# Patient Record
Sex: MALE | Race: WHITE | NOT HISPANIC OR LATINO | Employment: OTHER | URBAN - METROPOLITAN AREA
[De-identification: names, ages, dates, MRNs, and addresses within clinical notes are randomized per-mention and may not be internally consistent; named-entity substitution may affect disease eponyms.]

---

## 2017-04-10 ENCOUNTER — TELEPHONE (OUTPATIENT)
Dept: INTERNAL MEDICINE | Facility: CLINIC | Age: 82
End: 2017-04-10

## 2017-04-10 ENCOUNTER — CLINICAL SUPPORT (OUTPATIENT)
Dept: INTERNAL MEDICINE | Facility: CLINIC | Age: 82
End: 2017-04-10
Payer: COMMERCIAL

## 2017-04-10 DIAGNOSIS — E78.5 HYPERLIPIDEMIA, UNSPECIFIED HYPERLIPIDEMIA TYPE: ICD-10-CM

## 2017-04-10 DIAGNOSIS — I25.5 ISCHEMIC CARDIOMYOPATHY: ICD-10-CM

## 2017-04-10 DIAGNOSIS — I25.5 ISCHEMIC CARDIOMYOPATHY: Primary | ICD-10-CM

## 2017-04-10 LAB
ALBUMIN SERPL BCP-MCNC: 3.9 G/DL
ALP SERPL-CCNC: 90 U/L
ALT SERPL W/O P-5'-P-CCNC: 17 U/L
ANION GAP SERPL CALC-SCNC: 11 MMOL/L
AST SERPL-CCNC: 21 U/L
BASOPHILS # BLD AUTO: 0.04 K/UL
BASOPHILS NFR BLD: 0.4 %
BILIRUB SERPL-MCNC: 0.4 MG/DL
BUN SERPL-MCNC: 19 MG/DL
CALCIUM SERPL-MCNC: 9.5 MG/DL
CHLORIDE SERPL-SCNC: 103 MMOL/L
CHOLEST/HDLC SERPL: 3.7 {RATIO}
CO2 SERPL-SCNC: 27 MMOL/L
CREAT SERPL-MCNC: 1.2 MG/DL
DIFFERENTIAL METHOD: NORMAL
EOSINOPHIL # BLD AUTO: 0.2 K/UL
EOSINOPHIL NFR BLD: 2.6 %
ERYTHROCYTE [DISTWIDTH] IN BLOOD BY AUTOMATED COUNT: 14 %
EST. GFR  (AFRICAN AMERICAN): >60 ML/MIN/1.73 M^2
EST. GFR  (NON AFRICAN AMERICAN): 54.4 ML/MIN/1.73 M^2
GLUCOSE SERPL-MCNC: 109 MG/DL
HCT VFR BLD AUTO: 45 %
HDL/CHOLESTEROL RATIO: 27.1 %
HDLC SERPL-MCNC: 140 MG/DL
HDLC SERPL-MCNC: 38 MG/DL
HGB BLD-MCNC: 15.1 G/DL
LDLC SERPL CALC-MCNC: 79.8 MG/DL
LYMPHOCYTES # BLD AUTO: 2.2 K/UL
LYMPHOCYTES NFR BLD: 24 %
MCH RBC QN AUTO: 30.6 PG
MCHC RBC AUTO-ENTMCNC: 33.6 %
MCV RBC AUTO: 91 FL
MONOCYTES # BLD AUTO: 0.7 K/UL
MONOCYTES NFR BLD: 7.1 %
NEUTROPHILS # BLD AUTO: 6.1 K/UL
NEUTROPHILS NFR BLD: 65.7 %
NONHDLC SERPL-MCNC: 102 MG/DL
PLATELET # BLD AUTO: 240 K/UL
PMV BLD AUTO: 10.7 FL
POTASSIUM SERPL-SCNC: 5 MMOL/L
PROT SERPL-MCNC: 7.5 G/DL
RBC # BLD AUTO: 4.93 M/UL
SODIUM SERPL-SCNC: 141 MMOL/L
T4 FREE SERPL-MCNC: 0.74 NG/DL
TRIGL SERPL-MCNC: 111 MG/DL
TSH SERPL DL<=0.005 MIU/L-ACNC: 10.44 UIU/ML
WBC # BLD AUTO: 9.24 K/UL

## 2017-04-10 PROCEDURE — 84443 ASSAY THYROID STIM HORMONE: CPT

## 2017-04-10 PROCEDURE — 85025 COMPLETE CBC W/AUTO DIFF WBC: CPT

## 2017-04-10 PROCEDURE — 99999 PR PBB SHADOW E&M-EST. PATIENT-LVL I: CPT | Mod: PBBFAC,,,

## 2017-04-10 PROCEDURE — 80061 LIPID PANEL: CPT

## 2017-04-10 PROCEDURE — 84439 ASSAY OF FREE THYROXINE: CPT

## 2017-04-10 PROCEDURE — 80053 COMPREHEN METABOLIC PANEL: CPT

## 2017-04-10 NOTE — TELEPHONE ENCOUNTER
Patient came in with outside orders, please sign   [facility]   Phone number [phone]  Fax number [phone]

## 2017-04-11 ENCOUNTER — TELEPHONE (OUTPATIENT)
Dept: INTERNAL MEDICINE | Facility: CLINIC | Age: 82
End: 2017-04-11

## 2017-04-11 DIAGNOSIS — R79.89 ABNORMAL TSH: Primary | ICD-10-CM

## 2017-04-11 NOTE — TELEPHONE ENCOUNTER
See his lab results.  Please fax to his cardiologist Dr Kirby Figueroa and inform pt of thyroid results and schedule for lab repeat in a week.

## 2017-04-13 ENCOUNTER — TELEPHONE (OUTPATIENT)
Dept: INTERNAL MEDICINE | Facility: CLINIC | Age: 82
End: 2017-04-13

## 2017-04-13 NOTE — TELEPHONE ENCOUNTER
----- Message from Catherine Kauffman sent at 4/13/2017  4:22 PM CDT -----  Contact: Tiara/granddaughter  Tiara returned call to nurse. She can be contacted at [phone].    Thanks,  Catherine

## 2017-04-13 NOTE — TELEPHONE ENCOUNTER
----- Message from Catherine Kauffman sent at 4/13/2017  4:22 PM CDT -----  Contact: Tiara/granddaughter  Tiara returned call to nurse. She can be contacted at [phone].    Thanks,  Catherine

## 2017-04-17 ENCOUNTER — CLINICAL SUPPORT (OUTPATIENT)
Dept: INTERNAL MEDICINE | Facility: CLINIC | Age: 82
End: 2017-04-17
Payer: COMMERCIAL

## 2017-04-17 DIAGNOSIS — R79.89 ABNORMAL TSH: ICD-10-CM

## 2017-04-17 PROCEDURE — 84439 ASSAY OF FREE THYROXINE: CPT

## 2017-04-17 PROCEDURE — 99999 PR PBB SHADOW E&M-EST. PATIENT-LVL I: CPT | Mod: PBBFAC,,,

## 2017-04-17 PROCEDURE — 84481 FREE ASSAY (FT-3): CPT

## 2017-04-17 PROCEDURE — 84443 ASSAY THYROID STIM HORMONE: CPT

## 2017-04-18 LAB
T3FREE SERPL-MCNC: 2.8 PG/ML
T4 FREE SERPL-MCNC: 0.8 NG/DL
TSH SERPL DL<=0.005 MIU/L-ACNC: 11.99 UIU/ML

## 2017-04-28 ENCOUNTER — OFFICE VISIT (OUTPATIENT)
Dept: INTERNAL MEDICINE | Facility: CLINIC | Age: 82
End: 2017-04-28
Payer: COMMERCIAL

## 2017-04-28 VITALS
DIASTOLIC BLOOD PRESSURE: 70 MMHG | WEIGHT: 183.06 LBS | OXYGEN SATURATION: 95 % | TEMPERATURE: 98 F | HEART RATE: 61 BPM | SYSTOLIC BLOOD PRESSURE: 116 MMHG | HEIGHT: 70 IN | BODY MASS INDEX: 26.21 KG/M2

## 2017-04-28 DIAGNOSIS — N39.44 ENURESIS, NOCTURNAL ONLY: ICD-10-CM

## 2017-04-28 DIAGNOSIS — R35.1 NOCTURIA: ICD-10-CM

## 2017-04-28 DIAGNOSIS — E03.9 HYPOTHYROIDISM (ACQUIRED): Primary | ICD-10-CM

## 2017-04-28 DIAGNOSIS — R13.10 DYSPHAGIA, UNSPECIFIED TYPE: ICD-10-CM

## 2017-04-28 DIAGNOSIS — L60.3 DYSTROPHIC NAIL: ICD-10-CM

## 2017-04-28 LAB — THYROPEROXIDASE IGG SERPL-ACNC: 437.1 IU/ML

## 2017-04-28 PROCEDURE — 1126F AMNT PAIN NOTED NONE PRSNT: CPT | Mod: S$GLB,,, | Performed by: FAMILY MEDICINE

## 2017-04-28 PROCEDURE — 86376 MICROSOMAL ANTIBODY EACH: CPT

## 2017-04-28 PROCEDURE — 1157F ADVNC CARE PLAN IN RCRD: CPT | Mod: 8P,S$GLB,, | Performed by: FAMILY MEDICINE

## 2017-04-28 PROCEDURE — 1160F RVW MEDS BY RX/DR IN RCRD: CPT | Mod: S$GLB,,, | Performed by: FAMILY MEDICINE

## 2017-04-28 PROCEDURE — 99999 PR PBB SHADOW E&M-EST. PATIENT-LVL IV: CPT | Mod: PBBFAC,,, | Performed by: FAMILY MEDICINE

## 2017-04-28 PROCEDURE — 99214 OFFICE O/P EST MOD 30 MIN: CPT | Mod: S$GLB,,, | Performed by: FAMILY MEDICINE

## 2017-04-28 PROCEDURE — 1159F MED LIST DOCD IN RCRD: CPT | Mod: S$GLB,,, | Performed by: FAMILY MEDICINE

## 2017-04-28 RX ORDER — LEVOTHYROXINE SODIUM 25 UG/1
25 TABLET ORAL DAILY
Qty: 90 TABLET | Refills: 0 | Status: SHIPPED | OUTPATIENT
Start: 2017-04-28 | End: 2017-07-07 | Stop reason: DRUGHIGH

## 2017-04-28 RX ORDER — LOTEPREDNOL ETABONATE 5 MG/ML
SUSPENSION/ DROPS OPHTHALMIC
Refills: 5 | COMMUNITY
Start: 2017-04-17 | End: 2018-04-17 | Stop reason: ALTCHOICE

## 2017-04-28 NOTE — PROGRESS NOTES
Subjective:       Patient ID: Brant Lees is a 86 y.o. male.    Chief Complaint: discuss labs    HPI Comments: Here with his granddaughter who takes care of his meds, etc. He had labs per cardiologist which miles drawn here and included a TSH - it was elevated.  A repeat was performed at my request. He is here to review those results. - TSH still elevated 10.44 and then 11.99. FT4 and FT3 both WNL though lower nl.  Other labs all OK except eGFR sl decrease at 54.    He is also c/o frequency only at night with occasional leakage. Neither problem noted during the day.  C/O long nail on right he can't cut - been going to nail salons but does not trust them.      Review of Systems   Constitutional: Positive for fatigue (working in garden) and unexpected weight change (increased 13 lbs in last year).   HENT: Positive for trouble swallowing (off and on - cornbread and meat).    Respiratory: Negative for shortness of breath.    Cardiovascular: Negative for chest pain, palpitations and leg swelling.   Gastrointestinal: Negative for constipation.   Genitourinary: Positive for enuresis and frequency (only at night). Negative for difficulty urinating and urgency.   Skin: Negative for color change and rash.   Psychiatric/Behavioral: Negative for sleep disturbance.       Objective:      Physical Exam   Constitutional: He is oriented to person, place, and time. He appears well-developed.   HENT:   Head: Normocephalic and atraumatic.   Neck: Normal range of motion. Tracheal deviation present.   Cardiovascular: Normal rate and regular rhythm.    Murmur heard.  Pulmonary/Chest: Effort normal and breath sounds normal.   Neurological: He is alert and oriented to person, place, and time.   Skin: Skin is warm and dry.   R second and third toenails thickened, long, curving second nail   Psychiatric: He has a normal mood and affect. His behavior is normal.         Assessment/Plan:     1. Hypothyroidism (acquired)  Thyroid peroxidase  antibody    levothyroxine (SYNTHROID) 25 MCG tablet    TSH   2. Dysphagia, unspecified type  FL Esophagram Complete   3. Nocturia  Ambulatory referral to Urology   4. Enuresis, nocturnal only  Ambulatory referral to Urology   5. Dystrophic nail  Ambulatory referral to Podiatry   he declines GI eval for swallowing - will obtain GI swallow.  Podiatry for the private pay nail cutting alone.  Urology for complete eval prostate status and nocturia.  Will start on very low dose thyroid in this pt with CAD. He really is not symptomatic. Recheck 3 months on 25 mcg dose.  Check TPO to determine cause hypothyroid state.

## 2017-04-28 NOTE — MR AVS SNAPSHOT
[facility]  [address] [place] [address]  Phone: [phone]  Fax: [phone]                  Brant DIPAK Nabeel   2017 10:20 AM   Office Visit    Description:  Male : 1931   Provider:  Karlee Gibson MD   Department:  [facility]           Reason for Visit     discuss labs           Diagnoses this Visit        Comments    Hypothyroidism (acquired)    -  Primary     Dysphagia, unspecified type         Nocturia         Enuresis, nocturnal only         Dystrophic nail                To Do List           Future Appointments        Provider Department Dept Phone    2017 9:20 AM MD [facility] - Urology [phone]    2017 1:00 PM ONLH XR1-DR [facility] [phone]    2017 1:20 PM MD JAILYN [facility] - Pulmonary Services [phone]      Goals (5 Years of Data)     None      Follow-Up and Disposition     Return in about 2 months (around 2017) for lab recheck.       These Medications        Disp Refills Start End    levothyroxine (SYNTHROID) 25 MCG tablet 90 tablet 0 2017    Take 1 tablet (25 mcg total) by mouth once daily. - Oral    Pharmacy: [facility] - [address] #: [phone]         [facility]     [facility] Nurse Care [facility] -  Assistance  Unless otherwise directed by your provider, please contact [facility], our nurse care line that is available for  assistance.     Registered nurses in the [facility] Center provide: appointment scheduling, clinical advisement, health education, and other advisory services.  Call: [phone] (toll free)               Medications           Message regarding Medications     Verify the changes and/or additions to your medication regime listed below are the same as discussed with your clinician today.  If any of these changes or additions are incorrect, please  "notify your healthcare provider.        START taking these NEW medications        Refills    levothyroxine (SYNTHROID) 25 MCG tablet 0    Sig: Take 1 tablet (25 mcg total) by mouth once daily.    Class: Normal    Route: Oral      STOP taking these medications     guaifenesin-codeine 100-10 mg/5 ml (TUSSI-ORGANIDIN NR)  mg/5 mL syrup     prednisoLONE acetate (PRED FORTE) 1 % DrpS Place 1 drop into both eyes.           Verify that the below list of medications is an accurate representation of the medications you are currently taking.  If none reported, the list may be blank. If incorrect, please contact your healthcare provider. Carry this list with you in case of emergency.           Current Medications     amlodipine (NORVASC) 10 MG tablet Take 1 tablet by mouth. 1/2 two times daily    ASPIRIN (ASPIR-81 ORAL) Take by mouth.    clopidogrel (PLAVIX) 75 mg tablet Take 75 mg by mouth once daily.    levothyroxine (SYNTHROID) 25 MCG tablet Take 1 tablet (25 mcg total) by mouth once daily.    lisinopril (PRINIVIL,ZESTRIL) 20 MG tablet Take 1 tablet by mouth.    LOTEMAX 0.5 % ophthalmic suspension INSTILL 1 DROP IN EACH EYE TWICE DAILY THANK YOU    simvastatin (ZOCOR) 20 MG tablet Take 20 mg by mouth every evening.           Clinical Reference Information           Your Vitals Were     BP Pulse Temp Height Weight SpO2    116/70 (BP Location: Right arm, Patient Position: Sitting, BP Method: Automatic) 61 97.7 °F (36.5 °C) (Oral) 5' 10" (1.778 m) 83 kg (183 lb 1.5 oz) 95%    BMI                26.27 kg/m2          Blood Pressure          Most Recent Value    BP  116/70      Allergies as of 4/28/2017     No Known Allergies      Immunizations Administered on Date of Encounter - 4/28/2017     None      Orders Placed During Today's Visit      Normal Orders This Visit    Ambulatory referral to Podiatry     Ambulatory referral to Urology     Thyroid peroxidase antibody     Future Labs/Procedures Expected by Expires    FL " Esophagram Complete  4/28/2017 4/28/2018    TSH  6/29/2017 (Approximate) 4/29/2018      [facility] Sign-Up     Activating your [facility] account is as easy as 1-2-3!     1) Visit [email or web address], select Sign Up Now, enter this activation code and your date of birth, then select Next.  Activation code not generated  Current Patient Portal Status: Account disabled      2) Create a username and password to use when you visit [facility] in the future and select a security question in case you lose your password and select Next.    3) Enter your e-mail address and click Sign Up!    Additional Information  If you have questions, please e-mail [email or web address] or call [phone] to talk to our [email or web address] staff. Remember, [facility] is NOT to be used for urgent needs. For medical emergencies, dial 911.         Language Assistance Services     ATTENTION: Language assistance services are available, free of charge. Please call [phone].      ATENCIÓN: Si habla español, tiene a north disposición servicios gratuitos de asistencia lingüística. Llame al [phone].     HELGA Ý: N?u b?n nói Ti?ng Vi?t, có các d?ch v? h? tr? ngôn ng? mi?n phí dành cho b?n. G?i s? [phone].         [facility] complies with applicable Federal civil rights laws and does not discriminate on the basis of race, color, national origin, age, disability, or sex.

## 2017-05-02 ENCOUNTER — TELEPHONE (OUTPATIENT)
Dept: INTERNAL MEDICINE | Facility: CLINIC | Age: 82
End: 2017-05-02

## 2017-05-02 NOTE — TELEPHONE ENCOUNTER
----- Message from Patt Oviedo sent at 5/2/2017  2:33 PM CDT -----  Contact: Tiara hui  Returning your call.  please call Tiara @ [phone]. Thanks, facundo

## 2017-05-02 NOTE — TELEPHONE ENCOUNTER
Called pt grand daughter, gave her, her grand fathers test results. She did verbalize understanding of the results.

## 2017-06-16 ENCOUNTER — OFFICE VISIT (OUTPATIENT)
Dept: UROLOGY | Facility: CLINIC | Age: 82
End: 2017-06-16
Payer: COMMERCIAL

## 2017-06-16 VITALS
BODY MASS INDEX: 26.26 KG/M2 | HEART RATE: 57 BPM | SYSTOLIC BLOOD PRESSURE: 114 MMHG | WEIGHT: 183 LBS | DIASTOLIC BLOOD PRESSURE: 50 MMHG

## 2017-06-16 DIAGNOSIS — N32.81 OAB (OVERACTIVE BLADDER): ICD-10-CM

## 2017-06-16 DIAGNOSIS — R31.9 HEMATURIA: Primary | ICD-10-CM

## 2017-06-16 LAB
BACTERIA #/AREA URNS AUTO: ABNORMAL /HPF
BILIRUB SERPL-MCNC: NORMAL MG/DL
BLOOD URINE, POC: 50
COLOR, POC UA: YELLOW
GLUCOSE UR QL STRIP: NORMAL
KETONES UR QL STRIP: NORMAL
LEUKOCYTE ESTERASE URINE, POC: NORMAL
MICROSCOPIC COMMENT: ABNORMAL
NITRITE, POC UA: NORMAL
PH, POC UA: 5
POC RESIDUAL URINE VOLUME: 4 ML (ref 0–100)
PROTEIN, POC: NORMAL
RBC #/AREA URNS AUTO: 81 /HPF (ref 0–4)
SPECIFIC GRAVITY, POC UA: 1.01
UROBILINOGEN, POC UA: NORMAL
WBC #/AREA URNS AUTO: 29 /HPF (ref 0–5)

## 2017-06-16 PROCEDURE — 81001 URINALYSIS AUTO W/SCOPE: CPT

## 2017-06-16 PROCEDURE — 99999 PR PBB SHADOW E&M-EST. PATIENT-LVL II: CPT | Mod: PBBFAC,,, | Performed by: UROLOGY

## 2017-06-16 PROCEDURE — 99244 OFF/OP CNSLTJ NEW/EST MOD 40: CPT | Mod: 25,S$GLB,, | Performed by: UROLOGY

## 2017-06-16 PROCEDURE — 81002 URINALYSIS NONAUTO W/O SCOPE: CPT | Mod: S$GLB,,, | Performed by: UROLOGY

## 2017-06-16 PROCEDURE — 51798 US URINE CAPACITY MEASURE: CPT | Mod: S$GLB,,, | Performed by: UROLOGY

## 2017-06-16 RX ORDER — NITROGLYCERIN 0.4 MG/1
0.4 TABLET SUBLINGUAL EVERY 5 MIN PRN
COMMUNITY

## 2017-06-16 NOTE — LETTER
June 16, 2017      Karlee Gibson MD  [address] [place] [address] Dr Daphne BRANDT [id number]           [facility]  [address] [place] [address]  [place] [address]  Phone: [phone]  Fax: [phone]          Patient: Brant Lees   MR Number: [id number]   YOB: 1931   Date of Visit: 6/16/2017       Dear Dr. Karlee Gibson:    Thank you for referring Brant Lees to me for evaluation. Attached you will find relevant portions of my assessment and plan of care.    If you have questions, please do not hesitate to call me. I look forward to following Brant Lees along with you.    Sincerely,    Jorge Alcocer IV, MD    Enclosure  CC:  No Recipients    If you would like to receive this communication electronically, please contact [email or web address] or [phone] to request more information on mojio Link access.    For providers and/or their staff who would like to refer a patient to [facility], please contact us through our one-stop-shop provider referral line, [facility], at [phone].    If you feel you have received this communication in error or would no longer like to receive these types of communications, please e-mail [email or web address]

## 2017-06-16 NOTE — PROGRESS NOTES
Chief Complaint: Nocturia/BPH    HPI:   6/16/17: 85 yo man referred by Dr. iGbson for evaluation of nocturia and LUTS.  Has incontinence at night, sudden urgency has trouble making it to the toilet.  Nocturia like this 2-3/night.  No daytime problems.  Six months so far.   No abd/pelvic pain and no exac/rel factors.  No gross hematuria.  No urolithiasis.  No other urinary bother.  No  history.  PSA was always normal.  2 cups coffee in AM, tea at lunch and dinner (green tea).  Good stream.     Allergies:  Review of patient's allergies indicates no known allergies.    Medications:  has a current medication list which includes the following prescription(s): amlodipine, aspirin, clopidogrel, levothyroxine, lisinopril, lotemax, nitroglycerin, and simvastatin.    Review of Systems:  General: No fever, chills, fatigability, or weight loss.  Skin: No rashes, itching, or changes in color or texture of skin.  Chest: Denies DELUCA, cyanosis, wheezing, cough, and sputum production.  Abdomen: Appetite fine. No weight loss. Denies diarrhea, abdominal pain, hematemesis, or blood in stool.  Musculoskeletal: No joint stiffness or swelling. Denies back pain.  : As above.  All other review of systems negative.    PMH:   has a past medical history of Cardiomyopathy (9/07); HBP (high blood pressure) (1997); Hyperlipidemia; LBP (low back pain); MI (myocardial infarction) (12/06); and Status post primary angioplasty with coronary stent (12/06).    PSH:   has a past surgical history that includes Hernia repair and Coronary stent placement.    FamHx: family history is not on file.    SocHx:  reports that he has never smoked. He has never used smokeless tobacco. He reports that he does not drink alcohol or use drugs.      Physical Exam:  Vitals:    06/16/17 0924   BP: (!) 114/50   Pulse: (!) 57     General: A&Ox3, no apparent distress, no deformities  Neck: No masses, normal thyroid  Lungs: normal inspiration, no use of accessory  muscles  Heart: normal pulse, no arrhythmias  Abdomen: Soft, NT, ND, no masses, no hernias, no hepatosplenomegaly  Lymphatic: Neck and groin nodes negative  Skin: The skin is warm and dry. No jaundice.  Ext: No c/c/e.  : Test desc remington, no abnormalities of epididymus. Penis normal, with normal penile and scrotal skin. Meatus normal. Normal rectal tone, no hemorrhoids. Prost 40 gm no nodules or masses appreciated. SV not palpable. Perineum and anus normal.    Labs/Studies:   Urinalysis performed in clinic, summary: UA normal exc tr prot and 50 blood  Bladder Scan performed in office: PVR 4 ml.    Impression/Plan:   1.  Strictly no caffeine after noon.  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  Reviewed reccs to improve.  Oxybutinin later if pt asks for a little more help.  2.  Will send micro UA to see if hematuria workup is indicated.  3. RTC 1 year

## 2017-06-19 ENCOUNTER — TELEPHONE (OUTPATIENT)
Dept: UROLOGY | Facility: CLINIC | Age: 82
End: 2017-06-19

## 2017-06-19 DIAGNOSIS — R31.9 HEMATURIA: Primary | ICD-10-CM

## 2017-06-19 NOTE — TELEPHONE ENCOUNTER
Patient was contacted and informed of the requested hematuria workup Dr. Alcocer wanted him to have.  Pt asked me to contact his granddaughter, Tiara to inform her.  I called her; no answer. Msg left asking pt to call for appts.  Appts scheduled.

## 2017-06-26 ENCOUNTER — TELEPHONE (OUTPATIENT)
Dept: UROLOGY | Facility: CLINIC | Age: 82
End: 2017-06-26

## 2017-06-26 NOTE — TELEPHONE ENCOUNTER
Attempted to contact pts granddaughter, Tiara to discuss pending appt for CT scan.  No answer.  Msg left to call back.

## 2017-06-26 NOTE — TELEPHONE ENCOUNTER
Received call from Vicki in Radiology at [facility]; stated she spoke to the patient regarding his CT scan and he said he could not afford the $700 co pay and wanted to know if there was an alternate type of test you could order.

## 2017-06-29 ENCOUNTER — TELEPHONE (OUTPATIENT)
Dept: UROLOGY | Facility: CLINIC | Age: 82
End: 2017-06-29

## 2017-06-29 NOTE — TELEPHONE ENCOUNTER
----- Message from Bettina Gomez sent at 6/29/2017  4:13 PM CDT -----  Contact: pt Daughter Vianca  Pt is calling nurse staff regarding pt ct scan to be rescheduled at the Hospital and any other pt test. Pt call back  [phone]

## 2017-06-30 ENCOUNTER — CLINICAL SUPPORT (OUTPATIENT)
Dept: INTERNAL MEDICINE | Facility: CLINIC | Age: 82
End: 2017-06-30
Payer: COMMERCIAL

## 2017-06-30 DIAGNOSIS — E03.9 HYPOTHYROIDISM (ACQUIRED): ICD-10-CM

## 2017-06-30 LAB — TSH SERPL DL<=0.005 MIU/L-ACNC: 5.97 UIU/ML

## 2017-06-30 PROCEDURE — 84443 ASSAY THYROID STIM HORMONE: CPT

## 2017-06-30 PROCEDURE — 84439 ASSAY OF FREE THYROXINE: CPT

## 2017-07-01 LAB — T4 FREE SERPL-MCNC: 0.85 NG/DL

## 2017-07-06 ENCOUNTER — HOSPITAL ENCOUNTER (OUTPATIENT)
Dept: RADIOLOGY | Facility: HOSPITAL | Age: 82
Discharge: HOME | End: 2017-07-06
Attending: UROLOGY
Payer: MEDICARE

## 2017-07-06 DIAGNOSIS — R31.9 HEMATURIA: ICD-10-CM

## 2017-07-06 PROCEDURE — 25500020 PHARM REV CODE 255: Performed by: UROLOGY

## 2017-07-06 PROCEDURE — 74178 CT ABD&PLV WO CNTR FLWD CNTR: CPT | Mod: TC

## 2017-07-06 RX ADMIN — IOHEXOL 75 ML: 350 INJECTION, SOLUTION INTRAVENOUS at 12:07

## 2017-07-07 ENCOUNTER — OFFICE VISIT (OUTPATIENT)
Dept: INTERNAL MEDICINE | Facility: CLINIC | Age: 82
End: 2017-07-07
Payer: MEDICARE

## 2017-07-07 ENCOUNTER — TELEPHONE (OUTPATIENT)
Dept: OTOLARYNGOLOGY | Facility: CLINIC | Age: 82
End: 2017-07-07

## 2017-07-07 VITALS
BODY MASS INDEX: 26.2 KG/M2 | HEIGHT: 70 IN | OXYGEN SATURATION: 96 % | TEMPERATURE: 97 F | DIASTOLIC BLOOD PRESSURE: 68 MMHG | HEART RATE: 53 BPM | SYSTOLIC BLOOD PRESSURE: 124 MMHG | WEIGHT: 183 LBS

## 2017-07-07 DIAGNOSIS — E03.9 HYPOTHYROIDISM (ACQUIRED): Primary | ICD-10-CM

## 2017-07-07 DIAGNOSIS — M54.6 LEFT-SIDED THORACIC BACK PAIN, UNSPECIFIED CHRONICITY: ICD-10-CM

## 2017-07-07 PROCEDURE — 1126F AMNT PAIN NOTED NONE PRSNT: CPT | Mod: S$GLB,,, | Performed by: FAMILY MEDICINE

## 2017-07-07 PROCEDURE — 99999 PR PBB SHADOW E&M-EST. PATIENT-LVL III: CPT | Mod: PBBFAC,,, | Performed by: FAMILY MEDICINE

## 2017-07-07 PROCEDURE — 99213 OFFICE O/P EST LOW 20 MIN: CPT | Mod: S$GLB,,, | Performed by: FAMILY MEDICINE

## 2017-07-07 PROCEDURE — 1159F MED LIST DOCD IN RCRD: CPT | Mod: S$GLB,,, | Performed by: FAMILY MEDICINE

## 2017-07-07 RX ORDER — ACETAMINOPHEN 325 MG/1
325 TABLET ORAL EVERY 6 HOURS PRN
COMMUNITY

## 2017-07-07 RX ORDER — LEVOTHYROXINE SODIUM 50 UG/1
50 TABLET ORAL DAILY
Qty: 90 TABLET | Refills: 0 | Status: SHIPPED | OUTPATIENT
Start: 2017-07-07 | End: 2017-09-17 | Stop reason: SDUPTHER

## 2017-07-07 NOTE — PATIENT INSTRUCTIONS
Obtain your last refill of the 25 mcg dose - double it up to take 2 daily until you get the [facility] rx for 50 mcg.    Try no tylenol and see how your left side does.

## 2017-07-07 NOTE — TELEPHONE ENCOUNTER
----- Message from Bettina Gomez sent at 7/7/2017  9:30 AM CDT -----  Contact: Tati Resendiz  Pt is calling nurse staff regarding patient appt. Pt call back Astrid [phone]

## 2017-07-07 NOTE — PROGRESS NOTES
Subjective:       Patient ID: Brant Lees is a 86 y.o. male.    Chief Complaint: discuss labs    He is here to review his thyroid lab.  At his last visit he was placed on very low dose levothyroxine 25 µg due to a TSH of 10.440, and 11.991 on repeat.  On recheck 6/30/17, his level was still slightly elevated at 5.971.  No active hypothyroid symptoms.  No palpitations, sweats, tremors noted since starting his medication.    He is here with his wife.  The benefits and risks of increasing his dose are discussed.      Review of Systems   HENT: Negative for sore throat and trouble swallowing.    Respiratory: Negative for chest tightness, shortness of breath and wheezing.    Cardiovascular: Negative for chest pain and palpitations.   Musculoskeletal: Positive for myalgias (left flank - - hurts with standing, not sitting or lying down).   Neurological: Negative for numbness.       Objective:      Physical Exam   Constitutional: He is oriented to person, place, and time. He appears well-developed.   HENT:   Head: Normocephalic and atraumatic.   Cardiovascular: Normal rate and regular rhythm.    Murmur (2/6 LSB) heard.  Pulmonary/Chest: Effort normal and breath sounds normal.   No flank TTP B    Musculoskeletal:   Pain to left flank with lumbar flexion - but not with truncal twisting/side bending   Neurological: He is alert and oriented to person, place, and time.   Skin: Skin is warm and dry.   Psychiatric: He has a normal mood and affect. His behavior is normal.         Assessment/Plan:     1. Hypothyroidism (acquired)  levothyroxine (SYNTHROID) 50 MCG tablet    TSH   2. Left-sided thoracic back pain, unspecified chronicity      we will increase his dose to 50 µg levothyroxine.  And recheck in 90 days.  Obtain your last refill of the 25 mcg dose - double it up to take 2 daily until you get the [facility] rx for 50 mcg.  Try no tylenol and see how your left side does.

## 2017-07-07 NOTE — TELEPHONE ENCOUNTER
Patient's granddaughter states she would like a later time for patient's appointment on Monday. Informed her that there is no later time on that day and offered next available appt on 7/26/17. Granddaughter declined and will keep appointment as scheduled.

## 2017-07-10 ENCOUNTER — OFFICE VISIT (OUTPATIENT)
Dept: UROLOGY | Facility: CLINIC | Age: 82
End: 2017-07-10
Payer: COMMERCIAL

## 2017-07-10 VITALS — BODY MASS INDEX: 26.49 KG/M2 | WEIGHT: 182 LBS

## 2017-07-10 DIAGNOSIS — N32.9 LESION OF BLADDER: ICD-10-CM

## 2017-07-10 DIAGNOSIS — Z98.890 H/O TRANSURETHRAL DESTRUCTION OF BLADDER LESION: Primary | ICD-10-CM

## 2017-07-10 PROCEDURE — 52000 CYSTOURETHROSCOPY: CPT | Mod: S$GLB,,, | Performed by: UROLOGY

## 2017-07-10 PROCEDURE — 99999 PR PBB SHADOW E&M-EST. PATIENT-LVL III: CPT | Mod: PBBFAC,,, | Performed by: UROLOGY

## 2017-07-10 PROCEDURE — 99499 UNLISTED E&M SERVICE: CPT | Mod: S$GLB,,, | Performed by: UROLOGY

## 2017-07-10 NOTE — PROGRESS NOTES
Chief Complaint: Bladder Lesion    HPI:   7/10/17: Hematuria confirmed, CT Urogram reassuring.  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  6/16/17: 85 yo man referred by Dr. Gibson for evaluation of nocturia and LUTS.  Has incontinence at night, sudden urgency has trouble making it to the toilet.  Nocturia like this 2-3/night.  No daytime problems.  Six months so far.   No abd/pelvic pain and no exac/rel factors.  No gross hematuria.  No urolithiasis.  No other urinary bother.  No  history.  PSA was always normal.  2 cups coffee in AM, tea at lunch and dinner (green tea).  Good stream.     Allergies:  Review of patient's allergies indicates no known allergies.    Medications:  has a current medication list which includes the following prescription(s): acetaminophen, amlodipine, aspirin, clopidogrel, levothyroxine, lisinopril, lotemax, nitroglycerin, and simvastatin.    Review of Systems:  General: No fever, chills, fatigability, or weight loss.  Skin: No rashes, itching, or changes in color or texture of skin.  Chest: Denies DELUCA, cyanosis, wheezing, cough, and sputum production.  Abdomen: Appetite fine. No weight loss. Denies diarrhea, abdominal pain, hematemesis, or blood in stool.  Musculoskeletal: No joint stiffness or swelling. Denies back pain.  : As above.  All other review of systems negative.    PMH:   has a past medical history of Cardiomyopathy (9/07); HBP (high blood pressure) (1997); Hyperlipidemia; LBP (low back pain); MI (myocardial infarction) (12/06); and Status post primary angioplasty with coronary stent (12/06).    PSH:   has a past surgical history that includes Hernia repair and Coronary stent placement.    FamHx: family history is not on file.    SocHx:  reports that he has never smoked. He has never used smokeless tobacco. He reports that  he does not drink alcohol or use drugs.      Physical Exam:  There were no vitals filed for this visit.  General: A&Ox3, no apparent distress, no deformities  Neck: No masses, normal thyroid  Lungs: normal inspiration, no use of accessory muscles  Heart: normal pulse, no arrhythmias  Abdomen: Soft, NT, ND  Skin: The skin is warm and dry. No jaundice.  Ext: No c/c/e.  :   6/17: Test desc remington, no abnormalities of epididymus. Penis normal, with normal penile and scrotal skin. Meatus normal. Normal rectal tone, no hemorrhoids. Prost 40 gm no nodules or masses appreciated. SV not palpable. Perineum and anus normal.    Labs/Studies:   Urinalysis performed in clinic, summary:    6/17: UA normal exc tr prot and 50 blood  Bladder Scan performed in office:     6/17: PVR 4 ml.    Procedure: Diagnostic Cystoscopy    Procedure in Detail: After proper consents were obtained, the patient was prepped and draped in normal sterile fashion for diagnostic cystoscopy. 5 ml of lidocaine jelly was instilled in the urethra. The flexible cystoscope was then introduced into the urethra, and advanced into the bladder under direct vision. The urethral mucosa appeared normal, and no strictures were noted. The sphincter appeared to be normal, and the veru montanum was unremarkable. The prostatic mucosa and the lateral lobes of the prostate were normal. The bladder neck was normal. Inspection of the interior of the bladder was then carried out. The trigone was unremarkable, with no mucosal lesions. The ureteral orifices were normal in position and configuration. Systematic inspection of the mucosa of the bladder it was then carried out, rotating the cystoscope so that all areas of the left and right lateral walls, the dome of the bladder, and the posterior wall were all visualized. The cystoscope was then advanced further into the bladder, and maximum deflection of the scope was performed so that the bladder neck could be inspected. No mucosal  lesions were noted there. The cystoscope was then removed, and the procedure terminated.     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    Impression/Plan:   1. Strictly no caffeine after noon.  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  Reviewed reccs to improve.  Oxybutinin later if pt asks for a little more help.  2. Bladder worrisome for CIS/TCC.  Will arrange clearance for surgery and RTC to review/plan/consent.

## 2017-07-24 ENCOUNTER — PATIENT MESSAGE (OUTPATIENT)
Dept: UROLOGY | Facility: CLINIC | Age: 82
End: 2017-07-24

## 2017-07-31 ENCOUNTER — PATIENT MESSAGE (OUTPATIENT)
Dept: INTERNAL MEDICINE | Facility: CLINIC | Age: 82
End: 2017-07-31

## 2017-08-10 ENCOUNTER — OFFICE VISIT (OUTPATIENT)
Dept: UROLOGY | Facility: CLINIC | Age: 82
End: 2017-08-10
Payer: COMMERCIAL

## 2017-08-10 ENCOUNTER — HOSPITAL ENCOUNTER (OUTPATIENT)
Dept: RADIOLOGY | Facility: HOSPITAL | Age: 82
Discharge: HOME | End: 2017-08-10
Attending: UROLOGY
Payer: COMMERCIAL

## 2017-08-10 VITALS
DIASTOLIC BLOOD PRESSURE: 90 MMHG | BODY MASS INDEX: 26.91 KG/M2 | SYSTOLIC BLOOD PRESSURE: 163 MMHG | WEIGHT: 184.88 LBS | HEART RATE: 89 BPM

## 2017-08-10 DIAGNOSIS — N32.9 LESION OF BLADDER: ICD-10-CM

## 2017-08-10 DIAGNOSIS — N32.9 LESION OF BLADDER: Primary | ICD-10-CM

## 2017-08-10 LAB
BILIRUB SERPL-MCNC: NORMAL MG/DL
BLOOD URINE, POC: 50
COLOR, POC UA: NORMAL
GLUCOSE UR QL STRIP: NORMAL
KETONES UR QL STRIP: NORMAL
LEUKOCYTE ESTERASE URINE, POC: NORMAL
NITRITE, POC UA: NORMAL
PH, POC UA: 5
PROTEIN, POC: NORMAL
SPECIFIC GRAVITY, POC UA: 1.01
UROBILINOGEN, POC UA: NORMAL

## 2017-08-10 PROCEDURE — 81002 URINALYSIS NONAUTO W/O SCOPE: CPT | Mod: S$GLB,,, | Performed by: UROLOGY

## 2017-08-10 PROCEDURE — 71020 XR CHEST PA AND LATERAL: CPT | Mod: TC

## 2017-08-10 PROCEDURE — 99999 PR PBB SHADOW E&M-EST. PATIENT-LVL III: CPT | Mod: PBBFAC,,, | Performed by: UROLOGY

## 2017-08-10 PROCEDURE — 71020 XR CHEST PA AND LATERAL: CPT | Mod: 26,,, | Performed by: RADIOLOGY

## 2017-08-10 PROCEDURE — 99214 OFFICE O/P EST MOD 30 MIN: CPT | Mod: 25,S$GLB,, | Performed by: UROLOGY

## 2017-08-10 PROCEDURE — 1126F AMNT PAIN NOTED NONE PRSNT: CPT | Mod: S$GLB,,, | Performed by: UROLOGY

## 2017-08-10 PROCEDURE — 1159F MED LIST DOCD IN RCRD: CPT | Mod: S$GLB,,, | Performed by: UROLOGY

## 2017-08-10 PROCEDURE — 3008F BODY MASS INDEX DOCD: CPT | Mod: S$GLB,,, | Performed by: UROLOGY

## 2017-08-10 NOTE — PROGRESS NOTES
Chief Complaint: Bladder Lesion    HPI:   8/10/17: Cleared for surgery, reviewed findings, arranging TURBT.  7/10/17: Hematuria confirmed, CT Urogram reassuring.  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  6/16/17: 85 yo man referred by Dr. Gibson for evaluation of nocturia and LUTS.  Has incontinence at night, sudden urgency has trouble making it to the toilet.  Nocturia like this 2-3/night.  No daytime problems.  Six months so far.   No abd/pelvic pain and no exac/rel factors.  No gross hematuria.  No urolithiasis.  No other urinary bother.  No  history.  PSA was always normal.  2 cups coffee in AM, tea at lunch and dinner (green tea).  Good stream.     Allergies:  Review of patient's allergies indicates no known allergies.    Medications:  has a current medication list which includes the following prescription(s): acetaminophen, amlodipine, levothyroxine, lisinopril, lotemax, nitroglycerin, simvastatin, aspirin, and clopidogrel.    Review of Systems:  General: No fever, chills, fatigability, or weight loss.  Skin: No rashes, itching, or changes in color or texture of skin.  Chest: Denies DELUCA, cyanosis, wheezing, cough, and sputum production.  Abdomen: Appetite fine. No weight loss. Denies diarrhea, abdominal pain, hematemesis, or blood in stool.  Musculoskeletal: No joint stiffness or swelling. Denies back pain.  : As above.  All other review of systems negative.    PMH:   has a past medical history of Cardiomyopathy (9/07); HBP (high blood pressure) (1997); Hyperlipidemia; LBP (low back pain); MI (myocardial infarction) (12/06); and Status post primary angioplasty with coronary stent (12/06).    PSH:   has a past surgical history that includes Hernia repair and Coronary stent placement.    FamHx: family history is not on file.    SocHx:  reports that he has  never smoked. He has never used smokeless tobacco. He reports that he does not drink alcohol or use drugs.      Physical Exam:  Vitals:    08/10/17 1355   BP: (!) 163/90   Pulse: 89     General: A&Ox3, no apparent distress, no deformities  Neck: No masses, normal thyroid  Lungs: normal inspiration, no use of accessory muscles  Heart: normal pulse, no arrhythmias  Abdomen: Soft, NT, ND  Skin: The skin is warm and dry. No jaundice.  Ext: No c/c/e.  :   6/17: Test desc remington, no abnormalities of epididymus. Penis normal, with normal penile and scrotal skin. Meatus normal. Normal rectal tone, no hemorrhoids. Prost 40 gm no nodules or masses appreciated. SV not palpable. Perineum and anus normal.    Labs/Studies:   Urinalysis performed in clinic, summary:    6/17: UA normal exc tr prot and 50 blood  Bladder Scan performed in office:     6/17: PVR 4 ml.    Impression/Plan:   1. Risks/benefits of TURBT reviewed, consents on chart.  Will schedule.

## 2017-08-14 NOTE — PRE ADMISSION SCREENING
Pre op instructions reviewed with patient's granddaughter per phone:    To confirm, Your surgeon has instructed you:  Surgery is scheduled 08/22/17 at per MD      [facility] floor main lobby by per MD.      INSTRUCTIONS IMPORTANT!!!  ¨ Do not eat, drink, or smoke after 12 midnight-including water. OK to brush teeth, no gum, candy or mints!    ¨ Take only these medicines with a small swallow of water-morning of surgery.  Amlodipine, Synthroid, LIsinopril, use Lotemax opth gtts    Hold Plavix and ASA 7-10 days prior to surgery      ____  Do not wear makeup, including mascara.  ____  No powder, lotions or creams to surgical area.  ____  Please remove all jewelry, including piercings and leave at home.  ____  No money or valuables needed. Please leave at home.  ____  Please bring identification and insurance information to hospital.  ____  If going home the same day, arrange for a ride home. You will not be able to   drive if Anesthesia was used.  ____  Children, under 12 years old, must remain in the waiting room with an adult.  They are not allowed in patient areas.  ____  Wear loose fitting clothing. Allow for dressings, bandages.  ____  Stop Aspirin, Ibuprofen, Motrin and Aleve at least 5-7 days before surgery, unless otherwise instructed by your doctor, or the nurse.   You MAY use Tylenol/acetaminophen until day of surgery.  ____  If you take diabetic medication, do not take am of surgery unless instructed by   Doctor.  ____ Stop taking any Fish Oil supplement or any Vitamins that contain Vitamin E at least 5 days prior to surgery.          Bathing Instructions-- The night before surgery and the morning prior to coming to the hospital:   -Do not shave the surgical area.   -Shower and wash your hair and body as usual with anti-bacterial  soap and shampoo.   -Rinse your hair and body completely.   -Use one packet of hibiclens to wash the surgical site (using your hand)  gently for 5 minutes.  Do not scrub you skin too hard.   -Do not use hibiclens on your head, face, or genitals.   -Do not wash with anti-bacterial soap after you use the hibiclens.   -Rinse your body thoroughly.   -Dry with clean, soft towel.  Do not use lotion, cream, deodorant, or powders on   the surgical site.    Use antibacterial soap in place of hibiclens if your surgery is on the head, face or genitals.         Surgical Site Infection    Prevention of surgical site infections:     -Keep incisions clean and dry.   -Do not soak/submerge incisions in water until completely healed.   -Do not apply lotions, powders, creams, or deodorants to site.   -Always make sure hands are cleaned with antibacterial soap/ alcohol-based   prior to touching the surgical site.  (This includes doctors, nurses, staff, and yourself.)    Signs and symptoms:   -Redness and pain around the area where you had surgery   -Drainage of cloudy fluid from your surgical wound   -Fever over 100.4  I have read or had read and explained to me, and understand the above information.

## 2017-08-21 ENCOUNTER — ANESTHESIA EVENT (OUTPATIENT)
Dept: SURGERY | Facility: HOSPITAL | Age: 82
End: 2017-08-21
Payer: COMMERCIAL

## 2017-08-22 ENCOUNTER — TELEPHONE (OUTPATIENT)
Dept: UROLOGY | Facility: CLINIC | Age: 82
End: 2017-08-22

## 2017-08-22 ENCOUNTER — ANESTHESIA (OUTPATIENT)
Dept: SURGERY | Facility: HOSPITAL | Age: 82
End: 2017-08-22
Payer: COMMERCIAL

## 2017-08-22 ENCOUNTER — SURGERY (OUTPATIENT)
Age: 82
End: 2017-08-22

## 2017-08-22 ENCOUNTER — HOSPITAL ENCOUNTER (OUTPATIENT)
Facility: HOSPITAL | Age: 82
Discharge: HOME | End: 2017-08-22
Attending: UROLOGY | Admitting: UROLOGY
Payer: COMMERCIAL

## 2017-08-22 DIAGNOSIS — N32.9 LESION OF BLADDER: ICD-10-CM

## 2017-08-22 PROCEDURE — 25000003 PHARM REV CODE 250: Performed by: NURSE ANESTHETIST, CERTIFIED REGISTERED

## 2017-08-22 PROCEDURE — 71000033 HC RECOVERY, INTIAL HOUR: Performed by: UROLOGY

## 2017-08-22 PROCEDURE — 88305 TISSUE EXAM BY PATHOLOGIST: CPT | Mod: 26,,, | Performed by: PATHOLOGY

## 2017-08-22 PROCEDURE — 63600175 PHARM REV CODE 636 W HCPCS: Performed by: NURSE ANESTHETIST, CERTIFIED REGISTERED

## 2017-08-22 PROCEDURE — 88307 TISSUE EXAM BY PATHOLOGIST: CPT | Performed by: PATHOLOGY

## 2017-08-22 PROCEDURE — 37000009 HC ANESTHESIA EA ADD 15 MINS: Performed by: UROLOGY

## 2017-08-22 PROCEDURE — 36000706: Performed by: UROLOGY

## 2017-08-22 PROCEDURE — 88307 TISSUE EXAM BY PATHOLOGIST: CPT | Mod: 26,,, | Performed by: PATHOLOGY

## 2017-08-22 PROCEDURE — 52235 CYSTOSCOPY AND TREATMENT: CPT | Mod: ,,, | Performed by: UROLOGY

## 2017-08-22 PROCEDURE — 27201423 OPTIME MED/SURG SUP & DEVICES STERILE SUPPLY: Performed by: UROLOGY

## 2017-08-22 PROCEDURE — 88305 TISSUE EXAM BY PATHOLOGIST: CPT | Performed by: PATHOLOGY

## 2017-08-22 PROCEDURE — 36000707: Performed by: UROLOGY

## 2017-08-22 PROCEDURE — 63600175 PHARM REV CODE 636 W HCPCS: Performed by: UROLOGY

## 2017-08-22 PROCEDURE — 37000008 HC ANESTHESIA 1ST 15 MINUTES: Performed by: UROLOGY

## 2017-08-22 PROCEDURE — 71000015 HC POSTOP RECOV 1ST HR: Performed by: UROLOGY

## 2017-08-22 RX ORDER — PROPOFOL 10 MG/ML
VIAL (ML) INTRAVENOUS
Status: DISCONTINUED | OUTPATIENT
Start: 2017-08-22 | End: 2017-08-22

## 2017-08-22 RX ORDER — DOCUSATE SODIUM 100 MG/1
100 CAPSULE, LIQUID FILLED ORAL 2 TIMES DAILY
Qty: 60 CAPSULE | Refills: 0 | Status: SHIPPED | OUTPATIENT
Start: 2017-08-22 | End: 2017-09-28

## 2017-08-22 RX ORDER — ONDANSETRON 8 MG/1
8 TABLET, ORALLY DISINTEGRATING ORAL EVERY 8 HOURS PRN
Status: DISCONTINUED | OUTPATIENT
Start: 2017-08-22 | End: 2017-08-22 | Stop reason: HOSPADM

## 2017-08-22 RX ORDER — FUROSEMIDE 10 MG/ML
INJECTION INTRAMUSCULAR; INTRAVENOUS
Status: DISCONTINUED | OUTPATIENT
Start: 2017-08-22 | End: 2017-08-22

## 2017-08-22 RX ORDER — MORPHINE SULFATE 10 MG/ML
2 INJECTION INTRAMUSCULAR; INTRAVENOUS; SUBCUTANEOUS EVERY 5 MIN PRN
Status: DISCONTINUED | OUTPATIENT
Start: 2017-08-22 | End: 2017-08-22 | Stop reason: HOSPADM

## 2017-08-22 RX ORDER — CIPROFLOXACIN 500 MG/1
500 TABLET ORAL EVERY 12 HOURS
Qty: 6 TABLET | Refills: 0 | Status: SHIPPED | OUTPATIENT
Start: 2017-08-22 | End: 2017-08-25

## 2017-08-22 RX ORDER — FENTANYL CITRATE 50 UG/ML
INJECTION, SOLUTION INTRAMUSCULAR; INTRAVENOUS
Status: DISCONTINUED | OUTPATIENT
Start: 2017-08-22 | End: 2017-08-22

## 2017-08-22 RX ORDER — OXYCODONE HYDROCHLORIDE 5 MG/1
5 TABLET ORAL
Status: DISCONTINUED | OUTPATIENT
Start: 2017-08-22 | End: 2017-08-22 | Stop reason: HOSPADM

## 2017-08-22 RX ORDER — SODIUM CHLORIDE 0.9 % (FLUSH) 0.9 %
3 SYRINGE (ML) INJECTION
Status: DISCONTINUED | OUTPATIENT
Start: 2017-08-22 | End: 2017-08-22 | Stop reason: HOSPADM

## 2017-08-22 RX ORDER — MEPERIDINE HYDROCHLORIDE 50 MG/ML
12.5 INJECTION INTRAMUSCULAR; INTRAVENOUS; SUBCUTANEOUS ONCE AS NEEDED
Status: DISCONTINUED | OUTPATIENT
Start: 2017-08-22 | End: 2017-08-22 | Stop reason: HOSPADM

## 2017-08-22 RX ORDER — HYDROCODONE BITARTRATE AND ACETAMINOPHEN 5; 325 MG/1; MG/1
1 TABLET ORAL EVERY 4 HOURS PRN
Status: DISCONTINUED | OUTPATIENT
Start: 2017-08-22 | End: 2017-08-22 | Stop reason: HOSPADM

## 2017-08-22 RX ORDER — LIDOCAINE HYDROCHLORIDE 10 MG/ML
1 INJECTION, SOLUTION EPIDURAL; INFILTRATION; INTRACAUDAL; PERINEURAL ONCE
Status: DISCONTINUED | OUTPATIENT
Start: 2017-08-22 | End: 2017-08-22 | Stop reason: HOSPADM

## 2017-08-22 RX ORDER — HYDROCODONE BITARTRATE AND ACETAMINOPHEN 10; 325 MG/1; MG/1
1 TABLET ORAL EVERY 4 HOURS PRN
Status: DISCONTINUED | OUTPATIENT
Start: 2017-08-22 | End: 2017-08-22 | Stop reason: HOSPADM

## 2017-08-22 RX ORDER — SODIUM CHLORIDE, SODIUM LACTATE, POTASSIUM CHLORIDE, CALCIUM CHLORIDE 600; 310; 30; 20 MG/100ML; MG/100ML; MG/100ML; MG/100ML
INJECTION, SOLUTION INTRAVENOUS CONTINUOUS PRN
Status: DISCONTINUED | OUTPATIENT
Start: 2017-08-22 | End: 2017-08-22

## 2017-08-22 RX ORDER — CEFAZOLIN SODIUM 2 G/50ML
2 SOLUTION INTRAVENOUS
Status: DISCONTINUED | OUTPATIENT
Start: 2017-08-22 | End: 2017-08-22 | Stop reason: HOSPADM

## 2017-08-22 RX ORDER — SODIUM CHLORIDE 0.9 % (FLUSH) 0.9 %
3 SYRINGE (ML) INJECTION EVERY 8 HOURS
Status: DISCONTINUED | OUTPATIENT
Start: 2017-08-22 | End: 2017-08-22 | Stop reason: HOSPADM

## 2017-08-22 RX ORDER — ONDANSETRON 2 MG/ML
INJECTION INTRAMUSCULAR; INTRAVENOUS
Status: DISCONTINUED | OUTPATIENT
Start: 2017-08-22 | End: 2017-08-22

## 2017-08-22 RX ORDER — METOCLOPRAMIDE HYDROCHLORIDE 5 MG/ML
10 INJECTION INTRAMUSCULAR; INTRAVENOUS EVERY 10 MIN PRN
Status: DISCONTINUED | OUTPATIENT
Start: 2017-08-22 | End: 2017-08-22 | Stop reason: HOSPADM

## 2017-08-22 RX ORDER — ROCURONIUM BROMIDE 10 MG/ML
INJECTION, SOLUTION INTRAVENOUS
Status: DISCONTINUED | OUTPATIENT
Start: 2017-08-22 | End: 2017-08-22

## 2017-08-22 RX ORDER — LIDOCAINE HYDROCHLORIDE 10 MG/ML
INJECTION INFILTRATION; PERINEURAL
Status: DISCONTINUED | OUTPATIENT
Start: 2017-08-22 | End: 2017-08-22

## 2017-08-22 RX ORDER — OXYCODONE AND ACETAMINOPHEN 5; 325 MG/1; MG/1
1-2 TABLET ORAL EVERY 4 HOURS PRN
Qty: 30 TABLET | Refills: 0 | Status: SHIPPED | OUTPATIENT
Start: 2017-08-22 | End: 2017-09-28

## 2017-08-22 RX ADMIN — CEFAZOLIN SODIUM 2 G: 2 SOLUTION INTRAVENOUS at 03:08

## 2017-08-22 RX ADMIN — ROCURONIUM BROMIDE 5 MG: 10 INJECTION, SOLUTION INTRAVENOUS at 03:08

## 2017-08-22 RX ADMIN — PROPOFOL 100 MG: 10 INJECTION, EMULSION INTRAVENOUS at 03:08

## 2017-08-22 RX ADMIN — FUROSEMIDE 10 MG: 10 INJECTION, SOLUTION INTRAMUSCULAR; INTRAVENOUS at 03:08

## 2017-08-22 RX ADMIN — LIDOCAINE HYDROCHLORIDE 80 MG: 10 INJECTION, SOLUTION INFILTRATION; PERINEURAL at 03:08

## 2017-08-22 RX ADMIN — ROCURONIUM BROMIDE 10 MG: 10 INJECTION, SOLUTION INTRAVENOUS at 03:08

## 2017-08-22 RX ADMIN — SODIUM CHLORIDE, SODIUM LACTATE, POTASSIUM CHLORIDE, AND CALCIUM CHLORIDE: 600; 310; 30; 20 INJECTION, SOLUTION INTRAVENOUS at 03:08

## 2017-08-22 RX ADMIN — ONDANSETRON 4 MG: 2 INJECTION, SOLUTION INTRAMUSCULAR; INTRAVENOUS at 03:08

## 2017-08-22 RX ADMIN — ROCURONIUM BROMIDE 25 MG: 10 INJECTION, SOLUTION INTRAVENOUS at 03:08

## 2017-08-22 RX ADMIN — PROPOFOL 20 MG: 10 INJECTION, EMULSION INTRAVENOUS at 03:08

## 2017-08-22 RX ADMIN — FENTANYL CITRATE 100 MCG: 50 INJECTION, SOLUTION INTRAMUSCULAR; INTRAVENOUS at 03:08

## 2017-08-22 NOTE — PLAN OF CARE
Pt and pt daughter given discharge instructions. Pt given demonstration of large bag removed and leg bag attached for ride home. darefugiother and pt stated understanding of catheter care. Pt to be brought to main entrance via wheelchair with RN.

## 2017-08-22 NOTE — TELEPHONE ENCOUNTER
----- Message from Jorge Alcocer IV, MD sent at 8/22/2017  4:04 PM CDT -----  Nurse visit treva logan Monday then 2 wks with me

## 2017-08-22 NOTE — ANESTHESIA PREPROCEDURE EVALUATION
08/22/2017  Brant Lees is a 86 y.o., male.    Anesthesia Evaluation    I have reviewed the Patient Summary Reports.    I have reviewed the Nursing Notes.   I have reviewed the Medications.     Review of Systems  Anesthesia Hx:  No problems with previous Anesthesia  Denies Family Hx of Anesthesia complications.   Denies Personal Hx of Anesthesia complications.   Social:  Non-Smoker    Cardiovascular:   Hypertension Past MI CAD   Cardiomyopathy    Last stent in 2007.  Dr. Figueroa is cardiologist and cleared pt.  Doing well and is very active.  No CP or SOB   Pulmonary:  Pulmonary Normal    Neurological:  Neurology Normal        Physical Exam  General:  Well nourished    Airway/Jaw/Neck:  Airway Findings: Mouth Opening: Normal Tongue: Normal  Mallampati: I      Dental:  Dental Findings: Edentulous   Chest/Lungs:  Chest/Lungs Findings: Normal Respiratory Rate, Clear to auscultation     Heart/Vascular:  Heart Findings: Normal            Anesthesia Plan  Type of Anesthesia, risks & benefits discussed:  Anesthesia Type:  general  Patient's Preference:   Intra-op Monitoring Plan:   Intra-op Monitoring Plan Comments:   Post Op Pain Control Plan:   Post Op Pain Control Plan Comments:   Induction:   IV  Beta Blocker:  Patient is not currently on a Beta-Blocker (No further documentation required).       Informed Consent: Patient understands risks and agrees with Anesthesia plan.  Questions answered. Anesthesia consent signed with patient.  ASA Score: 3     Day of Surgery Review of History & Physical: I have interviewed and examined the patient. I have reviewed the patient's H&P dated:  There are no significant changes.          Ready For Surgery From Anesthesia Perspective.

## 2017-08-22 NOTE — PLAN OF CARE
Pt resting on stretcher s/p TURBT. Denies pain at present just have sensation to void. Encouraged pt that he has a logan in bladder and educated on that matter.

## 2017-08-22 NOTE — DISCHARGE INSTRUCTIONS
General Information:    1.  Do not drink alcoholic beverages including beer for 24 hours or as long as you are on pain medication..  2.  Do not drive a motor vehicle, operate machinery or power tools, or signs legal papers for 24 hours or as long as you are on pain medication.   3.  You may experience light-headedness, dizziness, and sleepiness following surgery. Please do not stay alone. A responsible adult should be with you for this 24 hour period.  4.  Go home and rest.    5. Progress slowly to a normal diet unless instructed.  Otherwise, begin with liquids such as soft drinks, then soup and crackers working up to solid foods. Drink plenty of nonalcoholic fluids.  6.  Certain anesthetics and pain medications produce nausea and vomiting in certain       individuals. If nausea becomes a problem at home, call you doctor.    7. Several times every hour while you are awake, take 2-3 deep breaths and cough. If you had stomach surgery hold a pillow or rolled towel firmly against your stomach before you cough. This will help with any pain the cough might cause.    8. Several times every hour while you are awake, pump and flex your feet 5-6 times and do foot circles. This will help prevent blood clots.    9.Call your doctor for severe pain, bleeding, fever, or signs or symptoms of infection (pain, swelling, redness, foul odor, drainage).    10.You can contact your doctor anytime by [phone] for the [facility] (at [facility]) or [phone] for the [facility] on [facility].   [email or web address] is another way to contact your doctor if you are an active participant online with [employer] [facility].      Discharge Instructions: Caring for Your Indwelling Urinary Catheter  You have been discharged with an indwelling urinary catheter (also called a Mendez catheter). A catheter is a thin, flexible tube. An indwelling urinary catheter has two parts. The first part is a tube that drains urine from your bladder.  The second part is a bag or other device that collects the urine.  The most important thing to remember is that you want to prevent infection. Always wash your hands before handling your catheter bag or tubing.  Draining the bedside bag  · Wash your hands with soap and clean, running water or an alcohol-based hand  that contains at least 60% alcohol.  · Hold the drainage tube over a toilet or measuring container.  · Unclamp the tube and let the bag drain.  · Dont touch the tip of the drainage tube or let it touch the toilet or container.  Cleaning the drainage tube  · When the bag is empty, clean the tip of the drainage tube with an alcohol wipe.  · Clamp the tube.  · Reinsert the tube into the pocket on the drainage bag.  Cleaning your skin and tubing  · Clean the skin near the catheter with soap and water.  · Wash your genital area from front to back.  · Wash the catheter tubing. Always wash the catheter in the direction away from your body.  · You will be told when and how to change your bag and tubing.  · Dont try to remove the catheter by yourself.  · You may shower with the catheter in place.  Emptying a leg bag  · Wash your hands.  · Remove the stopper on the bag.  · Drain the bag into the toilet or a measuring container. Dont let the tip of the drainage tube touch anything, including your fingers.  · Clean the tip of the drainage tube with alcohol.  · Replace the stopper.  Follow-up  Make a follow-up appointment as directed by your healthcare provider  When to call your healthcare provider  Call your healthcare provider right away if you have any of the following:  · Chills or fever above 100.4°F (38°C)  · Leakage around the catheter insertion site  · Increased spasms (uncontrollable twitching) in your legs, abdomen, or bladder. Occasional mild spasms are normal.  · Burning in the urinary tract, penis, or genital area  · Nausea and vomiting  · Aching in the lower back  · Cloudy or bloody (pink or  red) urine, sediment or mucus in the urine, or foul-smelling urine   Date Last Reviewed: 9/24/2014 © 2000-2016 [employer]. [address] [place] [address], [place] [address]. All rights reserved. This information is not intended as a substitute for professional medical care. Always follow your healthcare professional's instructions.      Discharge Instructions: Caring for Your Leg Bag  You are going home with a urinary catheter and collection device (drainage bag) in place. One type of collection device is called a leg bag. This is a smaller drainage bag that you can wear on your leg to collect urine during the day. The bag can fit under your clothing. You can move around with greater ease when using a leg bag instead of a larger collection bag.  You were shown how to care for your catheter in the hospital. This sheet will help you remember those steps when you are at home.  Home care  · Wash your hands thoroughly before and after you care for your catheter or collection device.  · Gather your supplies:  ¨ Alcohol wipes  ¨ Soap and water  ¨ Towel and washcloth  ¨ Leg strap and leg bag  · Use soap and water to wash the area where your catheter enters your body. Rinse well.  · Secure the bag gonzalez to your leg:  ¨ Position the leg band high on your thigh with the product label pointing away from your leg.  ¨ Stretch the leg band in place and fasten.  ¨ Place the catheter tubing over the bag and secure it. You may secure it with a Velcro tab or other method, depending on the product you use. Be sure to leave enough loop in the catheter above the leg band to avoid pulling on the tube.  ¨ Every 4 to 6 hours, reposition the band to prevent pressure from the elastic on your leg. You can do this by changing the bag to the other leg or by raising or lowering the leg band.  ¨ Wash the band as frequently as needed. You can hand wash and dry the leg band.  · Place the bag in the bag gonzalez.  · Clean the urine bag end of  the catheter and your catheter port with an alcohol wipe.  · Place a towel under the bag and port to keep urine from dripping onto your leg.  · Before connecting the outlet valve at the bottom of the bag to the catheter, make sure that it is firmly closed. Flip the valve upward toward the bag; it needs to snap firmly in place. Be sure not to tug on the tubing. Be gentle.  · Attach the urine bag to the end of the catheter; insert the connector snugly into the catheter port. You can avoid dribbling urine by bending the catheter tubing just below the tip and holding it while you disconnect it from the catheter. Be careful to keep the tip clean while connecting the leg bag tubing to the catheter--this keeps germs from getting into the system.  · Drain the bag when it is full. To drain the bag, flip the clamp downward. Direct the flexible outlet tube to control the flow of urine. You dont have to disconnect the leg bag from the catheter to empty it. Raise your leg up to the edge of the toilet to reach the leg bag. Then you can empty the bag directly into the toilet. This way, you wont need to bend over, which may be uncomfortable.  · Keep the leg bag clean. Rinse daily with equal parts water and vinegar to reduce odor and keep the bag free of germs.  · Remember to keep the drainage bag below the level of your bladder for proper drainage.  Follow-up  Make a follow-up appointment as directed by your healthcare provider.  When to call your healthcare provider  Call your healthcare provider immediately if you have any of the following:  · Redness, swelling, or warmth around the catheter entry site  · Pus draining from your catheter entry site or into the catheter tubing and bag  · Blood, clots, or floating debris in the urine  · Nausea and vomiting  · Shaking chills  · Fever above 100.4°F (38°C)  · Pain that is not relieved by medication  · Catheter that falls out or is dislodged   Date Last Reviewed: 9/26/2014  ©  1587-9640 [employer]. [address] [place] [address], [place] [address]. All rights reserved. This information is not intended as a substitute for professional medical care. Always follow your healthcare professional's instructions.      Ciprofloxacin tablets  What is this medicine?  CIPROFLOXACIN (sip latha FLOX a sin) is a quinolone antibiotic. It is used to treat certain kinds of bacterial infections. It will not work for colds, flu, or other viral infections.  How should I use this medicine?  Take this medicine by mouth with a glass of water. Follow the directions on the prescription label. Take your medicine at regular intervals. Do not take your medicine more often than directed. Take all of your medicine as directed even if you think your are better. Do not skip doses or stop your medicine early.  You can take this medicine with food or on an empty stomach. It can be taken with a meal that contains dairy or calcium, but do not take it alone with a dairy product, like milk or yogurt or calcium-fortified juice.  A special MedGuide will be given to you by the pharmacist with each prescription and refill. Be sure to read this information carefully each time.  Talk to your pediatrician regarding the use of this medicine in children. Special care may be needed.  What side effects may I notice from receiving this medicine?  Side effects that you should report to your doctor or health care professional as soon as possible:  · allergic reactions like skin rash or hives, swelling of the face, lips, or tongue  · anxious  · confusion  · depressed mood  · diarrhea  · fast, irregular heartbeat  · hallucination, loss of contact with reality  · joint, muscle, or tendon pain or swelling  · pain, tingling, numbness in the hands or feet  · suicidal thoughts or other mood changes  · sunburn  · unusually weak or tired  Side effects that usually do not require medical attention (report to your doctor or health care  professional if they continue or are bothersome):  · dry mouth  · headache  · nausea  · trouble sleeping  What may interact with this medicine?  Do not take this medicine with any of the following medications:  · cisapride  · droperidol  · terfenadine  · tizanidine  This medicine may also interact with the following medications:  · antacids  · birth control pills  · caffeine  · cyclosporin  · didanosine (ddI) buffered tablets or powder  · medicines for diabetes  · medicines for inflammation like ibuprofen, naproxen  · methotrexate  · multivitamins  · omeprazole  · phenytoin  · probenecid  · sucralfate  · theophylline  · warfarin  What if I miss a dose?  If you miss a dose, take it as soon as you can. If it is almost time for your next dose, take only that dose. Do not take double or extra doses.  Where should I keep my medicine?  Keep out of the reach of children.  Store at room temperature below 30 degrees C (86 degrees F). Keep container tightly closed. Throw away any unused medicine after the expiration date.  What should I tell my health care provider before I take this medicine?  They need to know if you have any of these conditions:  · bone problems  · cerebral disease  · history of low levels of potassium in the blood  · joint problems  · irregular heartbeat  · kidney disease  · myasthenia gravis  · seizures  · tendon problems  · tingling of the fingers or toes, or other nerve disorder  · an unusual or allergic reaction to ciprofloxacin, other antibiotics or medicines, foods, dyes, or preservatives  · pregnant or trying to get pregnant  · breast-feeding  What should I watch for while using this medicine?  Tell your doctor or health care professional if your symptoms do not improve.  Do not treat diarrhea with over the counter products. Contact your doctor if you have diarrhea that lasts more than 2 days or if it is severe and watery.  You may get drowsy or dizzy. Do not drive, use machinery, or do anything  that needs mental alertness until you know how this medicine affects you. Do not stand or sit up quickly, especially if you are an older patient. This reduces the risk of dizzy or fainting spells.  This medicine can make you more sensitive to the sun. Keep out of the sun. If you cannot avoid being in the sun, wear protective clothing and use sunscreen. Do not use sun lamps or tanning beds/booths.  Avoid antacids, aluminum, calcium, iron, magnesium, and zinc products for 6 hours before and 2 hours after taking a dose of this medicine.  Date Last Reviewed:   NOTE:This sheet is a summary. It may not cover all possible information. If you have questions about this medicine, talk to your doctor, pharmacist, or health care provider. Copyright© 2016 [employer]            Docusate capsules  What is this medicine?  DOCUSATE (doc CUE sayt) is stool softener. It helps prevent constipation and straining or discomfort associated with hard or dry stools.  How should I use this medicine?  Take this medicine by mouth with a glass of water. Follow the directions on the label. Take your doses at regular intervals. Do not take your medicine more often than directed.  Talk to your pediatrician regarding the use of this medicine in children. While this medicine may be prescribed for children as young as 2 years for selected conditions, precautions do apply.  What side effects may I notice from receiving this medicine?  Side effects that you should report to your doctor or health care professional as soon as possible:  · allergic reactions like skin rash, itching or hives, swelling of the face, lips, or tongue  Side effects that usually do not require medical attention (report to your doctor or health care professional if they continue or are bothersome):  · diarrhea  · stomach cramps  · throat irritation  What may interact with this medicine?  · mineral oil  What if I miss a dose?  If you miss a dose, take it as soon as you can. If  it is almost time for your next dose, take only that dose. Do not take double or extra doses.  Where should I keep my medicine?  Keep out of the reach of children.  Store at room temperature between 15 and 30 degrees C (59 and 86 degrees F). Throw away any unused medicine after the expiration date.  What should I tell my health care provider before I take this medicine?  They need to know if you have any of these conditions:  · nausea or vomiting  · severe constipation  · stomach pain  · sudden change in bowel habit lasting more than 2 weeks  · an unusual or allergic reaction to docusate, other medicines, foods, dyes, or preservatives  · pregnant or trying to get pregnant  · breast-feeding  What should I watch for while using this medicine?  Do not use for more than one week without advice from your doctor or health care professional. If your constipation returns, check with your doctor or health care professional.  Drink plenty of water while taking this medicine. Drinking water helps decrease constipation.  Stop using this medicine and contact your doctor or health care professional if you experience any rectal bleeding or do not have a bowel movement after use. These could be signs of a more serious condition.  Date Last Reviewed:   NOTE:This sheet is a summary. It may not cover all possible information. If you have questions about this medicine, talk to your doctor, pharmacist, or health care provider. Copyright© 2016 [employer]        Acetaminophen; Oxycodone tablets  What is this medicine?  ACETAMINOPHEN; OXYCODONE (a set a BRITTANY lashawn fen; ox i KOE done) is a pain reliever. It is used to treat moderate to severe pain.  How should I use this medicine?  Take this medicine by mouth with a full glass of water. Follow the directions on the prescription label. You can take it with or without food. If it upsets your stomach, take it with food. Take your medicine at regular intervals. Do not take it more often than  directed.  A special MedGuide will be given to you by the pharmacist with each prescription and refill. Be sure to read this information carefully each time.  Talk to your pediatrician regarding the use of this medicine in children. Special care may be needed.  What side effects may I notice from receiving this medicine?  Side effects that you should report to your doctor or health care professional as soon as possible:  · allergic reactions like skin rash, itching or hives, swelling of the face, lips, or tongue  · breathing problems  · confusion  · redness, blistering, peeling or loosening of the skin, including inside the mouth  · signs and symptoms of liver injury like dark yellow or brown urine; general ill feeling or flu-like symptoms; light-colored stools; loss of appetite; nausea; right upper belly pain; unusually weak or tired; yellowing of the eyes or skin  · signs and symptoms of low blood pressure like dizziness; feeling faint or lightheaded, falls; unusually weak or tired  · trouble passing urine or change in the amount of urine  Side effects that usually do not require medical attention (report to your doctor or health care professional if they continue or are bothersome):  · constipation  · dry mouth  · nausea, vomiting  · tiredness  What may interact with this medicine?  This medicine may interact with the following medications:  · alcohol  · antihistamines for allergy, cough and cold  · antiviral medicines for HIV or AIDS  · atropine  · certain antibiotics like clarithromycin, erythromycin, linezolid, rifampin  · certain medicines for anxiety or sleep  · certain medicines for bladder problems like oxybutynin, tolterodine  · certain medicines for depression like amitriptyline, fluoxetine, sertraline  · certain medicines for fungal infections like ketoconazole, itraconazole, voriconazole  · certain medicines for migraine headache like almotriptan, eletriptan, frovatriptan, naratriptan, rizatriptan,  sumatriptan, zolmitriptan  · certain medicines for nausea or vomiting like dolasetron, ondansetron, palonosetron  · certain medicines for Parkinson's disease like benztropine, trihexyphenidyl  · certain medicines for seizures like phenobarbital, phenytoin, primidone  · certain medicines for stomach problems like dicyclomine, hyoscyamine  · certain medicines for travel sickness like scopolamine  · diuretics  · general anesthetics like halothane, isoflurane, methoxyflurane, propofol  · ipratropium  · local anesthetics like lidocaine, pramoxine, tetracaine  · MAOIs like Carbex, Eldepryl, Marplan, Nardil, and Parnate  · medicines that relax muscles for surgery  · methylene blue  · nilotinib  · other medicines with acetaminophen  · other narcotic medicines for pain or cough  · phenothiazines like chlorpromazine, mesoridazine, prochlorperazine, thioridazine  What if I miss a dose?  If you miss a dose, take it as soon as you can. If it is almost time for your next dose, take only that dose. Do not take double or extra doses.  Where should I keep my medicine?  Keep out of the reach of children. This medicine can be abused. Keep your medicine in a safe place to protect it from theft. Do not share this medicine with anyone. Selling or giving away this medicine is dangerous and against the law.  This medicine may cause accidental overdose and death if it taken by other adults, children, or pets. Mix any unused medicine with a substance like cat litter or coffee grounds. Then throw the medicine away in a sealed container like a sealed bag or a coffee can with a lid. Do not use the medicine after the expiration date.  Store at room temperature between 20 and 25 degrees C (68 and 77 degrees F).  What should I tell my health care provider before I take this medicine?  They need to know if you have any of these conditions:  · brain tumor  · Crohn's disease, inflammatory bowel disease, or ulcerative colitis  · drug abuse or  addiction  · head injury  · heart or circulation problems  · if you often drink alcohol  · kidney disease or problems going to the bathroom  · liver disease  · lung disease, asthma, or breathing problems  · an unusual or allergic reaction to acetaminophen, oxycodone, other opioid analgesics, other medicines, foods, dyes, or preservatives  · pregnant or trying to get pregnant  · breast-feeding  What should I watch for while using this medicine?  Tell your doctor or health care professional if your pain does not go away, if it gets worse, or if you have new or a different type of pain. You may develop tolerance to the medicine. Tolerance means that you will need a higher dose of the medication for pain relief. Tolerance is normal and is expected if you take this medicine for a long time.  Do not suddenly stop taking your medicine because you may develop a severe reaction. Your body becomes used to the medicine. This does NOT mean you are addicted. Addiction is a behavior related to getting and using a drug for a non-medical reason. If you have pain, you have a medical reason to take pain medicine. Your doctor will tell you how much medicine to take. If your doctor wants you to stop the medicine, the dose will be slowly lowered over time to avoid any side effects.  There are different types of narcotic medicines (opiates). If you take more than one type at the same time or if you are taking another medicine that also causes drowsiness, you may have more side effects. Give your health care provider a list of all medicines you use. Your doctor will tell you how much medicine to take. Do not take more medicine than directed. Call emergency for help if you have problems breathing or unusual sleepiness.  Do not take other medicines that contain acetaminophen with this medicine. Always read labels carefully. If you have questions, ask your doctor or pharmacist.  If you take too much acetaminophen get medical help right away.  Too much acetaminophen can be very dangerous and cause liver damage. Even if you do not have symptoms, it is important to get help right away.  You may get drowsy or dizzy. Do not drive, use machinery, or do anything that needs mental alertness until you know how this medicine affects you. Do not stand or sit up quickly, especially if you are an older patient. This reduces the risk of dizzy or fainting spells. Alcohol may interfere with the effect of this medicine. Avoid alcoholic drinks.  The medicine will cause constipation. Try to have a bowel movement at least every 2 to 3 days. If you do not have a bowel movement for 3 days, call your doctor or health care professional.  Your mouth may get dry. Chewing sugarless gum or sucking hard candy, and drinking plenty or water may help. Contact your doctor if the problem does not go away or is severe.  Date Last Reviewed:   NOTE:This sheet is a summary. It may not cover all possible information. If you have questions about this medicine, talk to your doctor, pharmacist, or health care provider. Copyright© 2016 [employer]

## 2017-08-22 NOTE — DISCHARGE SUMMARY
Date of Discharge: 08/22/2017     Principle Diagnosis: Bladder lesion    Secondary Diagnosis:  has a past medical history of Anticoagulant long-term use; Cardiomyopathy (9/07); HBP (high blood pressure) (1997); Hyperlipidemia; LBP (low back pain); MI (myocardial infarction) (12/06); Status post primary angioplasty with coronary stent (12/06); and Thyroid disease.    History of Present Illness: Pt was worked up in clinic and today's procedure was scheduled.  Please see H&P for full details.    Hospital Course: Pt presented on the day of surgery and after proper consents were obtained he was brought to the OR where his procedure was performed without difficulty.    Discharge Disposition: Home    Followup Plan:  1. Pt is provided with medications for pain and others as indicated.  2. RTC in 2 weeks

## 2017-08-22 NOTE — H&P (VIEW-ONLY)
Chief Complaint: Bladder Lesion    HPI:   8/10/17: Cleared for surgery, reviewed findings, arranging TURBT.  7/10/17: Hematuria confirmed, CT Urogram reassuring.  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  6/16/17: 87 yo man referred by Dr. Gibson for evaluation of nocturia and LUTS.  Has incontinence at night, sudden urgency has trouble making it to the toilet.  Nocturia like this 2-3/night.  No daytime problems.  Six months so far.   No abd/pelvic pain and no exac/rel factors.  No gross hematuria.  No urolithiasis.  No other urinary bother.  No  history.  PSA was always normal.  2 cups coffee in AM, tea at lunch and dinner (green tea).  Good stream.     Allergies:  Review of patient's allergies indicates no known allergies.    Medications:  has a current medication list which includes the following prescription(s): acetaminophen, amlodipine, levothyroxine, lisinopril, lotemax, nitroglycerin, simvastatin, aspirin, and clopidogrel.    Review of Systems:  General: No fever, chills, fatigability, or weight loss.  Skin: No rashes, itching, or changes in color or texture of skin.  Chest: Denies DELUCA, cyanosis, wheezing, cough, and sputum production.  Abdomen: Appetite fine. No weight loss. Denies diarrhea, abdominal pain, hematemesis, or blood in stool.  Musculoskeletal: No joint stiffness or swelling. Denies back pain.  : As above.  All other review of systems negative.    PMH:   has a past medical history of Cardiomyopathy (9/07); HBP (high blood pressure) (1997); Hyperlipidemia; LBP (low back pain); MI (myocardial infarction) (12/06); and Status post primary angioplasty with coronary stent (12/06).    PSH:   has a past surgical history that includes Hernia repair and Coronary stent placement.    FamHx: family history is not on file.    SocHx:  reports that he has  never smoked. He has never used smokeless tobacco. He reports that he does not drink alcohol or use drugs.      Physical Exam:  Vitals:    08/10/17 1355   BP: (!) 163/90   Pulse: 89     General: A&Ox3, no apparent distress, no deformities  Neck: No masses, normal thyroid  Lungs: normal inspiration, no use of accessory muscles  Heart: normal pulse, no arrhythmias  Abdomen: Soft, NT, ND  Skin: The skin is warm and dry. No jaundice.  Ext: No c/c/e.  :   6/17: Test desc remington, no abnormalities of epididymus. Penis normal, with normal penile and scrotal skin. Meatus normal. Normal rectal tone, no hemorrhoids. Prost 40 gm no nodules or masses appreciated. SV not palpable. Perineum and anus normal.    Labs/Studies:   Urinalysis performed in clinic, summary:    6/17: UA normal exc tr prot and 50 blood  Bladder Scan performed in office:     6/17: PVR 4 ml.    Impression/Plan:   1. Risks/benefits of TURBT reviewed, consents on chart.  Will schedule.

## 2017-08-22 NOTE — ANESTHESIA POSTPROCEDURE EVALUATION
"Anesthesia Post Evaluation    Patient: Brant Lees    Procedure(s) Performed: Procedure(s) (LRB):  EXCISION-BLADDER TUMOR-TRANSURETHRAL (TURBT) (N/A)    Final Anesthesia Type: general  Patient location during evaluation: PACU  Patient participation: Yes- Able to Participate  Level of consciousness: awake and alert  Post-procedure vital signs: reviewed and stable  Pain management: adequate  Airway patency: patent  PONV status at discharge: No PONV  Anesthetic complications: no      Cardiovascular status: blood pressure returned to baseline  Respiratory status: unassisted  Hydration status: euvolemic  Follow-up not needed.        Visit Vitals  BP (!) 168/81   Pulse 75   Temp 36.8 °C (98.2 °F) (Temporal)   Resp (!) 26   Ht 5' 10" (1.778 m)   Wt 83 kg (182 lb 15.7 oz)   SpO2 (!) 91%   BMI 26.26 kg/m²       Pain/Kavita Score: Pain Assessment Performed: Yes (8/22/2017  4:45 PM)  Presence of Pain: denies (8/22/2017  4:45 PM)  Kavita Score: 9 (8/22/2017  4:45 PM)      "

## 2017-08-22 NOTE — INTERVAL H&P NOTE
The patient has been examined and the H&P has been reviewed:    I concur with the findings and no changes have occurred since H&P was written.    Anesthesia/Surgery risks, benefits and alternative options discussed and understood by patient/family.          Active Hospital Problems    Diagnosis  POA    Lesion of bladder [N32.9]  Yes      Resolved Hospital Problems    Diagnosis Date Resolved POA   No resolved problems to display.

## 2017-08-22 NOTE — TRANSFER OF CARE
"Anesthesia Transfer of Care Note    Patient: Brant Lees    Procedure(s) Performed: Procedure(s) (LRB):  EXCISION-BLADDER TUMOR-TRANSURETHRAL (TURBT) (N/A)    Patient location: PACU    Anesthesia Type: general    Transport from OR: Transported from OR on room air with adequate spontaneous ventilation    Post pain: adequate analgesia    Post assessment: no apparent anesthetic complications    Post vital signs: stable    Level of consciousness: awake, alert and oriented    Nausea/Vomiting: no nausea/vomiting    Complications: none    Transfer of care protocol was followed      Last vitals:   Visit Vitals  BP (!) 144/65 (Patient Position: Sitting)   Pulse (!) 51   Temp 37.1 °C (98.8 °F) (Skin)   Resp 18   Ht 5' 10" (1.778 m)   Wt 83 kg (182 lb 15.7 oz)   SpO2 (!) 94%   BMI 26.26 kg/m²     "

## 2017-08-22 NOTE — OP NOTE
Date of Procedure: 08/22/2017    PREOP DIAGNOSIS:  Bladder cancer.    POSTOP DIAGNOSIS:  Bladder cancer.    PROCEDURES:      1. TURBT -- medium.    SURGEON:  Clif Alcocer IV, M.D.    Assistants: None    Specimen: None    Prosthetics, Devices, Grafts: None    ANESTHESIA:  General endotracheal.    FINDINGS:  The patient had abnormal mucosa at the floor, dome and right side of bladder.  Two representative areas were biopsied, a low polypoid mass at the right dome was resected, and the remaining area fulgurated.  An area 4-5 cm was resected/fulgurated.    PROCEDURE IN DETAIL:  After proper consents were obtained, the patient was brought to the Operating Room where he was prepped and draped in normal sterile fashion and provided with general endotracheal anesthesia in the dorsal lithotomy position.  The 22-English cystoscope was assembled and introduced per urethra and the bladder and urethra were inspected and found to be normal except for the above finding consistent with what was seen in clinic.     The rigid biopsy forceps was then passed into the lumen of the scope and under vision, 2 biopsy specimens were taken.      The Erbe resectoscope was then assembled and introduced under vision with the visual obturator and then the Brown mechanism was passed for loop cautery. The lesion was resected in its entirety and then cauterized in a normal fashion.  The bladder was then rinsed and drained after excellent hemostasis was observed and the resectoscope was set aside.      A 20-English Logan catheter was placed in the bladder with 10 mL sterile water in its balloon and sent to gravity.  The patient tolerated all of this well and there were no complications.  he was awakened, transferred to [facility] and sent to Recovery in stable condition.    BLOOD LOSS:  None.    BLOOD GIVEN:  None.    URINE OUTPUT:  None.    DRAINS:  A 20-English Logan plug.    DISCHARGE DISPOSITION:  Home.    FOLLOWUP PLAN:  D/C logan Monday, then  return to clinic in approximately 2 weeks.  he was given prescriptions for his pain medicines, stool softeners and instructions for care at home.

## 2017-08-24 ENCOUNTER — PATIENT MESSAGE (OUTPATIENT)
Dept: UROLOGY | Facility: CLINIC | Age: 82
End: 2017-08-24

## 2017-08-28 ENCOUNTER — CLINICAL SUPPORT (OUTPATIENT)
Dept: UROLOGY | Facility: CLINIC | Age: 82
End: 2017-08-28
Payer: MEDICARE

## 2017-08-28 VITALS
WEIGHT: 183 LBS | BODY MASS INDEX: 26.26 KG/M2 | HEART RATE: 68 BPM | DIASTOLIC BLOOD PRESSURE: 68 MMHG | SYSTOLIC BLOOD PRESSURE: 138 MMHG

## 2017-08-28 VITALS
HEIGHT: 70 IN | RESPIRATION RATE: 26 BRPM | OXYGEN SATURATION: 91 % | SYSTOLIC BLOOD PRESSURE: 168 MMHG | TEMPERATURE: 98 F | BODY MASS INDEX: 26.2 KG/M2 | HEART RATE: 75 BPM | DIASTOLIC BLOOD PRESSURE: 81 MMHG | WEIGHT: 183 LBS

## 2017-08-28 DIAGNOSIS — N32.9 LESION OF BLADDER: Primary | ICD-10-CM

## 2017-08-28 PROCEDURE — 99999 PR PBB SHADOW E&M-EST. PATIENT-LVL III: CPT | Mod: PBBFAC,,,

## 2017-08-28 NOTE — PROGRESS NOTES
.Pt in today to have logan discontinued.  10cc balloon deflated and #20Fr logan successfully removed.  200 cc bloody colored urine noted to leg bag.  Pt tolerated procedure well and left clinic ambulatory per self without difficulty.

## 2017-09-05 ENCOUNTER — OFFICE VISIT (OUTPATIENT)
Dept: UROLOGY | Facility: CLINIC | Age: 82
End: 2017-09-05
Payer: MEDICARE

## 2017-09-05 VITALS — SYSTOLIC BLOOD PRESSURE: 144 MMHG | DIASTOLIC BLOOD PRESSURE: 64 MMHG | WEIGHT: 183 LBS | BODY MASS INDEX: 26.26 KG/M2

## 2017-09-05 DIAGNOSIS — C67.9 MALIGNANT NEOPLASM OF URINARY BLADDER, UNSPECIFIED SITE: Primary | ICD-10-CM

## 2017-09-05 LAB
BILIRUB SERPL-MCNC: NORMAL MG/DL
BLOOD URINE, POC: 250
COLOR, POC UA: NORMAL
GLUCOSE UR QL STRIP: NORMAL
KETONES UR QL STRIP: NORMAL
LEUKOCYTE ESTERASE URINE, POC: NORMAL
NITRITE, POC UA: NORMAL
PH, POC UA: 6
PROTEIN, POC: 30
SPECIFIC GRAVITY, POC UA: 1.02
UROBILINOGEN, POC UA: NORMAL

## 2017-09-05 PROCEDURE — 99024 POSTOP FOLLOW-UP VISIT: CPT | Mod: S$GLB,,, | Performed by: UROLOGY

## 2017-09-05 PROCEDURE — 81002 URINALYSIS NONAUTO W/O SCOPE: CPT | Mod: S$GLB,,, | Performed by: UROLOGY

## 2017-09-05 PROCEDURE — 99999 PR PBB SHADOW E&M-EST. PATIENT-LVL II: CPT | Mod: PBBFAC,,, | Performed by: UROLOGY

## 2017-09-05 NOTE — PROGRESS NOTES
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  OAB now settling down.  8/10/17: Cleared for surgery, reviewed findings, arranging TURBT.  7/10/17: Hematuria confirmed, CT Urogram reassuring.  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  6/16/17: 87 yo man referred by Dr. Gibson for evaluation of nocturia and LUTS.  Has incontinence at night, sudden urgency has trouble making it to the toilet.  Nocturia like this 2-3/night.  No daytime problems.  Six months so far.   No abd/pelvic pain and no exac/rel factors.  No gross hematuria.  No urolithiasis.  No other urinary bother.  No  history.  PSA was always normal.  2 cups coffee in AM, tea at lunch and dinner (green tea).  Good stream.     Allergies:  Review of patient's allergies indicates no known allergies.    Medications:  has a current medication list which includes the following prescription(s): amlodipine, aspirin, clopidogrel, levothyroxine, lisinopril, lotemax, simvastatin, acetaminophen, docusate sodium, nitroglycerin, and oxycodone-acetaminophen.    Review of Systems:  General: No fever, chills, fatigability, or weight loss.  Skin: No rashes, itching, or changes in color or texture of skin.  Chest: Denies DELUCA, cyanosis, wheezing, cough, and sputum production.  Abdomen: Appetite fine. No weight loss. Denies diarrhea, abdominal pain, hematemesis, or blood in stool.  Musculoskeletal: No joint stiffness or swelling. Denies back pain.  : As above.  All other review of systems negative.    PMH:   has a past medical history of Anticoagulant long-term use; Cardiomyopathy (9/07); HBP (high blood pressure) (1997); Hyperlipidemia; LBP (low back pain); MI (myocardial infarction) (12/06); Status post primary angioplasty with coronary stent (12/06); and Thyroid  disease.    PSH:   has a past surgical history that includes Hernia repair and Coronary stent placement.    FamHx: family history is not on file.    SocHx:  reports that he has never smoked. He has never used smokeless tobacco. He reports that he does not drink alcohol or use drugs.      Physical Exam:  Vitals:    09/05/17 1512   BP: (!) 144/64     General: A&Ox3, no apparent distress, no deformities  Neck: No masses, normal thyroid  Lungs: normal inspiration, no use of accessory muscles  Heart: normal pulse, no arrhythmias  Abdomen: Soft, NT, ND  Skin: The skin is warm and dry. No jaundice.  Ext: No c/c/e.  :   6/17: Test desc remington, no abnormalities of epididymus. Penis normal, with normal penile and scrotal skin. Meatus normal. Normal rectal tone, no hemorrhoids. Prost 40 gm no nodules or masses appreciated. SV not palpable. Perineum and anus normal.    Labs/Studies:   Urinalysis performed in clinic, summary:    6/17: UA normal exc tr prot and 50 blood  Bladder Scan performed in office:     6/17: PVR 4 ml.    Impression/Plan:   1. Reviewed findings and indications for BCG.  Will arrange 2 wks revisit for this.  2. 3 mo cysto 11/20/17.

## 2017-09-17 DIAGNOSIS — E03.9 HYPOTHYROIDISM (ACQUIRED): ICD-10-CM

## 2017-09-18 RX ORDER — LEVOTHYROXINE SODIUM 50 UG/1
TABLET ORAL
Qty: 90 TABLET | Refills: 0 | Status: SHIPPED | OUTPATIENT
Start: 2017-09-18 | End: 2017-12-17 | Stop reason: SDUPTHER

## 2017-09-22 ENCOUNTER — OFFICE VISIT (OUTPATIENT)
Dept: UROLOGY | Facility: CLINIC | Age: 82
End: 2017-09-22
Payer: COMMERCIAL

## 2017-09-22 VITALS — WEIGHT: 183 LBS | BODY MASS INDEX: 26.2 KG/M2 | HEIGHT: 70 IN

## 2017-09-22 DIAGNOSIS — C67.9 MALIGNANT NEOPLASM OF URINARY BLADDER, UNSPECIFIED SITE: Primary | ICD-10-CM

## 2017-09-22 DIAGNOSIS — R31.29 MICROSCOPIC HEMATURIA: ICD-10-CM

## 2017-09-22 LAB
BACTERIA #/AREA URNS AUTO: ABNORMAL /HPF
BILIRUB SERPL-MCNC: NORMAL MG/DL
BLOOD URINE, POC: 250
COLOR, POC UA: NORMAL
GLUCOSE UR QL STRIP: NORMAL
KETONES UR QL STRIP: NORMAL
LEUKOCYTE ESTERASE URINE, POC: NORMAL
MICROSCOPIC COMMENT: ABNORMAL
NITRITE, POC UA: NORMAL
PH, POC UA: 5
PROTEIN, POC: NORMAL
RBC #/AREA URNS AUTO: 50 /HPF (ref 0–4)
SPECIFIC GRAVITY, POC UA: 1.01
UROBILINOGEN, POC UA: NORMAL
WBC #/AREA URNS AUTO: 75 /HPF (ref 0–5)

## 2017-09-22 PROCEDURE — 81002 URINALYSIS NONAUTO W/O SCOPE: CPT | Mod: S$GLB,,, | Performed by: UROLOGY

## 2017-09-22 PROCEDURE — 99999 PR PBB SHADOW E&M-EST. PATIENT-LVL II: CPT | Mod: PBBFAC,,, | Performed by: UROLOGY

## 2017-09-22 PROCEDURE — 87086 URINE CULTURE/COLONY COUNT: CPT

## 2017-09-22 PROCEDURE — 81001 URINALYSIS AUTO W/SCOPE: CPT

## 2017-09-22 PROCEDURE — 99024 POSTOP FOLLOW-UP VISIT: CPT | Mod: S$GLB,,, | Performed by: UROLOGY

## 2017-09-22 NOTE — PROGRESS NOTES
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  9/5/17: High grade Ta on path report.  OAB now settling down.  8/10/17: Cleared for surgery, reviewed findings, arranging TURBT.  7/10/17: Hematuria confirmed, CT Urogram reassuring.  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  6/16/17: 85 yo man referred by Dr. Gibson for evaluation of nocturia and LUTS.  Has incontinence at night, sudden urgency has trouble making it to the toilet.  Nocturia like this 2-3/night.  No daytime problems.  Six months so far.   No abd/pelvic pain and no exac/rel factors.  No gross hematuria.  No urolithiasis.  No other urinary bother.  No  history.  PSA was always normal.  2 cups coffee in AM, tea at lunch and dinner (green tea).  Good stream.     Allergies:  Review of patient's allergies indicates no known allergies.    Medications:  has a current medication list which includes the following prescription(s): acetaminophen, amlodipine, aspirin, clopidogrel, docusate sodium, levothyroxine, lisinopril, lotemax, nitroglycerin, oxycodone-acetaminophen, and simvastatin.    Review of Systems:  General: No fever, chills, fatigability, or weight loss.  Skin: No rashes, itching, or changes in color or texture of skin.  Chest: Denies DELUCA, cyanosis, wheezing, cough, and sputum production.  Abdomen: Appetite fine. No weight loss. Denies diarrhea, abdominal pain, hematemesis, or blood in stool.  Musculoskeletal: No joint stiffness or swelling. Denies back pain.  : As above.  All other review of systems negative.    PMH:   has a past medical history of Anticoagulant long-term use; Cardiomyopathy (9/07); HBP (high blood pressure) (1997); Hyperlipidemia; LBP (low back pain); MI (myocardial infarction) (12/06); Status post  primary angioplasty with coronary stent (12/06); and Thyroid disease.    PSH:   has a past surgical history that includes Hernia repair and Coronary stent placement.    FamHx: family history is not on file.    SocHx:  reports that he has never smoked. He has never used smokeless tobacco. He reports that he does not drink alcohol or use drugs.      Physical Exam:  There were no vitals filed for this visit.  General: A&Ox3, no apparent distress, no deformities  Neck: No masses, normal thyroid  Lungs: normal inspiration, no use of accessory muscles  Heart: normal pulse, no arrhythmias  Abdomen: Soft, NT, ND  Skin: The skin is warm and dry. No jaundice.  Ext: No c/c/e.  :   6/17: Test desc remington, no abnormalities of epididymus. Penis normal, with normal penile and scrotal skin. Meatus normal. Normal rectal tone, no hemorrhoids. Prost 40 gm no nodules or masses appreciated. SV not palpable. Perineum and anus normal.    Labs/Studies:   Urinalysis performed in clinic, summary:    6/17: UA normal exc tr prot and 50 blood  Bladder Scan performed in office:     6/17: PVR 4 ml.    Impression/Plan:   1. Reviewed findings and indications for BCG.   UA/UCx out of abundance of caution and RTC 1 wk to retry BCG induction.  2. 3 mo cysto 11/20/17.

## 2017-09-24 LAB — BACTERIA UR CULT: NO GROWTH

## 2017-09-28 ENCOUNTER — OFFICE VISIT (OUTPATIENT)
Dept: UROLOGY | Facility: CLINIC | Age: 82
End: 2017-09-28
Payer: COMMERCIAL

## 2017-09-28 VITALS — WEIGHT: 183 LBS | BODY MASS INDEX: 26.26 KG/M2

## 2017-09-28 DIAGNOSIS — C67.9 MALIGNANT NEOPLASM OF URINARY BLADDER, UNSPECIFIED SITE: Primary | ICD-10-CM

## 2017-09-28 LAB
BILIRUB SERPL-MCNC: NORMAL MG/DL
BLOOD URINE, POC: 250
COLOR, POC UA: NORMAL
GLUCOSE UR QL STRIP: NORMAL
KETONES UR QL STRIP: NORMAL
LEUKOCYTE ESTERASE URINE, POC: NORMAL
NITRITE, POC UA: NORMAL
PH, POC UA: 5
PROTEIN, POC: 30
SPECIFIC GRAVITY, POC UA: 1.02
UROBILINOGEN, POC UA: NORMAL

## 2017-09-28 PROCEDURE — 81002 URINALYSIS NONAUTO W/O SCOPE: CPT | Mod: S$GLB,,, | Performed by: UROLOGY

## 2017-09-28 PROCEDURE — 51720 TREATMENT OF BLADDER LESION: CPT | Mod: S$GLB,,, | Performed by: UROLOGY

## 2017-09-28 PROCEDURE — 99499 UNLISTED E&M SERVICE: CPT | Mod: S$GLB,,, | Performed by: UROLOGY

## 2017-09-28 PROCEDURE — 99999 PR PBB SHADOW E&M-EST. PATIENT-LVL I: CPT | Mod: PBBFAC,,, | Performed by: UROLOGY

## 2017-09-28 NOTE — PROGRESS NOTES
Chief Complaint: High Grade Ta Bladder Cancer    HPI:   9/28/17: BCG 1/6 today.  9/22/17: Came for BCG today, but still too much microhematuria and leuk.  9/5/17: High grade Ta on path report.  OAB now settling down.  8/10/17: Cleared for surgery, reviewed findings, arranging TURBT.  7/10/17: Hematuria confirmed, CT Urogram reassuring.  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  6/16/17: 87 yo man referred by Dr. Gibson for evaluation of nocturia and LUTS.  Has incontinence at night, sudden urgency has trouble making it to the toilet.  Nocturia like this 2-3/night.  No daytime problems.  Six months so far.   No abd/pelvic pain and no exac/rel factors.  No gross hematuria.  No urolithiasis.  No other urinary bother.  No  history.  PSA was always normal.  2 cups coffee in AM, tea at lunch and dinner (green tea).  Good stream.     Allergies:  Review of patient's allergies indicates no known allergies.    Medications:  has a current medication list which includes the following prescription(s): acetaminophen, amlodipine, aspirin, clopidogrel, docusate sodium, levothyroxine, lisinopril, lotemax, nitroglycerin, oxycodone-acetaminophen, and simvastatin.    Review of Systems:  General: No fever, chills, fatigability, or weight loss.  Skin: No rashes, itching, or changes in color or texture of skin.  Chest: Denies DELUCA, cyanosis, wheezing, cough, and sputum production.  Abdomen: Appetite fine. No weight loss. Denies diarrhea, abdominal pain, hematemesis, or blood in stool.  Musculoskeletal: No joint stiffness or swelling. Denies back pain.  : As above.  All other review of systems negative.    PMH:   has a past medical history of Anticoagulant long-term use; Cardiomyopathy (9/07); HBP (high blood pressure) (1997); Hyperlipidemia; LBP (low back pain); MI (myocardial  infarction) (12/06); Status post primary angioplasty with coronary stent (12/06); and Thyroid disease.    PSH:   has a past surgical history that includes Hernia repair and Coronary stent placement.    FamHx: family history is not on file.    SocHx:  reports that he has never smoked. He has never used smokeless tobacco. He reports that he does not drink alcohol or use drugs.      Physical Exam:  There were no vitals filed for this visit.  General: A&Ox3, no apparent distress, no deformities  Neck: No masses, normal thyroid  Lungs: normal inspiration, no use of accessory muscles  Heart: normal pulse, no arrhythmias  Abdomen: Soft, NT, ND  Skin: The skin is warm and dry. No jaundice.  Ext: No c/c/e.  :   6/17: Test desc remington, no abnormalities of epididymus. Penis normal, with normal penile and scrotal skin. Meatus normal. Normal rectal tone, no hemorrhoids. Prost 40 gm no nodules or masses appreciated. SV not palpable. Perineum and anus normal.    Labs/Studies:   Urinalysis performed in clinic, summary:    6/17: UA normal exc tr prot and 50 blood  Bladder Scan performed in office:     6/17: PVR 4 ml.    Impression/Plan:   1. Reviewed findings and indications for BCG.   BCG 2/6 next week.  2. 3 mo cysto 11/20/17.

## 2017-09-28 NOTE — PROGRESS NOTES
....Patient in today for BCG treatment. One vial of BCG and 50ml perservative free sodium chloride 0.9% mixed as per instructions. Using aseptic technique, a sterile fenestrated drape was placed over penis. Pt was catheterized using a #14Fr red rubber catheter to allow bladder emptying. Private area cleansed with betadine. Using closed system, 50 ml BCG instilled via gravity directly into bladder. Pt tolerated well. Instructed pt to keep BCG solution in bladder for 2 hours. Pt was given instructions to follow for the next 6 hours, including sitting on his toilet at home and using 2 cups of undiluted chlorine bleach and closing this lid. Pt was told to allow bleach to stand for 15 minutes before flushing toilet. He was also told to drink plenty of water to flush any remaining BCG bacteria. Patient verbalized understanding.

## 2017-10-06 ENCOUNTER — OFFICE VISIT (OUTPATIENT)
Dept: UROLOGY | Facility: CLINIC | Age: 82
End: 2017-10-06
Payer: COMMERCIAL

## 2017-10-06 VITALS — BODY MASS INDEX: 26.26 KG/M2 | WEIGHT: 183 LBS

## 2017-10-06 DIAGNOSIS — C67.9 MALIGNANT NEOPLASM OF URINARY BLADDER, UNSPECIFIED SITE: Primary | ICD-10-CM

## 2017-10-06 LAB
BILIRUB SERPL-MCNC: NORMAL MG/DL
BLOOD URINE, POC: 250
COLOR, POC UA: YELLOW
GLUCOSE UR QL STRIP: NORMAL
KETONES UR QL STRIP: NORMAL
LEUKOCYTE ESTERASE URINE, POC: NORMAL
NITRITE, POC UA: NORMAL
PH, POC UA: 5
PROTEIN, POC: 30
SPECIFIC GRAVITY, POC UA: 1.02
UROBILINOGEN, POC UA: NORMAL

## 2017-10-06 PROCEDURE — 99999 PR PBB SHADOW E&M-EST. PATIENT-LVL I: CPT | Mod: PBBFAC,,, | Performed by: UROLOGY

## 2017-10-06 PROCEDURE — 81002 URINALYSIS NONAUTO W/O SCOPE: CPT | Mod: S$GLB,,, | Performed by: UROLOGY

## 2017-10-06 PROCEDURE — 99499 UNLISTED E&M SERVICE: CPT | Mod: S$GLB,,, | Performed by: UROLOGY

## 2017-10-06 PROCEDURE — 51720 TREATMENT OF BLADDER LESION: CPT | Mod: S$GLB,,, | Performed by: UROLOGY

## 2017-10-06 NOTE — PROGRESS NOTES
Chief Complaint: High Grade Ta Bladder Cancer    HPI:   10/6/17: BCG 2/6 today.  9/22/17: Came for BCG today, but still too much microhematuria and leuk.  9/5/17: High grade Ta on path report.  OAB now settling down.  8/10/17: Cleared for surgery, reviewed findings, arranging TURBT.  7/10/17: Hematuria confirmed, CT Urogram reassuring.  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  6/16/17: 85 yo man referred by Dr. Gibson for evaluation of nocturia and LUTS.  Has incontinence at night, sudden urgency has trouble making it to the toilet.  Nocturia like this 2-3/night.  No daytime problems.  Six months so far.   No abd/pelvic pain and no exac/rel factors.  No gross hematuria.  No urolithiasis.  No other urinary bother.  No  history.  PSA was always normal.  2 cups coffee in AM, tea at lunch and dinner (green tea).  Good stream.     Allergies:  Review of patient's allergies indicates no known allergies.    Medications:  has a current medication list which includes the following prescription(s): acetaminophen, amlodipine, aspirin, clopidogrel, levothyroxine, lisinopril, lotemax, nitroglycerin, and simvastatin.    Review of Systems:  General: No fever, chills, fatigability, or weight loss.  Skin: No rashes, itching, or changes in color or texture of skin.  Chest: Denies DELUCA, cyanosis, wheezing, cough, and sputum production.  Abdomen: Appetite fine. No weight loss. Denies diarrhea, abdominal pain, hematemesis, or blood in stool.  Musculoskeletal: No joint stiffness or swelling. Denies back pain.  : As above.  All other review of systems negative.    PMH:   has a past medical history of Anticoagulant long-term use; Cardiomyopathy (9/07); HBP (high blood pressure) (1997); Hyperlipidemia; LBP (low back pain); MI (myocardial infarction) (12/06); Status post primary  angioplasty with coronary stent (12/06); and Thyroid disease.    PSH:   has a past surgical history that includes Hernia repair and Coronary stent placement.    FamHx: family history is not on file.    SocHx:  reports that he has never smoked. He has never used smokeless tobacco. He reports that he does not drink alcohol or use drugs.      Physical Exam:  There were no vitals filed for this visit.  General: A&Ox3, no apparent distress, no deformities  Neck: No masses, normal thyroid  Lungs: normal inspiration, no use of accessory muscles  Heart: normal pulse, no arrhythmias  Abdomen: Soft, NT, ND  Skin: The skin is warm and dry. No jaundice.  Ext: No c/c/e.  :   6/17: Test desc remington, no abnormalities of epididymus. Penis normal, with normal penile and scrotal skin. Meatus normal. Normal rectal tone, no hemorrhoids. Prost 40 gm no nodules or masses appreciated. SV not palpable. Perineum and anus normal.    Labs/Studies:   Urinalysis performed in clinic, summary:    10/6/17: UA normal exc tr prot and 50 blood, less on micro exam  Bladder Scan performed in office:     6/17: PVR 4 ml.    Impression/Plan:   1. Reviewed findings and indications for BCG.   BCG 3/6 next week.  2. 3 mo cysto 11/20/17.

## 2017-10-10 ENCOUNTER — CLINICAL SUPPORT (OUTPATIENT)
Dept: INTERNAL MEDICINE | Facility: CLINIC | Age: 82
End: 2017-10-10
Payer: COMMERCIAL

## 2017-10-10 DIAGNOSIS — E03.9 HYPOTHYROIDISM (ACQUIRED): ICD-10-CM

## 2017-10-10 PROCEDURE — 99999 PR PBB SHADOW E&M-EST. PATIENT-LVL II: CPT | Mod: PBBFAC,,,

## 2017-10-10 PROCEDURE — 84443 ASSAY THYROID STIM HORMONE: CPT

## 2017-10-10 PROCEDURE — 90471 IMMUNIZATION ADMIN: CPT | Mod: S$GLB,,, | Performed by: FAMILY MEDICINE

## 2017-10-10 PROCEDURE — 90662 IIV NO PRSV INCREASED AG IM: CPT | Mod: S$GLB,,, | Performed by: FAMILY MEDICINE

## 2017-10-10 NOTE — PROGRESS NOTES
Lab draw today, informed will call with results, verbalized understanding.  Injection received today, informed to sit in clinic 15 minutes, verbalized understanding.

## 2017-10-11 DIAGNOSIS — E03.9 HYPOTHYROIDISM (ACQUIRED): Primary | ICD-10-CM

## 2017-10-11 LAB — TSH SERPL DL<=0.005 MIU/L-ACNC: 3.71 UIU/ML

## 2017-10-12 ENCOUNTER — CLINICAL SUPPORT (OUTPATIENT)
Dept: UROLOGY | Facility: CLINIC | Age: 82
End: 2017-10-12
Payer: COMMERCIAL

## 2017-10-12 ENCOUNTER — PATIENT MESSAGE (OUTPATIENT)
Dept: INTERNAL MEDICINE | Facility: CLINIC | Age: 82
End: 2017-10-12

## 2017-10-12 VITALS — BODY MASS INDEX: 26.26 KG/M2 | WEIGHT: 183 LBS

## 2017-10-12 DIAGNOSIS — R31.29 MICROSCOPIC HEMATURIA: Primary | ICD-10-CM

## 2017-10-12 PROCEDURE — 99999 PR PBB SHADOW E&M-EST. PATIENT-LVL II: CPT | Mod: PBBFAC,,,

## 2017-10-12 NOTE — PROGRESS NOTES
Patient here today for BCG; however, he didn't get it as he had too much blood in the urine.  Rescheduled for next week per Dr. Alcocer.

## 2017-10-13 ENCOUNTER — OFFICE VISIT (OUTPATIENT)
Dept: INTERNAL MEDICINE | Facility: CLINIC | Age: 82
End: 2017-10-13
Payer: COMMERCIAL

## 2017-10-13 VITALS
HEART RATE: 67 BPM | OXYGEN SATURATION: 96 % | SYSTOLIC BLOOD PRESSURE: 139 MMHG | WEIGHT: 181.13 LBS | BODY MASS INDEX: 25.99 KG/M2 | TEMPERATURE: 97 F | DIASTOLIC BLOOD PRESSURE: 61 MMHG

## 2017-10-13 DIAGNOSIS — E03.8 HYPOTHYROIDISM DUE TO HASHIMOTO'S THYROIDITIS: Primary | ICD-10-CM

## 2017-10-13 DIAGNOSIS — E06.3 HYPOTHYROIDISM DUE TO HASHIMOTO'S THYROIDITIS: Primary | ICD-10-CM

## 2017-10-13 DIAGNOSIS — L82.1 SEBORRHEIC KERATOSES: ICD-10-CM

## 2017-10-13 PROCEDURE — 99212 OFFICE O/P EST SF 10 MIN: CPT | Mod: S$GLB,,, | Performed by: FAMILY MEDICINE

## 2017-10-13 PROCEDURE — 99999 PR PBB SHADOW E&M-EST. PATIENT-LVL III: CPT | Mod: PBBFAC,,, | Performed by: FAMILY MEDICINE

## 2017-10-13 NOTE — PROGRESS NOTES
Subjective:       Patient ID: Brant Lees is a 86 y.o. male.    Chief Complaint: discuss lab work    Here for recheck on his hypothyroid state.  He had recent TSH which is now within normal limits.  He also states an acquaintance recommended he see the doctor about a mole on his face.      Review of Systems   Respiratory: Negative for shortness of breath.    Cardiovascular: Negative for chest pain and palpitations.   Skin: Positive for color change.       Objective:      Physical Exam   Constitutional: He is oriented to person, place, and time. He appears well-developed.   HENT:   Head: Normocephalic and atraumatic.   Cardiovascular: Normal rate, regular rhythm and normal heart sounds.    Pulmonary/Chest: Effort normal and breath sounds normal.   Neurological: He is alert and oriented to person, place, and time.   Skin: Skin is warm and dry.   Facial seborrheic keratoses are numerous including 1 to the left temple that covers a large area approx 2x3 cm. multiple seborrheic keratoses also to neck upper back.  There are hyperpigmentation changes to both forearms and evidence of chronic sun exposure.   Psychiatric: He has a normal mood and affect. His behavior is normal.         Assessment/Plan:     1. Hypothyroidism due to Hashimoto's thyroiditis     2. Seborrheic keratoses     Hypothyroidism controlled on current levothyroxine dose.  Will recheck 6 months for TSH while continuing the same dose.  No problem noted re samir K needing further eval - pt declines derm referral

## 2017-10-19 ENCOUNTER — OFFICE VISIT (OUTPATIENT)
Dept: UROLOGY | Facility: CLINIC | Age: 82
End: 2017-10-19
Payer: COMMERCIAL

## 2017-10-19 DIAGNOSIS — C67.9 MALIGNANT NEOPLASM OF URINARY BLADDER, UNSPECIFIED SITE: Primary | ICD-10-CM

## 2017-10-19 LAB
BILIRUB SERPL-MCNC: NORMAL MG/DL
BLOOD URINE, POC: NORMAL
COLOR, POC UA: YELLOW
GLUCOSE UR QL STRIP: NORMAL
KETONES UR QL STRIP: NORMAL
LEUKOCYTE ESTERASE URINE, POC: NORMAL
NITRITE, POC UA: NORMAL
PH, POC UA: 5
PROTEIN, POC: NORMAL
SPECIFIC GRAVITY, POC UA: 1.02
UROBILINOGEN, POC UA: NORMAL

## 2017-10-19 PROCEDURE — 81002 URINALYSIS NONAUTO W/O SCOPE: CPT | Mod: S$GLB,,, | Performed by: UROLOGY

## 2017-10-19 PROCEDURE — 99499 UNLISTED E&M SERVICE: CPT | Mod: S$GLB,,, | Performed by: UROLOGY

## 2017-10-19 PROCEDURE — 51720 TREATMENT OF BLADDER LESION: CPT | Mod: S$GLB,,, | Performed by: UROLOGY

## 2017-10-19 NOTE — PROGRESS NOTES
....Patient in today for BCG treatment. One vial of BCG and 50ml perservative free sodium chloride 0.9% mixed as per instructions. Using aseptic technique, pt was catheterized using a #14Fr red rubber catheter to allow bladder emptying. Private area cleansed with betadine. Using closed system, 50 ml BCG instilled via gravity directly into bladder. Pt tolerated well. Instructed pt to keep BCG solution in bladder for 2 hours. Pt was given instructions to follow for the next 6 hours, including sitting on the toilet at home and using 2 cups of undiluted chlorine bleach and closing the lid. Pt was told to allow bleach to stand for 15 minutes before flushing toilet. Pt was also told to drink plenty of water to flush any remaining BCG bacteria. Patient verbalized understanding.

## 2017-10-19 NOTE — PROGRESS NOTES
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  9/22/17: Came for BCG today, but still too much microhematuria and leuk.  9/5/17: High grade Ta on path report.  OAB now settling down.  8/10/17: Cleared for surgery, reviewed findings, arranging TURBT.  7/10/17: Hematuria confirmed, CT Urogram reassuring.  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  6/16/17: 85 yo man referred by Dr. Gibson for evaluation of nocturia and LUTS.  Has incontinence at night, sudden urgency has trouble making it to the toilet.  Nocturia like this 2-3/night.  No daytime problems.  Six months so far.   No abd/pelvic pain and no exac/rel factors.  No gross hematuria.  No urolithiasis.  No other urinary bother.  No  history.  PSA was always normal.  2 cups coffee in AM, tea at lunch and dinner (green tea).  Good stream.     Allergies:  Review of patient's allergies indicates no known allergies.    Medications:  has a current medication list which includes the following prescription(s): acetaminophen, amlodipine, aspirin, clopidogrel, levothyroxine, lisinopril, lotemax, nitroglycerin, and simvastatin.    Review of Systems:  General: No fever, chills, fatigability, or weight loss.  Skin: No rashes, itching, or changes in color or texture of skin.  Chest: Denies DELUCA, cyanosis, wheezing, cough, and sputum production.  Abdomen: Appetite fine. No weight loss. Denies diarrhea, abdominal pain, hematemesis, or blood in stool.  Musculoskeletal: No joint stiffness or swelling. Denies back pain.  : As above.  All other review of systems negative.    PMH:   has a past medical history of Anticoagulant long-term use; Cardiomyopathy (9/07); HBP (high blood pressure) (1997); Hyperlipidemia; LBP (low back pain); MI (myocardial infarction) (12/06); Status post primary  angioplasty with coronary stent (12/06); and Thyroid disease.    PSH:   has a past surgical history that includes Hernia repair and Coronary stent placement.    FamHx: family history is not on file.    SocHx:  reports that he has never smoked. He has never used smokeless tobacco. He reports that he does not drink alcohol or use drugs.      Physical Exam:  There were no vitals filed for this visit.  General: A&Ox3, no apparent distress, no deformities  Neck: No masses, normal thyroid  Lungs: normal inspiration, no use of accessory muscles  Heart: normal pulse, no arrhythmias  Abdomen: Soft, NT, ND  Skin: The skin is warm and dry. No jaundice.  Ext: No c/c/e.  :   6/17: Test desc remington, no abnormalities of epididymus. Penis normal, with normal penile and scrotal skin. Meatus normal. Normal rectal tone, no hemorrhoids. Prost 40 gm no nodules or masses appreciated. SV not palpable. Perineum and anus normal.    Labs/Studies:   Urinalysis performed in clinic, summary:    10/6/17: UA normal exc tr prot and 50 blood, less on micro exam  Bladder Scan performed in office:     6/17: PVR 4 ml.    Impression/Plan:   1. Reviewed findings and indications for BCG.   BCG 4/6 next week.  2. 3 mo cysto 11/20/17.

## 2017-10-26 ENCOUNTER — OFFICE VISIT (OUTPATIENT)
Dept: UROLOGY | Facility: CLINIC | Age: 82
End: 2017-10-26
Payer: COMMERCIAL

## 2017-10-26 VITALS
HEIGHT: 70 IN | SYSTOLIC BLOOD PRESSURE: 140 MMHG | DIASTOLIC BLOOD PRESSURE: 70 MMHG | WEIGHT: 182 LBS | BODY MASS INDEX: 26.05 KG/M2 | HEART RATE: 70 BPM

## 2017-10-26 DIAGNOSIS — C67.9 MALIGNANT NEOPLASM OF URINARY BLADDER, UNSPECIFIED SITE: Primary | ICD-10-CM

## 2017-10-26 PROCEDURE — 99999 PR PBB SHADOW E&M-EST. PATIENT-LVL III: CPT | Mod: PBBFAC,,, | Performed by: UROLOGY

## 2017-10-26 PROCEDURE — 99499 UNLISTED E&M SERVICE: CPT | Mod: S$GLB,,, | Performed by: UROLOGY

## 2017-10-26 NOTE — PROGRESS NOTES
Hematuria evident today and LUTS persist after last week.  Will postpone BCG and start with half-strength next week.

## 2017-11-03 ENCOUNTER — OFFICE VISIT (OUTPATIENT)
Dept: UROLOGY | Facility: CLINIC | Age: 82
End: 2017-11-03
Payer: COMMERCIAL

## 2017-11-03 ENCOUNTER — TELEPHONE (OUTPATIENT)
Dept: UROLOGY | Facility: CLINIC | Age: 82
End: 2017-11-03

## 2017-11-03 DIAGNOSIS — C67.9 MALIGNANT NEOPLASM OF URINARY BLADDER, UNSPECIFIED SITE: Primary | ICD-10-CM

## 2017-11-03 LAB
BILIRUB SERPL-MCNC: NORMAL MG/DL
BLOOD URINE, POC: 250
COLOR, POC UA: YELLOW
GLUCOSE UR QL STRIP: NORMAL
KETONES UR QL STRIP: NORMAL
LEUKOCYTE ESTERASE URINE, POC: NORMAL
NITRITE, POC UA: NORMAL
PH, POC UA: 5
PROTEIN, POC: 30
SPECIFIC GRAVITY, POC UA: 1.01
UROBILINOGEN, POC UA: NORMAL

## 2017-11-03 PROCEDURE — 99499 UNLISTED E&M SERVICE: CPT | Mod: S$GLB,,, | Performed by: UROLOGY

## 2017-11-03 PROCEDURE — 81002 URINALYSIS NONAUTO W/O SCOPE: CPT | Mod: S$GLB,,, | Performed by: UROLOGY

## 2017-11-03 PROCEDURE — 51720 TREATMENT OF BLADDER LESION: CPT | Mod: S$GLB,,, | Performed by: UROLOGY

## 2017-11-03 PROCEDURE — 99999 PR PBB SHADOW E&M-EST. PATIENT-LVL I: CPT | Mod: PBBFAC,,, | Performed by: UROLOGY

## 2017-11-03 NOTE — PROGRESS NOTES
....Patient in today for BCG treatment. One vial of BCG and 50ml perservative free sodium chloride 0.9% mixed as per instructions. Using aseptic technique, a sterile fenestrated drape was placed over penis. Pt was catheterized using a #14Fr red rubber catheter to allow bladder emptying. Private area cleansed with betadine. Using closed system, 50 ml BCG half strength instilled via gravity directly into bladder. Pt tolerated well. Instructed pt to keep BCG solution in bladder for 2 hours. Pt was given instructions to follow for the next 6 hours, including sitting on his toilet at home and using 2 cups of undiluted chlorine bleach and closing this lid. Pt was told to allow bleach to stand for 15 minutes before flushing toilet. He was also told to drink plenty of water to flush any remaining BCG bacteria. Patient verbalized understanding.

## 2017-11-03 NOTE — PROGRESS NOTES
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  BCG 1/2 strength 4/6 today.  10/19/17: BCG 3/6 today.  9/22/17: Came for BCG today, but still too much microhematuria and leuk.  9/5/17: High grade Ta on path report.  OAB now settling down.  8/10/17: Cleared for surgery, reviewed findings, arranging TURBT.  7/10/17: Hematuria confirmed, CT Urogram reassuring.  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  6/16/17: 87 yo man referred by Dr. Gibson for evaluation of nocturia and LUTS.  Has incontinence at night, sudden urgency has trouble making it to the toilet.  Nocturia like this 2-3/night.  No daytime problems.  Six months so far.   No abd/pelvic pain and no exac/rel factors.  No gross hematuria.  No urolithiasis.  No other urinary bother.  No  history.  PSA was always normal.  2 cups coffee in AM, tea at lunch and dinner (green tea).  Good stream.     Allergies:  Patient has no known allergies.    Medications:  has a current medication list which includes the following prescription(s): acetaminophen, amlodipine, aspirin, clopidogrel, levothyroxine, lisinopril, lotemax, nitroglycerin, and simvastatin.    Review of Systems:  General: No fever, chills, fatigability, or weight loss.  Skin: No rashes, itching, or changes in color or texture of skin.  Chest: Denies DELUCA, cyanosis, wheezing, cough, and sputum production.  Abdomen: Appetite fine. No weight loss. Denies diarrhea, abdominal pain, hematemesis, or blood in stool.  Musculoskeletal: No joint stiffness or swelling. Denies back pain.  : As above.  All other review of systems negative.    PMH:   has a past medical history of Anticoagulant long-term use; Cardiomyopathy (9/07); HBP (high blood pressure) (1997); Hyperlipidemia; LBP (low back  pain); MI (myocardial infarction) (12/06); Status post primary angioplasty with coronary stent (12/06); and Thyroid disease.    PSH:   has a past surgical history that includes Hernia repair and Coronary stent placement.    FamHx: family history is not on file.    SocHx:  reports that he has never smoked. He has never used smokeless tobacco. He reports that he does not drink alcohol or use drugs.      Physical Exam:  There were no vitals filed for this visit.  General: A&Ox3, no apparent distress, no deformities  Neck: No masses, normal thyroid  Lungs: normal inspiration, no use of accessory muscles  Heart: normal pulse, no arrhythmias  Abdomen: Soft, NT, ND  Skin: The skin is warm and dry. No jaundice.  Ext: No c/c/e.  :   6/17: Test desc remington, no abnormalities of epididymus. Penis normal, with normal penile and scrotal skin. Meatus normal. Normal rectal tone, no hemorrhoids. Prost 40 gm no nodules or masses appreciated. SV not palpable. Perineum and anus normal.    Labs/Studies:   Urinalysis performed in clinic, summary:    10/6/17: UA normal exc tr prot and 50 blood, less on micro exam  Bladder Scan performed in office:     6/17: PVR 4 ml.    Impression/Plan:   1. Reviewed findings and indications for BCG.   BCG 5/6 next week, half strength.  2. 3 mo cysto 11/20/17.

## 2017-11-03 NOTE — TELEPHONE ENCOUNTER
Patient complaining of dysuria and frequency following today's half strength BCG treatment. Advised patient to increase fluid intake and take AZO for bladder discomfort. If patient's symptoms worsen or do not improve call our office back on Monday. Also, advised patient go to ER if pain becomes intolerable. Patient states understanding.

## 2017-11-03 NOTE — TELEPHONE ENCOUNTER
----- Message from Paloma Alaniz sent at 11/3/2017  3:47 PM CDT -----  Contact: bella/wilma granddaughter   Call call regarding a question about pt appt.   ...[phone]

## 2017-11-06 NOTE — TELEPHONE ENCOUNTER
Spoke with patient's granddaughter, Tiara, to check on patient. Tiara states patient is taking AZO and has no further complaints at this time. Will discuss symptoms with Dr. Alcocer at upcoming visit this week.

## 2017-11-09 ENCOUNTER — OFFICE VISIT (OUTPATIENT)
Dept: UROLOGY | Facility: CLINIC | Age: 82
End: 2017-11-09
Payer: COMMERCIAL

## 2017-11-09 VITALS — BODY MASS INDEX: 25.91 KG/M2 | WEIGHT: 181 LBS | HEIGHT: 70 IN

## 2017-11-09 DIAGNOSIS — C67.9 MALIGNANT NEOPLASM OF URINARY BLADDER, UNSPECIFIED SITE: Primary | ICD-10-CM

## 2017-11-09 LAB
BILIRUB SERPL-MCNC: NORMAL MG/DL
BLOOD URINE, POC: 50
COLOR, POC UA: YELLOW
GLUCOSE UR QL STRIP: NORMAL
KETONES UR QL STRIP: NORMAL
LEUKOCYTE ESTERASE URINE, POC: NORMAL
NITRITE, POC UA: NORMAL
PH, POC UA: 5
PROTEIN, POC: 100
SPECIFIC GRAVITY, POC UA: 1.02
UROBILINOGEN, POC UA: NORMAL

## 2017-11-09 PROCEDURE — 99499 UNLISTED E&M SERVICE: CPT | Mod: S$GLB,,, | Performed by: UROLOGY

## 2017-11-09 PROCEDURE — 99999 PR PBB SHADOW E&M-EST. PATIENT-LVL II: CPT | Mod: PBBFAC,,, | Performed by: UROLOGY

## 2017-11-09 PROCEDURE — 81002 URINALYSIS NONAUTO W/O SCOPE: CPT | Mod: S$GLB,,, | Performed by: UROLOGY

## 2017-11-09 PROCEDURE — 51720 TREATMENT OF BLADDER LESION: CPT | Mod: S$GLB,,, | Performed by: UROLOGY

## 2017-11-09 NOTE — PROGRESS NOTES
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 BCG again today.  11/3/17: Had sig LUTS after last BCG and we skipped last week.  BCG 1/2 strength 4/6 today.  10/19/17: BCG 3/6 today.  9/22/17: Came for BCG today, but still too much microhematuria and leuk.  9/5/17: High grade Ta on path report.  OAB now settling down.  8/10/17: Cleared for surgery, reviewed findings, arranging TURBT.  7/10/17: Hematuria confirmed, CT Urogram reassuring.  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  6/16/17: 85 yo man referred by Dr. Gibson for evaluation of nocturia and LUTS.  Has incontinence at night, sudden urgency has trouble making it to the toilet.  Nocturia like this 2-3/night.  No daytime problems.  Six months so far.   No abd/pelvic pain and no exac/rel factors.  No gross hematuria.  No urolithiasis.  No other urinary bother.  No  history.  PSA was always normal.  2 cups coffee in AM, tea at lunch and dinner (green tea).  Good stream.     Allergies:  Patient has no known allergies.    Medications:  has a current medication list which includes the following prescription(s): acetaminophen, amlodipine, aspirin, clopidogrel, levothyroxine, lisinopril, lotemax, nitroglycerin, and simvastatin.    Review of Systems:  General: No fever, chills, fatigability, or weight loss.  Skin: No rashes, itching, or changes in color or texture of skin.  Chest: Denies DELUCA, cyanosis, wheezing, cough, and sputum production.  Abdomen: Appetite fine. No weight loss. Denies diarrhea, abdominal pain, hematemesis, or blood in stool.  Musculoskeletal: No joint stiffness or swelling. Denies back pain.  : As above.  All other review of systems negative.    PMH:   has a past medical history of Anticoagulant long-term  use; Cardiomyopathy (9/07); HBP (high blood pressure) (1997); Hyperlipidemia; LBP (low back pain); MI (myocardial infarction) (12/06); Status post primary angioplasty with coronary stent (12/06); and Thyroid disease.    PSH:   has a past surgical history that includes Hernia repair and Coronary stent placement.    FamHx: family history is not on file.    SocHx:  reports that he has never smoked. He has never used smokeless tobacco. He reports that he does not drink alcohol or use drugs.      Physical Exam:  There were no vitals filed for this visit.  General: A&Ox3, no apparent distress, no deformities  Neck: No masses, normal thyroid  Lungs: normal inspiration, no use of accessory muscles  Heart: normal pulse, no arrhythmias  Abdomen: Soft, NT, ND  Skin: The skin is warm and dry. No jaundice.  Ext: No c/c/e.  :   6/17: Test desc remington, no abnormalities of epididymus. Penis normal, with normal penile and scrotal skin. Meatus normal. Normal rectal tone, no hemorrhoids. Prost 40 gm no nodules or masses appreciated. SV not palpable. Perineum and anus normal.    Labs/Studies:   Urinalysis performed in clinic, summary:    10/6/17: UA normal exc tr prot and 50 blood, less on micro exam  Bladder Scan performed in office:     6/17: PVR 4 ml.    Impression/Plan:   1. Reviewed findings and indications for BCG.   BCG 6/6 next week, half strength.  2. 3 mo cysto 11/20/17.

## 2017-11-17 ENCOUNTER — OFFICE VISIT (OUTPATIENT)
Dept: UROLOGY | Facility: CLINIC | Age: 82
End: 2017-11-17
Payer: COMMERCIAL

## 2017-11-17 ENCOUNTER — TELEPHONE (OUTPATIENT)
Dept: UROLOGY | Facility: CLINIC | Age: 82
End: 2017-11-17

## 2017-11-17 DIAGNOSIS — C67.9 MALIGNANT NEOPLASM OF URINARY BLADDER, UNSPECIFIED SITE: Primary | ICD-10-CM

## 2017-11-17 PROCEDURE — 99214 OFFICE O/P EST MOD 30 MIN: CPT | Mod: 25,S$GLB,, | Performed by: UROLOGY

## 2017-11-17 PROCEDURE — 99999 PR PBB SHADOW E&M-EST. PATIENT-LVL I: CPT | Mod: PBBFAC,,, | Performed by: UROLOGY

## 2017-11-17 PROCEDURE — 81002 URINALYSIS NONAUTO W/O SCOPE: CPT | Mod: S$GLB,,, | Performed by: UROLOGY

## 2017-11-17 NOTE — PROGRESS NOTES
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  We reviewed the indications.  We will respect his wishes.  Agreed to cysto soon, then do 3 wks maint BCG; just not do it today.  11/9/17: LUTS for a half-day last time but not bad enough to skip this week. BCG again today.  11/3/17: Had sig LUTS after last BCG and we skipped last week.  BCG 1/2 strength 4/6 today.  10/19/17: BCG 3/6 today.  9/22/17: Came for BCG today, but still too much microhematuria and leuk.  9/5/17: High grade Ta on path report.  OAB now settling down.  8/10/17: Cleared for surgery, reviewed findings, arranging TURBT.  7/10/17: Hematuria confirmed, CT Urogram reassuring.  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  6/16/17: 85 yo man referred by Dr. Gibson for evaluation of nocturia and LUTS.  Has incontinence at night, sudden urgency has trouble making it to the toilet.  Nocturia like this 2-3/night.  No daytime problems.  Six months so far.   No abd/pelvic pain and no exac/rel factors.  No gross hematuria.  No urolithiasis.  No other urinary bother.  No  history.  PSA was always normal.  2 cups coffee in AM, tea at lunch and dinner (green tea).  Good stream.     Allergies:  Patient has no known allergies.    Medications:  has a current medication list which includes the following prescription(s): acetaminophen, amlodipine, aspirin, clopidogrel, levothyroxine, lisinopril, lotemax, nitroglycerin, and simvastatin.    Review of Systems:  General: No fever, chills, fatigability, or weight loss.  Skin: No rashes, itching, or changes in color or texture of skin.  Chest: Denies DELUCA, cyanosis, wheezing, cough, and sputum production.  Abdomen: Appetite fine. No weight loss. Denies diarrhea, abdominal pain, hematemesis, or blood in  stool.  Musculoskeletal: No joint stiffness or swelling. Denies back pain.  : As above.  All other review of systems negative.    PMH:   has a past medical history of Anticoagulant long-term use; Cardiomyopathy (9/07); HBP (high blood pressure) (1997); Hyperlipidemia; LBP (low back pain); MI (myocardial infarction) (12/06); Status post primary angioplasty with coronary stent (12/06); and Thyroid disease.    PSH:   has a past surgical history that includes Hernia repair and Coronary stent placement.    FamHx: family history is not on file.    SocHx:  reports that he has never smoked. He has never used smokeless tobacco. He reports that he does not drink alcohol or use drugs.      Physical Exam:  There were no vitals filed for this visit.  General: A&Ox3, no apparent distress, no deformities  Neck: No masses, normal thyroid  Lungs: normal inspiration, no use of accessory muscles  Heart: normal pulse, no arrhythmias  Abdomen: Soft, NT, ND  Skin: The skin is warm and dry. No jaundice.  Ext: No c/c/e.  :   6/17: Test desc remington, no abnormalities of epididymus. Penis normal, with normal penile and scrotal skin. Meatus normal. Normal rectal tone, no hemorrhoids. Prost 40 gm no nodules or masses appreciated. SV not palpable. Perineum and anus normal.    Labs/Studies:   Urinalysis performed in clinic, summary:    10/6/17: UA normal exc tr prot and 50 blood, less on micro exam  Bladder Scan performed in office:     6/17: PVR 4 ml.    Impression/Plan:   1. 3 mo cysto in a week or two.  Maint BCG after that.

## 2017-11-17 NOTE — TELEPHONE ENCOUNTER
----- Message from Bettina Gomez sent at 11/17/2017  2:17 PM CST -----  Contact: pt granddaughter Tiara    Pt is calling nurse staff requesting the pt procedure to be reschedule. Pt call back to be advised [phone] thanks

## 2017-11-28 ENCOUNTER — HOSPITAL ENCOUNTER (OUTPATIENT)
Dept: RADIOLOGY | Facility: HOSPITAL | Age: 82
Discharge: HOME | End: 2017-11-28
Attending: INTERNAL MEDICINE
Payer: COMMERCIAL

## 2017-11-28 ENCOUNTER — OFFICE VISIT (OUTPATIENT)
Dept: PULMONOLOGY | Facility: CLINIC | Age: 82
End: 2017-11-28
Payer: COMMERCIAL

## 2017-11-28 VITALS
RESPIRATION RATE: 17 BRPM | HEIGHT: 70 IN | BODY MASS INDEX: 26.05 KG/M2 | WEIGHT: 182 LBS | HEART RATE: 63 BPM | SYSTOLIC BLOOD PRESSURE: 120 MMHG | DIASTOLIC BLOOD PRESSURE: 64 MMHG | OXYGEN SATURATION: 95 %

## 2017-11-28 DIAGNOSIS — R91.8 MULTIPLE LUNG NODULES: Primary | Chronic | ICD-10-CM

## 2017-11-28 DIAGNOSIS — R91.8 MULTIPLE LUNG NODULES: Chronic | ICD-10-CM

## 2017-11-28 PROCEDURE — 71020 XR CHEST PA AND LATERAL: CPT | Mod: 26,,, | Performed by: RADIOLOGY

## 2017-11-28 PROCEDURE — 99214 OFFICE O/P EST MOD 30 MIN: CPT | Mod: S$GLB,,, | Performed by: INTERNAL MEDICINE

## 2017-11-28 PROCEDURE — 99999 PR PBB SHADOW E&M-EST. PATIENT-LVL III: CPT | Mod: PBBFAC,,, | Performed by: INTERNAL MEDICINE

## 2017-11-28 PROCEDURE — 71020 XR CHEST PA AND LATERAL: CPT | Mod: TC

## 2017-11-28 NOTE — PATIENT INSTRUCTIONS
Lung Anatomy  Your lungs take air in to give your body oxygen, which the body needs to work. Your lungs, like all the tissues in your body, are made up of billions of tiny specialized cells. Old lung cells die and are replaced by new, identical lung cells. This natural process helps ensure healthy lungs.    Date Last Reviewed: 11/1/2016  © 9168-9304 [employer] [address] [place] [address], [place] [address]. All rights reserved. This information is not intended as a substitute for professional medical care. Always follow your healthcare professional's instructions.

## 2017-11-28 NOTE — PROGRESS NOTES
Subjective:      Patient ID: Brant Lees is a 86 y.o. male.    Patient Active Problem List   Diagnosis    Hyperlipidemia    LBP (low back pain)    Cardiomyopathy    HBP (high blood pressure)    MI (myocardial infarction)    Status post primary angioplasty with coronary stent    Multiple lung nodules    Lesion of bladder    Hypothyroidism due to Hashimoto's thyroiditis     Problem list has been reviewed.    Chief Complaint: multiple lung nodules    HPI    Here for follow up for up  and review of CXR. He states that he is fine and has no specific pulmonary complaints. He denies  hemoptysis,  pain with breathing, wheezing, asthma.   A full  review of systems, past , family  and social histories was performed except as mentioned in the note above, these are non contributory to the main issues discussed today. Immunization status reviewed and updated.      Immunization status reviewed and up to date:    Previous Report Reviewed: office notes     The following portions of the patient's history were reviewed and updated as appropriate: He  has a past medical history of Anticoagulant long-term use; Cardiomyopathy (9/07); HBP (high blood pressure) (1997); Hyperlipidemia; LBP (low back pain); MI (myocardial infarction) (12/06); Status post primary angioplasty with coronary stent (12/06); and Thyroid disease.  He  has a past surgical history that includes Hernia repair and Coronary stent placement.  His family history is not on file.  He  reports that he has never smoked. He has never used smokeless tobacco. He reports that he does not drink alcohol or use drugs.  He has a current medication list which includes the following prescription(s): acetaminophen, amlodipine, aspirin, clopidogrel, levothyroxine, lisinopril, lotemax, nitroglycerin, and simvastatin.  He has No Known Allergies..    Review of Systems   Constitutional: Negative for fever, chills, fatigue and night sweats.   HENT: Negative for nosebleeds,  "rhinorrhea, sore throat, trouble swallowing, congestion and hearing loss.    Eyes: Negative for itching.   Respiratory: Positive for cough. Negative for snoring, sputum production, chest tightness, wheezing and dyspnea on extertion.    Cardiovascular: Negative for chest pain and leg swelling.   Genitourinary: Negative for difficulty urinating.   Endocrine: Negative for cold intolerance and heat intolerance.    Musculoskeletal: Positive for arthralgias.   Skin: Negative for rash.   Gastrointestinal: Negative for nausea, vomiting and acid reflux.   Neurological: Negative for dizziness, syncope, light-headedness and headaches.   Hematological: Does not bruise/bleed easily.   Psychiatric/Behavioral: The patient is not nervous/anxious.    All other systems reviewed and are negative.     Objective:     /64   Pulse 63   Resp 17   Ht 5' 10" (1.778 m)   Wt 82.6 kg (181 lb 15.8 oz)   SpO2 95%   BMI 26.11 kg/m²   Body mass index is 26.11 kg/m².    Physical Exam   Constitutional: He is oriented to person, place, and time. He appears well-developed and well-nourished.   HENT:   Head: Normocephalic and atraumatic.   Right Ear: Tympanic membrane, external ear and ear canal normal.   Left Ear: Tympanic membrane, external ear and ear canal normal.   Nose: Nose normal.   Mouth/Throat: Oropharynx is clear and moist. No oropharyngeal exudate.   Eyes: Conjunctivae and EOM are normal.   Neck: Neck supple. No thyromegaly present.   Cardiovascular: Normal rate, regular rhythm and normal heart sounds.    Pulmonary/Chest: Effort normal. He has no wheezes.   Lymphadenopathy:     He has no cervical adenopathy.   Neurological: He is alert and oriented to person, place, and time.   Skin: Skin is warm and dry.   Psychiatric: He has a normal mood and affect. His behavior is normal.       Personal Diagnostic Review  CXR: Cardiac silhouette remains borderline enlarged.   Diffuse coarsening of the bronchovascular markings with peripheral " scarring again noted bilaterally.  Slightly increased density noted in the left midlung which is equivocal for possible developing infectious/inflammatory infiltrate, correlate clinically.  No pleural effusions visualized. Multilevel degenerative changes noted throughout the visualized spine    Assessment:     1. Multiple lung nodules Stable     Outpatient Encounter Prescriptions as of 11/28/2017   Medication Sig Dispense Refill    acetaminophen (TYLENOL) 325 MG tablet Take 325 mg by mouth every 6 (six) hours as needed for Pain.      amlodipine (NORVASC) 10 MG tablet Take 2.5 tablets by mouth 2 (two) times daily. 1/2 two times daily       ASPIRIN (ASPIR-81 ORAL) Take by mouth.      clopidogrel (PLAVIX) 75 mg tablet Take 75 mg by mouth once daily.      levothyroxine (SYNTHROID) 50 MCG tablet TAKE 1 TABLET DAILY 90 tablet 0    lisinopril (PRINIVIL,ZESTRIL) 20 MG tablet Take 1 tablet by mouth once daily.       LOTEMAX 0.5 % ophthalmic suspension INSTILL 1 DROP IN EACH EYE TWICE DAILY THANK YOU  5    nitroGLYCERIN (NITROSTAT) 0.4 MG SL tablet Place 0.4 mg under the tongue every 5 (five) minutes as needed for Chest pain.      simvastatin (ZOCOR) 20 MG tablet Take 20 mg by mouth every evening.       No facility-administered encounter medications on file as of 11/28/2017.      Orders Placed This Encounter   Procedures    X-Ray Chest PA And Lateral     Standing Status:   Future     Standing Expiration Date:   11/28/2018     Order Specific Question:   May the Radiologist modify the order per protocol to meet the clinical needs of the patient?     Answer:   Yes     Plan:     Discussed diagnosis, its evaluation, treatment and usual course. All questions answered.    Multiple lung nodules  Radiologically stable. Repeat CXR in 1 year.      TIME SPENT WITH PATIENT: Time spent: 25 minutes in face to face  discussion concerning diagnosis, prognosis, review of lab and test results, benefits of treatment as well as  management of disease, counseling of patient and coordination of care between various health  care providers . Greater than half the time spent was used for coordination of care and counseling of patient.       Return in about 1 year (around 11/28/2018) for Multiple lung nodules.

## 2017-12-07 ENCOUNTER — OFFICE VISIT (OUTPATIENT)
Dept: UROLOGY | Facility: CLINIC | Age: 82
End: 2017-12-07
Payer: COMMERCIAL

## 2017-12-07 VITALS
SYSTOLIC BLOOD PRESSURE: 122 MMHG | BODY MASS INDEX: 25.91 KG/M2 | WEIGHT: 181 LBS | HEIGHT: 70 IN | HEART RATE: 68 BPM | DIASTOLIC BLOOD PRESSURE: 80 MMHG

## 2017-12-07 DIAGNOSIS — C67.9 MALIGNANT NEOPLASM OF URINARY BLADDER, UNSPECIFIED SITE: Primary | ICD-10-CM

## 2017-12-07 PROCEDURE — 52000 CYSTOURETHROSCOPY: CPT | Mod: S$GLB,,, | Performed by: UROLOGY

## 2017-12-07 PROCEDURE — 99499 UNLISTED E&M SERVICE: CPT | Mod: S$GLB,,, | Performed by: UROLOGY

## 2017-12-07 PROCEDURE — 81002 URINALYSIS NONAUTO W/O SCOPE: CPT | Mod: S$GLB,,, | Performed by: UROLOGY

## 2017-12-07 PROCEDURE — 99999 PR PBB SHADOW E&M-EST. PATIENT-LVL II: CPT | Mod: PBBFAC,,, | Performed by: UROLOGY

## 2017-12-07 NOTE — PROGRESS NOTES
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  11/17/17: Pt would like to not do the BCG today.  We reviewed the indications.  We will respect his wishes.  Agreed to cysto soon, then do 3 wks maint BCG; just not do it today.  11/9/17: LUTS for a half-day last time but not bad enough to skip this week. BCG again today.  11/3/17: Had sig LUTS after last BCG and we skipped last week.  BCG 1/2 strength 4/6 today.  10/19/17: BCG 3/6 today.  9/22/17: Came for BCG today, but still too much microhematuria and leuk.  9/5/17: High grade Ta on path report.  OAB now settling down.  8/10/17: Cleared for surgery, reviewed findings, arranging TURBT.  7/10/17: Hematuria confirmed, CT Urogram reassuring.  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  6/16/17: 85 yo man referred by Dr. Gibson for evaluation of nocturia and LUTS.  Has incontinence at night, sudden urgency has trouble making it to the toilet.  Nocturia like this 2-3/night.  No daytime problems.  Six months so far.   No abd/pelvic pain and no exac/rel factors.  No gross hematuria.  No urolithiasis.  No other urinary bother.  No  history.  PSA was always normal.  2 cups coffee in AM, tea at lunch and dinner (green tea).  Good stream.     Allergies:  Patient has no known allergies.    Medications:  has a current medication list which includes the following prescription(s): acetaminophen, amlodipine, aspirin, clopidogrel, levothyroxine, lisinopril, lotemax, nitroglycerin, and simvastatin.    Review of Systems:  General: No fever, chills, fatigability, or weight loss.  Skin: No rashes, itching, or changes in color or texture of skin.  Chest: Denies DELUCA, cyanosis, wheezing, cough, and sputum production.  Abdomen: Appetite fine. No weight loss. Denies diarrhea, abdominal pain, hematemesis,  or blood in stool.  Musculoskeletal: No joint stiffness or swelling. Denies back pain.  : As above.  All other review of systems negative.    PMH:   has a past medical history of Anticoagulant long-term use; Cardiomyopathy (9/07); HBP (high blood pressure) (1997); Hyperlipidemia; LBP (low back pain); MI (myocardial infarction) (12/06); Status post primary angioplasty with coronary stent (12/06); and Thyroid disease.    PSH:   has a past surgical history that includes Hernia repair and Coronary stent placement.    FamHx: family history is not on file.    SocHx:  reports that he has never smoked. He has never used smokeless tobacco. He reports that he does not drink alcohol or use drugs.      Physical Exam:  Vitals:    12/07/17 1434   BP: 122/80   Pulse: 68     General: A&Ox3, no apparent distress, no deformities  Neck: No masses, normal thyroid  Lungs: normal inspiration, no use of accessory muscles  Heart: normal pulse, no arrhythmias  Abdomen: Soft, NT, ND  Skin: The skin is warm and dry. No jaundice.  Ext: No c/c/e.  :   6/17: Test desc remington, no abnormalities of epididymus. Penis normal, with normal penile and scrotal skin. Meatus normal. Normal rectal tone, no hemorrhoids. Prost 40 gm no nodules or masses appreciated. SV not palpable. Perineum and anus normal.    Labs/Studies:   Urinalysis performed in clinic, summary:    10/6/17: UA normal exc tr prot and 50 blood, less on micro exam  Bladder Scan performed in office:     6/17: PVR 4 ml.    Procedure: Diagnostic Cystoscopy    Procedure in Detail: After proper consents were obtained, the patient was prepped and draped in normal sterile fashion for diagnostic cystoscopy. 5 ml of lidocaine jelly was instilled in the urethra. The flexible cystoscope was then introduced into the urethra, and advanced into the bladder under direct vision. The urethral mucosa appeared normal, and no strictures were noted. The sphincter appeared to be normal, and the veru montanum  was unremarkable. The prostatic mucosa and the lateral lobes of the prostate were normal. The bladder neck was normal. Inspection of the interior of the bladder was then carried out. The trigone was unremarkable, with no mucosal lesions. The ureteral orifices were normal in position and configuration. Systematic inspection of the mucosa of the bladder it was then carried out, rotating the cystoscope so that all areas of the left and right lateral walls, the dome of the bladder, and the posterior wall were all visualized. The cystoscope was then advanced further into the bladder, and maximum deflection of the scope was performed so that the bladder neck could be inspected. No mucosal lesions were noted there. The cystoscope was then removed, and the procedure terminated.     Findings: no recurrence    Impression/Plan:   1. 6 mo cysto 3/7/18.  Maint BCG 1/3 Half-strength next week.

## 2017-12-11 ENCOUNTER — TELEPHONE (OUTPATIENT)
Dept: UROLOGY | Facility: CLINIC | Age: 82
End: 2017-12-11

## 2017-12-11 NOTE — TELEPHONE ENCOUNTER
----- Message from Moris Dominguez sent at 12/11/2017  9:22 AM CST -----  Contact: Kathie Menjivar- [phone]  Returning call, please call back at 11am at [phone].  Md Cindy

## 2017-12-11 NOTE — TELEPHONE ENCOUNTER
Spoke with patient's granddaughter, Tiara, and rescheduled patient's BCG treatments and follow up cystoscopy.

## 2017-12-11 NOTE — TELEPHONE ENCOUNTER
----- Message from Genny Martinez sent at 12/8/2017 11:45 AM CST -----  Contact: Tiara (family member)  Request a call on this pt, no additional info given, can be reached at [phone]///[email or web address]

## 2017-12-13 ENCOUNTER — OFFICE VISIT (OUTPATIENT)
Dept: UROLOGY | Facility: CLINIC | Age: 82
End: 2017-12-13
Payer: COMMERCIAL

## 2017-12-13 VITALS — WEIGHT: 181 LBS | BODY MASS INDEX: 25.97 KG/M2

## 2017-12-13 DIAGNOSIS — C67.9 MALIGNANT NEOPLASM OF URINARY BLADDER, UNSPECIFIED SITE: Primary | ICD-10-CM

## 2017-12-13 LAB
BILIRUB SERPL-MCNC: NORMAL MG/DL
BLOOD URINE, POC: 50
COLOR, POC UA: NORMAL
GLUCOSE UR QL STRIP: NORMAL
KETONES UR QL STRIP: NORMAL
LEUKOCYTE ESTERASE URINE, POC: NORMAL
NITRITE, POC UA: NORMAL
PH, POC UA: 5
PROTEIN, POC: 30
SPECIFIC GRAVITY, POC UA: 1.01
UROBILINOGEN, POC UA: NORMAL

## 2017-12-13 PROCEDURE — 81002 URINALYSIS NONAUTO W/O SCOPE: CPT | Mod: S$GLB,,, | Performed by: UROLOGY

## 2017-12-13 PROCEDURE — 99499 UNLISTED E&M SERVICE: CPT | Mod: S$GLB,,, | Performed by: UROLOGY

## 2017-12-13 PROCEDURE — 51720 TREATMENT OF BLADDER LESION: CPT | Mod: S$GLB,,, | Performed by: UROLOGY

## 2017-12-13 PROCEDURE — 99999 PR PBB SHADOW E&M-EST. PATIENT-LVL I: CPT | Mod: PBBFAC,,, | Performed by: UROLOGY

## 2017-12-13 NOTE — PROGRESS NOTES
Chief Complaint: High Grade Ta Bladder Cancer    HPI:   12/13/17: BCG 1/3 1/2 strength today.  12/7/17: 3 mo cysto normal.  11/17/17: Pt would like to not do the BCG today.  We reviewed the indications.  We will respect his wishes.  Agreed to cysto soon, then do 3 wks maint BCG; just not do it today.  11/9/17: LUTS for a half-day last time but not bad enough to skip this week. BCG again today.  11/3/17: Had sig LUTS after last BCG and we skipped last week.  BCG 1/2 strength 4/6 today.  10/19/17: BCG 3/6 today.  9/22/17: Came for BCG today, but still too much microhematuria and leuk.  9/5/17: High grade Ta on path report.  OAB now settling down.  8/10/17: Cleared for surgery, reviewed findings, arranging TURBT.  7/10/17: Hematuria confirmed, CT Urogram reassuring.  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  6/16/17: 87 yo man referred by Dr. Gibson for evaluation of nocturia and LUTS.  Has incontinence at night, sudden urgency has trouble making it to the toilet.  Nocturia like this 2-3/night.  No daytime problems.  Six months so far.   No abd/pelvic pain and no exac/rel factors.  No gross hematuria.  No urolithiasis.  No other urinary bother.  No  history.  PSA was always normal.  2 cups coffee in AM, tea at lunch and dinner (green tea).  Good stream.     Allergies:  Patient has no known allergies.    Medications:  has a current medication list which includes the following prescription(s): acetaminophen, amlodipine, aspirin, clopidogrel, levothyroxine, lisinopril, lotemax, nitroglycerin, and simvastatin.    Review of Systems:  General: No fever, chills, fatigability, or weight loss.  Skin: No rashes, itching, or changes in color or texture of skin.  Chest: Denies DELUCA, cyanosis, wheezing, cough, and sputum production.  Abdomen: Appetite fine. No weight loss. Denies  diarrhea, abdominal pain, hematemesis, or blood in stool.  Musculoskeletal: No joint stiffness or swelling. Denies back pain.  : As above.  All other review of systems negative.    PMH:   has a past medical history of Anticoagulant long-term use; Cardiomyopathy (9/07); HBP (high blood pressure) (1997); Hyperlipidemia; LBP (low back pain); MI (myocardial infarction) (12/06); Status post primary angioplasty with coronary stent (12/06); and Thyroid disease.    PSH:   has a past surgical history that includes Hernia repair and Coronary stent placement.    FamHx: family history is not on file.    SocHx:  reports that he has never smoked. He has never used smokeless tobacco. He reports that he does not drink alcohol or use drugs.      Physical Exam:  There were no vitals filed for this visit.  General: A&Ox3, no apparent distress, no deformities  Neck: No masses, normal thyroid  Lungs: normal inspiration, no use of accessory muscles  Heart: normal pulse, no arrhythmias  Abdomen: Soft, NT, ND  Skin: The skin is warm and dry. No jaundice.  Ext: No c/c/e.  :   6/17: Test desc remington, no abnormalities of epididymus. Penis normal, with normal penile and scrotal skin. Meatus normal. Normal rectal tone, no hemorrhoids. Prost 40 gm no nodules or masses appreciated. SV not palpable. Perineum and anus normal.    Labs/Studies:   Urinalysis performed in clinic, summary:    10/6/17: UA normal exc tr prot and 50 blood, less on micro exam  Bladder Scan performed in office:     6/17: PVR 4 ml.    Impression/Plan:   1. 6 mo cysto 3/7/18.  Maint BCG 2/3 Half-strength next week.

## 2017-12-13 NOTE — PROGRESS NOTES
....Patient in today for BCG treatment. One vial of BCG and 50ml perservative free sodium chloride 0.9% mixed as per instructions. Using aseptic technique, a sterile fenestrated drape was placed over penis. Pt was catheterized using a #14Fr red rubber catheter to allow bladder emptying. Private area cleansed with betadine. Using closed system, 50 ml half strength BCG instilled via gravity directly into bladder. Pt tolerated well. Instructed pt to keep BCG solution in bladder for 2 hours. Pt was given instructions to follow for the next 6 hours, including sitting on his toilet at home and using 2 cups of undiluted chlorine bleach and closing this lid. Pt was told to allow bleach to stand for 15 minutes before flushing toilet. He was also told to drink plenty of water to flush any remaining BCG bacteria. Patient verbalized understanding.

## 2017-12-17 DIAGNOSIS — E03.9 HYPOTHYROIDISM (ACQUIRED): ICD-10-CM

## 2017-12-18 RX ORDER — LEVOTHYROXINE SODIUM 50 UG/1
TABLET ORAL
Qty: 90 TABLET | Refills: 0 | Status: SHIPPED | OUTPATIENT
Start: 2017-12-18 | End: 2018-03-16 | Stop reason: SDUPTHER

## 2017-12-20 ENCOUNTER — OFFICE VISIT (OUTPATIENT)
Dept: UROLOGY | Facility: CLINIC | Age: 82
End: 2017-12-20
Payer: COMMERCIAL

## 2017-12-20 DIAGNOSIS — C67.9 MALIGNANT NEOPLASM OF URINARY BLADDER, UNSPECIFIED SITE: Primary | ICD-10-CM

## 2017-12-20 PROCEDURE — 99499 UNLISTED E&M SERVICE: CPT | Mod: S$GLB,,, | Performed by: UROLOGY

## 2017-12-20 NOTE — PROGRESS NOTES
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  Got better in a couple days.  12/13/17: BCG 1/3 1/2 strength today.  12/7/17: 3 mo cysto normal.  11/17/17: Pt would like to not do the BCG today.  We reviewed the indications.  We will respect his wishes.  Agreed to cysto soon, then do 3 wks maint BCG; just not do it today.  11/9/17: LUTS for a half-day last time but not bad enough to skip this week. BCG again today.  11/3/17: Had sig LUTS after last BCG and we skipped last week.  BCG 1/2 strength 4/6 today.  10/19/17: BCG 3/6 today.  9/22/17: Came for BCG today, but still too much microhematuria and leuk.  9/5/17: High grade Ta on path report.  OAB now settling down.  8/10/17: Cleared for surgery, reviewed findings, arranging TURBT.  7/10/17: Hematuria confirmed, CT Urogram reassuring.  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  6/16/17: 87 yo man referred by Dr. Gibson for evaluation of nocturia and LUTS.  Has incontinence at night, sudden urgency has trouble making it to the toilet.  Nocturia like this 2-3/night.  No daytime problems.  Six months so far.   No abd/pelvic pain and no exac/rel factors.  No gross hematuria.  No urolithiasis.  No other urinary bother.  No  history.  PSA was always normal.  2 cups coffee in AM, tea at lunch and dinner (green tea).  Good stream.     Allergies:  Patient has no known allergies.    Medications:  has a current medication list which includes the following prescription(s): acetaminophen, amlodipine, aspirin, clopidogrel, levothyroxine, lisinopril, lotemax, nitroglycerin, and simvastatin.    Review of Systems:  General: No fever, chills, fatigability, or weight loss.  Skin: No rashes, itching, or changes in color or texture of skin.  Chest: Denies DELUCA,  cyanosis, wheezing, cough, and sputum production.  Abdomen: Appetite fine. No weight loss. Denies diarrhea, abdominal pain, hematemesis, or blood in stool.  Musculoskeletal: No joint stiffness or swelling. Denies back pain.  : As above.  All other review of systems negative.    PMH:   has a past medical history of Anticoagulant long-term use; Cardiomyopathy (9/07); HBP (high blood pressure) (1997); Hyperlipidemia; LBP (low back pain); MI (myocardial infarction) (12/06); Status post primary angioplasty with coronary stent (12/06); and Thyroid disease.    PSH:   has a past surgical history that includes Hernia repair and Coronary stent placement.    FamHx: family history is not on file.    SocHx:  reports that he has never smoked. He has never used smokeless tobacco. He reports that he does not drink alcohol or use drugs.      Physical Exam:  There were no vitals filed for this visit.  General: A&Ox3, no apparent distress, no deformities  Neck: No masses, normal thyroid  Lungs: normal inspiration, no use of accessory muscles  Heart: normal pulse, no arrhythmias  Abdomen: Soft, NT, ND  Skin: The skin is warm and dry. No jaundice.  Ext: No c/c/e.  :   6/17: Test desc remington, no abnormalities of epididymus. Penis normal, with normal penile and scrotal skin. Meatus normal. Normal rectal tone, no hemorrhoids. Prost 40 gm no nodules or masses appreciated. SV not palpable. Perineum and anus normal.    Labs/Studies:   Urinalysis performed in clinic, summary:    10/6/17: UA normal exc tr prot and 50 blood, less on micro exam  Bladder Scan performed in office:     6/17: PVR 4 ml.    Impression/Plan:   1. 6 mo cysto 3/7/18.  Maint BCG 2/3 Half-strength in a couple weeks.  If has a reaction to that will cease BCG.  No E&M for today.

## 2018-01-03 ENCOUNTER — OFFICE VISIT (OUTPATIENT)
Dept: UROLOGY | Facility: CLINIC | Age: 83
End: 2018-01-03
Payer: COMMERCIAL

## 2018-01-03 DIAGNOSIS — C67.9 MALIGNANT NEOPLASM OF URINARY BLADDER, UNSPECIFIED SITE: Primary | ICD-10-CM

## 2018-01-03 PROCEDURE — 51720 TREATMENT OF BLADDER LESION: CPT | Mod: S$GLB,,, | Performed by: UROLOGY

## 2018-01-03 PROCEDURE — 81002 URINALYSIS NONAUTO W/O SCOPE: CPT | Mod: S$GLB,,, | Performed by: UROLOGY

## 2018-01-03 PROCEDURE — 99499 UNLISTED E&M SERVICE: CPT | Mod: S$GLB,,, | Performed by: UROLOGY

## 2018-01-03 NOTE — PROGRESS NOTES
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  12/20/17: Had flu-like symptoms very severe after last dose.  Got better in a couple days.  12/13/17: BCG 1/3 1/2 strength today.  12/7/17: 3 mo cysto normal.  11/17/17: Pt would like to not do the BCG today.  We reviewed the indications.  We will respect his wishes.  Agreed to cysto soon, then do 3 wks maint BCG; just not do it today.  11/9/17: LUTS for a half-day last time but not bad enough to skip this week. BCG again today.  11/3/17: Had sig LUTS after last BCG and we skipped last week.  BCG 1/2 strength 4/6 today.  10/19/17: BCG 3/6 today.  9/22/17: Came for BCG today, but still too much microhematuria and leuk.  9/5/17: High grade Ta on path report.  OAB now settling down.  8/10/17: Cleared for surgery, reviewed findings, arranging TURBT.  7/10/17: Hematuria confirmed, CT Urogram reassuring.  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  6/16/17: 87 yo man referred by Dr. Gibson for evaluation of nocturia and LUTS.  Has incontinence at night, sudden urgency has trouble making it to the toilet.  Nocturia like this 2-3/night.  No daytime problems.  Six months so far.   No abd/pelvic pain and no exac/rel factors.  No gross hematuria.  No urolithiasis.  No other urinary bother.  No  history.  PSA was always normal.  2 cups coffee in AM, tea at lunch and dinner (green tea).  Good stream.     Allergies:  Patient has no known allergies.    Medications:  has a current medication list which includes the following prescription(s): acetaminophen, amlodipine, aspirin, clopidogrel, levothyroxine, lisinopril, lotemax, nitroglycerin, and simvastatin.    Review of Systems:  General: No fever, chills, fatigability, or weight loss.  Skin: No rashes, itching, or changes in  color or texture of skin.  Chest: Denies DELUCA, cyanosis, wheezing, cough, and sputum production.  Abdomen: Appetite fine. No weight loss. Denies diarrhea, abdominal pain, hematemesis, or blood in stool.  Musculoskeletal: No joint stiffness or swelling. Denies back pain.  : As above.  All other review of systems negative.    PMH:   has a past medical history of Anticoagulant long-term use; Cardiomyopathy (9/07); HBP (high blood pressure) (1997); Hyperlipidemia; LBP (low back pain); MI (myocardial infarction) (12/06); Status post primary angioplasty with coronary stent (12/06); and Thyroid disease.    PSH:   has a past surgical history that includes Hernia repair and Coronary stent placement.    FamHx: family history is not on file.    SocHx:  reports that he has never smoked. He has never used smokeless tobacco. He reports that he does not drink alcohol or use drugs.      Physical Exam:  There were no vitals filed for this visit.  General: A&Ox3, no apparent distress, no deformities  Neck: No masses, normal thyroid  Lungs: normal inspiration, no use of accessory muscles  Heart: normal pulse, no arrhythmias  Abdomen: Soft, NT, ND  Skin: The skin is warm and dry. No jaundice.  Ext: No c/c/e.  :   6/17: Test desc remington, no abnormalities of epididymus. Penis normal, with normal penile and scrotal skin. Meatus normal. Normal rectal tone, no hemorrhoids. Prost 40 gm no nodules or masses appreciated. SV not palpable. Perineum and anus normal.    Labs/Studies:   Urinalysis performed in clinic, summary:    10/6/17: UA normal exc tr prot and 50 blood, less on micro exam  Bladder Scan performed in office:     6/17: PVR 4 ml.    Impression/Plan:   1. 6 mo cysto 3/7/18.  Maint BCG 2/3 Half-strength today.  If has a reaction to that will cease BCG.

## 2018-01-10 ENCOUNTER — OFFICE VISIT (OUTPATIENT)
Dept: UROLOGY | Facility: CLINIC | Age: 83
End: 2018-01-10
Payer: COMMERCIAL

## 2018-01-10 DIAGNOSIS — C67.9 MALIGNANT NEOPLASM OF URINARY BLADDER, UNSPECIFIED SITE: Primary | ICD-10-CM

## 2018-01-10 LAB
BILIRUB SERPL-MCNC: NORMAL MG/DL
BLOOD URINE, POC: 50
COLOR, POC UA: YELLOW
GLUCOSE UR QL STRIP: NORMAL
KETONES UR QL STRIP: NORMAL
LEUKOCYTE ESTERASE URINE, POC: NORMAL
NITRITE, POC UA: NORMAL
PH, POC UA: 5
PROTEIN, POC: 30
SPECIFIC GRAVITY, POC UA: 1.02
UROBILINOGEN, POC UA: NORMAL

## 2018-01-10 PROCEDURE — 99213 OFFICE O/P EST LOW 20 MIN: CPT | Mod: 25,S$GLB,, | Performed by: UROLOGY

## 2018-01-10 PROCEDURE — 81002 URINALYSIS NONAUTO W/O SCOPE: CPT | Mod: S$GLB,,, | Performed by: UROLOGY

## 2018-01-10 PROCEDURE — 99999 PR PBB SHADOW E&M-EST. PATIENT-LVL I: CPT | Mod: PBBFAC,,, | Performed by: UROLOGY

## 2018-01-10 NOTE — PROGRESS NOTES
Chief Complaint: High Grade Ta Bladder Cancer    HPI:   1/10/18: Had a lot of problems with BCG re-try and we agreed today to not do it again.  Voiding fine otherwise, no other complaints.   1/3/18: No problems in interim will try BCG again.  12/20/17: Had flu-like symptoms very severe after last dose.  Got better in a couple days.  12/13/17: BCG 1/3 1/2 strength today.  12/7/17: 3 mo cysto normal.  11/17/17: Pt would like to not do the BCG today.  We reviewed the indications.  We will respect his wishes.  Agreed to cysto soon, then do 3 wks maint BCG; just not do it today.  11/9/17: LUTS for a half-day last time but not bad enough to skip this week. BCG again today.  11/3/17: Had sig LUTS after last BCG and we skipped last week.  BCG 1/2 strength 4/6 today.  10/19/17: BCG 3/6 today.  9/22/17: Came for BCG today, but still too much microhematuria and leuk.  9/5/17: High grade Ta on path report.  OAB now settling down.  8/10/17: Cleared for surgery, reviewed findings, arranging TURBT.  7/10/17: Hematuria confirmed, CT Urogram reassuring.  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  6/16/17: 85 yo man referred by Dr. Gibson for evaluation of nocturia and LUTS.  Has incontinence at night, sudden urgency has trouble making it to the toilet.  Nocturia like this 2-3/night.  No daytime problems.  Six months so far.   No abd/pelvic pain and no exac/rel factors.  No gross hematuria.  No urolithiasis.  No other urinary bother.  No  history.  PSA was always normal.  2 cups coffee in AM, tea at lunch and dinner (green tea).  Good stream.     Allergies:  Patient has no known allergies.    Medications:  has a current medication list which includes the following prescription(s): acetaminophen, amlodipine, aspirin, clopidogrel, levothyroxine, lisinopril, lotemax, nitroglycerin,  and simvastatin.    Review of Systems:  General: No fever, chills, fatigability, or weight loss.  Skin: No rashes, itching, or changes in color or texture of skin.  Chest: Denies DELUCA, cyanosis, wheezing, cough, and sputum production.  Abdomen: Appetite fine. No weight loss. Denies diarrhea, abdominal pain, hematemesis, or blood in stool.  Musculoskeletal: No joint stiffness or swelling. Denies back pain.  : As above.  All other review of systems negative.    PMH:   has a past medical history of Anticoagulant long-term use; Cardiomyopathy (9/07); HBP (high blood pressure) (1997); Hyperlipidemia; LBP (low back pain); MI (myocardial infarction) (12/06); Status post primary angioplasty with coronary stent (12/06); and Thyroid disease.    PSH:   has a past surgical history that includes Hernia repair and Coronary stent placement.    FamHx: family history is not on file.    SocHx:  reports that he has never smoked. He has never used smokeless tobacco. He reports that he does not drink alcohol or use drugs.      Physical Exam:  There were no vitals filed for this visit.  General: A&Ox3, no apparent distress, no deformities  Neck: No masses, normal thyroid  Lungs: normal inspiration, no use of accessory muscles  Heart: normal pulse, no arrhythmias  Abdomen: Soft, NT, ND  Skin: The skin is warm and dry. No jaundice.  Ext: No c/c/e.  :   6/17: Test desc remington, no abnormalities of epididymus. Penis normal, with normal penile and scrotal skin. Meatus normal. Normal rectal tone, no hemorrhoids. Prost 40 gm no nodules or masses appreciated. SV not palpable. Perineum and anus normal.    Labs/Studies:   Urinalysis performed in clinic, summary:    10/6/17: UA normal exc tr prot and 50 blood, less on micro exam  Bladder Scan performed in office:     6/17: PVR 4 ml.    Impression/Plan:   1. 6 mo cysto 3/7/18. No more BCG

## 2018-03-14 ENCOUNTER — OFFICE VISIT (OUTPATIENT)
Dept: UROLOGY | Facility: CLINIC | Age: 83
End: 2018-03-14
Payer: COMMERCIAL

## 2018-03-14 VITALS — BODY MASS INDEX: 25.97 KG/M2 | WEIGHT: 181 LBS

## 2018-03-14 DIAGNOSIS — C67.9 MALIGNANT NEOPLASM OF URINARY BLADDER, UNSPECIFIED SITE: Primary | ICD-10-CM

## 2018-03-14 PROCEDURE — 52000 CYSTOURETHROSCOPY: CPT | Mod: S$GLB,,, | Performed by: UROLOGY

## 2018-03-14 PROCEDURE — 99499 UNLISTED E&M SERVICE: CPT | Mod: S$GLB,,, | Performed by: UROLOGY

## 2018-03-14 NOTE — PROGRESS NOTES
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  No BCG from now on.  1/10/18: Had a lot of problems with BCG re-try and we agreed today to not do it again.  Voiding fine otherwise, no other complaints.   1/3/18: No problems in interim will try BCG again.  12/20/17: Had flu-like symptoms very severe after last dose.  Got better in a couple days.  12/13/17: BCG 1/3 1/2 strength today.  12/7/17: 3 mo cysto normal.  11/17/17: Pt would like to not do the BCG today.  We reviewed the indications.  We will respect his wishes.  Agreed to cysto soon, then do 3 wks maint BCG; just not do it today.  11/9/17: LUTS for a half-day last time but not bad enough to skip this week. BCG again today.  11/3/17: Had sig LUTS after last BCG and we skipped last week.  BCG 1/2 strength 4/6 today.  10/19/17: BCG 3/6 today.  9/22/17: Came for BCG today, but still too much microhematuria and leuk.  9/5/17: High grade Ta on path report.  OAB now settling down.  8/10/17: Cleared for surgery, reviewed findings, arranging TURBT.  7/10/17: Hematuria confirmed, CT Urogram reassuring.  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  6/16/17: 85 yo man referred by Dr. Gibson for evaluation of nocturia and LUTS.  Has incontinence at night, sudden urgency has trouble making it to the toilet.  Nocturia like this 2-3/night.  No daytime problems.  Six months so far.   No abd/pelvic pain and no exac/rel factors.  No gross hematuria.  No urolithiasis.  No other urinary bother.  No  history.  PSA was always normal.  2 cups coffee in AM, tea at lunch and dinner (green tea).  Good stream.     Allergies:  Patient has no known allergies.    Medications:  has a current medication list which includes the following prescription(s): acetaminophen, amlodipine, aspirin, clopidogrel,  levothyroxine, lisinopril, lotemax, nitroglycerin, and simvastatin.    Review of Systems:  General: No fever, chills, fatigability, or weight loss.  Skin: No rashes, itching, or changes in color or texture of skin.  Chest: Denies DELUCA, cyanosis, wheezing, cough, and sputum production.  Abdomen: Appetite fine. No weight loss. Denies diarrhea, abdominal pain, hematemesis, or blood in stool.  Musculoskeletal: No joint stiffness or swelling. Denies back pain.  : As above.  All other review of systems negative.    PMH:   has a past medical history of Anticoagulant long-term use; Cardiomyopathy (9/07); HBP (high blood pressure) (1997); Hyperlipidemia; LBP (low back pain); MI (myocardial infarction) (12/06); Status post primary angioplasty with coronary stent (12/06); and Thyroid disease.    PSH:   has a past surgical history that includes Hernia repair and Coronary stent placement.    FamHx: family history is not on file.    SocHx:  reports that he has never smoked. He has never used smokeless tobacco. He reports that he does not drink alcohol or use drugs.      Physical Exam:  There were no vitals filed for this visit.  General: A&Ox3, no apparent distress, no deformities  Neck: No masses, normal thyroid  Lungs: normal inspiration, no use of accessory muscles  Heart: normal pulse, no arrhythmias  Abdomen: Soft, NT, ND  Skin: The skin is warm and dry. No jaundice.  Ext: No c/c/e.  :   6/17: Test desc remingtno, no abnormalities of epididymus. Penis normal, with normal penile and scrotal skin. Meatus normal. Normal rectal tone, no hemorrhoids. Prost 40 gm no nodules or masses appreciated. SV not palpable. Perineum and anus normal.    Labs/Studies:   Urinalysis performed in clinic, summary:    10/6/17: UA normal exc tr prot and 50 blood, less on micro exam  Bladder Scan performed in office:     6/17: PVR 4 ml.    Procedure: Diagnostic Cystoscopy    Procedure in Detail: After proper consents were obtained, the patient was  prepped and draped in normal sterile fashion for diagnostic cystoscopy. 5 ml of lidocaine jelly was instilled in the urethra. The flexible cystoscope was then introduced into the urethra, and advanced into the bladder under direct vision. The urethral mucosa appeared normal, and no strictures were noted. The sphincter appeared to be normal, and the veru montanum was unremarkable. The prostatic mucosa and the lateral lobes of the prostate were normal. The bladder neck was normal. Inspection of the interior of the bladder was then carried out. The trigone was unremarkable, with no mucosal lesions. The ureteral orifices were normal in position and configuration. Systematic inspection of the mucosa of the bladder it was then carried out, rotating the cystoscope so that all areas of the left and right lateral walls, the dome of the bladder, and the posterior wall were all visualized. The cystoscope was then advanced further into the bladder, and maximum deflection of the scope was performed so that the bladder neck could be inspected. No mucosal lesions were noted there. The cystoscope was then removed, and the procedure terminated.     Findings: normal cysto    Impression/Plan:   1. 9 mo cysto 6/7/18. No more BCG

## 2018-03-16 DIAGNOSIS — E03.9 HYPOTHYROIDISM (ACQUIRED): ICD-10-CM

## 2018-03-19 RX ORDER — LEVOTHYROXINE SODIUM 50 UG/1
TABLET ORAL
Qty: 90 TABLET | Refills: 0 | Status: SHIPPED | OUTPATIENT
Start: 2018-03-19 | End: 2018-06-15 | Stop reason: SDUPTHER

## 2018-04-13 ENCOUNTER — CLINICAL SUPPORT (OUTPATIENT)
Dept: INTERNAL MEDICINE | Facility: CLINIC | Age: 83
End: 2018-04-13
Payer: COMMERCIAL

## 2018-04-13 DIAGNOSIS — E03.9 HYPOTHYROIDISM (ACQUIRED): ICD-10-CM

## 2018-04-13 LAB — TSH SERPL DL<=0.005 MIU/L-ACNC: 2.52 UIU/ML

## 2018-04-13 PROCEDURE — 84443 ASSAY THYROID STIM HORMONE: CPT

## 2018-04-13 PROCEDURE — 99999 PR PBB SHADOW E&M-EST. PATIENT-LVL I: CPT | Mod: PBBFAC,,,

## 2018-04-17 ENCOUNTER — OFFICE VISIT (OUTPATIENT)
Dept: INTERNAL MEDICINE | Facility: CLINIC | Age: 83
End: 2018-04-17
Payer: COMMERCIAL

## 2018-04-17 VITALS
TEMPERATURE: 97 F | WEIGHT: 183.63 LBS | BODY MASS INDEX: 26.29 KG/M2 | SYSTOLIC BLOOD PRESSURE: 136 MMHG | HEIGHT: 70 IN | HEART RATE: 63 BPM | DIASTOLIC BLOOD PRESSURE: 67 MMHG | OXYGEN SATURATION: 95 %

## 2018-04-17 DIAGNOSIS — E03.9 HYPOTHYROIDISM (ACQUIRED): Primary | ICD-10-CM

## 2018-04-17 PROCEDURE — 99999 PR PBB SHADOW E&M-EST. PATIENT-LVL III: CPT | Mod: PBBFAC,,, | Performed by: FAMILY MEDICINE

## 2018-04-17 PROCEDURE — 99212 OFFICE O/P EST SF 10 MIN: CPT | Mod: S$GLB,,, | Performed by: FAMILY MEDICINE

## 2018-04-17 RX ORDER — FLUOROMETHOLONE 1 MG/ML
SUSPENSION/ DROPS OPHTHALMIC
COMMUNITY
Start: 2018-03-19 | End: 2019-07-26 | Stop reason: ALTCHOICE

## 2018-04-17 NOTE — PROGRESS NOTES
Subjective:       Patient ID: Brant Lees is a 87 y.o. male.    Chief Complaint: Follow-up (6 Month follow-up hypothyroidism / Hashimoto)    Here for 6 month recheck on hypothyroidism.  Lab reviewed.Lab Results       Component                Value               Date                       WBC                      10.25               08/10/2017                 HGB                      15.0                08/10/2017                 HCT                      44.8                08/10/2017                 PLT                      243                 08/10/2017                 CHOL                     140                 04/10/2017                 TRIG                     111                 04/10/2017                 HDL                      38 (L)              04/10/2017                 LDLCALC                  79.8                04/10/2017                 ALT                      17                  04/10/2017                 AST                      21                  04/10/2017                 NA                       139                 08/10/2017                 K                        4.7                 08/10/2017                 CL                       103                 08/10/2017                 CALCIUM                  10.0                08/10/2017                 CREATININE               1.3                 08/10/2017                 BUN                      21                  08/10/2017                 CO2                      26                  08/10/2017                 TSH                      2.520               04/13/2018                 GLU                      98                  08/10/2017                 ESTGFRAFRICA             57.1 (A)            08/10/2017                 EGFRNONAA                49.4 (A)            08/10/2017            TSH in normal range now.  Blood pressure controlled and lipids controlled as well on simvastatin 20 from outside cardiologist.        Review of Systems    Constitutional: Positive for fatigue. Negative for unexpected weight change.   Respiratory: Negative for chest tightness.    Cardiovascular: Negative for chest pain, palpitations and leg swelling.   Gastrointestinal: Negative for constipation and diarrhea.   Musculoskeletal: Positive for neck pain (right sided throat pain).   Psychiatric/Behavioral: Negative for sleep disturbance.       Objective:      Physical Exam   Constitutional: He is oriented to person, place, and time. He appears well-developed.   HENT:   Head: Normocephalic and atraumatic.   Eyes:   Eyesight poor   Cardiovascular: Normal rate, regular rhythm and normal heart sounds.    Pulmonary/Chest: Effort normal and breath sounds normal.   Musculoskeletal: He exhibits no edema.   Neurological: He is alert and oriented to person, place, and time.   Skin: Skin is warm and dry.   Psychiatric: He has a normal mood and affect. His behavior is normal.         Assessment/Plan:     1. Hypothyroidism (acquired)  TSH     Will refill for a year when due repeat TSH in a year.

## 2018-06-14 ENCOUNTER — OFFICE VISIT (OUTPATIENT)
Dept: UROLOGY | Facility: CLINIC | Age: 83
End: 2018-06-14
Payer: COMMERCIAL

## 2018-06-14 VITALS
SYSTOLIC BLOOD PRESSURE: 124 MMHG | HEIGHT: 70 IN | DIASTOLIC BLOOD PRESSURE: 60 MMHG | HEART RATE: 64 BPM | BODY MASS INDEX: 26.29 KG/M2 | WEIGHT: 183.63 LBS

## 2018-06-14 DIAGNOSIS — C67.9 MALIGNANT NEOPLASM OF URINARY BLADDER, UNSPECIFIED SITE: Primary | ICD-10-CM

## 2018-06-14 LAB
BILIRUB SERPL-MCNC: NORMAL MG/DL
BLOOD URINE, POC: NORMAL
COLOR, POC UA: YELLOW
GLUCOSE UR QL STRIP: NORMAL
KETONES UR QL STRIP: NORMAL
LEUKOCYTE ESTERASE URINE, POC: NORMAL
NITRITE, POC UA: NORMAL
PH, POC UA: 5
PROTEIN, POC: NORMAL
SPECIFIC GRAVITY, POC UA: 1.01
UROBILINOGEN, POC UA: NORMAL

## 2018-06-14 PROCEDURE — 99999 PR PBB SHADOW E&M-EST. PATIENT-LVL II: CPT | Mod: PBBFAC,,, | Performed by: UROLOGY

## 2018-06-14 PROCEDURE — 99499 UNLISTED E&M SERVICE: CPT | Mod: S$GLB,,, | Performed by: UROLOGY

## 2018-06-14 PROCEDURE — 52000 CYSTOURETHROSCOPY: CPT | Mod: 59,S$GLB,, | Performed by: UROLOGY

## 2018-06-14 PROCEDURE — 81002 URINALYSIS NONAUTO W/O SCOPE: CPT | Mod: S$GLB,,, | Performed by: UROLOGY

## 2018-06-14 NOTE — PROGRESS NOTES
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  3/14/18: 6 mo cysto normal.  No BCG from now on.  1/10/18: Had a lot of problems with BCG re-try and we agreed today to not do it again.  Voiding fine otherwise, no other complaints.   1/3/18: No problems in interim will try BCG again.  12/20/17: Had flu-like symptoms very severe after last dose.  Got better in a couple days.  12/13/17: BCG 1/3 1/2 strength today.  12/7/17: 3 mo cysto normal.  11/17/17: Pt would like to not do the BCG today.  We reviewed the indications.  We will respect his wishes.  Agreed to cysto soon, then do 3 wks maint BCG; just not do it today.  11/9/17: LUTS for a half-day last time but not bad enough to skip this week. BCG again today.  11/3/17: Had sig LUTS after last BCG and we skipped last week.  BCG 1/2 strength 4/6 today.  10/19/17: BCG 3/6 today.  9/22/17: Came for BCG today, but still too much microhematuria and leuk.  9/5/17: High grade Ta on path report.  OAB now settling down.  8/10/17: Cleared for surgery, reviewed findings, arranging TURBT.  7/10/17: Hematuria confirmed, CT Urogram reassuring.  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  6/16/17: 85 yo man referred by Dr. Gibson for evaluation of nocturia and LUTS.  Has incontinence at night, sudden urgency has trouble making it to the toilet.  Nocturia like this 2-3/night.  No daytime problems.  Six months so far.   No abd/pelvic pain and no exac/rel factors.  No gross hematuria.  No urolithiasis.  No other urinary bother.  No  history.  PSA was always normal.  2 cups coffee in AM, tea at lunch and dinner (green tea).  Good stream.     Allergies:  Patient has no known allergies.    Medications:  has a current medication list which includes the following prescription(s): acetaminophen,  amlodipine, aspirin, clopidogrel, fluorometholone 0.1%, levothyroxine, lisinopril, nitroglycerin, and simvastatin.    Review of Systems:  General: No fever, chills, fatigability, or weight loss.  Skin: No rashes, itching, or changes in color or texture of skin.  Chest: Denies DELUCA, cyanosis, wheezing, cough, and sputum production.  Abdomen: Appetite fine. No weight loss. Denies diarrhea, abdominal pain, hematemesis, or blood in stool.  Musculoskeletal: No joint stiffness or swelling. Denies back pain.  : As above.  All other review of systems negative.    PMH:   has a past medical history of Anticoagulant long-term use; Cardiomyopathy (9/07); HBP (high blood pressure) (1997); Hyperlipidemia; LBP (low back pain); MI (myocardial infarction) (12/06); Status post primary angioplasty with coronary stent (12/06); and Thyroid disease.    PSH:   has a past surgical history that includes Hernia repair and Coronary stent placement.    FamHx: family history includes Cancer in his mother; Heart disease in his mother.    SocHx:  reports that he has never smoked. He has never used smokeless tobacco. He reports that he does not drink alcohol or use drugs.      Physical Exam:  Vitals:    06/14/18 1349   BP: 124/60   Pulse: 64     General: A&Ox3, no apparent distress, no deformities  Neck: No masses, normal thyroid  Lungs: normal inspiration, no use of accessory muscles  Heart: normal pulse, no arrhythmias  Abdomen: Soft, NT, ND  Skin: The skin is warm and dry. No jaundice.  Ext: No c/c/e.  :   6/17: Test desc remington, no abnormalities of epididymus. Penis normal, with normal penile and scrotal skin. Meatus normal. Normal rectal tone, no hemorrhoids. Prost 40 gm no nodules or masses appreciated. SV not palpable. Perineum and anus normal.    Labs/Studies:   Urinalysis performed in clinic, summary:    10/6/17: UA normal exc tr prot and 50 blood, less on micro exam  Bladder Scan performed in office:     6/17: PVR 4 ml.    Procedure:  Diagnostic Cystoscopy    Procedure in Detail: After proper consents were obtained, the patient was prepped and draped in normal sterile fashion for diagnostic cystoscopy. 5 ml of lidocaine jelly was instilled in the urethra. The flexible cystoscope was then introduced into the urethra, and advanced into the bladder under direct vision. The urethral mucosa appeared normal, and no strictures were noted. The sphincter appeared to be normal, and the veru montanum was unremarkable. The prostatic mucosa and the lateral lobes of the prostate were normal. The bladder neck was normal. Inspection of the interior of the bladder was then carried out. The trigone was unremarkable, with no mucosal lesions. The ureteral orifices were normal in position and configuration. Systematic inspection of the mucosa of the bladder it was then carried out, rotating the cystoscope so that all areas of the left and right lateral walls, the dome of the bladder, and the posterior wall were all visualized. The cystoscope was then advanced further into the bladder, and maximum deflection of the scope was performed so that the bladder neck could be inspected. No mucosal lesions were noted there. The cystoscope was then removed, and the procedure terminated.     Findings: normal cysto    Impression/Plan:   1. 12 mo cysto 9/7/18. No more BCG

## 2018-06-15 DIAGNOSIS — E03.9 HYPOTHYROIDISM (ACQUIRED): ICD-10-CM

## 2018-06-17 RX ORDER — LEVOTHYROXINE SODIUM 50 UG/1
TABLET ORAL
Qty: 90 TABLET | Refills: 2 | Status: SHIPPED | OUTPATIENT
Start: 2018-06-17 | End: 2019-03-14 | Stop reason: SDUPTHER

## 2018-09-14 ENCOUNTER — TELEPHONE (OUTPATIENT)
Dept: UROLOGY | Facility: CLINIC | Age: 83
End: 2018-09-14

## 2018-09-14 NOTE — TELEPHONE ENCOUNTER
----- Message from Maribell Valdivia sent at 9/14/2018  4:01 PM CDT -----  Contact: wilma Menjivar   Calling in regards to his upcoming appointment and please advise [phone]

## 2018-09-14 NOTE — TELEPHONE ENCOUNTER
----- Message from Sukumar Granger sent at 9/14/2018 12:09 PM CDT -----  Contact: Tiara [phone]  Granddaughter would like to change appt from Monday 17 til Tuesday 25 am. Please contact her at [phone]. The only times are in November.

## 2018-09-14 NOTE — TELEPHONE ENCOUNTER
Patient requesting to reschedule appt with Dr. Alcocer. After offering several different appt dates patient decided to keep as scheduled on 10/8/18.

## 2018-09-14 NOTE — TELEPHONE ENCOUNTER
Spoke with patient's granddaughter, Tiara, and rescheduled follow up with Dr. Alcocer per patient request.

## 2018-10-17 ENCOUNTER — OFFICE VISIT (OUTPATIENT)
Dept: UROLOGY | Facility: CLINIC | Age: 83
End: 2018-10-17
Payer: COMMERCIAL

## 2018-10-17 VITALS — WEIGHT: 183.63 LBS | BODY MASS INDEX: 26.29 KG/M2 | HEIGHT: 70 IN

## 2018-10-17 DIAGNOSIS — C67.9 MALIGNANT NEOPLASM OF URINARY BLADDER, UNSPECIFIED SITE: Primary | ICD-10-CM

## 2018-10-17 LAB
BILIRUB SERPL-MCNC: NORMAL MG/DL
BLOOD URINE, POC: NORMAL
COLOR, POC UA: YELLOW
GLUCOSE UR QL STRIP: NORMAL
KETONES UR QL STRIP: NORMAL
LEUKOCYTE ESTERASE URINE, POC: NORMAL
NITRITE, POC UA: NORMAL
PH, POC UA: 6
PROTEIN, POC: NORMAL
SPECIFIC GRAVITY, POC UA: 1.01
UROBILINOGEN, POC UA: NORMAL

## 2018-10-17 PROCEDURE — 99499 UNLISTED E&M SERVICE: CPT | Mod: S$GLB,,, | Performed by: UROLOGY

## 2018-10-17 PROCEDURE — 52000 CYSTOURETHROSCOPY: CPT | Mod: S$GLB,,, | Performed by: UROLOGY

## 2018-10-17 PROCEDURE — 99999 PR PBB SHADOW E&M-EST. PATIENT-LVL II: CPT | Mod: PBBFAC,,, | Performed by: UROLOGY

## 2018-10-17 PROCEDURE — 81002 URINALYSIS NONAUTO W/O SCOPE: CPT | Mod: S$GLB,,, | Performed by: UROLOGY

## 2018-10-17 NOTE — PROGRESS NOTES
Chief Complaint: High Grade Ta Bladder Cancer    HPI:   10/17/18: 12 mo cysto normal.  6/14/18: 9 mo cysto normal.  3/14/18: 6 mo cysto normal.  No BCG from now on.  1/10/18: Had a lot of problems with BCG re-try and we agreed today to not do it again.  Voiding fine otherwise, no other complaints.   1/3/18: No problems in interim will try BCG again.  12/20/17: Had flu-like symptoms very severe after last dose.  Got better in a couple days.  12/13/17: BCG 1/3 1/2 strength today.  12/7/17: 3 mo cysto normal.  11/17/17: Pt would like to not do the BCG today.  We reviewed the indications.  We will respect his wishes.  Agreed to cysto soon, then do 3 wks maint BCG; just not do it today.  11/9/17: LUTS for a half-day last time but not bad enough to skip this week. BCG again today.  11/3/17: Had sig LUTS after last BCG and we skipped last week.  BCG 1/2 strength 4/6 today.  10/19/17: BCG 3/6 today.  9/22/17: Came for BCG today, but still too much microhematuria and leuk.  9/5/17: High grade Ta on path report.  OAB now settling down.  8/10/17: Cleared for surgery, reviewed findings, arranging TURBT.  7/10/17: Hematuria confirmed, CT Urogram reassuring.  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  6/16/17: 87 yo man referred by Dr. Gibson for evaluation of nocturia and LUTS.  Has incontinence at night, sudden urgency has trouble making it to the toilet.  Nocturia like this 2-3/night.  No daytime problems.  Six months so far.   No abd/pelvic pain and no exac/rel factors.  No gross hematuria.  No urolithiasis.  No other urinary bother.  No  history.  PSA was always normal.  2 cups coffee in AM, tea at lunch and dinner (green tea).  Good stream.     Allergies:  Patient has no known allergies.    Medications:  has a current medication list which includes the following  prescription(s): acetaminophen, amlodipine, aspirin, clopidogrel, fluorometholone 0.1%, levothyroxine, lisinopril, nitroglycerin, and simvastatin.    Review of Systems:  General: No fever, chills, fatigability, or weight loss.  Skin: No rashes, itching, or changes in color or texture of skin.  Chest: Denies DELUCA, cyanosis, wheezing, cough, and sputum production.  Abdomen: Appetite fine. No weight loss. Denies diarrhea, abdominal pain, hematemesis, or blood in stool.  Musculoskeletal: No joint stiffness or swelling. Denies back pain.  : As above.  All other review of systems negative.    PMH:   has a past medical history of Anticoagulant long-term use, Cardiomyopathy (9/07), HBP (high blood pressure) (1997), Hyperlipidemia, LBP (low back pain), MI (myocardial infarction) (12/06), Status post primary angioplasty with coronary stent (12/06), and Thyroid disease.    PSH:   has a past surgical history that includes Hernia repair; Coronary stent placement; and EXCISION-BLADDER TUMOR-TRANSURETHRAL (TURBT) (N/A, 8/22/2017).    FamHx: family history includes Cancer in his mother; Heart disease in his mother.    SocHx:  reports that  has never smoked. he has never used smokeless tobacco. He reports that he does not drink alcohol or use drugs.      Physical Exam:  There were no vitals filed for this visit.  General: A&Ox3, no apparent distress, no deformities  Neck: No masses, normal thyroid  Lungs: normal inspiration, no use of accessory muscles  Heart: normal pulse, no arrhythmias  Abdomen: Soft, NT, ND  Skin: The skin is warm and dry. No jaundice.  Ext: No c/c/e.  :   6/17: Test desc remington, no abnormalities of epididymus. Penis normal, with normal penile and scrotal skin. Meatus normal. Normal rectal tone, no hemorrhoids. Prost 40 gm no nodules or masses appreciated. SV not palpable. Perineum and anus normal.    Labs/Studies:   Urinalysis performed in clinic, summary:    10/6/17: UA normal exc tr prot and 50 blood, less on  micro exam  Bladder Scan performed in office:     6/17: PVR 4 ml.    Procedure: Diagnostic Cystoscopy    Procedure in Detail: After proper consents were obtained, the patient was prepped and draped in normal sterile fashion for diagnostic cystoscopy. 5 ml of lidocaine jelly was instilled in the urethra. The flexible cystoscope was then introduced into the urethra, and advanced into the bladder under direct vision. The urethral mucosa appeared normal, and no strictures were noted. The sphincter appeared to be normal, and the veru montanum was unremarkable. The prostatic mucosa and the lateral lobes of the prostate were normal. The bladder neck was normal. Inspection of the interior of the bladder was then carried out. The trigone was unremarkable, with no mucosal lesions. The ureteral orifices were normal in position and configuration. Systematic inspection of the mucosa of the bladder it was then carried out, rotating the cystoscope so that all areas of the left and right lateral walls, the dome of the bladder, and the posterior wall were all visualized. The cystoscope was then advanced further into the bladder, and maximum deflection of the scope was performed so that the bladder neck could be inspected. No mucosal lesions were noted there. The cystoscope was then removed, and the procedure terminated.     Findings: normal cysto    Impression/Plan:   1. 15 mo cysto 12/7/18. No more BCG

## 2018-10-24 ENCOUNTER — PATIENT MESSAGE (OUTPATIENT)
Dept: PULMONOLOGY | Facility: CLINIC | Age: 83
End: 2018-10-24

## 2018-10-24 ENCOUNTER — PATIENT MESSAGE (OUTPATIENT)
Dept: UROLOGY | Facility: CLINIC | Age: 83
End: 2018-10-24

## 2018-10-24 ENCOUNTER — PATIENT MESSAGE (OUTPATIENT)
Dept: INTERNAL MEDICINE | Facility: CLINIC | Age: 83
End: 2018-10-24

## 2018-10-25 ENCOUNTER — PATIENT MESSAGE (OUTPATIENT)
Dept: UROLOGY | Facility: CLINIC | Age: 83
End: 2018-10-25

## 2018-10-30 ENCOUNTER — IMMUNIZATION (OUTPATIENT)
Dept: INTERNAL MEDICINE | Facility: CLINIC | Age: 83
End: 2018-10-30
Payer: COMMERCIAL

## 2018-10-30 PROCEDURE — 90471 IMMUNIZATION ADMIN: CPT | Mod: S$GLB,,, | Performed by: FAMILY MEDICINE

## 2018-10-30 PROCEDURE — 90662 IIV NO PRSV INCREASED AG IM: CPT | Mod: S$GLB,,, | Performed by: FAMILY MEDICINE

## 2018-11-28 ENCOUNTER — HOSPITAL ENCOUNTER (OUTPATIENT)
Dept: RADIOLOGY | Facility: HOSPITAL | Age: 83
Discharge: HOME | End: 2018-11-28
Attending: INTERNAL MEDICINE
Payer: COMMERCIAL

## 2018-11-28 ENCOUNTER — OFFICE VISIT (OUTPATIENT)
Dept: PULMONOLOGY | Facility: CLINIC | Age: 83
End: 2018-11-28
Payer: COMMERCIAL

## 2018-11-28 VITALS
HEIGHT: 70 IN | BODY MASS INDEX: 25.87 KG/M2 | WEIGHT: 180.69 LBS | HEART RATE: 82 BPM | RESPIRATION RATE: 18 BRPM | SYSTOLIC BLOOD PRESSURE: 100 MMHG | DIASTOLIC BLOOD PRESSURE: 60 MMHG | OXYGEN SATURATION: 94 %

## 2018-11-28 DIAGNOSIS — R91.8 MULTIPLE LUNG NODULES: Chronic | ICD-10-CM

## 2018-11-28 DIAGNOSIS — R91.8 MULTIPLE LUNG NODULES: Primary | Chronic | ICD-10-CM

## 2018-11-28 PROCEDURE — 90732 PPSV23 VACC 2 YRS+ SUBQ/IM: CPT | Mod: S$GLB,,, | Performed by: INTERNAL MEDICINE

## 2018-11-28 PROCEDURE — 1100F PTFALLS ASSESS-DOCD GE2>/YR: CPT | Mod: CPTII,S$GLB,, | Performed by: INTERNAL MEDICINE

## 2018-11-28 PROCEDURE — 71046 X-RAY EXAM CHEST 2 VIEWS: CPT | Mod: TC

## 2018-11-28 PROCEDURE — 90471 IMMUNIZATION ADMIN: CPT | Mod: S$GLB,,, | Performed by: INTERNAL MEDICINE

## 2018-11-28 PROCEDURE — 3288F FALL RISK ASSESSMENT DOCD: CPT | Mod: CPTII,S$GLB,, | Performed by: INTERNAL MEDICINE

## 2018-11-28 PROCEDURE — 99214 OFFICE O/P EST MOD 30 MIN: CPT | Mod: 25,S$GLB,, | Performed by: INTERNAL MEDICINE

## 2018-11-28 PROCEDURE — 99999 PR PBB SHADOW E&M-EST. PATIENT-LVL III: CPT | Mod: PBBFAC,,, | Performed by: INTERNAL MEDICINE

## 2018-11-28 PROCEDURE — 71046 X-RAY EXAM CHEST 2 VIEWS: CPT | Mod: 26,,, | Performed by: RADIOLOGY

## 2018-11-28 NOTE — ASSESSMENT & PLAN NOTE
Left midlung nodular opacity increased in density compared to previous imaging    Clinical correlation with CT chest with contrast recommended.    CT chest in 3 months.     Fleischner Society Guidelines:    High risk patient:   Smoking history, history of malignancy or risk factors for malignancy.( non-solid ground glass opacities and partially solid nodules may require longer follow up to exclude indolent adenocarcinoma)      Nodule size Low risk patient High risk patient   4 mm  No follow up Follow up CT in 12 months. If unchanged, no further follow up.   4 - 6 mm Follow up CT in 12 months; if unchanged, no further follow up.  Initial follow up CT in 6 - 12 months, then at 18 - 24 months if no change.      6 - 8 mm  Initial follow up CT at 6 - 12 months and at 18 months if no change.  Initial follow up CT at 3 - 6 months. Then at 9-12 months and 24 months if no change.      > 8 mm Initial follow up CT at 3, 6, 9 and 24 months, dynamic contrast enhanced CT, PET-CT and/or biopsy. Initial follow up CT at 3, 6, 9 and 24 months, dynamic contrast enhanced CT, PET-CT and/or biopsy.     Immunization Update:     [x]        Polysaccharide  Vaccine (23 Valent) (IM)  []        Pneumococcal Conjugate Vaccine (13 Valent) (IM)  methacholine.   []        Influenza - High Dose (65+) (PF) (IM)

## 2018-11-28 NOTE — PATIENT INSTRUCTIONS
Lung Anatomy  Your lungs take air in to give your body oxygen, which the body needs to work. Your lungs, like all the tissues in your body, are made up of billions of tiny specialized cells. Old lung cells die and are replaced by new, identical lung cells. This natural process helps ensure healthy lungs.    Date Last Reviewed: 11/1/2016  © 3094-4747 [employer]. [address] [place] [address], [place] [address]. All rights reserved. This information is not intended as a substitute for professional medical care. Always follow your healthcare professional's instructions.

## 2018-11-28 NOTE — PROGRESS NOTES
Subjective:      Patient ID: Brant Lees is a 87 y.o. male.    Patient Active Problem List   Diagnosis    Hyperlipidemia    LBP (low back pain)    Cardiomyopathy    HBP (high blood pressure)    MI (myocardial infarction)    Status post primary angioplasty with coronary stent    Multiple lung nodules    Lesion of bladder    Hypothyroidism due to Hashimoto's thyroiditis     Problem list has been reviewed.    Chief Complaint: multiple lung nodules    HPI    Here for follow up for up  and review of CXR. Left midlung nodular opacity increased in density compared to previous imaging. Clinical correlation with CT chest is recommended.     He states that he is fine and has no specific pulmonary complaints. He denies  hemoptysis,  pain with breathing, wheezing, asthma.  Immunization status reviewed and updated.      Immunization status reviewed and up to date:     A full  review of systems, past , family  and social histories was performed except as mentioned in the note above, these are non contributory to the main issues discussed today.       Previous Report Reviewed: office notes     The following portions of the patient's history were reviewed and updated as appropriate: He  has a past medical history of Anticoagulant long-term use, Cardiomyopathy (9/07), HBP (high blood pressure) (1997), Hyperlipidemia, LBP (low back pain), MI (myocardial infarction) (12/06), Status post primary angioplasty with coronary stent (12/06), and Thyroid disease.  He  has a past surgical history that includes Hernia repair; Coronary stent placement; and EXCISION-BLADDER TUMOR-TRANSURETHRAL (TURBT) (N/A, 8/22/2017).  His family history includes Cancer in his mother; Heart disease in his mother.  He  reports that  has never smoked. he has never used smokeless tobacco. He reports that he does not drink alcohol or use drugs.  He has a current medication list which includes the following prescription(s): acetaminophen, amlodipine, aspirin,  "fluorometholone 0.1%, levothyroxine, lisinopril, nitroglycerin, and simvastatin.  He has No Known Allergies..    Review of Systems   Constitutional: Negative for fever, chills, fatigue and night sweats.   HENT: Negative for nosebleeds, rhinorrhea, sore throat, trouble swallowing, congestion and hearing loss.    Eyes: Negative for itching.   Respiratory: Positive for cough. Negative for snoring, sputum production, chest tightness, wheezing and dyspnea on extertion.    Cardiovascular: Negative for chest pain and leg swelling.   Genitourinary: Negative for difficulty urinating.   Endocrine: Negative for cold intolerance and heat intolerance.    Musculoskeletal: Positive for arthralgias.   Skin: Negative for rash.   Gastrointestinal: Negative for nausea, vomiting and acid reflux.   Neurological: Negative for dizziness, syncope, light-headedness and headaches.   Hematological: Does not bruise/bleed easily.   Psychiatric/Behavioral: The patient is not nervous/anxious.    All other systems reviewed and are negative.     Objective:     /60   Pulse 82   Resp 18   Ht 5' 10" (1.778 m)   Wt 81.9 kg (180 lb 10.7 oz)   SpO2 (!) 94%   BMI 25.92 kg/m²   Body mass index is 25.92 kg/m².    Physical Exam   Constitutional: He is oriented to person, place, and time. He appears well-developed and well-nourished.   HENT:   Head: Normocephalic and atraumatic.   Right Ear: Tympanic membrane, external ear and ear canal normal.   Left Ear: Tympanic membrane, external ear and ear canal normal.   Nose: Nose normal.   Mouth/Throat: Oropharynx is clear and moist. No oropharyngeal exudate.   Eyes: Conjunctivae and EOM are normal.   Neck: Neck supple. No thyromegaly present.   Cardiovascular: Normal rate, regular rhythm and normal heart sounds.   Pulmonary/Chest: Effort normal. He has no wheezes.   Lymphadenopathy:     He has no cervical adenopathy.   Neurological: He is alert and oriented to person, place, and time.   Skin: Skin is " warm and dry.   Psychiatric: He has a normal mood and affect. His behavior is normal.       Personal Diagnostic Review  CXR: 11/28/18: Left midlung nodular opacity.  Correlate with CT chest with contrast    Assessment / plan :       Discussed diagnosis, its evaluation, treatment and usual course. All questions answered.    Problem List Items Addressed This Visit        Pulmonary    Multiple lung nodules - Primary (Chronic)    Current Assessment & Plan     Left midlung nodular opacity increased in density compared to previous imaging    Clinical correlation with CT chest with contrast recommended.    CT chest in 3 months.     Fleischner Society Guidelines:    High risk patient:   Smoking history, history of malignancy or risk factors for malignancy.( non-solid ground glass opacities and partially solid nodules may require longer follow up to exclude indolent adenocarcinoma)      Nodule size Low risk patient High risk patient   4 mm  No follow up Follow up CT in 12 months. If unchanged, no further follow up.   4 - 6 mm Follow up CT in 12 months; if unchanged, no further follow up.  Initial follow up CT in 6 - 12 months, then at 18 - 24 months if no change.      6 - 8 mm  Initial follow up CT at 6 - 12 months and at 18 months if no change.  Initial follow up CT at 3 - 6 months. Then at 9-12 months and 24 months if no change.      > 8 mm Initial follow up CT at 3, 6, 9 and 24 months, dynamic contrast enhanced CT, PET-CT and/or biopsy. Initial follow up CT at 3, 6, 9 and 24 months, dynamic contrast enhanced CT, PET-CT and/or biopsy.     Immunization Update:     [x]        Polysaccharide  Vaccine (23 Valent) (IM)  []        Pneumococcal Conjugate Vaccine (13 Valent) (IM)  methacholine.   []        Influenza - High Dose (65+) (PF) (IM)                     Relevant Orders    CT Chest With Contrast    Basic metabolic panel    Pneumococcal polysaccharide vaccine 23-valent greater than or equal to 1yo subcutaneous/IM  (Completed)         TIME SPENT WITH PATIENT: Time spent: 30 minutes in face to face  discussion concerning diagnosis, prognosis, review of lab and test results, benefits of treatment as well as management of disease, counseling of patient and coordination of care between various health  care providers . Greater than half the time spent was used for coordination of care and counseling of patient.       Follow-up in about 3 months (around 2/28/2019) for Lung Nodule.

## 2018-12-19 ENCOUNTER — OFFICE VISIT (OUTPATIENT)
Dept: UROLOGY | Facility: CLINIC | Age: 83
End: 2018-12-19
Payer: COMMERCIAL

## 2018-12-19 VITALS — HEIGHT: 70 IN | WEIGHT: 180.56 LBS | BODY MASS INDEX: 25.85 KG/M2

## 2018-12-19 DIAGNOSIS — C67.9 MALIGNANT NEOPLASM OF URINARY BLADDER, UNSPECIFIED SITE: Primary | ICD-10-CM

## 2018-12-19 PROCEDURE — 81002 URINALYSIS NONAUTO W/O SCOPE: CPT | Mod: S$GLB,,, | Performed by: UROLOGY

## 2018-12-19 PROCEDURE — 99499 UNLISTED E&M SERVICE: CPT | Mod: S$GLB,,, | Performed by: UROLOGY

## 2018-12-19 PROCEDURE — 99999 PR PBB SHADOW E&M-EST. PATIENT-LVL II: CPT | Mod: PBBFAC,,, | Performed by: UROLOGY

## 2018-12-19 PROCEDURE — 52000 CYSTOURETHROSCOPY: CPT | Mod: S$GLB,,, | Performed by: UROLOGY

## 2018-12-19 NOTE — PROGRESS NOTES
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  6/14/18: 9 mo cysto normal.  3/14/18: 6 mo cysto normal.  No BCG from now on.  1/10/18: Had a lot of problems with BCG re-try and we agreed today to not do it again.  Voiding fine otherwise, no other complaints.   1/3/18: No problems in interim will try BCG again.  12/20/17: Had flu-like symptoms very severe after last dose.  Got better in a couple days.  12/13/17: BCG 1/3 1/2 strength today.  12/7/17: 3 mo cysto normal.  11/17/17: Pt would like to not do the BCG today.  We reviewed the indications.  We will respect his wishes.  Agreed to cysto soon, then do 3 wks maint BCG; just not do it today.  11/9/17: LUTS for a half-day last time but not bad enough to skip this week. BCG again today.  11/3/17: Had sig LUTS after last BCG and we skipped last week.  BCG 1/2 strength 4/6 today.  10/19/17: BCG 3/6 today.  9/22/17: Came for BCG today, but still too much microhematuria and leuk.  9/5/17: High grade Ta on path report.  OAB now settling down.  8/10/17: Cleared for surgery, reviewed findings, arranging TURBT.  7/10/17: Hematuria confirmed, CT Urogram reassuring.  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  6/16/17: 87 yo man referred by Dr. Gibson for evaluation of nocturia and LUTS.  Has incontinence at night, sudden urgency has trouble making it to the toilet.  Nocturia like this 2-3/night.  No daytime problems.  Six months so far.   No abd/pelvic pain and no exac/rel factors.  No gross hematuria.  No urolithiasis.  No other urinary bother.  No  history.  PSA was always normal.  2 cups coffee in AM, tea at lunch and dinner (green tea).  Good stream.     Allergies:  Patient has no known allergies.    Medications:  has a current medication list which  includes the following prescription(s): acetaminophen, amlodipine, aspirin, fluorometholone 0.1%, levothyroxine, lisinopril, nitroglycerin, and simvastatin.    Review of Systems:  General: No fever, chills, fatigability, or weight loss.  Skin: No rashes, itching, or changes in color or texture of skin.  Chest: Denies DELUCA, cyanosis, wheezing, cough, and sputum production.  Abdomen: Appetite fine. No weight loss. Denies diarrhea, abdominal pain, hematemesis, or blood in stool.  Musculoskeletal: No joint stiffness or swelling. Denies back pain.  : As above.  All other review of systems negative.    PMH:   has a past medical history of Anticoagulant long-term use, Cardiomyopathy (9/07), HBP (high blood pressure) (1997), Hyperlipidemia, LBP (low back pain), MI (myocardial infarction) (12/06), Status post primary angioplasty with coronary stent (12/06), and Thyroid disease.    PSH:   has a past surgical history that includes Hernia repair; Coronary stent placement; and EXCISION-BLADDER TUMOR-TRANSURETHRAL (TURBT) (N/A, 8/22/2017).    FamHx: family history includes Cancer in his mother; Heart disease in his mother.    SocHx:  reports that  has never smoked. he has never used smokeless tobacco. He reports that he does not drink alcohol or use drugs.      Physical Exam:  There were no vitals filed for this visit.  General: A&Ox3, no apparent distress, no deformities  Neck: No masses, normal thyroid  Lungs: normal inspiration, no use of accessory muscles  Heart: normal pulse, no arrhythmias  Abdomen: Soft, NT, ND  Skin: The skin is warm and dry. No jaundice.  Ext: No c/c/e.  :   6/17: Test desc remington, no abnormalities of epididymus. Penis normal, with normal penile and scrotal skin. Meatus normal. Normal rectal tone, no hemorrhoids. Prost 40 gm no nodules or masses appreciated. SV not palpable. Perineum and anus normal.    Labs/Studies:   Urinalysis performed in clinic, summary:    10/6/17: UA normal exc tr prot and 50  blood, less on micro exam  Bladder Scan performed in office:     6/17: PVR 4 ml.    Procedure: Diagnostic Cystoscopy    Procedure in Detail: After proper consents were obtained, the patient was prepped and draped in normal sterile fashion for diagnostic cystoscopy. 5 ml of lidocaine jelly was instilled in the urethra. The flexible cystoscope was then introduced into the urethra, and advanced into the bladder under direct vision. The urethral mucosa appeared normal, and no strictures were noted. The sphincter appeared to be normal, and the veru montanum was unremarkable. The prostatic mucosa and the lateral lobes of the prostate were normal. The bladder neck was normal. Inspection of the interior of the bladder was then carried out. The trigone was unremarkable, with no mucosal lesions. The ureteral orifices were normal in position and configuration. Systematic inspection of the mucosa of the bladder it was then carried out, rotating the cystoscope so that all areas of the left and right lateral walls, the dome of the bladder, and the posterior wall were all visualized. The cystoscope was then advanced further into the bladder, and maximum deflection of the scope was performed so that the bladder neck could be inspected. No mucosal lesions were noted there. The cystoscope was then removed, and the procedure terminated.     Findings: normal cysto    Impression/Plan:   1. 18 mo cysto 3/7/19. No more BCG

## 2019-01-17 ENCOUNTER — OFFICE VISIT (OUTPATIENT)
Dept: INTERNAL MEDICINE | Facility: CLINIC | Age: 84
End: 2019-01-17
Payer: COMMERCIAL

## 2019-01-17 VITALS
RESPIRATION RATE: 18 BRPM | HEART RATE: 60 BPM | HEIGHT: 70 IN | SYSTOLIC BLOOD PRESSURE: 114 MMHG | DIASTOLIC BLOOD PRESSURE: 61 MMHG | TEMPERATURE: 98 F | WEIGHT: 179.44 LBS | OXYGEN SATURATION: 95 % | BODY MASS INDEX: 25.69 KG/M2

## 2019-01-17 DIAGNOSIS — Z01.812 ENCOUNTER FOR PREPROCEDURAL LABORATORY EXAMINATION: Primary | ICD-10-CM

## 2019-01-17 DIAGNOSIS — Z01.810 ENCOUNTER FOR PREPROCEDURAL CARDIOVASCULAR EXAMINATION: ICD-10-CM

## 2019-01-17 LAB
ALBUMIN SERPL BCP-MCNC: 3.9 G/DL
ALP SERPL-CCNC: 94 U/L
ALT SERPL W/O P-5'-P-CCNC: 23 U/L
ANION GAP SERPL CALC-SCNC: 8 MMOL/L
AST SERPL-CCNC: 22 U/L
BASOPHILS # BLD AUTO: 0.08 K/UL
BASOPHILS NFR BLD: 0.7 %
BILIRUB SERPL-MCNC: 0.3 MG/DL
BUN SERPL-MCNC: 20 MG/DL
CALCIUM SERPL-MCNC: 9.8 MG/DL
CHLORIDE SERPL-SCNC: 101 MMOL/L
CO2 SERPL-SCNC: 27 MMOL/L
CREAT SERPL-MCNC: 1.3 MG/DL
DIFFERENTIAL METHOD: ABNORMAL
EOSINOPHIL # BLD AUTO: 0.3 K/UL
EOSINOPHIL NFR BLD: 2.5 %
ERYTHROCYTE [DISTWIDTH] IN BLOOD BY AUTOMATED COUNT: 14.2 %
EST. GFR  (AFRICAN AMERICAN): 56.7 ML/MIN/1.73 M^2
EST. GFR  (NON AFRICAN AMERICAN): 49.1 ML/MIN/1.73 M^2
GLUCOSE SERPL-MCNC: 95 MG/DL
HCT VFR BLD AUTO: 47.2 %
HGB BLD-MCNC: 15.1 G/DL
IMM GRANULOCYTES # BLD AUTO: 0.06 K/UL
IMM GRANULOCYTES NFR BLD AUTO: 0.5 %
LYMPHOCYTES # BLD AUTO: 2.4 K/UL
LYMPHOCYTES NFR BLD: 20.9 %
MCH RBC QN AUTO: 29.6 PG
MCHC RBC AUTO-ENTMCNC: 32 G/DL
MCV RBC AUTO: 93 FL
MONOCYTES # BLD AUTO: 0.9 K/UL
MONOCYTES NFR BLD: 8 %
NEUTROPHILS # BLD AUTO: 7.7 K/UL
NEUTROPHILS NFR BLD: 67.4 %
NRBC BLD-RTO: 0 /100 WBC
PLATELET # BLD AUTO: 288 K/UL
PMV BLD AUTO: 11 FL
POTASSIUM SERPL-SCNC: 4.3 MMOL/L
PROT SERPL-MCNC: 8.1 G/DL
RBC # BLD AUTO: 5.1 M/UL
SODIUM SERPL-SCNC: 136 MMOL/L
WBC # BLD AUTO: 11.46 K/UL

## 2019-01-17 PROCEDURE — 80053 COMPREHEN METABOLIC PANEL: CPT

## 2019-01-17 PROCEDURE — 85025 COMPLETE CBC W/AUTO DIFF WBC: CPT

## 2019-01-17 PROCEDURE — 93000 EKG 12-LEAD: ICD-10-PCS | Mod: S$GLB,,, | Performed by: INTERNAL MEDICINE

## 2019-01-17 PROCEDURE — 99243 OFF/OP CNSLTJ NEW/EST LOW 30: CPT | Mod: S$GLB,,, | Performed by: FAMILY MEDICINE

## 2019-01-17 PROCEDURE — 99999 PR PBB SHADOW E&M-EST. PATIENT-LVL IV: ICD-10-PCS | Mod: PBBFAC,,, | Performed by: FAMILY MEDICINE

## 2019-01-17 PROCEDURE — 93000 ELECTROCARDIOGRAM COMPLETE: CPT | Mod: S$GLB,,, | Performed by: INTERNAL MEDICINE

## 2019-01-17 PROCEDURE — 93005 EKG 12-LEAD: ICD-10-PCS | Mod: S$GLB,,, | Performed by: FAMILY MEDICINE

## 2019-01-17 PROCEDURE — 99999 PR PBB SHADOW E&M-EST. PATIENT-LVL IV: CPT | Mod: PBBFAC,,, | Performed by: FAMILY MEDICINE

## 2019-01-17 PROCEDURE — 93005 ELECTROCARDIOGRAM TRACING: CPT | Mod: S$GLB,,, | Performed by: FAMILY MEDICINE

## 2019-01-17 PROCEDURE — 99243 PR OFFICE CONSULTATION,LEVEL III: ICD-10-PCS | Mod: S$GLB,,, | Performed by: FAMILY MEDICINE

## 2019-01-17 RX ORDER — LOTEPREDNOL ETABONATE 5 MG/G
GEL OPHTHALMIC
Refills: 5 | COMMUNITY
Start: 2018-12-12 | End: 2019-07-26 | Stop reason: ALTCHOICE

## 2019-01-17 RX ORDER — CLOPIDOGREL BISULFATE 75 MG/1
TABLET ORAL
COMMUNITY
Start: 2019-01-09 | End: 2019-03-21

## 2019-01-17 NOTE — PROGRESS NOTES
"Subjective:       Patient ID: Brant Lees is a 87 y.o. male.    Chief Complaint: Pre-op Exam    HPI  Here for preop eval  Needs cbc,cmp, and ekg    Having retina surgery under local anesthesia.   Onset of sx one year ago  Sx include visual disturbance, blood in eye, excessive eye discharge. Nothing helps. Being treated with eye drops    Provides paperwork  Denies any sx of cp, swelling, sob, fever, cough, congestion or urinary sx    Has h/o s/p coronary stent. No longer on plavix, recently discontinued in Aug 2018. Also taking asa  PMH HTN and HLD and hypothyroid. Reports compliance to all meds.    Denies recent use of NG tabs for cp  Review of Systems   Constitutional: Negative for activity change, appetite change, fever and unexpected weight change.   HENT: Negative for congestion and sore throat.    Eyes: Positive for discharge and visual disturbance.   Respiratory: Negative for cough and shortness of breath.    Cardiovascular: Negative for chest pain, palpitations and leg swelling.   Gastrointestinal: Negative for anal bleeding, blood in stool, constipation, diarrhea, nausea and vomiting.   Genitourinary: Negative for difficulty urinating, dysuria, frequency and hematuria.   Musculoskeletal:        Bunions causing pain when walk   Neurological: Negative for facial asymmetry, speech difficulty, weakness and numbness.   Psychiatric/Behavioral: Negative for dysphoric mood, hallucinations and suicidal ideas.        Objective:   /61 (BP Location: Left arm, Patient Position: Sitting, BP Method: Medium (Automatic))   Pulse 60   Temp 97.8 °F (36.6 °C) (Tympanic)   Resp 18   Ht 5' 10" (1.778 m)   Wt 81.4 kg (179 lb 7.3 oz)   SpO2 95%   BMI 25.75 kg/m²     Physical Exam   Constitutional: He is oriented to person, place, and time. He appears well-developed and well-nourished. No distress.   HENT:   Head: Normocephalic and atraumatic.   Mouth/Throat: Oropharynx is clear and moist.   Eyes: EOM are normal. No " scleral icterus.   Neck: Normal range of motion. Neck supple.   Cardiovascular: Regular rhythm. Bradycardia present.   Murmur heard.   Systolic murmur is present with a grade of 2/6.  Best ausculated right sternal border w/ radiating to carotids   Pulmonary/Chest: Effort normal and breath sounds normal. He has no wheezes.   Abdominal: Soft. Bowel sounds are normal. There is no tenderness.   Musculoskeletal: Normal range of motion. He exhibits no edema or deformity.   Neurological: He is alert and oriented to person, place, and time.   Skin: Skin is warm and dry.   Several seb keratosis on face and neck   Psychiatric: He has a normal mood and affect.   Vitals reviewed.    Assessment:     1. Encounter for preprocedural laboratory examination    2. Encounter for preprocedural cardiovascular examination      Plan:     Problem List Items Addressed This Visit     None      Visit Diagnoses     Encounter for preprocedural laboratory examination    -  Primary    Relevant Orders    CBC auto differential    Comprehensive metabolic panel    Encounter for preprocedural cardiovascular examination        Relevant Orders    EKG 12-lead      Samano index, Class I-II, low to moderate risk based on age and 2/6 murmur radiating to carotids     will need cardiac clearance. Has appt scheduled w/ Dr. Figueroa on 1/21/2019  Follow-up if symptoms worsen or fail to improve.

## 2019-01-18 NOTE — PROGRESS NOTES
Pls fax copy of lab results, ekg, and H&P (clinic note) to number provided on form to Lai Lewis MD (see form for instructions) fax number [phone]

## 2019-02-25 ENCOUNTER — LAB VISIT (OUTPATIENT)
Dept: LAB | Facility: HOSPITAL | Age: 84
End: 2019-02-25
Attending: INTERNAL MEDICINE
Payer: COMMERCIAL

## 2019-02-25 DIAGNOSIS — R91.8 MULTIPLE LUNG NODULES: Chronic | ICD-10-CM

## 2019-02-25 LAB
ANION GAP SERPL CALC-SCNC: 9 MMOL/L
BUN SERPL-MCNC: 18 MG/DL
CALCIUM SERPL-MCNC: 10.1 MG/DL
CHLORIDE SERPL-SCNC: 103 MMOL/L
CO2 SERPL-SCNC: 28 MMOL/L
CREAT SERPL-MCNC: 1.2 MG/DL
EST. GFR  (AFRICAN AMERICAN): >60 ML/MIN/1.73 M^2
EST. GFR  (NON AFRICAN AMERICAN): 54 ML/MIN/1.73 M^2
GLUCOSE SERPL-MCNC: 118 MG/DL
POTASSIUM SERPL-SCNC: 4.5 MMOL/L
SODIUM SERPL-SCNC: 140 MMOL/L

## 2019-02-25 PROCEDURE — 80048 BASIC METABOLIC PNL TOTAL CA: CPT

## 2019-02-25 PROCEDURE — 36415 COLL VENOUS BLD VENIPUNCTURE: CPT

## 2019-02-28 ENCOUNTER — OFFICE VISIT (OUTPATIENT)
Dept: PULMONOLOGY | Facility: CLINIC | Age: 84
End: 2019-02-28
Payer: COMMERCIAL

## 2019-02-28 ENCOUNTER — HOSPITAL ENCOUNTER (OUTPATIENT)
Dept: RADIOLOGY | Facility: HOSPITAL | Age: 84
Discharge: HOME | End: 2019-02-28
Attending: INTERNAL MEDICINE
Payer: COMMERCIAL

## 2019-02-28 VITALS
HEIGHT: 70 IN | WEIGHT: 178.44 LBS | BODY MASS INDEX: 25.55 KG/M2 | RESPIRATION RATE: 20 BRPM | DIASTOLIC BLOOD PRESSURE: 80 MMHG | SYSTOLIC BLOOD PRESSURE: 107 MMHG | HEART RATE: 62 BPM | OXYGEN SATURATION: 90 %

## 2019-02-28 DIAGNOSIS — R91.8 MULTIPLE LUNG NODULES: Primary | Chronic | ICD-10-CM

## 2019-02-28 DIAGNOSIS — R91.8 MULTIPLE LUNG NODULES: Chronic | ICD-10-CM

## 2019-02-28 PROCEDURE — 71250 CT THORAX DX C-: CPT | Mod: TC

## 2019-02-28 PROCEDURE — 1101F PT FALLS ASSESS-DOCD LE1/YR: CPT | Mod: CPTII,S$GLB,, | Performed by: INTERNAL MEDICINE

## 2019-02-28 PROCEDURE — 99215 OFFICE O/P EST HI 40 MIN: CPT | Mod: S$GLB,,, | Performed by: INTERNAL MEDICINE

## 2019-02-28 PROCEDURE — 99999 PR PBB SHADOW E&M-EST. PATIENT-LVL III: ICD-10-PCS | Mod: PBBFAC,,, | Performed by: INTERNAL MEDICINE

## 2019-02-28 PROCEDURE — 99215 PR OFFICE/OUTPT VISIT, EST, LEVL V, 40-54 MIN: ICD-10-PCS | Mod: S$GLB,,, | Performed by: INTERNAL MEDICINE

## 2019-02-28 PROCEDURE — 1101F PR PT FALLS ASSESS DOC 0-1 FALLS W/OUT INJ PAST YR: ICD-10-PCS | Mod: CPTII,S$GLB,, | Performed by: INTERNAL MEDICINE

## 2019-02-28 PROCEDURE — 99999 PR PBB SHADOW E&M-EST. PATIENT-LVL III: CPT | Mod: PBBFAC,,, | Performed by: INTERNAL MEDICINE

## 2019-02-28 NOTE — ASSESSMENT & PLAN NOTE
Multiple lung nodules. Neoplasm suspected.     NM PET scan.     CT lung biopsy after PET scan.    Fleischner Society Guidelines:    High risk patient:   Smoking history, history of malignancy or risk factors for malignancy.( non-solid ground glass opacities and partially solid nodules may require longer follow up to exclude indolent adenocarcinoma)      Nodule size Low risk patient High risk patient   4 mm  No follow up Follow up CT in 12 months. If unchanged, no further follow up.   4 - 6 mm Follow up CT in 12 months; if unchanged, no further follow up.  Initial follow up CT in 6 - 12 months, then at 18 - 24 months if no change.      6 - 8 mm  Initial follow up CT at 6 - 12 months and at 18 months if no change.  Initial follow up CT at 3 - 6 months. Then at 9-12 months and 24 months if no change.      > 8 mm Initial follow up CT at 3, 6, 9 and 24 months, dynamic contrast enhanced CT, PET-CT and/or biopsy. Initial follow up CT at 3, 6, 9 and 24 months, dynamic contrast enhanced CT, PET-CT and/or biopsy.

## 2019-02-28 NOTE — PATIENT INSTRUCTIONS
Lung Anatomy  Your lungs take air in to give your body oxygen, which the body needs to work. Your lungs, like all the tissues in your body, are made up of billions of tiny specialized cells. Old lung cells die and are replaced by new, identical lung cells. This natural process helps ensure healthy lungs.    Date Last Reviewed: 11/1/2016  © 0955-4632 [employer]. [address] [place] [address], [place] [address]. All rights reserved. This information is not intended as a substitute for professional medical care. Always follow your healthcare professional's instructions.

## 2019-02-28 NOTE — H&P (VIEW-ONLY)
Subjective:      Patient ID: Brant Lees is a 88 y.o. male.    Patient Active Problem List   Diagnosis    Hyperlipidemia    LBP (low back pain)    Cardiomyopathy    HBP (high blood pressure)    MI (myocardial infarction)    Status post primary angioplasty with coronary stent    Multiple lung nodules    Lesion of bladder    Hypothyroidism due to Hashimoto's thyroiditis     Problem list has been reviewed.    Chief Complaint: Multiple lung nodules    HPI    Here for follow up for up  and review of CT chest. Bibasilar opacities and  Enlarged AP window lymph node. Neoplasm suspected.     He states that he is fine and has no specific pulmonary complaints. He denies  hemoptysis,  pain with breathing, wheezing, asthma.  Immunization status reviewed and updated.      Immunization status reviewed and up to date:     A full  review of systems, past , family  and social histories was performed except as mentioned in the note above, these are non contributory to the main issues discussed today.       Previous Report Reviewed: office notes     The following portions of the patient's history were reviewed and updated as appropriate: He  has a past medical history of Anticoagulant long-term use, Cardiomyopathy (9/07), HBP (high blood pressure) (1997), Hyperlipidemia, LBP (low back pain), MI (myocardial infarction) (12/06), Status post primary angioplasty with coronary stent (12/06), and Thyroid disease.  He  has a past surgical history that includes Hernia repair and Coronary stent placement.  His family history includes Cancer in his mother; Heart disease in his mother.  He  reports that  has never smoked. he has never used smokeless tobacco. He reports that he does not drink alcohol or use drugs.  He has a current medication list which includes the following prescription(s): acetaminophen, amlodipine, aspirin, clopidogrel, fluorometholone 0.1%, levothyroxine, lisinopril, lotemax, nitroglycerin, and simvastatin.  He has No  "Known Allergies..    Review of Systems   Constitutional: Negative for fever, chills, fatigue and night sweats.   HENT: Negative for nosebleeds, rhinorrhea, sore throat, trouble swallowing, congestion and hearing loss.    Eyes: Negative for itching.   Respiratory: Positive for cough. Negative for snoring, sputum production, chest tightness, wheezing and dyspnea on extertion.    Cardiovascular: Negative for chest pain and leg swelling.   Genitourinary: Negative for difficulty urinating.   Endocrine: Negative for cold intolerance and heat intolerance.    Musculoskeletal: Positive for arthralgias.   Skin: Negative for rash.   Gastrointestinal: Negative for nausea, vomiting and acid reflux.   Neurological: Negative for dizziness, syncope, light-headedness and headaches.   Hematological: Does not bruise/bleed easily.   Psychiatric/Behavioral: The patient is not nervous/anxious.    All other systems reviewed and are negative.     Objective:     /80   Pulse 62   Resp 20   Ht 5' 10" (1.778 m)   Wt 81 kg (178 lb 7.4 oz)   SpO2 (!) 90%   BMI 25.61 kg/m²   Body mass index is 25.61 kg/m².    Physical Exam   Constitutional: He is oriented to person, place, and time. He appears well-developed and well-nourished.   HENT:   Head: Normocephalic and atraumatic.   Right Ear: Tympanic membrane, external ear and ear canal normal.   Left Ear: Tympanic membrane, external ear and ear canal normal.   Nose: Nose normal.   Mouth/Throat: Oropharynx is clear and moist. No oropharyngeal exudate.   Eyes: Conjunctivae and EOM are normal.   Neck: Neck supple. No thyromegaly present.   Cardiovascular: Normal rate, regular rhythm and normal heart sounds.   Pulmonary/Chest: Effort normal. He has no wheezes.   Lymphadenopathy:     He has no cervical adenopathy.   Neurological: He is alert and oriented to person, place, and time.   Skin: Skin is warm and dry.   Psychiatric: He has a normal mood and affect. His behavior is normal. "       Personal Diagnostic Review  CT chest with contrast: 02/28/2019:    1. There has been interval development of ill-defined opacities in the posterior aspect of both lower lobes. One of the larger ones is in the posterior aspect of the left lower lobe and measures 5.2 cm in craniocaudal dimension by 3.4 cm in AP dimension by 5.3 cm in medial-lateral dimension. There is an enlarged lymph node on the left side of the mediastinum in the AP window. It has a short axis measurement of 12 mm.  This is consistent with the patient's history.  Hence, I recommend consideration of a percutaneous biopsy.  2. There are moderate emphysematous changes in both upper lobes.  3. There is a 14 mm exophytic hypodense mass off of the anteromedial aspect of the superior pole of the left kidney. It has a Hounsfield measurement of -2.  This is characteristic of a cyst.  4. There is a small hiatal hernia.  5. There are mild degenerative changes in the spine.        Assessment / plan :       Discussed diagnosis, its evaluation, treatment and usual course. All questions answered.    Problem List Items Addressed This Visit        Pulmonary    Multiple lung nodules - Primary (Chronic)    Current Assessment & Plan     Multiple lung nodules. Neoplasm suspected.     NM PET scan.     CT lung biopsy after PET scan.    Fleischner Society Guidelines:    High risk patient:   Smoking history, history of malignancy or risk factors for malignancy.( non-solid ground glass opacities and partially solid nodules may require longer follow up to exclude indolent adenocarcinoma)      Nodule size Low risk patient High risk patient   4 mm  No follow up Follow up CT in 12 months. If unchanged, no further follow up.   4 - 6 mm Follow up CT in 12 months; if unchanged, no further follow up.  Initial follow up CT in 6 - 12 months, then at 18 - 24 months if no change.      6 - 8 mm  Initial follow up CT at 6 - 12 months and at 18 months if no change.  Initial follow  up CT at 3 - 6 months. Then at 9-12 months and 24 months if no change.      > 8 mm Initial follow up CT at 3, 6, 9 and 24 months, dynamic contrast enhanced CT, PET-CT and/or biopsy. Initial follow up CT at 3, 6, 9 and 24 months, dynamic contrast enhanced CT, PET-CT and/or biopsy.                     TIME SPENT WITH PATIENT: Time spent: 30 minutes in face to face  discussion concerning diagnosis, prognosis, review of lab and test results, benefits of treatment as well as management of disease, counseling of patient and coordination of care between various health  care providers . Greater than half the time spent was used for coordination of care and counseling of patient.       Follow-up in about 1 month (around 3/28/2019) for Lung Nodule.

## 2019-03-04 ENCOUNTER — TELEPHONE (OUTPATIENT)
Dept: RADIOLOGY | Facility: HOSPITAL | Age: 84
End: 2019-03-04

## 2019-03-05 ENCOUNTER — TELEPHONE (OUTPATIENT)
Dept: RADIOLOGY | Facility: HOSPITAL | Age: 84
End: 2019-03-05

## 2019-03-06 ENCOUNTER — HOSPITAL ENCOUNTER (OUTPATIENT)
Dept: RADIOLOGY | Facility: HOSPITAL | Age: 84
Discharge: HOME | End: 2019-03-06
Attending: INTERNAL MEDICINE
Payer: COMMERCIAL

## 2019-03-06 DIAGNOSIS — R91.8 MULTIPLE LUNG NODULES: Chronic | ICD-10-CM

## 2019-03-06 PROCEDURE — 78815 PET IMAGE W/CT SKULL-THIGH: CPT | Mod: 26,PS,, | Performed by: RADIOLOGY

## 2019-03-06 PROCEDURE — 78815 PET IMAGE W/CT SKULL-THIGH: CPT | Mod: TC

## 2019-03-06 PROCEDURE — A9552 F18 FDG: HCPCS

## 2019-03-06 PROCEDURE — 78815 NM PET CT ROUTINE: ICD-10-PCS | Mod: 26,PS,, | Performed by: RADIOLOGY

## 2019-03-08 ENCOUNTER — TELEPHONE (OUTPATIENT)
Dept: PULMONOLOGY | Facility: CLINIC | Age: 84
End: 2019-03-08

## 2019-03-08 DIAGNOSIS — R91.8 MULTIPLE LUNG NODULES: Primary | Chronic | ICD-10-CM

## 2019-03-08 NOTE — TELEPHONE ENCOUNTER
----- Message from Lai Blunt MD sent at 3/7/2019  7:02 PM CST -----  Regarding: PET scan Results  PET scan reviewed:    Increased FDG uptake noted in the masslike consolidative density along the posterior pleural margins of the left lower lobe described on recent chest CT.  SUV max measures 15.5.  There is an additional FDG avid nodular opacity along the posterolateral pleural margins of the right lower lobe measuring approximately 1.9 cm in diameter a grossly unchanged in appearance from recent CT.  SUV max measures 7.0.  There multiple FDG avid left hilar lymph nodes with SUV max of 5.3.  Low level tracer uptake noted within a right hilar lymph node with SUV max of 2.9.  There is low level FDG uptake within a prominent AP window mediastinal lymph node with SUV max of 2.2      PLAN:    CT guided lung biopsy.    Please inform patient and schedule

## 2019-03-13 ENCOUNTER — TELEPHONE (OUTPATIENT)
Dept: PULMONOLOGY | Facility: CLINIC | Age: 84
End: 2019-03-13

## 2019-03-14 ENCOUNTER — TELEPHONE (OUTPATIENT)
Dept: INTERNAL MEDICINE | Facility: CLINIC | Age: 84
End: 2019-03-14

## 2019-03-14 DIAGNOSIS — E03.8 HYPOTHYROIDISM DUE TO HASHIMOTO'S THYROIDITIS: Primary | ICD-10-CM

## 2019-03-14 DIAGNOSIS — E06.3 HYPOTHYROIDISM DUE TO HASHIMOTO'S THYROIDITIS: Primary | ICD-10-CM

## 2019-03-14 DIAGNOSIS — E03.9 HYPOTHYROIDISM (ACQUIRED): ICD-10-CM

## 2019-03-14 RX ORDER — LEVOTHYROXINE SODIUM 50 UG/1
TABLET ORAL
Qty: 90 TABLET | Refills: 0 | Status: SHIPPED | OUTPATIENT
Start: 2019-03-14 | End: 2019-06-12 | Stop reason: SDUPTHER

## 2019-03-14 NOTE — TELEPHONE ENCOUNTER
----- Message from Angela Jaime sent at 3/14/2019 12:18 PM CDT -----  Contact: Dolores [phone]  Type:  Patient Returning Call    Who Called:Dolores  Who Left Message for Patient:Sol  Does the patient know what this is regarding?:unk  Would the patient rather a call back or a response via [email or web address]? Call back   Best Call Back Number:[phone]  Additional Information:

## 2019-03-14 NOTE — TELEPHONE ENCOUNTER
Spoke to Tiara and advise of prescription that was sent to the pharmacy.  Also informed her of the patient needing his TSH lab completed.  Tiara verbally understood.  Scheduled patient for 03/21/2019 to have TSH done.

## 2019-03-14 NOTE — TELEPHONE ENCOUNTER
rf sent for thyroid med - he is due to get TSH checked.  pls schedule him for lab to obtain the TSH ordered last year for this month or next.

## 2019-03-20 ENCOUNTER — TELEPHONE (OUTPATIENT)
Dept: RADIOLOGY | Facility: HOSPITAL | Age: 84
End: 2019-03-20

## 2019-03-20 DIAGNOSIS — R91.8 MULTIPLE LUNG NODULES: Primary | Chronic | ICD-10-CM

## 2019-03-21 ENCOUNTER — HOSPITAL ENCOUNTER (OUTPATIENT)
Dept: RADIOLOGY | Facility: HOSPITAL | Age: 84
Discharge: HOME | End: 2019-03-21
Attending: PHYSICIAN ASSISTANT
Payer: COMMERCIAL

## 2019-03-21 ENCOUNTER — HOSPITAL ENCOUNTER (OUTPATIENT)
Dept: RADIOLOGY | Facility: HOSPITAL | Age: 84
Discharge: HOME | End: 2019-03-21
Attending: INTERNAL MEDICINE
Payer: COMMERCIAL

## 2019-03-21 VITALS
RESPIRATION RATE: 18 BRPM | TEMPERATURE: 98 F | OXYGEN SATURATION: 93 % | WEIGHT: 175 LBS | DIASTOLIC BLOOD PRESSURE: 65 MMHG | HEIGHT: 70 IN | BODY MASS INDEX: 25.05 KG/M2 | HEART RATE: 70 BPM | SYSTOLIC BLOOD PRESSURE: 141 MMHG

## 2019-03-21 DIAGNOSIS — R91.8 MULTIPLE LUNG NODULES: Chronic | ICD-10-CM

## 2019-03-21 PROCEDURE — 88342 IMHCHEM/IMCYTCHM 1ST ANTB: CPT | Mod: 26,,, | Performed by: PATHOLOGY

## 2019-03-21 PROCEDURE — 87116 MYCOBACTERIA CULTURE: CPT

## 2019-03-21 PROCEDURE — 88341 IMHCHEM/IMCYTCHM EA ADD ANTB: CPT | Performed by: PATHOLOGY

## 2019-03-21 PROCEDURE — 71045 X-RAY EXAM CHEST 1 VIEW: CPT | Mod: TC

## 2019-03-21 PROCEDURE — 88342 IMHCHEM/IMCYTCHM 1ST ANTB: CPT | Performed by: PATHOLOGY

## 2019-03-21 PROCEDURE — 88305 TISSUE EXAM BY PATHOLOGIST: CPT | Mod: 26,,, | Performed by: PATHOLOGY

## 2019-03-21 PROCEDURE — 87102 FUNGUS ISOLATION CULTURE: CPT

## 2019-03-21 PROCEDURE — 88341 TISSUE SPECIMEN TO PATHOLOGY, RADIOLOGY: ICD-10-PCS | Mod: 26,,, | Performed by: PATHOLOGY

## 2019-03-21 PROCEDURE — 88342 TISSUE SPECIMEN TO PATHOLOGY, RADIOLOGY: ICD-10-PCS | Mod: 26,,, | Performed by: PATHOLOGY

## 2019-03-21 PROCEDURE — 88341 IMHCHEM/IMCYTCHM EA ADD ANTB: CPT | Mod: 26,,, | Performed by: PATHOLOGY

## 2019-03-21 PROCEDURE — 87070 CULTURE OTHR SPECIMN AEROBIC: CPT

## 2019-03-21 PROCEDURE — 88305 TISSUE SPECIMEN TO PATHOLOGY, RADIOLOGY: ICD-10-PCS | Mod: 26,,, | Performed by: PATHOLOGY

## 2019-03-21 PROCEDURE — 88305 TISSUE EXAM BY PATHOLOGIST: CPT | Performed by: PATHOLOGY

## 2019-03-21 PROCEDURE — 63600175 PHARM REV CODE 636 W HCPCS: Performed by: RADIOLOGY

## 2019-03-21 PROCEDURE — 87205 SMEAR GRAM STAIN: CPT

## 2019-03-21 PROCEDURE — 77012 CT SCAN FOR NEEDLE BIOPSY: CPT | Mod: TC

## 2019-03-21 PROCEDURE — 87206 SMEAR FLUORESCENT/ACID STAI: CPT

## 2019-03-21 PROCEDURE — 87075 CULTR BACTERIA EXCEPT BLOOD: CPT

## 2019-03-21 RX ORDER — MIDAZOLAM HYDROCHLORIDE 1 MG/ML
INJECTION INTRAMUSCULAR; INTRAVENOUS CODE/TRAUMA/SEDATION MEDICATION
Status: COMPLETED | OUTPATIENT
Start: 2019-03-21 | End: 2019-03-21

## 2019-03-21 RX ORDER — FENTANYL CITRATE 50 UG/ML
INJECTION, SOLUTION INTRAMUSCULAR; INTRAVENOUS CODE/TRAUMA/SEDATION MEDICATION
Status: COMPLETED | OUTPATIENT
Start: 2019-03-21 | End: 2019-03-21

## 2019-03-21 RX ADMIN — MIDAZOLAM HYDROCHLORIDE 0.5 MG: 1 INJECTION, SOLUTION INTRAMUSCULAR; INTRAVENOUS at 10:03

## 2019-03-21 RX ADMIN — FENTANYL CITRATE 25 MCG: 50 INJECTION, SOLUTION INTRAMUSCULAR; INTRAVENOUS at 10:03

## 2019-03-21 NOTE — SEDATION DOCUMENTATION
Procedure complete, pt tolerated well.  Vss.  Band aid placed to left back puncture site, site WdL.  Pt awake and able to follow simple commands.  Pt denied sob or pain at this time.

## 2019-03-21 NOTE — DISCHARGE SUMMARY
Pre Op Diagnosis: left lung mass     Post Op Diagnosis: same     Procedure:  Left lung bx.     Procedure performed by: Luis Felipe MARSH, Ariana RECINOS     Written Informed Consent Obtained: Yes     Specimen Removed:  yes     Estimated Blood Loss:  minimal     Findings: Local anesthesia and moderate sedation were used.     The patient tolerated the procedure well and there were no complications.      Sterile technique was performed in the posterior left thorax, lidocaine was used as a local anesthetic.  Multiple samples taken from the left lung mass.  Pt tolerated the procedure well with a small pneumothorax.  Please see radiologist report for details. F/u with PCP and/or ordering physician.

## 2019-03-21 NOTE — DISCHARGE INSTRUCTIONS
CT-Guided Lung Biopsy  CT-guided lung biopsy is a procedure to collect small tissue samples from an abnormal area in your lung. During the procedure, an imaging method called CT (computed tomography) is used to show live pictures of your lung. Then a thin needle is used to remove the tissue samples. The samples are tested in a lab for cancer and other problems.     For the biopsy, a thin needle is used to remove samples of tissue from an abnormal area in the lung.   Getting ready for your procedure  Follow any instructions from your healthcare provider.  Tell your provider about any medicines you are taking. It is important for your provider to know if you are taking any blood-thinning medicines or have a bleeding disorder.  You may need to stop taking all or some medicine before the procedure. This includes:  · All prescription medicines  · Blood-thinning medicines (anticoagulants)  · Over-the-counter medicines such as aspirin or ibuprofen  · Street drugs  · Herbs, vitamins, and other supplements  Also tell your provider if you:  · Are pregnant or think you may be pregnant  · Are breastfeeding  · Are allergic to or have intolerances to any medicines  · Have a chronic cough, new cough, or other illness  · Use oxygen therapy at home  · Smoke or drink alcohol on a regular basis  Follow any directions youre given for not eating or drinking before the procedure.  The day of your procedure  The procedure takes about 60 minutes. The entire procedure (including time to prepare and recover) takes several hours. Youll likely go home the same day.  Before the procedure begins:  · An IV (intravenous) line may be put into a vein in your hand or arm. This line supplies fluids and medicines.  · To keep you free of pain during the procedure, you may be given anesthesia. Depending on the type of anesthesia used, you may be awake, drowsy, or in a deep sleep for the procedure.  During the procedure:  · Youll lie on a CT scan  table. You may be on your back, side, or stomach. Pictures of your lung are then taken using the CT scanner. This helps your provider find the best place to position the needle in your lungs.  · A april is made on your skin where the needle will be inserted (biopsy site). The site is injected with numbing medicine.  · Using the CT pictures as a guide, the needle is passed through the numbed skin between your ribs and into your lung. Samples of tissue are then removed from the abnormal area in your lung. The samples are sent to a lab to be checked for problems.  · When the procedure is complete, the needle is removed. Pressure is applied to the biopsy site to help stop any bleeding. The site is then bandaged.  After the procedure:  · Youll be taken to a room to rest until the anesthesia wears off.  · A chest X-ray may be done. This is to make sure there was no damage to your lungs or the area where the needle was placed.  · When its time for you to go home, have an adult family member or friend ready to drive you.  Recovering at home  You may cough up a small amount of blood shortly after the procedure. Later, you may also have some soreness around the biopsy site. Once at home, follow any instructions youre given. Be sure to:  · Take all medicines as directed.  · Care for the biopsy site as instructed.  · Check for signs of infection at the biopsy site (see below).  · Do not bathe or shower until your provider says it's OK. If you wish, you may wash with a sponge or washcloth.  · Avoid heavy lifting and other strenuous activities as directed.  · If you plan any air travel, ask your provider when you can do so. You may be told not to fly for a few weeks. This is because pressure changes may affect your lungs.  When to call your provider  Call 911 or your local emergency number if you have sudden, severe difficulty breathing. This could be a life-threatening emergency.  Call your healthcare provider for less severe  symptoms that are still concerning. If you are unable to speak with your provider, go to the emergency room if you have any of the following symptoms:  · Fever of 100.4°F (38°C) or higher, or as directed by your provider  · Sudden chest pain, shortness of breath, or fainting  · Coughing up increasing amounts of blood  · Signs of infection at the biopsy site, such as increased redness or swelling, warmth, more pain, bleeding, or bad-smelling drainage   Follow-up  The provider who ordered your test will discuss the biopsy results with you during a follow-up visit. Results are usually ready within 1 to 2 weeks. If more tests or treatments are needed, your provider will discuss these with you.  Risks and possible complications  All procedures have some risk. Possible risks of this procedure include:  · An air leak in your lung (pneumothorax). This may require a stay in the hospital and treatment to re-inflate the lung.  · Bleeding into or around the lung  · Infection in the skin or lung  · Injury to other structures in the chest  · Risks of anesthesia. This will be discussed with you before the procedure.   Date Last Reviewed: 6/11/2015  © 3406-1421 [employer]. [address] [place] [address]. All rights reserved. This information is not intended as a substitute for professional medical care. Always follow your healthcare professional's instructions.        Recovery After Procedural Sedation (Adult)  You have been given medicine by vein to make you sleep during your surgery. This may have included both a pain medicine and sleeping medicine. Most of the effects have worn off. But you may still have some drowsiness for the next 6 to 8 hours.  Home care  Follow these guidelines when you get home:  · For the next 8 hours, you should be watched by a responsible adult. This person should make sure your condition is not getting worse.  · Don't drink any alcohol for the next 24 hours.  · Don't drive,  operate dangerous machinery, or make important business or personal decisions during the next 24 hours.  Note: Your healthcare provider may tell you not to take any medicine by mouth for pain or sleep in the next 4 hours. These medicines may react with the medicines you were given in the hospital. This could cause a much stronger response than usual.  Follow-up care  Follow up with your healthcare provider if you are not alert and back to your usual level of activity within 12 hours.  When to seek medical advice  Call your healthcare provider right away if any of these occur:  · Drowsiness gets worse  · Weakness or dizziness gets worse  · Repeated vomiting  · You can't be awakened   Date Last Reviewed: 10/18/2016  © 7306-7469 [employer]. [address] [place] [address], [place] [address]. All rights reserved. This information is not intended as a substitute for professional medical care. Always follow your healthcare professional's instructions.

## 2019-03-25 ENCOUNTER — OFFICE VISIT (OUTPATIENT)
Dept: UROLOGY | Facility: CLINIC | Age: 84
End: 2019-03-25
Payer: COMMERCIAL

## 2019-03-25 VITALS
SYSTOLIC BLOOD PRESSURE: 124 MMHG | BODY MASS INDEX: 25.06 KG/M2 | WEIGHT: 175.06 LBS | HEIGHT: 70 IN | DIASTOLIC BLOOD PRESSURE: 56 MMHG | HEART RATE: 62 BPM

## 2019-03-25 DIAGNOSIS — C67.9 MALIGNANT NEOPLASM OF URINARY BLADDER, UNSPECIFIED SITE: Primary | ICD-10-CM

## 2019-03-25 PROCEDURE — 81002 POCT URINE DIPSTICK WITHOUT MICROSCOPE: ICD-10-PCS | Mod: S$GLB,,, | Performed by: UROLOGY

## 2019-03-25 PROCEDURE — 99999 PR PBB SHADOW E&M-EST. PATIENT-LVL III: ICD-10-PCS | Mod: PBBFAC,,, | Performed by: UROLOGY

## 2019-03-25 PROCEDURE — 52000 CYSTOURETHROSCOPY: CPT | Mod: S$GLB,,, | Performed by: UROLOGY

## 2019-03-25 PROCEDURE — 81002 URINALYSIS NONAUTO W/O SCOPE: CPT | Mod: S$GLB,,, | Performed by: UROLOGY

## 2019-03-25 PROCEDURE — 52000 PR CYSTOURETHROSCOPY: ICD-10-PCS | Mod: S$GLB,,, | Performed by: UROLOGY

## 2019-03-25 PROCEDURE — 99999 PR PBB SHADOW E&M-EST. PATIENT-LVL III: CPT | Mod: PBBFAC,,, | Performed by: UROLOGY

## 2019-03-25 PROCEDURE — 99024 POSTOP FOLLOW-UP VISIT: CPT | Mod: S$GLB,,, | Performed by: UROLOGY

## 2019-03-25 PROCEDURE — 99024 PR POST-OP FOLLOW-UP VISIT: ICD-10-PCS | Mod: S$GLB,,, | Performed by: UROLOGY

## 2019-03-25 NOTE — PROGRESS NOTES
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  12/19/18: 15 mo cysto normal  10/17/18: 12 mo cysto normal.  6/14/18: 9 mo cysto normal.  3/14/18: 6 mo cysto normal.  No BCG from now on.  1/10/18: Had a lot of problems with BCG re-try and we agreed today to not do it again.  Voiding fine otherwise, no other complaints.   1/3/18: No problems in interim will try BCG again.  12/20/17: Had flu-like symptoms very severe after last dose.  Got better in a couple days.  12/13/17: BCG 1/3 1/2 strength today.  12/7/17: 3 mo cysto normal.  11/17/17: Pt would like to not do the BCG today.  We reviewed the indications.  We will respect his wishes.  Agreed to cysto soon, then do 3 wks maint BCG; just not do it today.  11/9/17: LUTS for a half-day last time but not bad enough to skip this week. BCG again today.  11/3/17: Had sig LUTS after last BCG and we skipped last week.  BCG 1/2 strength 4/6 today.  10/19/17: BCG 3/6 today.  9/22/17: Came for BCG today, but still too much microhematuria and leuk.  9/5/17: High grade Ta on path report.  OAB now settling down.  8/10/17: Cleared for surgery, reviewed findings, arranging TURBT.  7/10/17: Hematuria confirmed, CT Urogram reassuring.  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  6/16/17: 85 yo man referred by Dr. Gibson for evaluation of nocturia and LUTS.  Has incontinence at night, sudden urgency has trouble making it to the toilet.  Nocturia like this 2-3/night.  No daytime problems.  Six months so far.   No abd/pelvic pain and no exac/rel factors.  No gross hematuria.  No urolithiasis.  No other urinary bother.  No  history.  PSA was always normal.  2 cups coffee in AM, tea at lunch and dinner (green tea).  Good stream.     Allergies:  Patient has no known allergies.    Medications:  has a  current medication list which includes the following prescription(s): acetaminophen, amlodipine, aspirin, fluorometholone 0.1%, levothyroxine, lisinopril, lotemax, nitroglycerin, and simvastatin.    Review of Systems:  General: No fever, chills, fatigability, or weight loss.  Skin: No rashes, itching, or changes in color or texture of skin.  Chest: Denies DELUCA, cyanosis, wheezing, cough, and sputum production.  Abdomen: Appetite fine. No weight loss. Denies diarrhea, abdominal pain, hematemesis, or blood in stool.  Musculoskeletal: No joint stiffness or swelling. Denies back pain.  : As above.  All other review of systems negative.    PMH:   has a past medical history of Anticoagulant long-term use, Cardiomyopathy (9/07), HBP (high blood pressure) (1997), Hyperlipidemia, LBP (low back pain), MI (myocardial infarction) (12/06), Status post primary angioplasty with coronary stent (12/06), and Thyroid disease.    PSH:   has a past surgical history that includes Hernia repair and Coronary stent placement.    FamHx: family history includes Cancer in his mother; Heart disease in his mother.    SocHx:  reports that he has never smoked. He has never used smokeless tobacco. He reports that he does not drink alcohol or use drugs.      Physical Exam:  Vitals:    03/25/19 0835   BP: (!) 124/56   Pulse: 62     General: A&Ox3, no apparent distress, no deformities  Neck: No masses, normal thyroid  Lungs: normal inspiration, no use of accessory muscles  Heart: normal pulse, no arrhythmias  Abdomen: Soft, NT, ND  Skin: The skin is warm and dry. No jaundice.  Ext: No c/c/e.  :   6/17: Test desc remington, no abnormalities of epididymus. Penis normal, with normal penile and scrotal skin. Meatus normal. Normal rectal tone, no hemorrhoids. Prost 40 gm no nodules or masses appreciated. SV not palpable. Perineum and anus normal.    Labs/Studies:   Urinalysis performed in clinic, summary:    10/6/17: UA normal exc tr prot and 50 blood, less  on micro exam  Bladder Scan performed in office:     6/17: PVR 4 ml.    Procedure: Diagnostic Cystoscopy    Procedure in Detail: After proper consents were obtained, the patient was prepped and draped in normal sterile fashion for diagnostic cystoscopy. 5 ml of lidocaine jelly was instilled in the urethra. The flexible cystoscope was then introduced into the urethra, and advanced into the bladder under direct vision. The urethral mucosa appeared normal, and no strictures were noted. The sphincter appeared to be normal, and the veru montanum was unremarkable. The prostatic mucosa and the lateral lobes of the prostate were normal. The bladder neck was normal. Inspection of the interior of the bladder was then carried out. The trigone was unremarkable, with no mucosal lesions. The ureteral orifices were normal in position and configuration. Systematic inspection of the mucosa of the bladder it was then carried out, rotating the cystoscope so that all areas of the left and right lateral walls, the dome of the bladder, and the posterior wall were all visualized. The cystoscope was then advanced further into the bladder, and maximum deflection of the scope was performed so that the bladder neck could be inspected. No mucosal lesions were noted there. The cystoscope was then removed, and the procedure terminated.     Findings: normal cysto    Impression/Plan:   1. 24 mo cysto 9/7/19. No more BCG

## 2019-03-26 LAB
BACTERIA THROAT CULT: NO GROWTH
GRAM STN SPEC: NORMAL
GRAM STN SPEC: NORMAL

## 2019-03-29 LAB — BACTERIA SPEC ANAEROBE CULT: NORMAL

## 2019-04-01 ENCOUNTER — PATIENT MESSAGE (OUTPATIENT)
Dept: PULMONOLOGY | Facility: CLINIC | Age: 84
End: 2019-04-01

## 2019-04-01 ENCOUNTER — TELEPHONE (OUTPATIENT)
Dept: PULMONOLOGY | Facility: CLINIC | Age: 84
End: 2019-04-01

## 2019-04-01 DIAGNOSIS — R91.8 MULTIPLE LUNG NODULES: Chronic | ICD-10-CM

## 2019-04-01 NOTE — TELEPHONE ENCOUNTER
----- Message from Lai Blunt MD sent at 3/28/2019 12:58 PM CDT -----  Regarding: BIOPSY results.    I have reviewed  Left lung nodule biopsy results:     Biopsy is positive and suggestive of non-small cell carcinoma    The specimen shows infiltrating nests of hyperchromatic tumor cells. No gland formation is readily identified. Immunohistochemical stains show positivity for cytokeratin 5/6 with focal positivity for p63 and cytokeratin 7 and negativity for synaptophysin, chromogranin and TTF 1.     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     Molecular studies for PD-L1 have been ordered and the results will  be issued in a supplemental report when available.       PLAN:    1. Ambulatory Referral to ONCOLOGY for further evaluation and management  2. MRI brain to rule out brain METS.      Please inform patient.

## 2019-04-15 ENCOUNTER — INITIAL CONSULT (OUTPATIENT)
Dept: HEMATOLOGY/ONCOLOGY | Facility: CLINIC | Age: 84
End: 2019-04-15
Payer: COMMERCIAL

## 2019-04-15 ENCOUNTER — HOSPITAL ENCOUNTER (OUTPATIENT)
Dept: RADIOLOGY | Facility: HOSPITAL | Age: 84
Discharge: HOME | End: 2019-04-15
Attending: INTERNAL MEDICINE
Payer: COMMERCIAL

## 2019-04-15 VITALS
DIASTOLIC BLOOD PRESSURE: 72 MMHG | SYSTOLIC BLOOD PRESSURE: 130 MMHG | TEMPERATURE: 96 F | HEIGHT: 70 IN | BODY MASS INDEX: 25.54 KG/M2 | OXYGEN SATURATION: 93 % | HEART RATE: 77 BPM | WEIGHT: 178.38 LBS | RESPIRATION RATE: 18 BRPM

## 2019-04-15 DIAGNOSIS — C34.90 METASTATIC NON-SMALL CELL LUNG CANCER: Primary | ICD-10-CM

## 2019-04-15 DIAGNOSIS — C34.80 MALIGNANT NEOPLASM OF OVERLAPPING SITES OF LUNG, UNSPECIFIED LATERALITY: ICD-10-CM

## 2019-04-15 DIAGNOSIS — Z85.51 HISTORY OF BLADDER CANCER: ICD-10-CM

## 2019-04-15 PROCEDURE — 99999 PR PBB SHADOW E&M-EST. PATIENT-LVL III: CPT | Mod: PBBFAC,,, | Performed by: INTERNAL MEDICINE

## 2019-04-15 PROCEDURE — 99999 PR PBB SHADOW E&M-EST. PATIENT-LVL III: ICD-10-PCS | Mod: PBBFAC,,, | Performed by: INTERNAL MEDICINE

## 2019-04-15 PROCEDURE — 25500020 PHARM REV CODE 255: Performed by: INTERNAL MEDICINE

## 2019-04-15 PROCEDURE — 70553 MRI BRAIN STEM W/O & W/DYE: CPT | Mod: TC

## 2019-04-15 PROCEDURE — 99245 OFF/OP CONSLTJ NEW/EST HI 55: CPT | Mod: S$GLB,,, | Performed by: INTERNAL MEDICINE

## 2019-04-15 PROCEDURE — A9585 GADOBUTROL INJECTION: HCPCS | Performed by: INTERNAL MEDICINE

## 2019-04-15 PROCEDURE — 99245 PR OFFICE CONSULTATION,LEVEL V: ICD-10-PCS | Mod: S$GLB,,, | Performed by: INTERNAL MEDICINE

## 2019-04-15 RX ORDER — GADOBUTROL 604.72 MG/ML
10 INJECTION INTRAVENOUS
Status: COMPLETED | OUTPATIENT
Start: 2019-04-15 | End: 2019-04-15

## 2019-04-15 RX ADMIN — GADOBUTROL 7 ML: 604.72 INJECTION INTRAVENOUS at 08:04

## 2019-04-15 NOTE — LETTER
April 15, 2019      Lai Blunt MD  [address]  [place] [address]           [place] [facility]  [address] [place] [address]  Phone: [phone]  Fax: [phone]          Patient: Brant Lees   MR Number: [id number]   YOB: 1931   Date of Visit: 4/15/2019       Dear Dr. Lai Blunt:    Thank you for referring Brant Lees to me for evaluation. Attached you will find relevant portions of my assessment and plan of care.    If you have questions, please do not hesitate to call me. I look forward to following Brant Lees along with you.    Sincerely,    Simone Goins MD    Enclosure  CC:  No Recipients    If you would like to receive this communication electronically, please contact [email or web address] or [phone] to request more information on [facility] access.    For providers and/or their staff who would like to refer a patient to [facility], please contact us through our one-stop-shop provider referral line, Mayra Gallardo, at [phone].    If you feel you have received this communication in error or would no longer like to receive these types of communications, please e-mail [email or web address]

## 2019-04-15 NOTE — PROGRESS NOTES
Subjective:       Patient ID: Brant Lees is a 88 y.o. male.    Chief Complaint: Metastatic non-small cell lung cancer [C34.90]  HPI: We have an opportunity to see Mr. Brant Lees in Hematology Oncology clinic at [facility] on 04/15/2019.  Mr. Brant Lees is a 88 y.o. gentleman who had screening Ct lung, was found to have multiple lung lesions. PET CT showed involvement of both right and left lungs as well as hilar nodes.  Biopsy showed NSCLC squamous type.  Reported no cancer symptoms, denies pain.     No history exists.     Past Medical History:   Diagnosis Date    Anticoagulant long-term use     plavix    Cardiomyopathy 9/07    Ischemic    HBP (high blood pressure) 1997    Hyperlipidemia     LBP (low back pain)     MI (myocardial infarction) 12/06    Status post primary angioplasty with coronary stent 12/06    Thyroid disease      Family History   Problem Relation Age of Onset    Heart disease Mother     Cancer Mother      Social History     Socioeconomic History    Marital status:      Spouse name: Not on file    Number of children: 1    Years of education: Not on file    Highest education level: Not on file   Occupational History    Not on file   Social Needs    Financial resource strain: Not on file    Food insecurity:     Worry: Not on file     Inability: Not on file    Transportation needs:     Medical: Not on file     Non-medical: Not on file   Tobacco Use    Smoking status: Never Smoker    Smokeless tobacco: Never Used   Substance and Sexual Activity    Alcohol use: No    Drug use: No    Sexual activity: Not Currently     Partners: Female   Lifestyle    Physical activity:     Days per week: Not on file     Minutes per session: Not on file    Stress: Not on file   Relationships    Social connections:     Talks on phone: Not on file     Gets together: Not on file     Attends Latter-day service: Not on file     Active member of club or organization: Not on file      Attends meetings of clubs or organizations: Not on file     Relationship status: Not on file   Other Topics Concern    Not on file   Social History Narrative    Not on file     Past Surgical History:   Procedure Laterality Date    CORONARY STENT PLACEMENT      EXCISION-BLADDER TUMOR-TRANSURETHRAL (TURBT) N/A 8/22/2017    Performed by Jorge Alcocer IV, MD at [facility] OR    HERNIA REPAIR       Current Outpatient Medications   Medication Sig Dispense Refill    acetaminophen (TYLENOL) 325 MG tablet Take 325 mg by mouth every 6 (six) hours as needed for Pain.      amlodipine (NORVASC) 10 MG tablet Take 2.5 tablets by mouth 2 (two) times daily. 1/2 two times daily       ASPIRIN (ASPIR-81 ORAL) Take by mouth.      fluorometholone 0.1% (FML) 0.1 % DrpS       levothyroxine (SYNTHROID) 50 MCG tablet TAKE 1 TABLET DAILY 90 tablet 0    lisinopril (PRINIVIL,ZESTRIL) 20 MG tablet Take 1 tablet by mouth once daily.       LOTEMAX 0.5 % DrpG INSTILL 1 DROP INTO BOTH EYES TWICE DAILY THANK YOU  5    nitroGLYCERIN (NITROSTAT) 0.4 MG SL tablet Place 0.4 mg under the tongue every 5 (five) minutes as needed for Chest pain.      simvastatin (ZOCOR) 20 MG tablet Take 20 mg by mouth every evening.       No current facility-administered medications for this visit.        Labs:  Lab Results   Component Value Date    WBC 11.21 03/21/2019    HGB 16.3 03/21/2019    HCT 47.9 03/21/2019    MCV 91 03/21/2019     03/21/2019     BMP  Lab Results   Component Value Date     02/25/2019    K 4.5 02/25/2019     02/25/2019    CO2 28 02/25/2019    BUN 18 02/25/2019    CREATININE 1.3 04/15/2019    CALCIUM 10.1 02/25/2019    ANIONGAP 9 02/25/2019    ESTGFRAFRICA 56 (A) 04/15/2019    EGFRNONAA 49 (A) 04/15/2019     Lab Results   Component Value Date    ALT 23 01/17/2019    AST 22 01/17/2019    ALKPHOS 94 01/17/2019    BILITOT 0.3 01/17/2019       No results found for: IRON, TIBC, FERRITIN, SATURATEDIRO  No results found for:  LQIEKYCG35  No results found for: FOLATE  Lab Results   Component Value Date    TSH 1.896 03/21/2019       I have reviewed the radiology reports and examined the scan/xray images.    Review of Systems   Constitutional: Negative.    HENT: Negative.    Eyes: Negative.    Respiratory: Negative.    Cardiovascular: Negative.    Gastrointestinal: Negative.    Endocrine: Negative.    Genitourinary: Negative.    Musculoskeletal: Negative.    Skin: Negative.    Allergic/Immunologic: Negative.    Neurological: Negative.    Hematological: Negative.    Psychiatric/Behavioral: Negative.      ECOG SCORE    0 - Fully active-able to carry on all pre-disease performance without restriction            Objective:     Vitals:    04/15/19 0910   BP: 130/72   Pulse: 77   Resp: 18   Temp: 96.1 °F (35.6 °C)   Body mass index is 25.59 kg/m².  Physical Exam   Constitutional: He is oriented to person, place, and time. He appears well-developed and well-nourished.   HENT:   Head: Normocephalic and atraumatic.   Eyes: Conjunctivae and EOM are normal.   Neck: Normal range of motion. Neck supple.   Cardiovascular: Normal rate and regular rhythm.   Pulmonary/Chest: Effort normal and breath sounds normal.   Abdominal: Soft. Bowel sounds are normal.   Musculoskeletal: Normal range of motion.   Neurological: He is alert and oriented to person, place, and time.   Skin: Skin is warm and dry.   Psychiatric: He has a normal mood and affect. His behavior is normal. Judgment and thought content normal.   Nursing note and vitals reviewed.        Assessment:      1. Metastatic non-small cell lung cancer    2. Malignant neoplasm of overlapping sites of lung, unspecified laterality    3. History of bladder cancer           Plan:     Malignant neoplasm of overlapping sites of lung, unspecified laterality  Metastatic non-small cell lung cancer    Mr. Lees is not interested in chemotherapy given potential side effects on QOL.    Will check pathology evaluation of  PDL1 expression, and mutations in EGFR, ROS1, ALK, BRAF.   Mr. Lees would like immunetherapy or targeted therapy as options.    MRI brain done, result pending    Bladder cancer  S/p TURBT and BCG.  Follow up with Dr. Alcocer

## 2019-04-23 LAB — FUNGUS SPEC CULT: NORMAL

## 2019-05-07 ENCOUNTER — OFFICE VISIT (OUTPATIENT)
Dept: HEMATOLOGY/ONCOLOGY | Facility: CLINIC | Age: 84
End: 2019-05-07
Payer: COMMERCIAL

## 2019-05-07 VITALS
HEIGHT: 70 IN | HEART RATE: 62 BPM | OXYGEN SATURATION: 95 % | DIASTOLIC BLOOD PRESSURE: 77 MMHG | TEMPERATURE: 97 F | WEIGHT: 177 LBS | SYSTOLIC BLOOD PRESSURE: 137 MMHG | BODY MASS INDEX: 25.34 KG/M2

## 2019-05-07 DIAGNOSIS — C34.90 METASTATIC NON-SMALL CELL LUNG CANCER: Primary | ICD-10-CM

## 2019-05-07 PROCEDURE — 1100F PTFALLS ASSESS-DOCD GE2>/YR: CPT | Mod: CPTII,S$GLB,, | Performed by: INTERNAL MEDICINE

## 2019-05-07 PROCEDURE — 99215 OFFICE O/P EST HI 40 MIN: CPT | Mod: S$GLB,,, | Performed by: INTERNAL MEDICINE

## 2019-05-07 PROCEDURE — 99999 PR PBB SHADOW E&M-EST. PATIENT-LVL IV: CPT | Mod: PBBFAC,,, | Performed by: INTERNAL MEDICINE

## 2019-05-07 PROCEDURE — 3288F PR FALLS RISK ASSESSMENT DOCUMENTED: ICD-10-PCS | Mod: CPTII,S$GLB,, | Performed by: INTERNAL MEDICINE

## 2019-05-07 PROCEDURE — 99999 PR PBB SHADOW E&M-EST. PATIENT-LVL IV: ICD-10-PCS | Mod: PBBFAC,,, | Performed by: INTERNAL MEDICINE

## 2019-05-07 PROCEDURE — 99215 PR OFFICE/OUTPT VISIT, EST, LEVL V, 40-54 MIN: ICD-10-PCS | Mod: S$GLB,,, | Performed by: INTERNAL MEDICINE

## 2019-05-07 PROCEDURE — 1100F PR PT FALLS ASSESS DOC 2+ FALLS/FALL W/INJURY/YR: ICD-10-PCS | Mod: CPTII,S$GLB,, | Performed by: INTERNAL MEDICINE

## 2019-05-07 PROCEDURE — 3288F FALL RISK ASSESSMENT DOCD: CPT | Mod: CPTII,S$GLB,, | Performed by: INTERNAL MEDICINE

## 2019-05-07 NOTE — PLAN OF CARE
START ON PATHWAY REGIMEN - Non-Small Cell Lung    [id number]        Pembrolizumab (Keytruda(R))           Additional Orders: Severe immune-mediated reactions can occur. See   prescribing information for more details and required immediate management with   steroids. Monitor thyroid, renal, liver function tests, glucose, and sodium at   baseline and before each dose of pembrolizumab. Ref: Keytruda(R) (pembrolizumab)   prescribing information, 2016.    **Always confirm dose/schedule in your pharmacy ordering system**        Patient Characteristics:  Stage IV Metastatic, Squamous, PS = 0, 1, First Line, PD-L1 Expression Positive    ? 50% (TPS)  AJCC T Category: Staged < 8th Ed.  Current Disease Status: Distant Metastases  AJCC N Category: Staged < 8th Ed.  AJCC M Category: M1a  AJCC 8 Stage Grouping: CHAPARRITA  Histology: Squamous Cell  Line of therapy: First Line  PD-L1 Expression Status: PD-L1 Positive ? 50% (TPS)  Performance Status: PS = 0, 1  Intent of Therapy:  Curative Intent, Discussed with Patient

## 2019-05-07 NOTE — PROGRESS NOTES
Subjective:       Patient ID: Brant Lees is a 88 y.o. male.    Chief Complaint: Metastatic non-small cell lung cancer [C34.90]  HPI: We have an opportunity to see Mr. Brant Lees in Hematology Oncology clinic at [facility] on 05/07/2019.  Mr. Brant Lees is a 88 y.o.  gentleman who had screening Ct lung, was found to have multiple lung lesions. PET CT showed involvement of both right and left lungs as well as hilar nodes.  Biopsy showed NSCLC squamous type.  Reported no cancer symptoms, denies pain.  Molecular analysis showed high PDL1 expression 50%.         Malignant neoplasm of overlapping sites of lung    4/15/2019 Initial Diagnosis     Malignant neoplasm of overlapping sites of lung         4/15/2019 Cancer Staged     Staging form: Lung, AJCC 8th Edition  - Clinical stage from 4/15/2019: Stage IV (cT3, cN3, cM1a) - Signed by Simone Goins MD on 4/15/2019           Metastatic non-small cell lung cancer    4/15/2019 Initial Diagnosis     Metastatic non-small cell lung cancer         5/7/2019 -  Chemotherapy     Treatment Summary   Plan Name: OP PEMBROLIZUMAB 200MG Q3W  Treatment Goal: Curative  Status: Active  Start Date: 5/7/2019 (Planned)  End Date: 8/20/2019 (Planned)  Provider: Simone Goins MD  Chemotherapy: PEMBROLIZUMAB INFUSION, 200 mg, Intravenous, Clinic/HOD 1 time, 0 of 6 cycles          Past Medical History:   Diagnosis Date    Anticoagulant long-term use     plavix    Cardiomyopathy 9/07    Ischemic    HBP (high blood pressure) 1997    Hyperlipidemia     LBP (low back pain)     MI (myocardial infarction) 12/06    Status post primary angioplasty with coronary stent 12/06    Thyroid disease      Family History   Problem Relation Age of Onset    Heart disease Mother     Cancer Mother      Social History     Socioeconomic History    Marital status:      Spouse name: Not on file    Number of children: 1    Years of education: Not on file    Highest education level: Not on  file   Occupational History    Not on file   Social Needs    Financial resource strain: Not on file    Food insecurity:     Worry: Not on file     Inability: Not on file    Transportation needs:     Medical: Not on file     Non-medical: Not on file   Tobacco Use    Smoking status: Never Smoker    Smokeless tobacco: Never Used   Substance and Sexual Activity    Alcohol use: No    Drug use: No    Sexual activity: Not Currently     Partners: Female   Lifestyle    Physical activity:     Days per week: Not on file     Minutes per session: Not on file    Stress: Not on file   Relationships    Social connections:     Talks on phone: Not on file     Gets together: Not on file     Attends Bahai service: Not on file     Active member of club or organization: Not on file     Attends meetings of clubs or organizations: Not on file     Relationship status: Not on file   Other Topics Concern    Not on file   Social History Narrative    Not on file     Past Surgical History:   Procedure Laterality Date    CORONARY STENT PLACEMENT      EXCISION-BLADDER TUMOR-TRANSURETHRAL (TURBT) N/A 8/22/2017    Performed by [facility] at [facility] OR    HERNIA REPAIR       Current Outpatient Medications   Medication Sig Dispense Refill    acetaminophen (TYLENOL) 325 MG tablet Take 325 mg by mouth every 6 (six) hours as needed for Pain.      amlodipine (NORVASC) 10 MG tablet Take 2.5 tablets by mouth 2 (two) times daily. 1/2 two times daily       ASPIRIN (ASPIR-81 ORAL) Take by mouth.      fluorometholone 0.1% (FML) 0.1 % DrpS       levothyroxine (SYNTHROID) 50 MCG tablet TAKE 1 TABLET DAILY 90 tablet 0    lisinopril (PRINIVIL,ZESTRIL) 20 MG tablet Take 1 tablet by mouth once daily.       LOTEMAX 0.5 % DrpG INSTILL 1 DROP INTO BOTH EYES TWICE DAILY THANK YOU  5    simvastatin (ZOCOR) 20 MG tablet Take 20 mg by mouth every evening.      nitroGLYCERIN (NITROSTAT) 0.4 MG SL tablet Place 0.4 mg under the tongue  every 5 (five) minutes as needed for Chest pain.       No current facility-administered medications for this visit.        Labs:  Lab Results   Component Value Date    WBC 11.21 03/21/2019    HGB 16.3 03/21/2019    HCT 47.9 03/21/2019    MCV 91 03/21/2019     03/21/2019     BMP  Lab Results   Component Value Date     02/25/2019    K 4.5 02/25/2019     02/25/2019    CO2 28 02/25/2019    BUN 18 02/25/2019    CREATININE 1.3 04/15/2019    CALCIUM 10.1 02/25/2019    ANIONGAP 9 02/25/2019    ESTGFRAFRICA 56 (A) 04/15/2019    EGFRNONAA 49 (A) 04/15/2019     Lab Results   Component Value Date    ALT 23 01/17/2019    AST 22 01/17/2019    ALKPHOS 94 01/17/2019    BILITOT 0.3 01/17/2019       No results found for: IRON, TIBC, FERRITIN, SATURATEDIRO  No results found for: [id number]  No results found for: FOLATE  Lab Results   Component Value Date    TSH 1.896 03/21/2019       I have reviewed the radiology reports and examined the scan/xray images.    Review of Systems   Constitutional: Negative.    HENT: Negative.    Eyes: Negative.    Respiratory: Negative.    Cardiovascular: Negative.    Gastrointestinal: Negative.    Endocrine: Negative.    Genitourinary: Negative.    Musculoskeletal: Negative.    Skin: Negative.    Allergic/Immunologic: Negative.    Neurological: Negative.    Hematological: Negative.    Psychiatric/Behavioral: Negative.      ECOG SCORE              Objective:     Vitals:    05/07/19 1114   BP: 137/77   Pulse: 62   Temp: 96.7 °F (35.9 °C)   Body mass index is 25.4 kg/m².  Physical Exam   Constitutional: He is oriented to person, place, and time. He appears well-developed and well-nourished.   HENT:   Head: Normocephalic and atraumatic.   Eyes: Conjunctivae and EOM are normal.   Neck: Normal range of motion. Neck supple.   Cardiovascular: Normal rate and regular rhythm.   Pulmonary/Chest: Effort normal and breath sounds normal.   Abdominal: Soft. Bowel sounds are normal.   Musculoskeletal:  Normal range of motion.   Neurological: He is alert and oriented to person, place, and time.   Skin: Skin is warm and dry.   Psychiatric: He has a normal mood and affect. His behavior is normal. Judgment and thought content normal.   Nursing note and vitals reviewed.        Assessment:      1. Metastatic non-small cell lung cancer           Plan:     Metastatic non-small cell lung cancer    We discuss treatment with Keytruda  We discussed the benefit and risk of treatment.  Goal of treatment is for curative intent with improved chance of cancer free survival and overall survival. Risk of treatment includes but is not limited to fatigue, immune mediated side effects colitis pneumonitis.  -     Ambulatory consult to General Surgery for port placement.

## 2019-05-08 ENCOUNTER — TELEPHONE (OUTPATIENT)
Dept: HEMATOLOGY/ONCOLOGY | Facility: CLINIC | Age: 84
End: 2019-05-08

## 2019-05-08 NOTE — TELEPHONE ENCOUNTER
Called and spoke with pts granddaughter regarding appt with Dr. Goins yesterday and the need to set up some appointments.  Explained my role as nurse navigator to pts granddaughter.  Tiara stated that she is the caregiver for him and she is the one to bring him to all his appts.  Notified that we need to set him up with gen surgery for mediport consult.  Tiara would prefer for pt to be seen at [facility] but next availability isnt until 5/16.  Tiara stated that she will be off work on 5/17 but doesn't want to wait that long for appt.  Explained that we have multiple available appts with surgery the next few days and early next week but at the  location.  Tiara stated that she is going to contact some family and see if they could bring him to that appt for her.  Tiara to call me back once she speaks with them to schedule.  Tiara stated that she has my business card and direct number.

## 2019-05-09 ENCOUNTER — OFFICE VISIT (OUTPATIENT)
Dept: SURGERY | Facility: CLINIC | Age: 84
End: 2019-05-09
Payer: COMMERCIAL

## 2019-05-09 ENCOUNTER — LAB VISIT (OUTPATIENT)
Dept: LAB | Facility: HOSPITAL | Age: 84
End: 2019-05-09
Attending: SURGERY
Payer: COMMERCIAL

## 2019-05-09 VITALS
WEIGHT: 176.56 LBS | HEART RATE: 59 BPM | DIASTOLIC BLOOD PRESSURE: 68 MMHG | SYSTOLIC BLOOD PRESSURE: 110 MMHG | TEMPERATURE: 99 F | BODY MASS INDEX: 25.34 KG/M2

## 2019-05-09 DIAGNOSIS — C34.90 METASTATIC NON-SMALL CELL LUNG CANCER: ICD-10-CM

## 2019-05-09 DIAGNOSIS — C34.90 METASTATIC NON-SMALL CELL LUNG CANCER: Primary | ICD-10-CM

## 2019-05-09 LAB
ALBUMIN SERPL BCP-MCNC: 3.6 G/DL (ref 3.5–5.2)
ALP SERPL-CCNC: 105 U/L (ref 55–135)
ALT SERPL W/O P-5'-P-CCNC: 16 U/L (ref 10–44)
ANION GAP SERPL CALC-SCNC: 9 MMOL/L (ref 8–16)
AST SERPL-CCNC: 19 U/L (ref 10–40)
BASOPHILS # BLD AUTO: 0.07 K/UL (ref 0–0.2)
BASOPHILS NFR BLD: 0.7 % (ref 0–1.9)
BILIRUB SERPL-MCNC: 0.3 MG/DL (ref 0.1–1)
BUN SERPL-MCNC: 18 MG/DL (ref 8–23)
CALCIUM SERPL-MCNC: 9.9 MG/DL (ref 8.7–10.5)
CHLORIDE SERPL-SCNC: 105 MMOL/L (ref 95–110)
CO2 SERPL-SCNC: 25 MMOL/L (ref 23–29)
CREAT SERPL-MCNC: 1.2 MG/DL (ref 0.5–1.4)
DIFFERENTIAL METHOD: ABNORMAL
EOSINOPHIL # BLD AUTO: 0.3 K/UL (ref 0–0.5)
EOSINOPHIL NFR BLD: 2.6 % (ref 0–8)
ERYTHROCYTE [DISTWIDTH] IN BLOOD BY AUTOMATED COUNT: 13.7 % (ref 11.5–14.5)
EST. GFR  (AFRICAN AMERICAN): >60 ML/MIN/1.73 M^2
EST. GFR  (NON AFRICAN AMERICAN): 53.7 ML/MIN/1.73 M^2
GLUCOSE SERPL-MCNC: 73 MG/DL (ref 70–110)
HCT VFR BLD AUTO: 47 % (ref 40–54)
HGB BLD-MCNC: 14.8 G/DL (ref 14–18)
IMM GRANULOCYTES # BLD AUTO: 0.03 K/UL (ref 0–0.04)
IMM GRANULOCYTES NFR BLD AUTO: 0.3 % (ref 0–0.5)
LYMPHOCYTES # BLD AUTO: 2.1 K/UL (ref 1–4.8)
LYMPHOCYTES NFR BLD: 20.6 % (ref 18–48)
MCH RBC QN AUTO: 29.1 PG (ref 27–31)
MCHC RBC AUTO-ENTMCNC: 31.5 G/DL (ref 32–36)
MCV RBC AUTO: 92 FL (ref 82–98)
MONOCYTES # BLD AUTO: 1 K/UL (ref 0.3–1)
MONOCYTES NFR BLD: 9.4 % (ref 4–15)
NEUTROPHILS # BLD AUTO: 6.9 K/UL (ref 1.8–7.7)
NEUTROPHILS NFR BLD: 66.4 % (ref 38–73)
NRBC BLD-RTO: 0 /100 WBC
PLATELET # BLD AUTO: 194 K/UL (ref 150–350)
PMV BLD AUTO: 12.1 FL (ref 9.2–12.9)
POTASSIUM SERPL-SCNC: 5.2 MMOL/L (ref 3.5–5.1)
PROT SERPL-MCNC: 7.9 G/DL (ref 6–8.4)
RBC # BLD AUTO: 5.09 M/UL (ref 4.6–6.2)
SODIUM SERPL-SCNC: 139 MMOL/L (ref 136–145)
WBC # BLD AUTO: 10.33 K/UL (ref 3.9–12.7)

## 2019-05-09 PROCEDURE — 99243 PR OFFICE CONSULTATION,LEVEL III: ICD-10-PCS | Mod: S$GLB,,, | Performed by: SURGERY

## 2019-05-09 PROCEDURE — 80053 COMPREHEN METABOLIC PANEL: CPT

## 2019-05-09 PROCEDURE — 99243 OFF/OP CNSLTJ NEW/EST LOW 30: CPT | Mod: S$GLB,,, | Performed by: SURGERY

## 2019-05-09 PROCEDURE — 85025 COMPLETE CBC W/AUTO DIFF WBC: CPT

## 2019-05-09 PROCEDURE — 99999 PR PBB SHADOW E&M-EST. PATIENT-LVL III: ICD-10-PCS | Mod: PBBFAC,,, | Performed by: SURGERY

## 2019-05-09 PROCEDURE — 99999 PR PBB SHADOW E&M-EST. PATIENT-LVL III: CPT | Mod: PBBFAC,,, | Performed by: SURGERY

## 2019-05-09 PROCEDURE — 36415 COLL VENOUS BLD VENIPUNCTURE: CPT

## 2019-05-09 RX ORDER — LIDOCAINE HYDROCHLORIDE 10 MG/ML
1 INJECTION, SOLUTION EPIDURAL; INFILTRATION; INTRACAUDAL; PERINEURAL ONCE
Status: DISCONTINUED | OUTPATIENT
Start: 2019-05-09 | End: 2019-05-17 | Stop reason: HOSPADM

## 2019-05-09 RX ORDER — ONDANSETRON 4 MG/1
8 TABLET, ORALLY DISINTEGRATING ORAL EVERY 8 HOURS PRN
Status: CANCELLED | OUTPATIENT
Start: 2019-05-09

## 2019-05-09 NOTE — PROGRESS NOTES
Patient ID: Brant Lees is a 88 y.o. male.    MediPort placement    Chief Complaint: Consult (mediport)      HPI:  Patient presents for MediPort placement.  This is to be placed for the treatment of metastatic non-small cell lung cancer.    The patient offers no complaints at this time.      Review of Systems   Constitutional: Negative.    HENT: Negative.    Eyes: Negative.    Respiratory: Negative.    Cardiovascular: Negative.    Gastrointestinal: Negative.    Endocrine: Negative.    Genitourinary: Negative.    Musculoskeletal: Negative.    Skin: Negative.    Allergic/Immunologic: Negative.    Neurological: Negative.    Hematological: Negative.    Psychiatric/Behavioral: Negative.        Current Outpatient Medications   Medication Sig Dispense Refill    acetaminophen (TYLENOL) 325 MG tablet Take 325 mg by mouth every 6 (six) hours as needed for Pain.      amlodipine (NORVASC) 10 MG tablet Take 2.5 tablets by mouth 2 (two) times daily. 1/2 two times daily       ASPIRIN (ASPIR-81 ORAL) Take by mouth.      fluorometholone 0.1% (FML) 0.1 % DrpS       levothyroxine (SYNTHROID) 50 MCG tablet TAKE 1 TABLET DAILY 90 tablet 0    lisinopril (PRINIVIL,ZESTRIL) 20 MG tablet Take 1 tablet by mouth once daily.       LOTEMAX 0.5 % DrpG INSTILL 1 DROP INTO BOTH EYES TWICE DAILY THANK YOU  5    nitroGLYCERIN (NITROSTAT) 0.4 MG SL tablet Place 0.4 mg under the tongue every 5 (five) minutes as needed for Chest pain.      simvastatin (ZOCOR) 20 MG tablet Take 20 mg by mouth every evening.       No current facility-administered medications for this visit.        Review of patient's allergies indicates:  No Known Allergies    Past Medical History:   Diagnosis Date    Anticoagulant long-term use     plavix    Cardiomyopathy 9/07    Ischemic    HBP (high blood pressure) 1997    Hyperlipidemia     LBP (low back pain)     MI (myocardial infarction) 12/06    Status post primary angioplasty with coronary stent 12/06    Thyroid  disease        Past Surgical History:   Procedure Laterality Date    CORONARY STENT PLACEMENT      EXCISION-BLADDER TUMOR-TRANSURETHRAL (TURBT) N/A 8/22/2017    Performed by [facility] at [facility] OR    HERNIA REPAIR         Family History   Problem Relation Age of Onset    Heart disease Mother     Cancer Mother        Social History     Socioeconomic History    Marital status:      Spouse name: Not on file    Number of children: 1    Years of education: Not on file    Highest education level: Not on file   Occupational History    Not on file   Social Needs    Financial resource strain: Not on file    Food insecurity:     Worry: Not on file     Inability: Not on file    Transportation needs:     Medical: Not on file     Non-medical: Not on file   Tobacco Use    Smoking status: Never Smoker    Smokeless tobacco: Never Used   Substance and Sexual Activity    Alcohol use: No    Drug use: No    Sexual activity: Not Currently     Partners: Female   Lifestyle    Physical activity:     Days per week: Not on file     Minutes per session: Not on file    Stress: Not on file   Relationships    Social connections:     Talks on phone: Not on file     Gets together: Not on file     Attends Druze service: Not on file     Active member of club or organization: Not on file     Attends meetings of clubs or organizations: Not on file     Relationship status: Not on file   Other Topics Concern    Not on file   Social History Narrative    Not on file       Vitals:    05/09/19 1033   BP: 110/68   Pulse: (!) 59   Temp: 98.6 °F (37 °C)       Physical Exam   Constitutional: He is oriented to person, place, and time. He appears well-developed and well-nourished. No distress.   Slightly frail   HENT:   Head: Normocephalic and atraumatic.   Eyes: Pupils are equal, round, and reactive to light. No scleral icterus.   Neck: Normal range of motion. Neck supple. No JVD present. No tracheal deviation  present. No thyromegaly present.   Cardiovascular: Normal rate, regular rhythm, normal heart sounds and intact distal pulses.   Pulmonary/Chest: Effort normal and breath sounds normal.   Abdominal: Soft. Bowel sounds are normal. He exhibits no distension and no mass. There is no tenderness. There is no rebound and no guarding.   Musculoskeletal: Normal range of motion.   Lymphadenopathy:     He has no cervical adenopathy.   Neurological: He is alert and oriented to person, place, and time.   Skin: Skin is warm and dry. Capillary refill takes less than 2 seconds.   Multiple benign skin lesion   Psychiatric: He has a normal mood and affect. His behavior is normal. Judgment and thought content normal.   Vitals reviewed.    PET scan was reviewed.  Masslike density on the right    Pathology was reviewed    Assessment & Plan:    metastatic non-small cell lung cancer  MediPort placement.  Will try the right side 1st.    Surgery will be scheduled for May 17th due to OR availability.    The need for MediPort placement was discussed. The risks,benefits, and potential complications were discussed.  This includes infection, bleeding, Pneumothorax, hemothorax, injury to the great vessels, loss of the Guidewire or catheter.  I also discussed embolism of air or fat, pericardial tamponade, narrowing or stenosis of the vessels and infection of the port site.    Complete list of complications were reviewed and are listed on the consent form.  Informed consent was obtained.  Surgery  was scheduled.  Preoperative instructions were given

## 2019-05-09 NOTE — PATIENT INSTRUCTIONS
" Surgery scheduled for May 17th at 11:00 a.m..  We would like to move the surgery up to 8:00 a.m. if operating room time becomes available.  We will call you and inform you of the arrival time and the start time of your surgery in the next few days.    Please stop taking her aspirin tomorrow      Please take all the remainder of your morning medications with a sip of water.    If any of your preop labs are abnormal we will let you  [facility]  Instructions for Patients and Families    You are invited to share your experience with me and my staff.  If you receive a survey in the mail, please return it at your convenience, or complete a brief survey on [employer].  We value your opinion!        Did you know that [facility] can be used to make appointments?  Just select "Schedule Appointment" under the "Visits" menu.    Notify you if any of your preop laboratories show abnormalities.  I    Before surgery:  The pre-op nurse from the hospital will call you before the day of your surgery to confirm your arrival time.  The time of your surgery may change due to emergencies or other unforeseen events.  Do not eat or drink anything after midnight the night before your procedure, except for clear liquids up to three hours before your surgery time, and sips of water with medication.  If you are not diabetic, it is recommended that you drink a glass of clear fruit juice (apple, grape, cranberry, not orange) three hours before your surgery time, but nothing after that.  If you are diabetic, you may have water or sugar-free clear liquids such as Crystal Light up to three hours before your surgery time.    Day of Surgery:  · You will go to a pre-op area where an IV will be started and you will speak to the anesthesiologist and surgeon.  · Your family will be updated throughout the operation.  After surgery, your family member may be taken to a private room for consultation with the surgeon.  This is " for the privacy of your medical information and does not necessarily mean there is anything wrong.    If your incisions have:  · Glue:  You may take a bath or shower immediately and wash your skin as you normally do.  The glue will eventually crumble or peel off. Do not let your incisions soak under water.  · Strips: Leave them on, but it is OK if they fall off on their own. It is OK to get them wet 48 hours after surgery.  · Bandage: You may remove it 2 days after your surgery, and then you may leave the incision open and take a shower or bath, unless otherwise instructed. If your bandage has clear plastic, you may shower with it on and then remove it 2 days after surgery.    Activities  · Walking is recommended after surgery; bed rest is not recommended unless specifically ordered.  · If you have had abdominal surgery, do not lift over 20 pounds for 4 weeks after surgery.  · If you have had hernia surgery, do not lift over 20 pounds for 6 weeks after surgery.  · You may drive when you are off your post-operative pain medication.  · Do not smoke after surgery, it decreases your ability to heal and increases the risk of infection and pneumonia.    Diet:  Drink lots of fluids after surgery.  You might not have much of an appetite at first, you may eat regular food when you feel ready, unless you are given special diet instructions.    Post-operative symptoms and medications  · It is safe to take over-the-counter medications for constipation, heartburn, sleep, or itching if needed.  Prescription pain medication may contain acetaminophen (Tylenol), so you should not take additional acetaminophen (Tylenol) at the same time as your pain medication.  · You may experience nausea, low fever/chills, and clear drainage from your incision, sometimes up to a month after surgery.  Notify our office if you have fever over 101 degrees, worsening redness around your incision, thick cloudy drainage, or inability to drink any  "liquids.  · You will experience some level of pain after surgery.  Your pain medication should help with the pain, but may not be able to eliminate it entirely.  Pain will decrease with time, and most pain will be gone by 4 to 6 weeks after surgery.  · We are not able to call in prescriptions for pain medication after hours or on weekends.  If your pain medication is ineffective or you will run out soon and need a refill, please call our office at [phone].  We are not able to replace pain medication that has been lost or stolen.    After surgery, you will either be discharged home or admitted to the hospital.  If you are admitted to the hospital, one of the surgeons or a physician assistant will see you once a day.  Due to scheduled surgery, we may see you in the afternoon or at night; however, your nurse is able to page us at any time.  If you feel there is a situation that is not being addressed properly, please dial [phone] from the phone in your room.    Follow-up appointment  · You will see your surgeon or a physician assistant in clinic for a follow-up appointment at either our [facility] (off [facility]) or [facility] (off [facility]) locations, usually between one and four weeks after your surgery.  · The hospital nurses can make your follow up appointment, or you can make it online at [email or web address] or call [phone].  · If you have a smartphone with the [employer] nba, please let us know if you would like to do a phone visit instead of a post-op office visit.    If you are signed up for [facility], install the "[employer]" nba to access your test results, send messages to your doctors, and schedule appointments from your smartphone!    "

## 2019-05-09 NOTE — LETTER
May 9, 2019      Simone Goins MD  [address]  [place] [address]           [facility]  [address] [facility] [address]  Phone: [phone]  Fax: [phone]          Patient: Brant Lees   MR Number: [id number]   YOB: 1931   Date of Visit: 5/9/2019       Dear Dr. Simone Goins:    Thank you for referring Brant Lees to me for evaluation. Attached you will find relevant portions of my assessment and plan of care.    If you have questions, please do not hesitate to call me. I look forward to following Brant Lees along with you.    Sincerely,    Lester Trejo MD    Enclosure  CC:  No Recipients    If you would like to receive this communication electronically, please contact [email or web address] or [phone] to request more information on [employer] access.    For providers and/or their staff who would like to refer a patient to [facility], please contact us through our one-stop-shop provider referral line, Mayra Gallardo, at [phone].    If you feel you have received this communication in error or would no longer like to receive these types of communications, please e-mail [email or web address]

## 2019-05-09 NOTE — H&P (VIEW-ONLY)
Patient ID: Brant Lees is a 88 y.o. male.    MediPort placement    Chief Complaint: Consult (mediport)      HPI:  Patient presents for MediPort placement.  This is to be placed for the treatment of metastatic non-small cell lung cancer.    The patient offers no complaints at this time.      Review of Systems   Constitutional: Negative.    HENT: Negative.    Eyes: Negative.    Respiratory: Negative.    Cardiovascular: Negative.    Gastrointestinal: Negative.    Endocrine: Negative.    Genitourinary: Negative.    Musculoskeletal: Negative.    Skin: Negative.    Allergic/Immunologic: Negative.    Neurological: Negative.    Hematological: Negative.    Psychiatric/Behavioral: Negative.        Current Outpatient Medications   Medication Sig Dispense Refill    acetaminophen (TYLENOL) 325 MG tablet Take 325 mg by mouth every 6 (six) hours as needed for Pain.      amlodipine (NORVASC) 10 MG tablet Take 2.5 tablets by mouth 2 (two) times daily. 1/2 two times daily       ASPIRIN (ASPIR-81 ORAL) Take by mouth.      fluorometholone 0.1% (FML) 0.1 % DrpS       levothyroxine (SYNTHROID) 50 MCG tablet TAKE 1 TABLET DAILY 90 tablet 0    lisinopril (PRINIVIL,ZESTRIL) 20 MG tablet Take 1 tablet by mouth once daily.       LOTEMAX 0.5 % DrpG INSTILL 1 DROP INTO BOTH EYES TWICE DAILY THANK YOU  5    nitroGLYCERIN (NITROSTAT) 0.4 MG SL tablet Place 0.4 mg under the tongue every 5 (five) minutes as needed for Chest pain.      simvastatin (ZOCOR) 20 MG tablet Take 20 mg by mouth every evening.       No current facility-administered medications for this visit.        Review of patient's allergies indicates:  No Known Allergies    Past Medical History:   Diagnosis Date    Anticoagulant long-term use     plavix    Cardiomyopathy 9/07    Ischemic    HBP (high blood pressure) 1997    Hyperlipidemia     LBP (low back pain)     MI (myocardial infarction) 12/06    Status post primary angioplasty with coronary stent 12/06    Thyroid  disease        Past Surgical History:   Procedure Laterality Date    CORONARY STENT PLACEMENT      EXCISION-BLADDER TUMOR-TRANSURETHRAL (TURBT) N/A 8/22/2017    Performed by Jorge [facility]coc[facility] at [facility] OR    HERNIA REPAIR         Family History   Problem Relation Age of Onset    Heart disease Mother     Cancer Mother        Social History     Socioeconomic History    Marital status:      Spouse name: Not on file    Number of children: 1    Years of education: Not on file    Highest education level: Not on file   Occupational History    Not on file   Social Needs    Financial resource strain: Not on file    Food insecurity:     Worry: Not on file     Inability: Not on file    Transportation needs:     Medical: Not on file     Non-medical: Not on file   Tobacco Use    Smoking status: Never Smoker    Smokeless tobacco: Never Used   Substance and Sexual Activity    Alcohol use: No    Drug use: No    Sexual activity: Not Currently     Partners: Female   Lifestyle    Physical activity:     Days per week: Not on file     Minutes per session: Not on file    Stress: Not on file   Relationships    Social connections:     Talks on phone: Not on file     Gets together: Not on file     Attends Scientologist service: Not on file     Active member of club or organization: Not on file     Attends meetings of clubs or organizations: Not on file     Relationship status: Not on file   Other Topics Concern    Not on file   Social History Narrative    Not on file       Vitals:    05/09/19 1033   BP: 110/68   Pulse: (!) 59   Temp: 98.6 °F (37 °C)       Physical Exam   Constitutional: He is oriented to person, place, and time. He appears well-developed and well-nourished. No distress.   Slightly frail   HENT:   Head: Normocephalic and atraumatic.   Eyes: Pupils are equal, round, and reactive to light. No scleral icterus.   Neck: Normal range of motion. Neck supple. No JVD present. No tracheal deviation  present. No thyromegaly present.   Cardiovascular: Normal rate, regular rhythm, normal heart sounds and intact distal pulses.   Pulmonary/Chest: Effort normal and breath sounds normal.   Abdominal: Soft. Bowel sounds are normal. He exhibits no distension and no mass. There is no tenderness. There is no rebound and no guarding.   Musculoskeletal: Normal range of motion.   Lymphadenopathy:     He has no cervical adenopathy.   Neurological: He is alert and oriented to person, place, and time.   Skin: Skin is warm and dry. Capillary refill takes less than 2 seconds.   Multiple benign skin lesion   Psychiatric: He has a normal mood and affect. His behavior is normal. Judgment and thought content normal.   Vitals reviewed.    PET scan was reviewed.  Masslike density on the right    Pathology was reviewed    Assessment & Plan:    metastatic non-small cell lung cancer  MediPort placement.  Will try the right side 1st.    Surgery will be scheduled for May 17th due to OR availability.    The need for MediPort placement was discussed. The risks,benefits, and potential complications were discussed.  This includes infection, bleeding, Pneumothorax, hemothorax, injury to the great vessels, loss of the Guidewire or catheter.  I also discussed embolism of air or fat, pericardial tamponade, narrowing or stenosis of the vessels and infection of the port site.    Complete list of complications were reviewed and are listed on the consent form.  Informed consent was obtained.  Surgery  was scheduled.  Preoperative instructions were given

## 2019-05-10 ENCOUNTER — TELEPHONE (OUTPATIENT)
Dept: HEMATOLOGY/ONCOLOGY | Facility: CLINIC | Age: 84
End: 2019-05-10

## 2019-05-13 DIAGNOSIS — C34.90 METASTATIC NON-SMALL CELL LUNG CANCER: Primary | ICD-10-CM

## 2019-05-15 NOTE — PRE-PROCEDURE INSTRUCTIONS
Pre op instructions reviewed with patient's granddaughter, Tiara,  per phone.    To confirm, Your surgeon has instructed you:  Surgery is scheduled 05/17/19 at 0800.  Pre admit office to call afternoon prior to surgery with final arrival time.  If surgery is on Monday, Pre admit office to call Friday afternoon with with final arrival time      Please report to [facility] floor main lobby by 0630.      INSTRUCTIONS IMPORTANT!!!  ¨ No smoking after 12 midnight, the night before surgery.  ¨ No solid food after 12 midnight, but you may have clear liquids up until 3 hours prior to surgery.  This includes: grape, cranberry, and apple juice (not orange, and no coffee.)   ¨ OK to brush teeth, but no gum, candy or mints!    ¨ Take only these medicines with a small swallow of water-morning of surgery.  Amlodipine, synthroid  ____  Do not wear makeup, including mascara.  ____  No powder, lotions or creams to surgical area.  ____  Please remove all jewelry, including piercings and leave at home.  ____  No money or valuables needed. Please leave at home.  ____  Please bring identification and insurance information to hospital.  ____  If going home the same day, arrange for a ride home. You will not be able to   drive if Anesthesia was used.  ____  Children, under 12 years old, must remain in the waiting room with an adult.  They are not allowed in patient areas.  ____  Wear loose fitting clothing. Allow for dressings, bandages.  ____  Stop Aspirin, Ibuprofen, Motrin and Aleve at least 5-7 days before surgery, unless otherwise instructed by your doctor, or the nurse.   You MAY use Tylenol/acetaminophen until day of surgery.  ____  If you take diabetic medication, do not take am of surgery unless instructed by   Doctor.  ____ Stop taking any Fish Oil supplement or any Vitamins that contain Vitamin E at least 5 days prior to surgery.          Bathing Instructions-- The night before surgery and the  morning prior to coming to the hospital:   -Do not shave the surgical area.   -Shower and wash your hair and body as usual with your regular soap and shampoo.   -Rinse your hair and body completely.   -Use one packet of hibiclens to wash the surgical site (using your hand) gently for 5 minutes.  Do not scrub you skin too hard.   -Do not use hibiclens on your head, face, or genitals.   -Do not wash with regular soap after you use the hibiclens.   -Rinse your body thoroughly.   -Dry with clean, soft towel.  Do not use lotion, cream, deodorant, or powders on   the surgical site.    Use antibacterial soap in place of hibiclens if your surgery is on the head, face or genitals.         Surgical Site Infection    Prevention of surgical site infections:     -Keep incisions clean and dry.   -Do not soak/submerge incisions in water until completely healed.   -Do not apply lotions, powders, creams, or deodorants to site.   -Always make sure hands are cleaned with antibacterial soap/ alcohol-based   prior to touching the surgical site.  (This includes doctors, nurses, staff, and yourself.)    Signs and symptoms:   -Redness and pain around the area where you had surgery   -Drainage of cloudy fluid from your surgical wound   -Fever over 100.4  I have read or had read and explained to me, and understand the above information.

## 2019-05-17 ENCOUNTER — ANESTHESIA (OUTPATIENT)
Dept: SURGERY | Facility: HOSPITAL | Age: 84
End: 2019-05-17
Payer: COMMERCIAL

## 2019-05-17 ENCOUNTER — HOSPITAL ENCOUNTER (OUTPATIENT)
Facility: HOSPITAL | Age: 84
Discharge: HOME | End: 2019-05-17
Attending: SURGERY | Admitting: SURGERY
Payer: COMMERCIAL

## 2019-05-17 ENCOUNTER — ANESTHESIA EVENT (OUTPATIENT)
Dept: SURGERY | Facility: HOSPITAL | Age: 84
End: 2019-05-17
Payer: COMMERCIAL

## 2019-05-17 DIAGNOSIS — C34.90 METASTATIC NON-SMALL CELL LUNG CANCER: ICD-10-CM

## 2019-05-17 PROBLEM — Z95.828 PORT-A-CATH IN PLACE: Status: ACTIVE | Noted: 2019-05-17

## 2019-05-17 PROCEDURE — 25000003 PHARM REV CODE 250: Performed by: NURSE ANESTHETIST, CERTIFIED REGISTERED

## 2019-05-17 PROCEDURE — 36000706: Performed by: SURGERY

## 2019-05-17 PROCEDURE — 36561 INSERT TUNNELED CV CATH: CPT | Mod: RT,,, | Performed by: SURGERY

## 2019-05-17 PROCEDURE — 63600175 PHARM REV CODE 636 W HCPCS: Performed by: SURGERY

## 2019-05-17 PROCEDURE — 36561 PR INSERT TUNNELED CV CATH WITH PORT: ICD-10-PCS | Mod: RT,,, | Performed by: SURGERY

## 2019-05-17 PROCEDURE — 77001 FLUOROGUIDE FOR VEIN DEVICE: CPT | Mod: 26,,, | Performed by: SURGERY

## 2019-05-17 PROCEDURE — 25000003 PHARM REV CODE 250: Performed by: SURGERY

## 2019-05-17 PROCEDURE — 25000003 PHARM REV CODE 250: Performed by: ANESTHESIOLOGY

## 2019-05-17 PROCEDURE — 71000033 HC RECOVERY, INTIAL HOUR: Performed by: SURGERY

## 2019-05-17 PROCEDURE — 71000015 HC POSTOP RECOV 1ST HR: Performed by: SURGERY

## 2019-05-17 PROCEDURE — 77001 CHG FLUOROGUIDE CNTRL VEN ACCESS,PLACE,REPLACE,REMOVE: ICD-10-PCS | Mod: 26,,, | Performed by: SURGERY

## 2019-05-17 PROCEDURE — C1788 PORT, INDWELLING, IMP: HCPCS | Performed by: SURGERY

## 2019-05-17 PROCEDURE — 63600175 PHARM REV CODE 636 W HCPCS: Performed by: NURSE ANESTHETIST, CERTIFIED REGISTERED

## 2019-05-17 PROCEDURE — 36000707: Performed by: SURGERY

## 2019-05-17 PROCEDURE — 37000009 HC ANESTHESIA EA ADD 15 MINS: Performed by: SURGERY

## 2019-05-17 PROCEDURE — 37000008 HC ANESTHESIA 1ST 15 MINUTES: Performed by: SURGERY

## 2019-05-17 DEVICE — PORT POWER CLEAR VIEW: Type: IMPLANTABLE DEVICE | Site: SUBCLAVIAN | Status: FUNCTIONAL

## 2019-05-17 RX ORDER — HYDROMORPHONE HYDROCHLORIDE 2 MG/ML
0.2 INJECTION, SOLUTION INTRAMUSCULAR; INTRAVENOUS; SUBCUTANEOUS EVERY 5 MIN PRN
Status: DISCONTINUED | OUTPATIENT
Start: 2019-05-17 | End: 2019-05-17 | Stop reason: HOSPADM

## 2019-05-17 RX ORDER — OXYCODONE HYDROCHLORIDE 5 MG/1
5 TABLET ORAL
Status: DISCONTINUED | OUTPATIENT
Start: 2019-05-17 | End: 2019-05-17 | Stop reason: HOSPADM

## 2019-05-17 RX ORDER — HEPARIN 100 UNIT/ML
SYRINGE INTRAVENOUS
Status: DISCONTINUED | OUTPATIENT
Start: 2019-05-17 | End: 2019-05-17 | Stop reason: HOSPADM

## 2019-05-17 RX ORDER — LIDOCAINE HYDROCHLORIDE 10 MG/ML
INJECTION, SOLUTION EPIDURAL; INFILTRATION; INTRACAUDAL; PERINEURAL
Status: DISCONTINUED | OUTPATIENT
Start: 2019-05-17 | End: 2019-05-17 | Stop reason: HOSPADM

## 2019-05-17 RX ORDER — HYDROCODONE BITARTRATE AND ACETAMINOPHEN 5; 325 MG/1; MG/1
1 TABLET ORAL EVERY 6 HOURS PRN
Qty: 12 TABLET | Refills: 0 | Status: SHIPPED | OUTPATIENT
Start: 2019-05-17 | End: 2019-06-14

## 2019-05-17 RX ORDER — ONDANSETRON 8 MG/1
8 TABLET, ORALLY DISINTEGRATING ORAL EVERY 8 HOURS PRN
Status: DISCONTINUED | OUTPATIENT
Start: 2019-05-17 | End: 2019-05-17 | Stop reason: HOSPADM

## 2019-05-17 RX ORDER — SODIUM CHLORIDE 0.9 % (FLUSH) 0.9 %
10 SYRINGE (ML) INJECTION
Status: DISCONTINUED | OUTPATIENT
Start: 2019-05-17 | End: 2019-05-17 | Stop reason: HOSPADM

## 2019-05-17 RX ORDER — FENTANYL CITRATE 50 UG/ML
INJECTION, SOLUTION INTRAMUSCULAR; INTRAVENOUS
Status: DISCONTINUED | OUTPATIENT
Start: 2019-05-17 | End: 2019-05-17

## 2019-05-17 RX ORDER — BUPIVACAINE HYDROCHLORIDE 2.5 MG/ML
INJECTION, SOLUTION EPIDURAL; INFILTRATION; INTRACAUDAL
Status: DISCONTINUED | OUTPATIENT
Start: 2019-05-17 | End: 2019-05-17 | Stop reason: HOSPADM

## 2019-05-17 RX ORDER — LIDOCAINE HYDROCHLORIDE 10 MG/ML
INJECTION INFILTRATION; PERINEURAL
Status: DISCONTINUED | OUTPATIENT
Start: 2019-05-17 | End: 2019-05-17

## 2019-05-17 RX ORDER — MORPHINE SULFATE 4 MG/ML
2 INJECTION, SOLUTION INTRAMUSCULAR; INTRAVENOUS EVERY 5 MIN PRN
Status: DISCONTINUED | OUTPATIENT
Start: 2019-05-17 | End: 2019-05-17 | Stop reason: HOSPADM

## 2019-05-17 RX ORDER — PROPOFOL 10 MG/ML
VIAL (ML) INTRAVENOUS
Status: DISCONTINUED | OUTPATIENT
Start: 2019-05-17 | End: 2019-05-17

## 2019-05-17 RX ORDER — LIDOCAINE HYDROCHLORIDE 10 MG/ML
1 INJECTION, SOLUTION EPIDURAL; INFILTRATION; INTRACAUDAL; PERINEURAL ONCE
Status: DISCONTINUED | OUTPATIENT
Start: 2019-05-17 | End: 2019-05-17 | Stop reason: HOSPADM

## 2019-05-17 RX ORDER — CEFAZOLIN SODIUM 2 G/50ML
2 SOLUTION INTRAVENOUS
Status: COMPLETED | OUTPATIENT
Start: 2019-05-17 | End: 2019-05-17

## 2019-05-17 RX ORDER — HYDROCODONE BITARTRATE AND ACETAMINOPHEN 5; 325 MG/1; MG/1
1 TABLET ORAL EVERY 4 HOURS PRN
Status: DISCONTINUED | OUTPATIENT
Start: 2019-05-17 | End: 2019-05-17 | Stop reason: HOSPADM

## 2019-05-17 RX ORDER — ONDANSETRON 2 MG/ML
4 INJECTION INTRAMUSCULAR; INTRAVENOUS DAILY PRN
Status: DISCONTINUED | OUTPATIENT
Start: 2019-05-17 | End: 2019-05-17 | Stop reason: HOSPADM

## 2019-05-17 RX ORDER — HYDROCODONE BITARTRATE AND ACETAMINOPHEN 7.5; 325 MG/1; MG/1
1 TABLET ORAL EVERY 6 HOURS PRN
Status: DISCONTINUED | OUTPATIENT
Start: 2019-05-17 | End: 2019-05-17 | Stop reason: HOSPADM

## 2019-05-17 RX ORDER — SODIUM CHLORIDE, SODIUM LACTATE, POTASSIUM CHLORIDE, CALCIUM CHLORIDE 600; 310; 30; 20 MG/100ML; MG/100ML; MG/100ML; MG/100ML
INJECTION, SOLUTION INTRAVENOUS CONTINUOUS
Status: DISCONTINUED | OUTPATIENT
Start: 2019-05-17 | End: 2019-05-17 | Stop reason: HOSPADM

## 2019-05-17 RX ORDER — HYDROMORPHONE HYDROCHLORIDE 2 MG/ML
0.5 INJECTION, SOLUTION INTRAMUSCULAR; INTRAVENOUS; SUBCUTANEOUS EVERY 4 HOURS PRN
Status: DISCONTINUED | OUTPATIENT
Start: 2019-05-17 | End: 2019-05-17 | Stop reason: HOSPADM

## 2019-05-17 RX ADMIN — CEFAZOLIN SODIUM 2 G: 2 SOLUTION INTRAVENOUS at 08:05

## 2019-05-17 RX ADMIN — PROPOFOL 50 MG: 10 INJECTION, EMULSION INTRAVENOUS at 08:05

## 2019-05-17 RX ADMIN — FENTANYL CITRATE 25 MCG: 50 INJECTION, SOLUTION INTRAMUSCULAR; INTRAVENOUS at 08:05

## 2019-05-17 RX ADMIN — PROPOFOL 25 MG: 10 INJECTION, EMULSION INTRAVENOUS at 08:05

## 2019-05-17 RX ADMIN — SODIUM CHLORIDE, SODIUM LACTATE, POTASSIUM CHLORIDE, AND CALCIUM CHLORIDE: 600; 310; 30; 20 INJECTION, SOLUTION INTRAVENOUS at 08:05

## 2019-05-17 RX ADMIN — LIDOCAINE HYDROCHLORIDE 50 MG: 10 INJECTION, SOLUTION INFILTRATION; PERINEURAL at 08:05

## 2019-05-17 NOTE — ANESTHESIA PREPROCEDURE EVALUATION
05/17/2019  Brant Lees is a 88 y.o., male.    Pre-op Assessment    I have reviewed the Patient Summary Reports.         Review of Systems  Anesthesia Hx:  History of prior surgery of interest to airway management or planning: Denies Family Hx of Anesthesia complications.    Cardiovascular:   Hypertension Past MI     Pulmonary:   Denies COPD.  Denies Shortness of breath.    Neurological:   Denies CVA. Denies Seizures.    Endocrine:   Hypothyroidism        Physical Exam  General:  Well nourished    Airway/Jaw/Neck:  Airway Findings: Mouth Opening: Normal General Airway Assessment: Adult       Chest/Lungs:  Chest/Lungs Findings: Clear to auscultation, Normal Respiratory Rate     Heart/Vascular:  Heart Findings: Rate: Normal  Rhythm: Regular Rhythm  Sounds: Normal             Anesthesia Plan  Type of Anesthesia, risks & benefits discussed:  Anesthesia Type:  general  Patient's Preference:   Intra-op Monitoring Plan:   Intra-op Monitoring Plan Comments:   Post Op Pain Control Plan:   Post Op Pain Control Plan Comments:   Induction:   IV  Beta Blocker:         Informed Consent:    ASA Score: 3     Day of Surgery Review of History & Physical:

## 2019-05-17 NOTE — DISCHARGE SUMMARY
[facility]  Discharge Note  Short Stay    Admit Date: 5/17/2019    Discharge Date and Time: No discharge date for patient encounter.     Attending Physician: Lester Trejo MD     Discharge Provider: Lester Trejo    Diagnoses:  Active Hospital Problems    Diagnosis  POA    *Metastatic non-small cell lung cancer [C34.90]  Yes      Resolved Hospital Problems   No resolved problems to display.       Discharged Condition: stable    Hospital Course: Patient was admitted for an outpatient procedure and tolerated the procedure well with no complications.    Right subclavian CT compatible an MRI safe power port.  Chest x-ray showed no complications    Final Diagnoses: Same as principal problem.    Disposition: Home or Self Care    Follow up/Patient Instructions:    Medications:  Reconciled Home Medications:      Medication List      START taking these medications    HYDROcodone-acetaminophen 5-325 mg per tablet  Commonly known as:  NORCO  Take 1 tablet by mouth every 6 (six) hours as needed for Pain.     nozaseptin nasal   Commonly known as:  NOZIN  1 each by Each Nare route 2 (two) times daily. for 7 days        CONTINUE taking these medications    acetaminophen 325 MG tablet  Commonly known as:  TYLENOL  Take 325 mg by mouth every 6 (six) hours as needed for Pain.     amLODIPine 10 MG tablet  Commonly known as:  NORVASC  Take 2.5 tablets by mouth 2 (two) times daily. 1/2 two times daily     ASPIR-81 ORAL  Take by mouth.     fluorometholone 0.1% 0.1 % Drps  Commonly known as:  FML     levothyroxine 50 MCG tablet  Commonly known as:  SYNTHROID  TAKE 1 TABLET DAILY     lisinopril 20 MG tablet  Commonly known as:  PRINIVIL,ZESTRIL  Take 1 tablet by mouth once daily.     LOTEMAX 0.5 % Drpg  Generic drug:  loteprednol etabonate  INSTILL 1 DROP INTO BOTH EYES TWICE DAILY THANK YOU     nitroGLYCERIN 0.4 MG SL tablet  Commonly known as:  NITROSTAT  Place 0.4 mg under the tongue every 5 (five) minutes as  needed for Chest pain.     simvastatin 20 MG tablet  Commonly known as:  ZOCOR  Take 20 mg by mouth every evening.          Discharge Procedure Orders   Diet general     Call MD for:  temperature >100.4     Call MD for:  persistent nausea and vomiting     Call MD for:  severe uncontrolled pain     Call MD for:  difficulty breathing, headache or visual disturbances     Call MD for:  redness, tenderness, or signs of infection (pain, swelling, redness, odor or green/yellow discharge around incision site)     Remove dressing in 72 hours   Order Comments: You may shower with the clear plastic dressing in place     Activity as tolerated     Follow-up Information     [facility] - Hematology Oncology.    Specialty:  Hematology and Oncology  Why:  As scheduled for chemotherapy  Contact information:  [address] [facility] [address] [phone]                 Discharge Procedure Orders (must include Diet, Follow-up, Activity):   Discharge Procedure Orders (must include Diet, Follow-up, Activity)   Diet general     Call MD for:  temperature >100.4     Call MD for:  persistent nausea and vomiting     Call MD for:  severe uncontrolled pain     Call MD for:  difficulty breathing, headache or visual disturbances     Call MD for:  redness, tenderness, or signs of infection (pain, swelling, redness, odor or green/yellow discharge around incision site)     Remove dressing in 72 hours   Order Comments: You may shower with the clear plastic dressing in place     Activity as tolerated

## 2019-05-17 NOTE — PLAN OF CARE
Pt resting on stretcher, denies pain at present. CXR now completed. VSS. Will cont to monitor. See flow sheet for detailed assessment.

## 2019-05-17 NOTE — ANESTHESIA POSTPROCEDURE EVALUATION
Anesthesia Post Evaluation    Patient: Brant Lees    Procedure(s) Performed: Procedure(s) (LRB):  INSERTION, VENOUS ACCESS PORT (Left)    Final Anesthesia Type: MAC  Patient location during evaluation: PACU  Patient participation: Yes- Able to Participate  Level of consciousness: awake and alert  Post-procedure vital signs: reviewed and stable  Pain management: adequate  Airway patency: patent  PONV status at discharge: No PONV  Anesthetic complications: no      Cardiovascular status: blood pressure returned to baseline  Respiratory status: unassisted  Hydration status: euvolemic  Follow-up not needed.          Vitals Value Taken Time   /78 5/17/2019 10:30 AM   Temp 36.2 °C (97.1 °F) 5/17/2019 10:30 AM   Pulse 82 5/17/2019 10:30 AM   Resp 20 5/17/2019 10:30 AM   SpO2 92 % 5/17/2019 10:30 AM         Event Time     Out of Recovery 09:46:04          Pain/Kavita Score: Kavita Score: 10 (5/17/2019 10:30 AM)

## 2019-05-17 NOTE — DISCHARGE INSTRUCTIONS
Please call for any fever, increase in pain, nausea or vomiting or redness or drainage from incision(s).        May shower with the clear plastic dressing in place and once it has been removed    Remove the dressing on Monday morning or leave it in place to see the oncology department for chemotherapy     Remove steri strips as they begin to fall off    If you become constipated from the pain medication you can use over the counter laxatives,  Miralax or Glycolax, or Magnesium Citrate for severe constipation.

## 2019-05-17 NOTE — TRANSFER OF CARE
"Anesthesia Transfer of Care Note    Patient: Brant Lees    Procedure(s) Performed: Procedure(s) (LRB):  INSERTION, VENOUS ACCESS PORT (Left)    Patient location: PACU    Anesthesia Type: MAC    Transport from OR: Transported from OR on room air with adequate spontaneous ventilation    Post pain: adequate analgesia    Post assessment: no apparent anesthetic complications    Post vital signs: stable    Level of consciousness: awake, alert and oriented    Nausea/Vomiting: no nausea/vomiting    Complications: none    Transfer of care protocol was followed      Last vitals:   Visit Vitals  BP (!) 146/65 (BP Location: Left arm, Patient Position: Sitting)   Pulse 84   Temp 36.8 °C (98.2 °F) (Temporal)   Resp 18   Ht 5' 10" (1.778 m)   Wt 78.9 kg (174 lb 0.9 oz)   SpO2 (!) 92%   BMI 24.97 kg/m²     "

## 2019-05-17 NOTE — OP NOTE
Operative Note       Surgery Date: 5/17/2019     Surgeon(s) and Role:     * Lester Trejo MD - Primary    Pre-op Diagnosis:  Metastatic non-small cell lung cancer [C34.90]    Post-op Diagnosis: Post-Op Diagnosis Codes:     * Metastatic non-small cell lung cancer [C34.90]    Procedure(s) (LRB):  INSERTION, VENOUS ACCESS PORT (Left)    Anesthesia: Monitor Anesthesia Care    Procedure in Detail/Findings:    Right subclavian power port    The patient was placed supine . SCDs were placed for DVT prophylaxis and antibiotics were given  before the incision. SCD on the lower extremities.  The upper chest and neck were prepped and draped with sterile technique.       A time out was completed.    The central catheter was flushed with heparin to ensure function. Landmarks were identified and a skin entry site was chosen 1-2 cm inferior to the distal third of the clavicle. The right chest  wall skin and subcutaneous tissue were anesthetized with lidocaine. Local anesthesia was carried down to the periosteum of the clavicle.     The patient's head was turned in the contralateral direction and, as the ipsilateral arm was retracted caudad.  The bed was placed in slight Trendelenburg position.  The right subclavian vein was then located with the needle and a 10-mL syringe. The needle was inserted at the chosen site with the bevel down and directed toward the sternal notch. With continuous negative pressure in the syringe, the needle was advanced in a stepwise fashion down the clavicle. The vein was located as the needle passed beneath the bone. As soon as the syringe began to fill with venous blood, the needle was rotated 90 degrees so the bevel was pointed at the foot of the bed. The needle position was secured and the syringe removed. The hub was occluded to prevent venous air embolus. The guide wire passed easily, and its position in the superior vena cava was confirmed by fluoroscopy. The needle was removed while the wire  was held in place.     A counter incision was made at the guidewire exit site. A 3-cm incision was made on the anterior chest wall, and electrocautery was used to create a subcutaneous pocket for the port. MediPort catheter was tunneled between the pocket and the guidewire exit    The dilator/introducer was placed over the guidewire and advanced into the superior Vena cava.  The dilator and guidewire were removed.  The MediPort catheter was placed through the introducer and advanced into the distal superior vena cava/atrial.  The catheter was aspirated and there was brisk return of blood.  The catheter was then withdrawn under fluoroscopic visualization such that the tip was at the superior vena cava/atrial junction.  The catheter was cut and connected to the reservoir.    The port was placed in the subcutaneous pocket.  The port was secured in place in the subcutaneous pocket with 0-Vicryl sutures. The port was aspirated to ensure adequate blood flow and then flushed with heparinized saline. Both skin incisions were then closed in two layers with interrupted 3-0 Vicryl sutures and running 4-0 Monocryl sutures.     The patient tolerated the procedure well and was taken to the postanesthesia care unit in stable condition.  An upright chest x-ray was ordered to document location of the catheter tip and check for pneumothorax.     Estimated Blood Loss: 20 mL  Lidocaine 1% 7 mL  Marcaine 0.25% 10 mL  IV fluids 500 mL           Specimens (From admission, onward)    None        Implants:   Implant Name Type Inv. Item Serial No.  Lot No. LRB No. Used   PORT POWER CLEAR VIEW - [id number]  PORT POWER CLEAR VIEW  [employer] [id number] 1              Disposition: PACU - hemodynamically stable.           Condition: Stable    Attestation:  I performed the procedure.           Discharge Note    Admit Date: 5/17/2019    Attending Physician: Lester Trejo MD     Discharge Physician: Lester Trejo MD    Final  Diagnosis: Post-Op Diagnosis Codes:     * Metastatic non-small cell lung cancer [C34.90]    Disposition: Home or Self Care    Patient Instructions:   Current Discharge Medication List      START taking these medications    Details   HYDROcodone-acetaminophen (NORCO) 5-325 mg per tablet Take 1 tablet by mouth every 6 (six) hours as needed for Pain.  Qty: 12 tablet, Refills: 0      nozaseptin (NOZIN) nasal  1 each by Each Nare route 2 (two) times daily. for 7 days  Qty: 1 applicator, Refills: 0         CONTINUE these medications which have NOT CHANGED    Details   acetaminophen (TYLENOL) 325 MG tablet Take 325 mg by mouth every 6 (six) hours as needed for Pain.      amlodipine (NORVASC) 10 MG tablet Take 2.5 tablets by mouth 2 (two) times daily. 1/2 two times daily       ASPIRIN (ASPIR-81 ORAL) Take by mouth.      levothyroxine (SYNTHROID) 50 MCG tablet TAKE 1 TABLET DAILY  Qty: 90 tablet, Refills: 0    Associated Diagnoses: Hypothyroidism (acquired)      lisinopril (PRINIVIL,ZESTRIL) 20 MG tablet Take 1 tablet by mouth once daily.       LOTEMAX 0.5 % DrpG INSTILL 1 DROP INTO BOTH EYES TWICE DAILY THANK YOU  Refills: 5      simvastatin (ZOCOR) 20 MG tablet Take 20 mg by mouth every evening.      fluorometholone 0.1% (FML) 0.1 % DrpS       nitroGLYCERIN (NITROSTAT) 0.4 MG SL tablet Place 0.4 mg under the tongue every 5 (five) minutes as needed for Chest pain.             Discharge Procedure Orders (must include Diet, Follow-up, Activity)   Discharge Procedure Orders (must include Diet, Follow-up, Activity)   Diet general     Call MD for:  temperature >100.4     Call MD for:  persistent nausea and vomiting     Call MD for:  severe uncontrolled pain     Call MD for:  difficulty breathing, headache or visual disturbances     Call MD for:  redness, tenderness, or signs of infection (pain, swelling, redness, odor or green/yellow discharge around incision site)     Remove dressing in 72 hours   Order Comments: You  may shower with the clear plastic dressing in place     Activity as tolerated        Discharge Date: No discharge date for patient encounter.

## 2019-05-17 NOTE — INTERVAL H&P NOTE
The patient has been examined and the H&P has been reviewed:    I concur with the findings and no changes have occurred since H&P was written.    Anesthesia/Surgery risks, benefits and alternative options discussed and understood by patient/family.          Active Hospital Problems    Diagnosis  POA    Metastatic non-small cell lung cancer [C34.90]  Yes      Resolved Hospital Problems   No resolved problems to display.

## 2019-05-17 NOTE — PLAN OF CARE
Dr. Salamanca called to bedside to assess pt's O2 sats. Pt has non-labored breathing with O2 sats of 92-95% on 2.5L O2 via NC. Dr. Salamanca gave approval to transfer pt to Phase II of Recovery and to wean off to room air in Phase II of Recovery.  Still awaiting CXR results.

## 2019-05-17 NOTE — PLAN OF CARE
Report given to nurse [place], informed of O2 sats on room air vs oxygen. Instructed of Dr. Salamanca's orders to wean off O2 to room air. Also made aware of need of CXR results prior to d/c to home, nurse [place] verbalized agreement.

## 2019-05-17 NOTE — PLAN OF CARE
Discharge instructions discussed with patient and granddaughter. Patient and granddaughter verbalized understanding.

## 2019-05-20 ENCOUNTER — OFFICE VISIT (OUTPATIENT)
Dept: HEMATOLOGY/ONCOLOGY | Facility: CLINIC | Age: 84
End: 2019-05-20
Payer: COMMERCIAL

## 2019-05-20 ENCOUNTER — SOCIAL WORK (OUTPATIENT)
Dept: HEMATOLOGY/ONCOLOGY | Facility: CLINIC | Age: 84
End: 2019-05-20

## 2019-05-20 DIAGNOSIS — Z71.9 ENCOUNTER FOR EDUCATION: ICD-10-CM

## 2019-05-20 DIAGNOSIS — C34.90 METASTATIC NON-SMALL CELL LUNG CANCER: Primary | ICD-10-CM

## 2019-05-20 PROCEDURE — 99999 PR PBB SHADOW E&M-EST. PATIENT-LVL I: CPT | Mod: PBBFAC,,, | Performed by: NURSE PRACTITIONER

## 2019-05-20 PROCEDURE — 3288F PR FALLS RISK ASSESSMENT DOCUMENTED: ICD-10-PCS | Mod: CPTII,S$GLB,, | Performed by: NURSE PRACTITIONER

## 2019-05-20 PROCEDURE — 1100F PR PT FALLS ASSESS DOC 2+ FALLS/FALL W/INJURY/YR: ICD-10-PCS | Mod: CPTII,S$GLB,, | Performed by: NURSE PRACTITIONER

## 2019-05-20 PROCEDURE — 99999 PR PBB SHADOW E&M-EST. PATIENT-LVL I: ICD-10-PCS | Mod: PBBFAC,,, | Performed by: NURSE PRACTITIONER

## 2019-05-20 PROCEDURE — 3288F FALL RISK ASSESSMENT DOCD: CPT | Mod: CPTII,S$GLB,, | Performed by: NURSE PRACTITIONER

## 2019-05-20 PROCEDURE — 1100F PTFALLS ASSESS-DOCD GE2>/YR: CPT | Mod: CPTII,S$GLB,, | Performed by: NURSE PRACTITIONER

## 2019-05-20 PROCEDURE — 99215 PR OFFICE/OUTPT VISIT, EST, LEVL V, 40-54 MIN: ICD-10-PCS | Mod: S$GLB,,, | Performed by: NURSE PRACTITIONER

## 2019-05-20 PROCEDURE — 99215 OFFICE O/P EST HI 40 MIN: CPT | Mod: S$GLB,,, | Performed by: NURSE PRACTITIONER

## 2019-05-20 NOTE — PROGRESS NOTES
"Met with pt to complete initial SW assessment of needs; addressed Distress Screening of 4/10. Normalized and validated feelings. Pt accompanied by his granddaughter who lives next door and is primary caregiver. She is also a nurse, but she balances a job and 4 children of her own. Main concern off the bat is financial assistance. Currently pt has commercial insurance. He does have a deductible, which is believed to have been met. Nevertheless, the pt will likely be eligible for co-pay assistance. Will attempt to help him get set up with this. Granddaughter mentioned that pt will convert to Medicare July 1st. This will change the assistance he can get with Keytruda, so in June will need to look into other options (foundation versus free drug). Pt does not want to get treated if it will result in a large bill. Let him know every attempt will be made to mitigate cost. Aside from this, they reviewed  services checklist. They will look at brochures for Cancer Services and [employer] and let SW know if they would like a referral at treatment on Friday. Also let them know about Next Steps class. Granddaughter questioned Home Health/in-home help for "later down the road." We talked about the difference between Home Health and sitter services, especially in terms of coverage by insurance and limitations for the latter. Referred them to [facility]. Also suggested a life alert since the pt lives alone. No additional needs expressed; will plan to f/u Friday regarding the above and to assess for any further needs.  "

## 2019-05-20 NOTE — PROGRESS NOTES
87 y/o male for immunotherapy teaching. Patient given the Navigation Notebook. Explained the contents of the chemotherapy binder. Discussed with patient the rationale for chemotherapy, how it works, the process of treatment, potential side effects and symptoms to report.   SW consulted to discuss medication cost issues.      Reviewed the specific medication and gave them a handout describing the potential side effects of Keytruda     Discussed potential side effects such as:  Colitis  Intestinal perforation/obstruction  Fatigue  Stomatitis  Diarrhea  Fever > 100.4  Rash - itchy/non itchy  Skin care  Constipation  Hepatic inflammation  Renal inflammation  Lung inflammation  Neurological complications  Endocrine Dysfunction  Eye changes  Port management  Community resources  Reproductive Concerns mentioned    Phone numbers to contact healthcare team provided to patient.  Patient verbalized understanding plan of care and how to contact healthcare team if needed.

## 2019-05-23 VITALS
DIASTOLIC BLOOD PRESSURE: 78 MMHG | HEIGHT: 70 IN | SYSTOLIC BLOOD PRESSURE: 140 MMHG | BODY MASS INDEX: 24.92 KG/M2 | RESPIRATION RATE: 20 BRPM | WEIGHT: 174.06 LBS | OXYGEN SATURATION: 92 % | TEMPERATURE: 97 F | HEART RATE: 82 BPM

## 2019-05-24 ENCOUNTER — LAB VISIT (OUTPATIENT)
Dept: LAB | Facility: HOSPITAL | Age: 84
End: 2019-05-24
Attending: INTERNAL MEDICINE
Payer: COMMERCIAL

## 2019-05-24 ENCOUNTER — OFFICE VISIT (OUTPATIENT)
Dept: HEMATOLOGY/ONCOLOGY | Facility: CLINIC | Age: 84
End: 2019-05-24
Payer: COMMERCIAL

## 2019-05-24 ENCOUNTER — INFUSION (OUTPATIENT)
Dept: INFUSION THERAPY | Facility: HOSPITAL | Age: 84
End: 2019-05-24
Attending: INTERNAL MEDICINE
Payer: COMMERCIAL

## 2019-05-24 VITALS — HEIGHT: 71 IN | WEIGHT: 172 LBS | BODY MASS INDEX: 24.08 KG/M2

## 2019-05-24 VITALS
DIASTOLIC BLOOD PRESSURE: 69 MMHG | OXYGEN SATURATION: 95 % | BODY MASS INDEX: 24.74 KG/M2 | WEIGHT: 172.38 LBS | SYSTOLIC BLOOD PRESSURE: 127 MMHG | TEMPERATURE: 98 F | HEART RATE: 72 BPM

## 2019-05-24 DIAGNOSIS — C34.90 METASTATIC NON-SMALL CELL LUNG CANCER: Primary | ICD-10-CM

## 2019-05-24 DIAGNOSIS — C34.80 MALIGNANT NEOPLASM OF OVERLAPPING SITES OF LUNG, UNSPECIFIED LATERALITY: ICD-10-CM

## 2019-05-24 DIAGNOSIS — E07.9 THYROID DYSFUNCTION: ICD-10-CM

## 2019-05-24 DIAGNOSIS — C34.90 METASTATIC NON-SMALL CELL LUNG CANCER: ICD-10-CM

## 2019-05-24 LAB
ACID FAST MOD KINY STN SPEC: NORMAL
ALBUMIN SERPL BCP-MCNC: 3.4 G/DL (ref 3.5–5.2)
ALP SERPL-CCNC: 101 U/L (ref 55–135)
ALT SERPL W/O P-5'-P-CCNC: 41 U/L (ref 10–44)
ANION GAP SERPL CALC-SCNC: 10 MMOL/L (ref 8–16)
AST SERPL-CCNC: 29 U/L (ref 10–40)
BASOPHILS # BLD AUTO: 0.02 K/UL (ref 0–0.2)
BASOPHILS NFR BLD: 0.2 % (ref 0–1.9)
BILIRUB SERPL-MCNC: 0.3 MG/DL (ref 0.1–1)
BUN SERPL-MCNC: 22 MG/DL (ref 8–23)
CALCIUM SERPL-MCNC: 9.2 MG/DL (ref 8.7–10.5)
CHLORIDE SERPL-SCNC: 105 MMOL/L (ref 95–110)
CO2 SERPL-SCNC: 23 MMOL/L (ref 23–29)
CREAT SERPL-MCNC: 1.3 MG/DL (ref 0.5–1.4)
DIFFERENTIAL METHOD: NORMAL
EOSINOPHIL # BLD AUTO: 0.3 K/UL (ref 0–0.5)
EOSINOPHIL NFR BLD: 3.6 % (ref 0–8)
ERYTHROCYTE [DISTWIDTH] IN BLOOD BY AUTOMATED COUNT: 14.3 % (ref 11.5–14.5)
EST. GFR  (AFRICAN AMERICAN): 56 ML/MIN/1.73 M^2
EST. GFR  (NON AFRICAN AMERICAN): 49 ML/MIN/1.73 M^2
GLUCOSE SERPL-MCNC: 107 MG/DL (ref 70–110)
HCT VFR BLD AUTO: 43.5 % (ref 40–54)
HGB BLD-MCNC: 14.2 G/DL (ref 14–18)
LYMPHOCYTES # BLD AUTO: 2 K/UL (ref 1–4.8)
LYMPHOCYTES NFR BLD: 21.4 % (ref 18–48)
MCH RBC QN AUTO: 29.2 PG (ref 27–31)
MCHC RBC AUTO-ENTMCNC: 32.6 G/DL (ref 32–36)
MCV RBC AUTO: 89 FL (ref 82–98)
MONOCYTES # BLD AUTO: 0.9 K/UL (ref 0.3–1)
MONOCYTES NFR BLD: 9.2 % (ref 4–15)
MYCOBACTERIUM SPEC QL CULT: NORMAL
NEUTROPHILS # BLD AUTO: 6.2 K/UL (ref 1.8–7.7)
NEUTROPHILS NFR BLD: 65.6 % (ref 38–73)
PLATELET # BLD AUTO: 317 K/UL (ref 150–350)
PMV BLD AUTO: 9.7 FL (ref 9.2–12.9)
POTASSIUM SERPL-SCNC: 4.8 MMOL/L (ref 3.5–5.1)
PROT SERPL-MCNC: 7.6 G/DL (ref 6–8.4)
RBC # BLD AUTO: 4.87 M/UL (ref 4.6–6.2)
SODIUM SERPL-SCNC: 138 MMOL/L (ref 136–145)
WBC # BLD AUTO: 9.41 K/UL (ref 3.9–12.7)

## 2019-05-24 PROCEDURE — 99215 PR OFFICE/OUTPT VISIT, EST, LEVL V, 40-54 MIN: ICD-10-PCS | Mod: S$GLB,,, | Performed by: INTERNAL MEDICINE

## 2019-05-24 PROCEDURE — 80053 COMPREHEN METABOLIC PANEL: CPT

## 2019-05-24 PROCEDURE — 99999 PR PBB SHADOW E&M-EST. PATIENT-LVL III: CPT | Mod: PBBFAC,,, | Performed by: INTERNAL MEDICINE

## 2019-05-24 PROCEDURE — 63600175 PHARM REV CODE 636 W HCPCS: Performed by: INTERNAL MEDICINE

## 2019-05-24 PROCEDURE — 1101F PR PT FALLS ASSESS DOC 0-1 FALLS W/OUT INJ PAST YR: ICD-10-PCS | Mod: CPTII,S$GLB,, | Performed by: INTERNAL MEDICINE

## 2019-05-24 PROCEDURE — 96413 CHEMO IV INFUSION 1 HR: CPT

## 2019-05-24 PROCEDURE — 99215 OFFICE O/P EST HI 40 MIN: CPT | Mod: S$GLB,,, | Performed by: INTERNAL MEDICINE

## 2019-05-24 PROCEDURE — 36415 COLL VENOUS BLD VENIPUNCTURE: CPT

## 2019-05-24 PROCEDURE — 1101F PT FALLS ASSESS-DOCD LE1/YR: CPT | Mod: CPTII,S$GLB,, | Performed by: INTERNAL MEDICINE

## 2019-05-24 PROCEDURE — 85025 COMPLETE CBC W/AUTO DIFF WBC: CPT

## 2019-05-24 PROCEDURE — 25000003 PHARM REV CODE 250: Performed by: INTERNAL MEDICINE

## 2019-05-24 PROCEDURE — 99999 PR PBB SHADOW E&M-EST. PATIENT-LVL III: ICD-10-PCS | Mod: PBBFAC,,, | Performed by: INTERNAL MEDICINE

## 2019-05-24 RX ORDER — HEPARIN 100 UNIT/ML
500 SYRINGE INTRAVENOUS
Status: DISCONTINUED | OUTPATIENT
Start: 2019-05-24 | End: 2019-05-24 | Stop reason: HOSPADM

## 2019-05-24 RX ORDER — HEPARIN 100 UNIT/ML
500 SYRINGE INTRAVENOUS
Status: CANCELLED | OUTPATIENT
Start: 2019-05-24

## 2019-05-24 RX ORDER — SODIUM CHLORIDE 0.9 % (FLUSH) 0.9 %
10 SYRINGE (ML) INJECTION
Status: CANCELLED | OUTPATIENT
Start: 2019-05-24

## 2019-05-24 RX ADMIN — HEPARIN 500 UNITS: 100 SYRINGE at 02:05

## 2019-05-24 RX ADMIN — SODIUM CHLORIDE 200 MG: 9 INJECTION, SOLUTION INTRAVENOUS at 01:05

## 2019-05-24 NOTE — PROGRESS NOTES
Hematology/Oncology Office Note    Reason for referral:  Lung cancer    CC:  Lung cancer    Referred by:  No ref. provider found    Diagnosis:  Stage IV metastatic lung cancer    Treatment:  Keytruda    History of present illness:  88-year-old gentleman followed by Dr. Simone Goins for stage IV non-small cell lung cancer who presents today to receive 1st dose of Keytruda.  I am seeing the patient today for the 1st time in Dr. Alvarado absence.  He has no complaints today and is eager to begin palliative immunotherapy.      Past Medical History:   Diagnosis Date    Anticoagulant long-term use     plavix    Cancer     Metastatic non-small cell lung cancer    Cardiomyopathy 9/07    Ischemic    HBP (high blood pressure) 1997    Hyperlipidemia     LBP (low back pain)     MI (myocardial infarction) 12/06    Status post primary angioplasty with coronary stent 12/06    Thyroid disease          Social History:      Family History: family history includes Cancer in his mother; Heart disease in his mother.          HPI  Review of Systems   Constitutional: Negative for activity change, appetite change, diaphoresis, fatigue, fever and unexpected weight change.   HENT: Negative for congestion, dental problem, drooling, ear discharge, ear pain, facial swelling, nosebleeds, rhinorrhea, sinus pressure and voice change.    Eyes: Negative.    Respiratory: Negative for apnea, cough, shortness of breath, wheezing and stridor.    Cardiovascular: Negative for chest pain, palpitations and leg swelling.   Gastrointestinal: Negative for abdominal distention, abdominal pain, constipation, diarrhea, nausea and vomiting.   Endocrine: Negative.  Negative for polyphagia.   Genitourinary: Negative for decreased urine volume, difficulty urinating, enuresis, flank pain, frequency, genital sores, hematuria, testicular pain and urgency.   Musculoskeletal: Negative for arthralgias, joint swelling, myalgias, neck pain and neck stiffness.   Skin:  Negative for color change, pallor, rash and wound.   Allergic/Immunologic: Negative.    Neurological: Negative for dizziness, tremors, syncope, facial asymmetry, speech difficulty, weakness and headaches.   Hematological: Negative for adenopathy. Does not bruise/bleed easily.   Psychiatric/Behavioral: Negative for agitation, behavioral problems, confusion, decreased concentration, dysphoric mood and hallucinations. The patient is not nervous/anxious and is not hyperactive.        Objective:       Vitals:    05/24/19 1256   BP: 127/69   Pulse: 72   Temp: 98 °F (36.7 °C)   TempSrc: Oral   SpO2: 95%   Weight: 78.2 kg (172 lb 6.4 oz)     Physical Exam   Constitutional: He is oriented to person, place, and time. He appears well-developed and well-nourished. No distress.   HENT:   Head: Normocephalic and atraumatic.   Nose: Nose normal.   Mouth/Throat: Oropharynx is clear and moist. No oropharyngeal exudate.   Eyes: Pupils are equal, round, and reactive to light. Conjunctivae and EOM are normal. Right eye exhibits no discharge. Left eye exhibits no discharge. No scleral icterus.   Neck: Normal range of motion. Neck supple. No JVD present. No tracheal deviation present. No thyromegaly present.   Cardiovascular: Normal rate and regular rhythm. Exam reveals no gallop and no friction rub.   No murmur heard.  Pulmonary/Chest: Effort normal and breath sounds normal. No stridor. No respiratory distress. He has no wheezes. He has no rales. He exhibits no tenderness.   Abdominal: Soft. Bowel sounds are normal. He exhibits no distension and no mass. There is no tenderness. There is no rebound.   Musculoskeletal: Normal range of motion. He exhibits no edema or tenderness.   Lymphadenopathy:     He has no cervical adenopathy.   Neurological: He is alert and oriented to person, place, and time. No cranial nerve deficit. Coordination normal.   Skin: Skin is warm and dry. No rash noted. He is not diaphoretic.   Psychiatric: He has a  normal mood and affect. Thought content normal.   Vitals reviewed.      Assessment:       88-year-old gentleman followed by Dr. Figueroa in the Hematology/Oncology Clinic for stage IV non-small cell lung cancer.  He presents today for 1st dose of palliative immunotherapy with Keytruda.  We will proceed with treatment as scheduled he will follow up in 3 weeks to continue treatmet.    Stage IV Lung Ca:  --palliative immunotherapy with Keytruda  --follow up in 3 weeks with repeat labs

## 2019-05-24 NOTE — PLAN OF CARE
"Problem: Adult Inpatient Plan of Care  Goal: Plan of Care Review  Outcome: Ongoing (interventions implemented as appropriate)  Pt states, " I feel fine today"      "

## 2019-05-24 NOTE — DISCHARGE INSTRUCTIONS
.  [facility]  [address] [facility]  [address]  [phone] phone     [phone] fax  Hours of Operation: Monday- Friday 8:00am- 5:00pm  After hours phone  [phone]  Hematology / Oncology Physicians on call      Dr. Carlos Manuel Goins    Please call with any concerns regarding your appointment today.  .FALL PREVENTION   Falls often occur due to slipping, tripping or losing your balance. Here are ways to reduce your risk of falling again.   Was there anything that caused your fall that can be fixed, removed or replaced?   Make your home safe by keeping walkways clear of objects you may trip over.   Use non-slip pads under rugs.   Do not walk in poorly lit areas.   Do not stand on chairs or wobbly ladders.   Use caution when reaching overhead or looking upward. This position can cause a loss of balance.   Be sure your shoes fit properly, have non-slip bottoms and are in good condition.   Be cautious when going up and down stairs, curbs, and when walking on uneven sidewalks.   If your balance is poor, consider using a cane or walker.   If your fall was related to alcohol use, stop or limit alcohol intake.   If your fall was related to use of sleeping medicines, talk to your doctor about this. You may need to reduce your dosage at bedtime if you awaken during the night to go to the bathroom.   To reduce the need for nighttime bathroom trips:   Avoid drinking fluids for several hours before going to bed   Empty your bladder before going to bed   Men can keep a urinal at the bedside   © 9955-9673 [employer], [address] [place] [address]. All rights reserved. This information is not intended as a substitute for professional medical care. Always follow your healthcare professional's instructions.    .WAYS TO HELP PREVENT INFECTION         WASH YOUR HANDS OFTEN DURING THE DAY, ESPECIALLY BEFORE YOU EAT, AFTER USING  THE BATHROOM, AND AFTER TOUCHING ANIMALS     STAY AWAY FROM PEOPLE WHO HAVE ILLNESSES YOU CAN CATCH; SUCH AS COLDS, FLU, CHICKEN POX     TRY TO AVOID CROWDS     STAY AWAY FROM CHILDREN WHO RECENTLY HAVE RECEIVED LIVE VIRUS VACCINES     MAINTAIN GOOD MOUTH CARE     DO NOT SQUEEZE OR SCRATCH PIMPLES     CLEAN CUTS & SCRAPES RIGHT AWAY AND DAILY UNTIL HEALED WITH WARM WATER, SOAP & AN ANTISEPTIC     AVOID CONTACT WITH LITTER BOXES, BIRD CAGES, & FISH TANKS     AVOID STANDING WATER, IE., BIRD BATHS, FLOWER POTS/VASES, OR HUMIDIFIERS     WEAR GLOVES WHEN GARDENING OR CLEANING UP AFTER OTHERS, ESPECIALLY BABIES & SMALL CHILDREN     DO NOT EAT RAW FISH, SEAFOOD, MEAT, OR EGGS  .HOME CARE AFTER CHEMOTHERAPY   Meals   Many patients feel sick and lose their appetites during treatment. Eat small meals several times a day. Choose bland foods with little taste or smell if you have problems with nausea. Be sure to cook all food thoroughly. This kills bacteria and helps you avoid intestinal infection. Soft foods are easier to swallow and digest.   Activity   Exercise keeps you strong and keeps your heart and lungs active. Talk to your doctor about an appropriate exercise program for you.   Skin Care   To prevent a skin infection, bathe or shower once a day. Use a moisturizing soap and wash with warm water. Avoid very hot or cold water. Chemotherapy can make your skin dry . Apply moisturizing lotion to help relieve dry skin. Some drugs used in high doses can cause slight burns to appear (like sunburn). Ask for a special cream to help relieve the burn and protect your skin.   Prevent Mouth Sores   During chemotherapy, many people get mouth sores. Do the following to help prevent mouth sores or to ease discomfort.   Brush your teeth with a soft-bristle toothbrush after every meal.  Don't use dental floss if your platelet count is below 50,000. Your doctor or nurse will tell you if this is the case.  Use an oral swab or  "special soft toothbrush if your gums bleed during regular brushing.  Use mouthwash as directed. If you can't tolerate commercial mouthwash, use salt and baking soda to clean your mouth. Mix 1 teaspoon of salt and 1 teaspoon of baking soda into a glass of water. Swish and spit.  Call your doctor or return to this facility if you develop any of the following:   Sore throat   White patches in the mouth or throat   Fever of 100.4ºF (38ºC) or higher, or as directed by your healthcare provider  © 6004-1281 [facility] [address], [address] [place] [address], [place] [address]. All rights reserved. This information is not intended as a substitute for professional medical care. Always follow your healthcare professional's     .Support Groups/Classes    Support groups and classes are being offered at the   [facility] and [facility]!!    "Cooking with Cancer" (Nutrition Class):  Second Wednesday of each month   at noon at the [facility].  Metastatic Support Group:  Third Tuesday of each month   at noon at the [facility].  Next Steps Class/Group: Second and fourth Thursday of each month at noon at the [facility].  [facility] (Breast Cancer Support Group): First Tuesday of each month   at 5:30pm at the [facility] location.  [facility]: [facility]: Second and third Tuesday of each month from 7:30am - 2pm.  [facility]: First and fourth Tuesday of each month from 7:30am - 2pm    If you are interested in attending or would like more information please ask our social workers or your nurse!    "

## 2019-05-24 NOTE — PLAN OF CARE
Problem: Anemia (Chemotherapy Effects)  Goal: Anemia Symptom Improvement    Intervention: Monitor and Manage Anemia  Reviewed pt's labs and how chemo can affect blood counts

## 2019-05-24 NOTE — PLAN OF CARE
Problem: Nausea and Vomiting (Chemotherapy Effects)  Goal: Fluid and Electrolyte Balance    Intervention: Prevent and Manage Nausea and Vomiting  Reviewed possible side effects of chemo and importance of good hydration and nutrition

## 2019-06-12 DIAGNOSIS — E03.9 HYPOTHYROIDISM (ACQUIRED): ICD-10-CM

## 2019-06-12 RX ORDER — LEVOTHYROXINE SODIUM 50 UG/1
TABLET ORAL
Qty: 90 TABLET | Refills: 2 | Status: SHIPPED | OUTPATIENT
Start: 2019-06-12 | End: 2019-08-16

## 2019-06-14 ENCOUNTER — LAB VISIT (OUTPATIENT)
Dept: LAB | Facility: HOSPITAL | Age: 84
End: 2019-06-14
Attending: INTERNAL MEDICINE
Payer: COMMERCIAL

## 2019-06-14 ENCOUNTER — INFUSION (OUTPATIENT)
Dept: INFUSION THERAPY | Facility: HOSPITAL | Age: 84
End: 2019-06-14
Attending: INTERNAL MEDICINE
Payer: COMMERCIAL

## 2019-06-14 ENCOUNTER — SOCIAL WORK (OUTPATIENT)
Dept: HEMATOLOGY/ONCOLOGY | Facility: CLINIC | Age: 84
End: 2019-06-14

## 2019-06-14 ENCOUNTER — OFFICE VISIT (OUTPATIENT)
Dept: HEMATOLOGY/ONCOLOGY | Facility: CLINIC | Age: 84
End: 2019-06-14
Payer: COMMERCIAL

## 2019-06-14 VITALS
SYSTOLIC BLOOD PRESSURE: 136 MMHG | OXYGEN SATURATION: 95 % | TEMPERATURE: 97 F | DIASTOLIC BLOOD PRESSURE: 66 MMHG | BODY MASS INDEX: 23.74 KG/M2 | HEIGHT: 71 IN | WEIGHT: 169.56 LBS | HEART RATE: 71 BPM

## 2019-06-14 DIAGNOSIS — C34.90 METASTATIC NON-SMALL CELL LUNG CANCER: ICD-10-CM

## 2019-06-14 DIAGNOSIS — C34.90 METASTATIC NON-SMALL CELL LUNG CANCER: Primary | ICD-10-CM

## 2019-06-14 DIAGNOSIS — C34.80 MALIGNANT NEOPLASM OF OVERLAPPING SITES OF LUNG, UNSPECIFIED LATERALITY: ICD-10-CM

## 2019-06-14 DIAGNOSIS — C34.81 MALIGNANT NEOPLASM OF OVERLAPPING SITES OF RIGHT LUNG: ICD-10-CM

## 2019-06-14 DIAGNOSIS — E07.9 THYROID DYSFUNCTION: ICD-10-CM

## 2019-06-14 LAB
ALBUMIN SERPL BCP-MCNC: 3.4 G/DL (ref 3.5–5.2)
ALP SERPL-CCNC: 114 U/L (ref 55–135)
ALT SERPL W/O P-5'-P-CCNC: 29 U/L (ref 10–44)
ANION GAP SERPL CALC-SCNC: 14 MMOL/L (ref 8–16)
AST SERPL-CCNC: 17 U/L (ref 10–40)
BASOPHILS # BLD AUTO: 0.03 K/UL (ref 0–0.2)
BASOPHILS NFR BLD: 0.3 % (ref 0–1.9)
BILIRUB SERPL-MCNC: 0.4 MG/DL (ref 0.1–1)
BUN SERPL-MCNC: 23 MG/DL (ref 8–23)
CALCIUM SERPL-MCNC: 9.6 MG/DL (ref 8.7–10.5)
CHLORIDE SERPL-SCNC: 105 MMOL/L (ref 95–110)
CO2 SERPL-SCNC: 20 MMOL/L (ref 23–29)
CREAT SERPL-MCNC: 1.2 MG/DL (ref 0.5–1.4)
DIFFERENTIAL METHOD: ABNORMAL
EOSINOPHIL # BLD AUTO: 0.4 K/UL (ref 0–0.5)
EOSINOPHIL NFR BLD: 3.8 % (ref 0–8)
ERYTHROCYTE [DISTWIDTH] IN BLOOD BY AUTOMATED COUNT: 14.4 % (ref 11.5–14.5)
EST. GFR  (AFRICAN AMERICAN): >60 ML/MIN/1.73 M^2
EST. GFR  (NON AFRICAN AMERICAN): 54 ML/MIN/1.73 M^2
GLUCOSE SERPL-MCNC: 118 MG/DL (ref 70–110)
HCT VFR BLD AUTO: 42.7 % (ref 40–54)
HGB BLD-MCNC: 14.2 G/DL (ref 14–18)
LDH SERPL L TO P-CCNC: 269 U/L (ref 110–260)
LYMPHOCYTES # BLD AUTO: 2.3 K/UL (ref 1–4.8)
LYMPHOCYTES NFR BLD: 22.4 % (ref 18–48)
MCH RBC QN AUTO: 29.5 PG (ref 27–31)
MCHC RBC AUTO-ENTMCNC: 33.3 G/DL (ref 32–36)
MCV RBC AUTO: 89 FL (ref 82–98)
MONOCYTES # BLD AUTO: 1.1 K/UL (ref 0.3–1)
MONOCYTES NFR BLD: 10.8 % (ref 4–15)
NEUTROPHILS # BLD AUTO: 6.3 K/UL (ref 1.8–7.7)
NEUTROPHILS NFR BLD: 62.7 % (ref 38–73)
PLATELET # BLD AUTO: 302 K/UL (ref 150–350)
PMV BLD AUTO: 9.7 FL (ref 9.2–12.9)
POTASSIUM SERPL-SCNC: 4.6 MMOL/L (ref 3.5–5.1)
PROT SERPL-MCNC: 8 G/DL (ref 6–8.4)
RBC # BLD AUTO: 4.82 M/UL (ref 4.6–6.2)
SODIUM SERPL-SCNC: 139 MMOL/L (ref 136–145)
T4 FREE SERPL-MCNC: 1.86 NG/DL (ref 0.71–1.51)
TSH SERPL DL<=0.005 MIU/L-ACNC: <0.01 UIU/ML (ref 0.4–4)
WBC # BLD AUTO: 10.04 K/UL (ref 3.9–12.7)

## 2019-06-14 PROCEDURE — 99999 PR PBB SHADOW E&M-EST. PATIENT-LVL III: ICD-10-PCS | Mod: PBBFAC,,, | Performed by: INTERNAL MEDICINE

## 2019-06-14 PROCEDURE — 1101F PR PT FALLS ASSESS DOC 0-1 FALLS W/OUT INJ PAST YR: ICD-10-PCS | Mod: CPTII,S$GLB,, | Performed by: INTERNAL MEDICINE

## 2019-06-14 PROCEDURE — 80053 COMPREHEN METABOLIC PANEL: CPT

## 2019-06-14 PROCEDURE — 99215 PR OFFICE/OUTPT VISIT, EST, LEVL V, 40-54 MIN: ICD-10-PCS | Mod: 25,S$GLB,, | Performed by: INTERNAL MEDICINE

## 2019-06-14 PROCEDURE — 1101F PT FALLS ASSESS-DOCD LE1/YR: CPT | Mod: CPTII,S$GLB,, | Performed by: INTERNAL MEDICINE

## 2019-06-14 PROCEDURE — 84439 ASSAY OF FREE THYROXINE: CPT

## 2019-06-14 PROCEDURE — 85025 COMPLETE CBC W/AUTO DIFF WBC: CPT

## 2019-06-14 PROCEDURE — 99999 PR PBB SHADOW E&M-EST. PATIENT-LVL III: CPT | Mod: PBBFAC,,, | Performed by: INTERNAL MEDICINE

## 2019-06-14 PROCEDURE — 99215 OFFICE O/P EST HI 40 MIN: CPT | Mod: 25,S$GLB,, | Performed by: INTERNAL MEDICINE

## 2019-06-14 PROCEDURE — 84443 ASSAY THYROID STIM HORMONE: CPT

## 2019-06-14 PROCEDURE — 36415 COLL VENOUS BLD VENIPUNCTURE: CPT

## 2019-06-14 PROCEDURE — 63600175 PHARM REV CODE 636 W HCPCS: Mod: JG | Performed by: INTERNAL MEDICINE

## 2019-06-14 PROCEDURE — 25000003 PHARM REV CODE 250: Performed by: INTERNAL MEDICINE

## 2019-06-14 PROCEDURE — 83615 LACTATE (LD) (LDH) ENZYME: CPT

## 2019-06-14 PROCEDURE — 96413 CHEMO IV INFUSION 1 HR: CPT

## 2019-06-14 RX ORDER — HEPARIN 100 UNIT/ML
500 SYRINGE INTRAVENOUS
Status: CANCELLED | OUTPATIENT
Start: 2019-06-14

## 2019-06-14 RX ORDER — SODIUM CHLORIDE 0.9 % (FLUSH) 0.9 %
10 SYRINGE (ML) INJECTION
Status: CANCELLED | OUTPATIENT
Start: 2019-06-14

## 2019-06-14 RX ORDER — OMEPRAZOLE 20 MG/1
20 CAPSULE, DELAYED RELEASE ORAL DAILY
COMMUNITY

## 2019-06-14 RX ORDER — HEPARIN 100 UNIT/ML
500 SYRINGE INTRAVENOUS
Status: DISCONTINUED | OUTPATIENT
Start: 2019-06-14 | End: 2019-06-14 | Stop reason: HOSPADM

## 2019-06-14 RX ADMIN — HEPARIN 500 UNITS: 100 SYRINGE at 03:06

## 2019-06-14 RX ADMIN — SODIUM CHLORIDE 200 MG: 9 INJECTION, SOLUTION INTRAVENOUS at 02:06

## 2019-06-14 NOTE — PLAN OF CARE
Problem: Adult Inpatient Plan of Care  Goal: Plan of Care Review  Outcome: Ongoing (interventions implemented as appropriate)  Pt states is feeling well , just has a poor appetite

## 2019-06-14 NOTE — PROGRESS NOTES
Subjective:       Patient ID: Brant Lees is a 88 y.o. male.    Chief Complaint: Results; Chemotherapy; and Lung Cancer    HPI 88-year-old male history of metastatic non-small cell lung carcinoma patient is returns for review patient has PD L1 positive disease greater than 50% patient presents for cycle 2 day 1 of Keytruda therapy ECOG status 2    Past Medical History:   Diagnosis Date    Anticoagulant long-term use     plavix    Cancer     Metastatic non-small cell lung cancer    Cardiomyopathy 9/07    Ischemic    HBP (high blood pressure) 1997    Hyperlipidemia     LBP (low back pain)     MI (myocardial infarction) 12/06    Status post primary angioplasty with coronary stent 12/06    Thyroid disease      Family History   Problem Relation Age of Onset    Heart disease Mother     Cancer Mother      Social History     Socioeconomic History    Marital status:      Spouse name: Not on file    Number of children: 1    Years of education: Not on file    Highest education level: Not on file   Occupational History    Not on file   Social Needs    Financial resource strain: Not on file    Food insecurity:     Worry: Not on file     Inability: Not on file    Transportation needs:     Medical: Not on file     Non-medical: Not on file   Tobacco Use    Smoking status: Never Smoker    Smokeless tobacco: Never Used   Substance and Sexual Activity    Alcohol use: No    Drug use: No    Sexual activity: Not Currently     Partners: Female   Lifestyle    Physical activity:     Days per week: Not on file     Minutes per session: Not on file    Stress: Not on file   Relationships    Social connections:     Talks on phone: Not on file     Gets together: Not on file     Attends Jain service: Not on file     Active member of club or organization: Not on file     Attends meetings of clubs or organizations: Not on file     Relationship status: Not on file   Other Topics Concern    Not on file   Social  History Narrative    Not on file     Past Surgical History:   Procedure Laterality Date    CORONARY STENT PLACEMENT      EXCISION-BLADDER TUMOR-TRANSURETHRAL (TURBT) N/A 8/22/2017    Performed by Jogre Alcocer IV, MD at [facility] OR    HERNIA REPAIR      INSERTION, VENOUS ACCESS PORT Left 5/17/2019    Performed by Lester Trejo MD at [facility] OR       Labs:  Lab Results   Component Value Date    WBC 10.04 06/14/2019    HGB 14.2 06/14/2019    HCT 42.7 06/14/2019    MCV 89 06/14/2019     06/14/2019     BMP  Lab Results   Component Value Date     06/14/2019    K 4.6 06/14/2019     06/14/2019    CO2 20 (L) 06/14/2019    BUN 23 06/14/2019    CREATININE 1.2 06/14/2019    CALCIUM 9.6 06/14/2019    ANIONGAP 14 06/14/2019    ESTGFRAFRICA >60 06/14/2019    EGFRNONAA 54 (A) 06/14/2019     Lab Results   Component Value Date    ALT 29 06/14/2019    AST 17 06/14/2019    ALKPHOS 114 06/14/2019    BILITOT 0.4 06/14/2019       No results found for: IRON, TIBC, FERRITIN, SATURATEDIRO  No results found for: [id number]  No results found for: FOLATE  Lab Results   Component Value Date    TSH 1.896 03/21/2019         Review of Systems   Constitutional: Positive for activity change and fatigue. Negative for appetite change, chills, diaphoresis, fever and unexpected weight change.   HENT: Negative for congestion, dental problem, drooling, ear discharge, ear pain, facial swelling, hearing loss, mouth sores, nosebleeds, postnasal drip, rhinorrhea, sinus pressure, sneezing, sore throat, tinnitus, trouble swallowing and voice change.    Eyes: Negative for photophobia, pain, discharge, redness, itching and visual disturbance.   Respiratory: Positive for cough. Negative for apnea, choking, chest tightness, shortness of breath, wheezing and stridor.    Cardiovascular: Negative for chest pain, palpitations and leg swelling.   Gastrointestinal: Negative for abdominal distention, abdominal pain, anal bleeding, blood in stool,  constipation, diarrhea, nausea, rectal pain and vomiting.   Endocrine: Negative for cold intolerance, heat intolerance, polydipsia, polyphagia and polyuria.   Genitourinary: Negative for decreased urine volume, difficulty urinating, discharge, dysuria, enuresis, flank pain, frequency, genital sores, hematuria, penile pain, penile swelling, scrotal swelling, testicular pain and urgency.   Musculoskeletal: Negative for arthralgias, back pain, gait problem, joint swelling, myalgias, neck pain and neck stiffness.   Skin: Negative for color change, pallor, rash and wound.   Allergic/Immunologic: Negative for environmental allergies, food allergies and immunocompromised state.   Neurological: Positive for weakness. Negative for dizziness, tremors, seizures, syncope, facial asymmetry, speech difficulty, light-headedness, numbness and headaches.   Hematological: Negative for adenopathy. Does not bruise/bleed easily.   Psychiatric/Behavioral: Positive for dysphoric mood. Negative for agitation, behavioral problems, confusion, decreased concentration, hallucinations, self-injury, sleep disturbance and suicidal ideas. The patient is nervous/anxious. The patient is not hyperactive.        Objective:      Physical Exam   Constitutional: He is oriented to person, place, and time. He appears well-developed and well-nourished. He appears distressed.   HENT:   Head: Normocephalic.   Right Ear: External ear normal.   Left Ear: External ear normal.   Nose: Nose normal. Right sinus exhibits no maxillary sinus tenderness and no frontal sinus tenderness. Left sinus exhibits no maxillary sinus tenderness and no frontal sinus tenderness.   Mouth/Throat: Oropharynx is clear and moist. No oropharyngeal exudate.   Eyes: Pupils are equal, round, and reactive to light. EOM and lids are normal. Right eye exhibits no discharge. Left eye exhibits no discharge. Right conjunctiva is not injected. Right conjunctiva has no hemorrhage. Left conjunctiva  is not injected. Left conjunctiva has no hemorrhage. No scleral icterus. Right eye exhibits normal extraocular motion. Left eye exhibits normal extraocular motion.   Neck: Normal range of motion. Neck supple. No JVD present. No tracheal deviation present. No thyromegaly present.   Cardiovascular: Normal rate, regular rhythm and normal heart sounds.   Pulmonary/Chest: Effort normal and breath sounds normal. No stridor. No respiratory distress.   Abdominal: Soft. Bowel sounds are normal. He exhibits no mass. There is no hepatosplenomegaly, splenomegaly or hepatomegaly. There is no tenderness.   Musculoskeletal: Normal range of motion. He exhibits no edema or tenderness.   Lymphadenopathy:        Head (right side): No posterior auricular and no occipital adenopathy present.        Head (left side): No posterior auricular and no occipital adenopathy present.     He has no cervical adenopathy.        Right cervical: No superficial cervical, no deep cervical and no posterior cervical adenopathy present.       Left cervical: No superficial cervical, no deep cervical and no posterior cervical adenopathy present.     He has no axillary adenopathy.        Right: No supraclavicular adenopathy present.        Left: No supraclavicular adenopathy present.   Neurological: He is alert and oriented to person, place, and time. He has normal strength. No cranial nerve deficit. Coordination normal.   Skin: Skin is dry. No rash noted. He is not diaphoretic. No cyanosis or erythema. Nails show no clubbing.   Psychiatric: He has a normal mood and affect. His behavior is normal. Judgment and thought content normal. Cognition and memory are normal.   Vitals reviewed.          Assessment:      1. Metastatic non-small cell lung cancer    2. Malignant neoplasm of overlapping sites of right lung           Plan:     Patient has metastatic non-small cell lung carcinoma presents for cycle 2 day 1 of immunotherapy with Keytruda PD L1 positive 50%  return 3 weeks with laboratory reviewed reviewed imaging studies photographs PET scan at baseline to start        Ez Horowitz Jr, MD FACP

## 2019-06-14 NOTE — DISCHARGE INSTRUCTIONS
[facility]  [address] [facility] [address]  [address]  [phone] phone     [phone] fax  Hours of Operation: Monday- Friday 8:00am- 5:00pm  After hours phone  [phone]  Hematology / Oncology Physicians on call      Dr. Carlos Manuel Goins      Please call with any concerns regarding your appointment today.    HOME CARE AFTER CHEMOTHERAPY   Meals   Many patients feel sick and lose their appetites during treatment. Eat small meals several times a day. Choose bland foods with little taste or smell if you have problems with nausea. Be sure to cook all food thoroughly. This kills bacteria and helps you avoid intestinal infection. Soft foods are easier to swallow and digest.   Activity   Exercise keeps you strong and keeps your heart and lungs active. Talk to your doctor about an appropriate exercise program for you.   Skin Care   To prevent a skin infection, bathe or shower once a day. Use a moisturizing soap and wash with warm water. Avoid very hot or cold water. Chemotherapy can make your skin dry . Apply moisturizing lotion to help relieve dry skin. Some drugs used in high doses can cause slight burns to appear (like sunburn). Ask for a special cream to help relieve the burn and protect your skin.   Prevent Mouth Sores   During chemotherapy, many people get mouth sores. Do the following to help prevent mouth sores or to ease discomfort.   Brush your teeth with a soft-bristle toothbrush after every meal.  Don't use dental floss if your platelet count is below 50,000. Your doctor or nurse will tell you if this is the case.  Use an oral swab or special soft toothbrush if your gums bleed during regular brushing.  Use mouthwash as directed. If you can't tolerate commercial mouthwash, use salt and baking soda to clean your mouth. Mix 1 teaspoon of salt and 1 teaspoon of baking soda into a glass of water. Swish and spit.  Call your  doctor or return to this facility if you develop any of the following:   Sore throat   White patches in the mouth or throat   Fever of 100.4ºF (38ºC) or higher, or as directed by your healthcare provider  © 2000-2011 [facility], [address] [place] [address]. All rights reserved. This information is not intended as a substitute for professional medical care. Always follow your healthcare professional's   FALL PREVENTION   Falls often occur due to slipping, tripping or losing your balance. Here are ways to reduce your risk of falling again.   Was there anything that caused your fall that can be fixed, removed or replaced?   Make your home safe by keeping walkways clear of objects you may trip over.   Use non-slip pads under rugs.   Do not walk in poorly lit areas.   Do not stand on chairs or wobbly ladders.   Use caution when reaching overhead or looking upward. This position can cause a loss of balance.   Be sure your shoes fit properly, have non-slip bottoms and are in good condition.   Be cautious when going up and down stairs, curbs, and when walking on uneven sidewalks.   If your balance is poor, consider using a cane or walker.   If your fall was related to alcohol use, stop or limit alcohol intake.   If your fall was related to use of sleeping medicines, talk to your doctor about this. You may need to reduce your dosage at bedtime if you awaken during the night to go to the bathroom.   To reduce the need for nighttime bathroom trips:   Avoid drinking fluids for several hours before going to bed   Empty your bladder before going to bed   Men can keep a urinal at the bedside   © 2000-2011 [facility], [address] [place] [address]. All rights reserved. This information is not intended as a substitute for professional medical care. Always follow your healthcare professional's instructions.  WAYS TO HELP PREVENT INFECTION         WASH YOUR HANDS OFTEN DURING THE DAY, ESPECIALLY  BEFORE YOU EAT, AFTER USING THE BATHROOM, AND AFTER TOUCHING ANIMALS     STAY AWAY FROM PEOPLE WHO HAVE ILLNESSES YOU CAN CATCH; SUCH AS COLDS, FLU, CHICKEN POX     TRY TO AVOID CROWDS     STAY AWAY FROM CHILDREN WHO RECENTLY HAVE RECEIVED LIVE VIRUS VACCINES     MAINTAIN GOOD MOUTH CARE     DO NOT SQUEEZE OR SCRATCH PIMPLES     CLEAN CUTS & SCRAPES RIGHT AWAY AND DAILY UNTIL HEALED WITH WARM WATER, SOAP & AN ANTISEPTIC     AVOID CONTACT WITH LITTER BOXES, BIRD CAGES, & FISH TANKS     AVOID STANDING WATER, IE., BIRD BATHS, FLOWER POTS/VASES, OR HUMIDIFIERS     WEAR GLOVES WHEN GARDENING OR CLEANING UP AFTER OTHERS, ESPECIALLY BABIES & SMALL CHILDREN     DO NOT EAT RAW FISH, SEAFOOD, MEAT, OR EGGS

## 2019-06-17 NOTE — PROGRESS NOTES
SW met with pt and his grand-daughter a few times of the course of his visit to clinic today regarding enrollment in Keytruda patient assistance ppwk. SW explained the program and his grand-daughter mentioned having spoken to  about this prior. Pt was agreeable to enrollment into program and signed necessary ppwk. SW will have MD review and sign and return to  for processing next week.

## 2019-07-05 ENCOUNTER — OFFICE VISIT (OUTPATIENT)
Dept: HEMATOLOGY/ONCOLOGY | Facility: CLINIC | Age: 84
End: 2019-07-05
Payer: MEDICARE

## 2019-07-05 ENCOUNTER — INFUSION (OUTPATIENT)
Dept: INFUSION THERAPY | Facility: HOSPITAL | Age: 84
End: 2019-07-05
Attending: INTERNAL MEDICINE
Payer: MEDICARE

## 2019-07-05 VITALS
HEIGHT: 71 IN | SYSTOLIC BLOOD PRESSURE: 131 MMHG | OXYGEN SATURATION: 96 % | RESPIRATION RATE: 18 BRPM | TEMPERATURE: 97 F | BODY MASS INDEX: 24.07 KG/M2 | DIASTOLIC BLOOD PRESSURE: 71 MMHG | WEIGHT: 171.94 LBS | HEART RATE: 67 BPM

## 2019-07-05 DIAGNOSIS — D53.9 NUTRITIONAL ANEMIA: ICD-10-CM

## 2019-07-05 DIAGNOSIS — C34.81 MALIGNANT NEOPLASM OF OVERLAPPING SITES OF RIGHT LUNG: Primary | ICD-10-CM

## 2019-07-05 DIAGNOSIS — C34.90 METASTATIC NON-SMALL CELL LUNG CANCER: Primary | ICD-10-CM

## 2019-07-05 PROCEDURE — 99999 PR PBB SHADOW E&M-EST. PATIENT-LVL IV: ICD-10-PCS | Mod: PBBFAC,,, | Performed by: INTERNAL MEDICINE

## 2019-07-05 PROCEDURE — 25000003 PHARM REV CODE 250: Performed by: INTERNAL MEDICINE

## 2019-07-05 PROCEDURE — 99215 OFFICE O/P EST HI 40 MIN: CPT | Mod: 25,S$GLB,, | Performed by: INTERNAL MEDICINE

## 2019-07-05 PROCEDURE — 1101F PT FALLS ASSESS-DOCD LE1/YR: CPT | Mod: CPTII,S$GLB,, | Performed by: INTERNAL MEDICINE

## 2019-07-05 PROCEDURE — 63600175 PHARM REV CODE 636 W HCPCS: Performed by: INTERNAL MEDICINE

## 2019-07-05 PROCEDURE — 96413 CHEMO IV INFUSION 1 HR: CPT

## 2019-07-05 PROCEDURE — 1101F PR PT FALLS ASSESS DOC 0-1 FALLS W/OUT INJ PAST YR: ICD-10-PCS | Mod: CPTII,S$GLB,, | Performed by: INTERNAL MEDICINE

## 2019-07-05 PROCEDURE — 99999 PR PBB SHADOW E&M-EST. PATIENT-LVL IV: CPT | Mod: PBBFAC,,, | Performed by: INTERNAL MEDICINE

## 2019-07-05 PROCEDURE — 99215 PR OFFICE/OUTPT VISIT, EST, LEVL V, 40-54 MIN: ICD-10-PCS | Mod: 25,S$GLB,, | Performed by: INTERNAL MEDICINE

## 2019-07-05 RX ORDER — DRONABINOL 2.5 MG/1
2.5 CAPSULE ORAL
Qty: 60 CAPSULE | Refills: 1 | Status: SHIPPED | OUTPATIENT
Start: 2019-07-05 | End: 2020-06-03

## 2019-07-05 RX ORDER — SODIUM CHLORIDE 0.9 % (FLUSH) 0.9 %
10 SYRINGE (ML) INJECTION
Status: CANCELLED | OUTPATIENT
Start: 2019-07-05

## 2019-07-05 RX ORDER — HEPARIN 100 UNIT/ML
500 SYRINGE INTRAVENOUS
Status: CANCELLED | OUTPATIENT
Start: 2019-07-05

## 2019-07-05 RX ORDER — HEPARIN 100 UNIT/ML
500 SYRINGE INTRAVENOUS
Status: DISCONTINUED | OUTPATIENT
Start: 2019-07-05 | End: 2019-07-05 | Stop reason: HOSPADM

## 2019-07-05 RX ADMIN — HEPARIN 500 UNITS: 100 SYRINGE at 03:07

## 2019-07-05 RX ADMIN — SODIUM CHLORIDE 200 MG: 9 INJECTION, SOLUTION INTRAVENOUS at 02:07

## 2019-07-05 NOTE — DISCHARGE INSTRUCTIONS
[facility]  [address] [address]  [address]  [phone] phone     [phone] fax  Hours of Operation: Monday- Friday 8:00am- 5:00pm  After hours phone  [phone]  Hematology / Oncology Physicians on call      Dr. Carlos Manuel Goins      Please call with any concerns regarding your appointment today.    FALL PREVENTION   Falls often occur due to slipping, tripping or losing your balance. Here are ways to reduce your risk of falling again.   Was there anything that caused your fall that can be fixed, removed or replaced?   Make your home safe by keeping walkways clear of objects you may trip over.   Use non-slip pads under rugs.   Do not walk in poorly lit areas.   Do not stand on chairs or wobbly ladders.   Use caution when reaching overhead or looking upward. This position can cause a loss of balance.   Be sure your shoes fit properly, have non-slip bottoms and are in good condition.   Be cautious when going up and down stairs, curbs, and when walking on uneven sidewalks.   If your balance is poor, consider using a cane or walker.   If your fall was related to alcohol use, stop or limit alcohol intake.   If your fall was related to use of sleeping medicines, talk to your doctor about this. You may need to reduce your dosage at bedtime if you awaken during the night to go to the bathroom.   To reduce the need for nighttime bathroom trips:   Avoid drinking fluids for several hours before going to bed   Empty your bladder before going to bed   Men can keep a urinal at the bedside   © 2666-7382 [employer], [address] [place] [address], [place] [address]. All rights reserved. This information is not intended as a substitute for professional medical care. Always follow your healthcare professional's instructions.

## 2019-07-05 NOTE — PLAN OF CARE
Problem: Adult Inpatient Plan of Care  Goal: Plan of Care Review  Outcome: Ongoing (interventions implemented as appropriate)  Pt states he feels well today, gets tired/weak for a few days after treatment.

## 2019-07-05 NOTE — NURSING
Pt tolerated Keytruda well. No adverse reaction noted. Pt education reinforced on regimen, side effects, what to expect, and when to call . Pt verbalized understanding. I reviewed pt calendar w/ pt and understanding verbalized. Right PAC deaccessed and flushed w/ NS and heparin per protocol.

## 2019-07-05 NOTE — PROGRESS NOTES
Subjective:       Patient ID: Brant Lees is a 88 y.o. male.    Chief Complaint: Malignant neoplasm of overlapping sites of right lung [C34.81]  HPI: We have an opportunity to see Mr. Brant Lees in Hematology Oncology clinic at [facility] on 07/05/2019.  Mr. Brant Lees is a 88 y.o. gentleman who had screening Ct lung, was found to have multiple lung lesions. PET CT showed involvement of both right and left lungs as well as hilar nodes.  Biopsy showed NSCLC squamous type.  Reported no cancer symptoms, denies pain.  Molecular analysis showed high PDL1 expression 50%.  Currently on keytruda.  Reported has decreased appetite and mild fatigue.       Malignant neoplasm of overlapping sites of lung    4/15/2019 Initial Diagnosis     Malignant neoplasm of overlapping sites of lung         4/15/2019 Cancer Staged     Staging form: Lung, AJCC 8th Edition  - Clinical stage from 4/15/2019: Stage IV (cT3, cN3, cM1a) - Signed by Simone Goins MD on 4/15/2019           Metastatic non-small cell lung cancer    4/15/2019 Initial Diagnosis     Metastatic non-small cell lung cancer         5/7/2019 -  Chemotherapy     Treatment Summary   Plan Name: OP PEMBROLIZUMAB 200MG Q3W  Treatment Goal: Curative  Status: Active  Start Date: 5/24/2019  End Date: 9/6/2019 (Planned)  Provider: Simone Goins MD  Chemotherapy: pembrolizumab (KEYTRUDA) 200 mg in sodium chloride 0.9% 108 mL chemo infusion, 200 mg, Intravenous, Clinic/HOD 1 time, 2 of 6 cycles  Administration: 200 mg (5/24/2019), 200 mg (6/14/2019)          Past Medical History:   Diagnosis Date    Anticoagulant long-term use     plavix    Cancer     Metastatic non-small cell lung cancer    Cardiomyopathy 9/07    Ischemic    HBP (high blood pressure) 1997    Hyperlipidemia     LBP (low back pain)     MI (myocardial infarction) 12/06    Status post primary angioplasty with coronary stent 12/06    Thyroid disease      Family History   Problem Relation Age of Onset     Heart disease Mother     Cancer Mother      Social History     Socioeconomic History    Marital status:      Spouse name: Not on file    Number of children: 1    Years of education: Not on file    Highest education level: Not on file   Occupational History    Not on file   Social Needs    Financial resource strain: Not on file    Food insecurity:     Worry: Not on file     Inability: Not on file    Transportation needs:     Medical: Not on file     Non-medical: Not on file   Tobacco Use    Smoking status: Never Smoker    Smokeless tobacco: Never Used   Substance and Sexual Activity    Alcohol use: No    Drug use: No    Sexual activity: Not Currently     Partners: Female   Lifestyle    Physical activity:     Days per week: Not on file     Minutes per session: Not on file    Stress: Not on file   Relationships    Social connections:     Talks on phone: Not on file     Gets together: Not on file     Attends Taoist service: Not on file     Active member of club or organization: Not on file     Attends meetings of clubs or organizations: Not on file     Relationship status: Not on file   Other Topics Concern    Not on file   Social History Narrative    Not on file     Past Surgical History:   Procedure Laterality Date    CORONARY STENT PLACEMENT      EXCISION-BLADDER TUMOR-TRANSURETHRAL (TURBT) N/A 8/22/2017    Performed by Jorge Alcocer IV, MD at [facility] OR    HERNIA REPAIR      INSERTION, VENOUS ACCESS PORT Left 5/17/2019    Performed by Lester Trejo MD at [facility] OR     Current Outpatient Medications   Medication Sig Dispense Refill    acetaminophen (TYLENOL) 325 MG tablet Take 325 mg by mouth every 6 (six) hours as needed for Pain.      amlodipine (NORVASC) 10 MG tablet Take 2.5 tablets by mouth 2 (two) times daily. 1/2 two times daily       ASPIRIN (ASPIR-81 ORAL) Take by mouth.      fluorometholone 0.1% (FML) 0.1 % DrpS       levothyroxine (SYNTHROID) 50 MCG tablet TAKE 1  TABLET DAILY 90 tablet 2    lisinopril (PRINIVIL,ZESTRIL) 20 MG tablet Take 1 tablet by mouth once daily.       LOTEMAX 0.5 % DrpG INSTILL 1 DROP INTO BOTH EYES TWICE DAILY THANK YOU  5    nitroGLYCERIN (NITROSTAT) 0.4 MG SL tablet Place 0.4 mg under the tongue every 5 (five) minutes as needed for Chest pain.      omeprazole (PRILOSEC) 20 MG capsule Take 20 mg by mouth once daily.      simvastatin (ZOCOR) 20 MG tablet Take 20 mg by mouth every evening.      dronabinol (MARINOL) 2.5 MG capsule Take 1 capsule (2.5 mg total) by mouth 2 (two) times daily before meals. 60 capsule 1     No current facility-administered medications for this visit.        Labs:  Lab Results   Component Value Date    WBC 10.00 07/05/2019    HGB 13.8 (L) 07/05/2019    HCT 42.1 07/05/2019    MCV 89 07/05/2019     07/05/2019     BMP  Lab Results   Component Value Date     07/05/2019    K 4.4 07/05/2019     07/05/2019    CO2 24 07/05/2019    BUN 17 07/05/2019    CREATININE 1.2 07/05/2019    CALCIUM 9.1 07/05/2019    ANIONGAP 10 07/05/2019    ESTGFRAFRICA >60 07/05/2019    EGFRNONAA 54 (A) 07/05/2019     Lab Results   Component Value Date    ALT 20 07/05/2019    AST 18 07/05/2019    ALKPHOS 102 07/05/2019    BILITOT 0.3 07/05/2019       No results found for: IRON, TIBC, FERRITIN, SATURATEDIRO  No results found for: ALEMGZGO12  No results found for: FOLATE  Lab Results   Component Value Date    TSH 0.880 07/05/2019       I have reviewed the radiology reports and examined the scan/xray images.    Review of Systems   Constitutional: Negative.    HENT: Negative.    Eyes: Negative.    Respiratory: Negative.    Cardiovascular: Negative.    Gastrointestinal: Negative.    Endocrine: Negative.    Genitourinary: Negative.    Musculoskeletal: Negative.    Skin: Negative.    Allergic/Immunologic: Negative.    Neurological: Negative.    Hematological: Negative.    Psychiatric/Behavioral: Negative.      ECOG SCORE               Objective:     Vitals:    07/05/19 1340   BP: 131/71   Pulse: 67   Resp: 18   Temp: 97.2 °F (36.2 °C)   Body mass index is 23.98 kg/m².  Physical Exam   Constitutional: He is oriented to person, place, and time. He appears well-developed and well-nourished.   HENT:   Head: Normocephalic and atraumatic.   Eyes: Conjunctivae and EOM are normal.   Neck: Normal range of motion. Neck supple.   Cardiovascular: Normal rate and regular rhythm.   Pulmonary/Chest: Effort normal and breath sounds normal.   Abdominal: Soft. Bowel sounds are normal.   Musculoskeletal: Normal range of motion.   Neurological: He is alert and oriented to person, place, and time.   Skin: Skin is warm and dry.   Psychiatric: He has a normal mood and affect. His behavior is normal. Judgment and thought content normal.   Nursing note and vitals reviewed.        Assessment:      1. Malignant neoplasm of overlapping sites of right lung           Plan:     Malignant neoplasm of overlapping sites of right lung  Continue on keytruda  Refer to see Podiatry for toes nails management and feet bunions.  -     dronabinol (MARINOL) 2.5 MG capsule; Take 1 capsule (2.5 mg total) by mouth 2 (two) times daily before meals.  Dispense: 60 capsule; Refill: 1

## 2019-07-09 ENCOUNTER — TELEPHONE (OUTPATIENT)
Dept: INTERNAL MEDICINE | Facility: CLINIC | Age: 84
End: 2019-07-09

## 2019-07-09 NOTE — TELEPHONE ENCOUNTER
----- Message from Justine Bethea MA sent at 7/9/2019  3:44 PM CDT -----  Contact: velvet program  Please advise  ----- Message -----  From: Jenny Gilliland  Sent: 7/9/2019   3:40 PM  To: Arthur WHITE Staff    needs intake form returner to her, faxed 7/1 & 7/5. needs by tomorrow...[phone]

## 2019-07-09 NOTE — TELEPHONE ENCOUNTER
Patient is being enrolled in drug co-pay assistance program for his chemotherapy, Keytruda (pembrolizumab). The ordering doctor has to fill this in.   This has to go to Dr Simone Goins, his oncologist, not to me (his PCP).  Please call Dr Goins's staff and verify correct fax number with them.

## 2019-07-12 ENCOUNTER — TELEPHONE (OUTPATIENT)
Dept: HEMATOLOGY/ONCOLOGY | Facility: CLINIC | Age: 84
End: 2019-07-12

## 2019-07-12 NOTE — TELEPHONE ENCOUNTER
----- Message from Rona Herrera LPN sent at 7/12/2019 11:51 AM CDT -----  I will give the form to Dr. Goins for signature. The fax is almost illegible if Dr. Goins needs to complete the form  In its entirety please let me know or if you have a better copy already started let me know that as well to prevent duplication.    Thanks   Justin  ----- Message -----  From: Nubia Valdivia LPN  Sent: 7/12/2019  11:37 AM  To: Dennise Herrera LMSW, Simone Goins MD, #    I received a fax for this patient patient assistance paperwork for Keytruda. I will [facility] a [facility] and give a copy to Justin.

## 2019-07-24 ENCOUNTER — OFFICE VISIT (OUTPATIENT)
Dept: PODIATRY | Facility: CLINIC | Age: 84
End: 2019-07-24
Payer: MEDICARE

## 2019-07-24 VITALS
HEIGHT: 71 IN | DIASTOLIC BLOOD PRESSURE: 70 MMHG | HEART RATE: 63 BPM | SYSTOLIC BLOOD PRESSURE: 145 MMHG | WEIGHT: 174 LBS | BODY MASS INDEX: 24.36 KG/M2

## 2019-07-24 DIAGNOSIS — B35.1 DERMATOPHYTOSIS OF NAIL: ICD-10-CM

## 2019-07-24 DIAGNOSIS — N18.30 CHRONIC RENAL FAILURE, STAGE 3 (MODERATE): Primary | ICD-10-CM

## 2019-07-24 DIAGNOSIS — L84 CORN OR CALLUS: ICD-10-CM

## 2019-07-24 PROCEDURE — 1101F PT FALLS ASSESS-DOCD LE1/YR: CPT | Mod: CPTII,S$GLB,, | Performed by: PODIATRIST

## 2019-07-24 PROCEDURE — 1101F PR PT FALLS ASSESS DOC 0-1 FALLS W/OUT INJ PAST YR: ICD-10-PCS | Mod: CPTII,S$GLB,, | Performed by: PODIATRIST

## 2019-07-24 PROCEDURE — 11056 PARNG/CUTG B9 HYPRKR LES 2-4: CPT | Mod: Q9,S$GLB,, | Performed by: PODIATRIST

## 2019-07-24 PROCEDURE — 99203 PR OFFICE/OUTPT VISIT, NEW, LEVL III, 30-44 MIN: ICD-10-PCS | Mod: 25,S$GLB,, | Performed by: PODIATRIST

## 2019-07-24 PROCEDURE — 11721 DEBRIDE NAIL 6 OR MORE: CPT | Mod: 59,Q9,S$GLB, | Performed by: PODIATRIST

## 2019-07-24 PROCEDURE — 99999 PR PBB SHADOW E&M-EST. PATIENT-LVL III: ICD-10-PCS | Mod: PBBFAC,,, | Performed by: PODIATRIST

## 2019-07-24 PROCEDURE — 99999 PR PBB SHADOW E&M-EST. PATIENT-LVL III: CPT | Mod: PBBFAC,,, | Performed by: PODIATRIST

## 2019-07-24 PROCEDURE — 99203 OFFICE O/P NEW LOW 30 MIN: CPT | Mod: 25,S$GLB,, | Performed by: PODIATRIST

## 2019-07-24 PROCEDURE — 11056 PR TRIM BENIGN HYPERKERATOTIC SKIN LESION,2-4: ICD-10-PCS | Mod: Q9,S$GLB,, | Performed by: PODIATRIST

## 2019-07-24 PROCEDURE — 11721 PR DEBRIDEMENT OF NAILS, 6 OR MORE: ICD-10-PCS | Mod: 59,Q9,S$GLB, | Performed by: PODIATRIST

## 2019-07-24 NOTE — LETTER
August 10, 2019      Simone Goins MD  [address] [place] [address]           [facility]  [address] [place] [address]  Phone: [phone]  Fax: [phone]          Patient: Brant Lees   MR Number: [id number]   YOB: 1931   Date of Visit: 7/24/2019       Dear Dr. Simone Goins:    Thank you for referring Brant Lees to me for evaluation. Attached you will find relevant portions of my assessment and plan of care.    If you have questions, please do not hesitate to call me. I look forward to following Brant Lees along with you.    Sincerely,    Lennie Anne DPM    Enclosure  CC:  No Recipients    If you would like to receive this communication electronically, please contact [email or web address] or [phone] to request more information on [employer] Link access.    For providers and/or their staff who would like to refer a patient to [facility], please contact us through our one-stop-shop provider referral line, [facility] , at [phone].    If you feel you have received this communication in error or would no longer like to receive these types of communications, please e-mail [email or web address]

## 2019-07-26 ENCOUNTER — OFFICE VISIT (OUTPATIENT)
Dept: HEMATOLOGY/ONCOLOGY | Facility: CLINIC | Age: 84
End: 2019-07-26
Payer: MEDICARE

## 2019-07-26 ENCOUNTER — INFUSION (OUTPATIENT)
Dept: INFUSION THERAPY | Facility: HOSPITAL | Age: 84
End: 2019-07-26
Attending: INTERNAL MEDICINE
Payer: MEDICARE

## 2019-07-26 VITALS
OXYGEN SATURATION: 94 % | BODY MASS INDEX: 24.63 KG/M2 | TEMPERATURE: 98 F | SYSTOLIC BLOOD PRESSURE: 123 MMHG | DIASTOLIC BLOOD PRESSURE: 63 MMHG | HEART RATE: 60 BPM | WEIGHT: 176.56 LBS

## 2019-07-26 VITALS — HEIGHT: 71 IN | BODY MASS INDEX: 24.72 KG/M2 | WEIGHT: 176.56 LBS

## 2019-07-26 DIAGNOSIS — N18.30 CHRONIC RENAL FAILURE, STAGE 3 (MODERATE): ICD-10-CM

## 2019-07-26 DIAGNOSIS — J43.1 PANLOBULAR EMPHYSEMA: ICD-10-CM

## 2019-07-26 DIAGNOSIS — E06.3 HYPOTHYROIDISM DUE TO HASHIMOTO'S THYROIDITIS: ICD-10-CM

## 2019-07-26 DIAGNOSIS — C34.90 METASTATIC NON-SMALL CELL LUNG CANCER: Primary | ICD-10-CM

## 2019-07-26 DIAGNOSIS — E07.9 THYROID DYSFUNCTION: ICD-10-CM

## 2019-07-26 DIAGNOSIS — C34.82 MALIGNANT NEOPLASM OF OVERLAPPING SITES OF LEFT LUNG: ICD-10-CM

## 2019-07-26 DIAGNOSIS — R64 CACHEXIA: ICD-10-CM

## 2019-07-26 DIAGNOSIS — E03.8 HYPOTHYROIDISM DUE TO HASHIMOTO'S THYROIDITIS: ICD-10-CM

## 2019-07-26 DIAGNOSIS — C34.11 MALIGNANT NEOPLASM OF UPPER LOBE, RIGHT BRONCHUS OR LUNG: ICD-10-CM

## 2019-07-26 PROCEDURE — A4216 STERILE WATER/SALINE, 10 ML: HCPCS | Performed by: INTERNAL MEDICINE

## 2019-07-26 PROCEDURE — 99215 PR OFFICE/OUTPT VISIT, EST, LEVL V, 40-54 MIN: ICD-10-PCS | Mod: 25,S$GLB,, | Performed by: INTERNAL MEDICINE

## 2019-07-26 PROCEDURE — 3288F PR FALLS RISK ASSESSMENT DOCUMENTED: ICD-10-PCS | Mod: CPTII,S$GLB,, | Performed by: INTERNAL MEDICINE

## 2019-07-26 PROCEDURE — 63600175 PHARM REV CODE 636 W HCPCS: Mod: JG | Performed by: INTERNAL MEDICINE

## 2019-07-26 PROCEDURE — 99999 PR PBB SHADOW E&M-EST. PATIENT-LVL III: CPT | Mod: PBBFAC,,, | Performed by: INTERNAL MEDICINE

## 2019-07-26 PROCEDURE — 96413 CHEMO IV INFUSION 1 HR: CPT

## 2019-07-26 PROCEDURE — 1100F PTFALLS ASSESS-DOCD GE2>/YR: CPT | Mod: CPTII,S$GLB,, | Performed by: INTERNAL MEDICINE

## 2019-07-26 PROCEDURE — 3288F FALL RISK ASSESSMENT DOCD: CPT | Mod: CPTII,S$GLB,, | Performed by: INTERNAL MEDICINE

## 2019-07-26 PROCEDURE — 99215 OFFICE O/P EST HI 40 MIN: CPT | Mod: 25,S$GLB,, | Performed by: INTERNAL MEDICINE

## 2019-07-26 PROCEDURE — 1100F PR PT FALLS ASSESS DOC 2+ FALLS/FALL W/INJURY/YR: ICD-10-PCS | Mod: CPTII,S$GLB,, | Performed by: INTERNAL MEDICINE

## 2019-07-26 PROCEDURE — 99999 PR PBB SHADOW E&M-EST. PATIENT-LVL III: ICD-10-PCS | Mod: PBBFAC,,, | Performed by: INTERNAL MEDICINE

## 2019-07-26 PROCEDURE — 25000003 PHARM REV CODE 250: Performed by: INTERNAL MEDICINE

## 2019-07-26 RX ORDER — SODIUM CHLORIDE 0.9 % (FLUSH) 0.9 %
10 SYRINGE (ML) INJECTION
Status: CANCELLED | OUTPATIENT
Start: 2019-07-26

## 2019-07-26 RX ORDER — HEPARIN 100 UNIT/ML
500 SYRINGE INTRAVENOUS
Status: DISCONTINUED | OUTPATIENT
Start: 2019-07-26 | End: 2019-07-26 | Stop reason: HOSPADM

## 2019-07-26 RX ORDER — HEPARIN 100 UNIT/ML
500 SYRINGE INTRAVENOUS
Status: CANCELLED | OUTPATIENT
Start: 2019-07-26

## 2019-07-26 RX ORDER — SODIUM CHLORIDE 0.9 % (FLUSH) 0.9 %
10 SYRINGE (ML) INJECTION
Status: DISCONTINUED | OUTPATIENT
Start: 2019-07-26 | End: 2019-07-26 | Stop reason: HOSPADM

## 2019-07-26 RX ADMIN — SODIUM CHLORIDE 10 ML: 9 INJECTION, SOLUTION INTRAMUSCULAR; INTRAVENOUS; SUBCUTANEOUS at 02:07

## 2019-07-26 RX ADMIN — SODIUM CHLORIDE 200 MG: 9 INJECTION, SOLUTION INTRAVENOUS at 03:07

## 2019-07-26 RX ADMIN — HEPARIN 500 UNITS: 100 SYRINGE at 03:07

## 2019-07-26 RX ADMIN — SODIUM CHLORIDE: 9 INJECTION, SOLUTION INTRAVENOUS at 02:07

## 2019-07-26 NOTE — DISCHARGE INSTRUCTIONS
[facility]  [address]  [address] [address]  [phone] phone     [phone] fax  Hours of Operation: Monday- Friday 8:00am- 5:00pm  After hours phone  [phone]  Hematology / Oncology Physicians on call      Dr. Carlos Manuel Win                        Please call with any concerns regarding your appointment today.      HOME CARE AFTER CHEMOTHERAPY   Meals   Many patients feel sick and lose their appetites during treatment. Eat small meals several times a day. Choose bland foods with little taste or smell if you have problems with nausea. Be sure to cook all food thoroughly. This kills bacteria and helps you avoid intestinal infection. Soft foods are easier to swallow and digest.   Activity   Exercise keeps you strong and keeps your heart and lungs active. Talk to your doctor about an appropriate exercise program for you.   Skin Care   To prevent a skin infection, bathe or shower once a day. Use a moisturizing soap and wash with warm water. Avoid very hot or cold water. Chemotherapy can make your skin dry . Apply moisturizing lotion to help relieve dry skin. Some drugs used in high doses can cause slight burns to appear (like sunburn). Ask for a special cream to help relieve the burn and protect your skin.   Prevent Mouth Sores   During chemotherapy, many people get mouth sores. Do the following to help prevent mouth sores or to ease discomfort.   Brush your teeth with a soft-bristle toothbrush after every meal.  Don't use dental floss if your platelet count is below 50,000. Your doctor or nurse will tell you if this is the case.  Use an oral swab or special soft toothbrush if your gums bleed during regular brushing.  Use mouthwash as directed. If you can't tolerate commercial mouthwash, use salt and baking soda to clean your mouth. Mix 1 teaspoon of salt and 1 teaspoon of baking soda into a glass of water. Swish and spit.  Call your  Consult





- Past Medical History


CNS: Yes: TIA


Cardio/Vascular: Yes: HTN


Pulmonary: Yes: COPD


Gastrointestinal: Yes: Diverticulitis


Renal/: Yes: Cancer (Prostate/KIDNEY)


Psych: Yes: Anxiety


Rheumatology: Yes: Other (arthritis knees)





- Past Surgical History


Past Surgical History: Yes: Appendectomy, Colonoscopy (last 1 yrs ago, kozicky 

2 polyps. h/o ischemic colitis 3,2 and ?years ago), Hernia Repair (b/l), 

Nephrectomy (left)





- Alcohol/Substance Use


Hx Alcohol Use: No





- Smoking History


Smoking history: Former smoker


Have you smoked in the past 12 months: No


Aproximately how many cigarettes per day: 0


If you are a former smoker, when did you quit?: 35 years ago





- Social History


ADL: Independent


Occupation: retired salesman


History of Recent Travel: No





Home Medications





- Allergies


Allergies/Adverse Reactions: 


 Allergies











Allergy/AdvReac Type Severity Reaction Status Date / Time


 


No Known Drug Allergies Allergy   Verified 02/12/17 21:20














- Home Medications


Home Medications: 


Ambulatory Orders





Amlodipine Besylate [Norvasc -] 10 mg PO DAILY 11/06/14 


Ca Cmb No.1/Vit D3/B-6/FA/B12 [Vitamin D3 1,000 Unit Tablet] 1 tab PO DAILY 11/ 06/14 


Carvedilol 12.5 mg PO BID 11/06/14 


Vitamin E 400 unit PO DAILY 11/06/14 


Losartan Potassium [Cozaar] 50 mg PO DAILY 11/07/14 


Atorvastatin Ca [Lipitor] 10 mg PO HS #30 tablet 01/21/15 


Aspirin [ASA -] 325 mg PO DAILY@0800  tablet 12/09/15 


Tamsulosin HCl [Flomax -] 0.8 mg PO DAILY@0830  cap.er.24h 12/23/15 


Cinnamon Bark [Cinnamon] 500 mg PO DAILY 02/12/17 


Finasteride 0 mg PO DAILY 02/12/17 


Ranitidine [Zantac -] 150 mg PO DAILY 02/13/17 











Physical Exam


Vital Signs: 


 Vital Signs











Temperature  98.9 F   02/28/17 06:00


 


Pulse Rate  84   02/28/17 06:00


 


Respiratory Rate  18   02/28/17 06:00


 


Blood Pressure  146/66   02/28/17 06:00


 


O2 Sat by Pulse Oximetry (%)  94 L  02/27/17 21:00











Labs: 


 CBC, BMP





 02/28/17 05:35 





 02/25/17 05:45 











Assessment/Plan


Vascular Surgery 





Pt seen and examined. 


extensive stay here at hospital. 


CAlled to evaluate right IJ thrombus. 


No DVT in subclavian. 


Diagnosed on 2/26


No signs of placement of any central lines in area. 


Would repeat US in one week from last one. 


Would not anticoagulate unless clot propagates. 





Reed Ramirez DO doctor or return to this facility if you develop any of the following:   Sore throat   White patches in the mouth or throat   Fever of 100.4ºF (38ºC) or higher, or as directed by your healthcare provider  © 2000-2011 [employer], [address] [place] [address], [place] [address]. All rights reserved. This information is not intended as a substitute for professional medical care. Always follow your healthcare professional's   HOME CARE AFTER CHEMOTHERAPY   Meals   Many patients feel sick and lose their appetites during treatment. Eat small meals several times a day. Choose bland foods with little taste or smell if you have problems with nausea. Be sure to cook all food thoroughly. This kills bacteria and helps you avoid intestinal infection. Soft foods are easier to swallow and digest.   Activity   Exercise keeps you strong and keeps your heart and lungs active. Talk to your doctor about an appropriate exercise program for you.   Skin Care   To prevent a skin infection, bathe or shower once a day. Use a moisturizing soap and wash with warm water. Avoid very hot or cold water. Chemotherapy can make your skin dry . Apply moisturizing lotion to help relieve dry skin. Some drugs used in high doses can cause slight burns to appear (like sunburn). Ask for a special cream to help relieve the burn and protect your skin.   Prevent Mouth Sores   During chemotherapy, many people get mouth sores. Do the following to help prevent mouth sores or to ease discomfort.   Brush your teeth with a soft-bristle toothbrush after every meal.  Don't use dental floss if your platelet count is below 50,000. Your doctor or nurse will tell you if this is the case.  Use an oral swab or special soft toothbrush if your gums bleed during regular brushing.  Use mouthwash as directed. If you can't tolerate commercial mouthwash, use salt and baking soda to clean your mouth. Mix 1 teaspoon of salt and 1 teaspoon of baking soda into a glass of water. Swish  and spit.  Call your doctor or return to this facility if you develop any of the following:   Sore throat   White patches in the mouth or throat   Fever of 100.4ºF (38ºC) or higher, or as directed by your healthcare provider  © 3813-1890 [employer], [address] [place] [address]. All rights reserved. This information is not intended as a substitute for professional medical care. Always follow your healthcare professional's       WAYS TO HELP PREVENT INFECTION         WASH YOUR HANDS OFTEN DURING THE DAY, ESPECIALLY BEFORE YOU EAT, AFTER USING THE BATHROOM, AND AFTER TOUCHING ANIMALS     STAY AWAY FROM PEOPLE WHO HAVE ILLNESSES YOU CAN CATCH; SUCH AS COLDS, FLU, CHICKEN POX     TRY TO AVOID CROWDS     STAY AWAY FROM CHILDREN WHO RECENTLY HAVE RECEIVED LIVE VIRUS VACCINES     MAINTAIN GOOD MOUTH CARE     DO NOT SQUEEZE OR SCRATCH PIMPLES     CLEAN CUTS & SCRAPES RIGHT AWAY AND DAILY UNTIL HEALED WITH WARM WATER, SOAP & AN ANTISEPTIC     AVOID CONTACT WITH LITTER BOXES, BIRD CAGES, & FISH TANKS     AVOID STANDING WATER, IE., BIRD BATHS, FLOWER POTS/VASES, OR HUMIDIFIERS     WEAR GLOVES WHEN GARDENING OR CLEANING UP AFTER OTHERS, ESPECIALLY BABIES & SMALL CHILDREN      YOU HAVE STARTED ON CHEMOTHERAPY. IF YOU EXPERIENCE ANY OF THE FOLLOWING PROBLEMS, CALL THE OFFICE IMMEDIATELY.    *FEVER .0 OR GREATER    *CHILLS, ESPECIALLY SHAKING CHILLS, OR SWEATING    *A SEVERE COUGH OR SORE THROAT, OR SINUS PAIN/     PRESSURE    *REDNESS, SWELLING, OR TENDERNESS AROUND A WOUND,     SORE, PIMPLE, RECTAL AREA, OR IV SITE    *SORES OR ULCERS IN THE MOUTH    *BLISTERS ON THE LIPS OR SKIN    *FREQUENT URGENCY TO URINATE OR A BURNING FEELING   WHEN YOU URINATE    *BLOOD IN THE URINE OR STOOL    *ANY UNEXPLAINED BRUISING OR PROLONGED BLEEDING,     (NOSEBLEEDS OR BLEEDING GUMS)    *LOOSE BOWEL MOVEMENTS THAT DO NOT RESPOND TO     IMODIUM OR MORE THAN THREE TIMES A DAY    *VOMITING UNRESPONSIVE TO  ANTINAUSEA MEDICINE    *ANY UNUSUAL PHYSICAL SYMPTOMS THAT BEGAN AFTER     CHEMOTHERAPY    DURING WEEKDAYS, CALL AND ASK TO SPEAK DIRECTLY TO A NURSE.  AT OTHER TIMES, CALL THE OFFICE PHONE NUMBER; THE ANSWERING SERVICE WILL CONTACT THE ON-CALL PHYSICIAN.  SOMEONE IS AVAILABLE 24 HOURS A DAY, SEVEN DAYS A WEEK.    FALL PREVENTION   Falls often occur due to slipping, tripping or losing your balance. Here are ways to reduce your risk of falling again.   Was there anything that caused your fall that can be fixed, removed or replaced?   Make your home safe by keeping walkways clear of objects you may trip over.   Use non-slip pads under rugs.   Do not walk in poorly lit areas.   Do not stand on chairs or wobbly ladders.   Use caution when reaching overhead or looking upward. This position can cause a loss of balance.   Be sure your shoes fit properly, have non-slip bottoms and are in good condition.   Be cautious when going up and down stairs, curbs, and when walking on uneven sidewalks.   If your balance is poor, consider using a cane or walker.   If your fall was related to alcohol use, stop or limit alcohol intake.   If your fall was related to use of sleeping medicines, talk to your doctor about this. You may need to reduce your dosage at bedtime if you awaken during the night to go to the bathroom.   To reduce the need for nighttime bathroom trips:   Avoid drinking fluids for several hours before going to bed   Empty your bladder before going to bed   Men can keep a urinal at the bedside   © 9257-9862 [facility], [address] [place] [address], [place] [address]. All rights reserved. This information is not intended as a substitute for professional medical care. Always follow your healthcare professional's instructions.

## 2019-07-26 NOTE — PROGRESS NOTES
Subjective:       Patient ID: Brant Lees is a 88 y.o. male.    Chief Complaint: Results; Chronic Renal Failure; Chemotherapy; and Lung Cancer    HPI 88-year-old male history of metastatic non-small cell lung carcinoma PD L1 positive 50% patient continues with every 3 week true the patient returns for review ECOG status 2 concerned over weight loss    Past Medical History:   Diagnosis Date    Anticoagulant long-term use     plavix    Cancer     Metastatic non-small cell lung cancer    Cardiomyopathy 9/07    Ischemic    HBP (high blood pressure) 1997    Hyperlipidemia     LBP (low back pain)     MI (myocardial infarction) 12/06    Status post primary angioplasty with coronary stent 12/06    Thyroid disease      Family History   Problem Relation Age of Onset    Heart disease Mother     Cancer Mother      Social History     Socioeconomic History    Marital status:      Spouse name: Not on file    Number of children: 1    Years of education: Not on file    Highest education level: Not on file   Occupational History    Not on file   Social Needs    Financial resource strain: Not on file    Food insecurity:     Worry: Not on file     Inability: Not on file    Transportation needs:     Medical: Not on file     Non-medical: Not on file   Tobacco Use    Smoking status: Never Smoker    Smokeless tobacco: Never Used   Substance and Sexual Activity    Alcohol use: No    Drug use: No    Sexual activity: Not Currently     Partners: Female   Lifestyle    Physical activity:     Days per week: Not on file     Minutes per session: Not on file    Stress: Not on file   Relationships    Social connections:     Talks on phone: Not on file     Gets together: Not on file     Attends [employer] service: Not on file     Active member of club or organization: Not on file     Attends meetings of clubs or organizations: Not on file     Relationship status: Not on file   Other Topics Concern    Not on file    Social History Narrative    Not on file     Past Surgical History:   Procedure Laterality Date    CORONARY STENT PLACEMENT      EXCISION-BLADDER TUMOR-TRANSURETHRAL (TURBT) N/A 8/22/2017    Performed by Jorge Alcocer IV, MD at [facility] OR    HERNIA REPAIR      INSERTION, VENOUS ACCESS PORT Left 5/17/2019    Performed by Lester Trejo MD at [facility] OR       Labs:  Lab Results   Component Value Date    WBC 9.34 07/26/2019    HGB 14.3 07/26/2019    HCT 43.0 07/26/2019    MCV 90 07/26/2019     07/26/2019     BMP  Lab Results   Component Value Date     07/26/2019    K 4.6 07/26/2019     07/26/2019    CO2 26 07/26/2019    BUN 23 07/26/2019    CREATININE 1.3 07/26/2019    CALCIUM 9.1 07/26/2019    ANIONGAP 10 07/26/2019    ESTGFRAFRICA 56 (A) 07/26/2019    EGFRNONAA 49 (A) 07/26/2019     Lab Results   Component Value Date    ALT 21 07/26/2019    AST 20 07/26/2019    ALKPHOS 107 07/26/2019    BILITOT 0.2 07/26/2019       No results found for: IRON, TIBC, FERRITIN, SATURATEDIRO  No results found for: [id number]  No results found for: FOLATE  Lab Results   Component Value Date    TSH 0.880 07/05/2019         Review of Systems   Constitutional: Positive for activity change, appetite change and unexpected weight change. Negative for chills, diaphoresis, fatigue and fever.   HENT: Negative for congestion, dental problem, drooling, ear discharge, ear pain, facial swelling, hearing loss, mouth sores, nosebleeds, postnasal drip, rhinorrhea, sinus pressure, sneezing, sore throat, tinnitus, trouble swallowing and voice change.    Eyes: Negative for photophobia, pain, discharge, redness, itching and visual disturbance.   Respiratory: Negative for apnea, cough, choking, chest tightness, shortness of breath, wheezing and stridor.    Cardiovascular: Negative for chest pain, palpitations and leg swelling.   Gastrointestinal: Negative for abdominal distention, abdominal pain, anal bleeding, blood in stool,  constipation, diarrhea, nausea, rectal pain and vomiting.   Endocrine: Negative for cold intolerance, heat intolerance, polydipsia, polyphagia and polyuria.   Genitourinary: Negative for decreased urine volume, difficulty urinating, discharge, dysuria, enuresis, flank pain, frequency, genital sores, hematuria, penile pain, penile swelling, scrotal swelling, testicular pain and urgency.   Musculoskeletal: Negative for arthralgias, back pain, gait problem, joint swelling, myalgias, neck pain and neck stiffness.   Skin: Negative for color change, pallor, rash and wound.   Allergic/Immunologic: Negative for environmental allergies, food allergies and immunocompromised state.   Neurological: Positive for weakness. Negative for dizziness, tremors, seizures, syncope, facial asymmetry, speech difficulty, light-headedness, numbness and headaches.   Hematological: Negative for adenopathy. Does not bruise/bleed easily.   Psychiatric/Behavioral: Positive for dysphoric mood. Negative for agitation, behavioral problems, confusion, decreased concentration, hallucinations, self-injury, sleep disturbance and suicidal ideas. The patient is nervous/anxious. The patient is not hyperactive.        Objective:      Physical Exam   Constitutional: He is oriented to person, place, and time. He appears cachectic. He has a sickly appearance. He appears ill. He appears distressed.   HENT:   Head: Normocephalic.   Right Ear: External ear normal.   Left Ear: External ear normal.   Nose: Nose normal. Right sinus exhibits no maxillary sinus tenderness and no frontal sinus tenderness. Left sinus exhibits no maxillary sinus tenderness and no frontal sinus tenderness.   Mouth/Throat: Oropharynx is clear and moist. No oropharyngeal exudate.   Eyes: Pupils are equal, round, and reactive to light. EOM and lids are normal. Right eye exhibits no discharge. Left eye exhibits no discharge. Right conjunctiva is not injected. Right conjunctiva has no  hemorrhage. Left conjunctiva is not injected. Left conjunctiva has no hemorrhage. No scleral icterus. Right eye exhibits normal extraocular motion. Left eye exhibits normal extraocular motion.   Neck: Normal range of motion. Neck supple. No JVD present. No tracheal deviation present. No thyromegaly present.   Cardiovascular: Normal rate and regular rhythm.   Pulmonary/Chest: Effort normal. No stridor. No respiratory distress.   Abdominal: Soft. He exhibits no mass. There is no hepatosplenomegaly, splenomegaly or hepatomegaly. There is no tenderness.   Musculoskeletal: Normal range of motion. He exhibits no edema or tenderness.   Lymphadenopathy:        Head (right side): No posterior auricular and no occipital adenopathy present.        Head (left side): No posterior auricular and no occipital adenopathy present.     He has no cervical adenopathy.        Right cervical: No superficial cervical, no deep cervical and no posterior cervical adenopathy present.       Left cervical: No superficial cervical, no deep cervical and no posterior cervical adenopathy present.     He has no axillary adenopathy.        Right: No supraclavicular adenopathy present.        Left: No supraclavicular adenopathy present.   Neurological: He is alert and oriented to person, place, and time. He has normal strength. No cranial nerve deficit. Coordination normal.   Skin: Skin is dry. No rash noted. He is not diaphoretic. No cyanosis or erythema. Nails show no clubbing.   Psychiatric: His behavior is normal. Judgment and thought content normal. His mood appears anxious. Cognition and memory are normal. He exhibits a depressed mood.   Vitals reviewed.          Assessment:      1. Metastatic non-small cell lung cancer    2. Thyroid dysfunction    3. Malignant neoplasm of upper lobe, right bronchus or lung     4. Malignant neoplasm of overlapping sites of left lung    5. Panlobular emphysema    6. Chronic renal failure, stage 3 (moderate)    7.  Cachexia    8. Hypothyroidism due to Hashimoto's thyroiditis           Plan:     Metastatic non-small cell lung carcinoma continue to treat with every 3 week Keytruda PET scan ordered for response to therapy from March 2019 clinically stable patient is concerned about weight loss. At this time also noted on imaging previous history of emphysema and chronic renal failure noted discussed implications with patient follow-up with me after PET scan laboratory studies 3 weeks        Ez Horowitz Jr, MD FACP

## 2019-07-26 NOTE — PLAN OF CARE
Problem: Adult Inpatient Plan of Care  Goal: Plan of Care Review  Outcome: Ongoing (interventions implemented as appropriate)  Pt states feeling well today.  States did have a recent fall.

## 2019-08-11 NOTE — PROGRESS NOTES
Subjective:     Patient ID: Brant Lees is a 88 y.o. male.    Chief Complaint: Foot Problem (c/o possible bilateral calluses on ball of foot. rates pain 8/10. wears dress shoes with socks. non-diabetic Pt. last seen on 04/17/18 with PCP Dr. Gibson.)    Brant is a 88 y.o. male who presents to the clinic for evaluation and treatment of high risk feet. Brant has a past medical history of Anticoagulant long-term use, Cancer, Cardiomyopathy (9/07), HBP (high blood pressure) (1997), Hyperlipidemia, LBP (low back pain), MI (myocardial infarction) (12/06), Status post primary angioplasty with coronary stent (12/06), and Thyroid disease. The patient's chief complaint is painful callus and  long, thick toenails. This patient has documented high risk feet requiring routine maintenance secondary to peripheral neuropathy.    PCP: Karlee Gibson MD    Date Last Seen by PCP: 04/17/18    Current shoe gear:  Affected Foot: Casual shoes     Unaffected Foot: Casual shoes    Last encounter in this department: Visit date not found      Patient Active Problem List   Diagnosis    Hyperlipidemia    LBP (low back pain)    Cardiomyopathy    HBP (high blood pressure)    MI (myocardial infarction)    Status post primary angioplasty with coronary stent    Multiple lung nodules    Lesion of bladder    Hypothyroidism due to Hashimoto's thyroiditis    Malignant neoplasm of overlapping sites of lung    Metastatic non-small cell lung cancer    History of bladder cancer    CT compatible an MRI safe power Port-A-Cath in place    Panlobular emphysema    Chronic renal failure, stage 3 (moderate)    Cachexia       Medication List with Changes/Refills   Current Medications    ACETAMINOPHEN (TYLENOL) 325 MG TABLET    Take 325 mg by mouth every 6 (six) hours as needed for Pain.    AMLODIPINE (NORVASC) 10 MG TABLET    Take 2.5 tablets by mouth 2 (two) times daily. 1/2 two times daily     ASPIRIN (ASPIR-81 ORAL)    Take by mouth.     DRONABINOL (MARINOL) 2.5 MG CAPSULE    Take 1 capsule (2.5 mg total) by mouth 2 (two) times daily before meals.    LEVOTHYROXINE (SYNTHROID) 50 MCG TABLET    TAKE 1 TABLET DAILY    LISINOPRIL (PRINIVIL,ZESTRIL) 20 MG TABLET    Take 1 tablet by mouth once daily.     NITROGLYCERIN (NITROSTAT) 0.4 MG SL TABLET    Place 0.4 mg under the tongue every 5 (five) minutes as needed for Chest pain.    OMEPRAZOLE (PRILOSEC) 20 MG CAPSULE    Take 20 mg by mouth once daily.    SIMVASTATIN (ZOCOR) 20 MG TABLET    Take 20 mg by mouth every evening.   Discontinued Medications    FLUOROMETHOLONE 0.1% (FML) 0.1 % DRPS        LOTEMAX 0.5 % DRPG    INSTILL 1 DROP INTO BOTH EYES TWICE DAILY THANK YOU       Review of patient's allergies indicates:  No Known Allergies    Past Surgical History:   Procedure Laterality Date    CORONARY STENT PLACEMENT      EXCISION-BLADDER TUMOR-TRANSURETHRAL (TURBT) N/A 8/22/2017    Performed by [facility], [facility] at [facility] OR    HERNIA REPAIR      INSERTION, VENOUS ACCESS PORT Left 5/17/2019    Performed by Lester Trejo MD at [facility] OR       Family History   Problem Relation Age of Onset    Heart disease Mother     Cancer Mother        Social History     Socioeconomic History    Marital status:      Spouse name: Not on file    Number of children: 1    Years of education: Not on file    Highest education level: Not on file   Occupational History    Not on file   Social Needs    Financial resource strain: Not on file    Food insecurity:     Worry: Not on file     Inability: Not on file    Transportation needs:     Medical: Not on file     Non-medical: Not on file   Tobacco Use    Smoking status: Never Smoker    Smokeless tobacco: Never Used   Substance and Sexual Activity    Alcohol use: No    Drug use: No    Sexual activity: Not Currently     Partners: Female   Lifestyle    Physical activity:     Days per week: Not on file     Minutes per session: Not on file    Stress: Not  "on file   Relationships    Social connections:     Talks on phone: Not on file     Gets together: Not on file     Attends [employer] service: Not on file     Active member of club or organization: Not on file     Attends meetings of clubs or organizations: Not on file     Relationship status: Not on file   Other Topics Concern    Not on file   Social History Narrative    Not on file       Vitals:    07/24/19 1557   BP: (!) 145/70   Pulse: 63   Weight: 78.9 kg (174 lb)   Height: 5' 11" (1.803 m)   PainSc:   8   PainLoc: Foot       Review of Systems   Constitutional: Negative for chills and fever.   Respiratory: Negative for shortness of breath.    Cardiovascular: Negative for chest pain, palpitations, orthopnea, claudication and leg swelling.   Gastrointestinal: Negative for diarrhea, nausea and vomiting.   Musculoskeletal: Negative for joint pain.   Skin: Negative for rash.   Neurological: Positive for tingling and sensory change.   Psychiatric/Behavioral: Negative.              Objective:   PHYSICAL EXAM: Apperance: Alert and orient in no distress,well developed, and with good attention to grooming and body habits  Patient presents ambulating in casual shoes.   LOWER EXTREMITY EXAM:  VASCULAR: Dorsalis pedis pulses 0/4 bilateral and Posterior Tibial pulses 1/4 bilateral. Capillary fill time <blanched bilateral. No edema observed bilateral. Varicosities present bilateral. Skin temperature of the lower extremities is warm to cool, proximal to distal. Hair growth dim bilateral.  DERMATOLOGICAL: No skin rashes, subcutaneous nodules, lesions, or ulcers observed bilateral. Nails 1,2,3,4,5 bilateral elongated, thickened, and discolored with subungual debris. Webspaces 1,2,3,4 bilateral clean, dry and without evidence of break in skin integrity. Mild hyperkeratotic lesions noted to bilateral 5th plantar submetarsal.  NEUROLOGICAL: Light touch, sharp-dull, proprioception all present and equal bilaterally.  Vibratory " sensation diminished at bilateral hallux. Protective sensation absent sites as tested with a [place]-Sarah 5.07 monofilament.   MUSCULOSKELETAL: Muscle strength is 5/5 for foot inverters, everters, plantarflexors, and dorsiflexors. Muscle tone is normal.     Assessment:   Chronic renal failure, stage 3 (moderate)    Dermatophytosis of nail    Corn or callus          Plan:   Chronic renal failure, stage 3 (moderate)    Dermatophytosis of nail    Corn or callus      I counseled the patient on his conditions, regarding findings of my examination, my impressions, and usual treatment plan.   Greater than 50% of this visit spent on counseling and coordination of care.  Patient counseled on keeping follow up care with Hematology/Oncology/Urology for his metastasis cancer and chronic kidney disease.   Greater than 15 minutes of a 20 minute appointment spent on education about the neuropathy, and prevention of limb loss.  Shoe inspection. Patient instructed on proper foot hygeine. We discussed wearing proper shoe gear, daily foot inspections, never walking without protective shoe gear, never putting sharp instruments to feet.    With patient's permission, nails 1-5 bilateral were debrided/trimmed in length and thickness aggressively to their soft tissue attachment mechanically and with electric , removing all offending nail and debris. Patient relates relief following the procedure.  With patient's permission, bilateral callus trimmed in thickness with #15 blade in thickness without incident.   Patient  will continue to monitor the areas daily, inspect feet, wear protective shoe gear when ambulatory, moisturizer to maintain skin integrity.   Patient to return 6 months or sooner if needed.                 Lennie Anne DPM  [facility]

## 2019-08-12 ENCOUNTER — TELEPHONE (OUTPATIENT)
Dept: RADIOLOGY | Facility: HOSPITAL | Age: 84
End: 2019-08-12

## 2019-08-13 ENCOUNTER — TELEPHONE (OUTPATIENT)
Dept: RADIOLOGY | Facility: HOSPITAL | Age: 84
End: 2019-08-13

## 2019-08-14 ENCOUNTER — HOSPITAL ENCOUNTER (OUTPATIENT)
Dept: RADIOLOGY | Facility: HOSPITAL | Age: 84
Discharge: HOME | End: 2019-08-14
Attending: INTERNAL MEDICINE
Payer: MEDICARE

## 2019-08-14 DIAGNOSIS — C34.11 MALIGNANT NEOPLASM OF UPPER LOBE, RIGHT BRONCHUS OR LUNG: ICD-10-CM

## 2019-08-14 DIAGNOSIS — E07.9 THYROID DYSFUNCTION: ICD-10-CM

## 2019-08-14 DIAGNOSIS — C34.90 METASTATIC NON-SMALL CELL LUNG CANCER: ICD-10-CM

## 2019-08-14 PROCEDURE — 78815 PET IMAGE W/CT SKULL-THIGH: CPT | Mod: TC

## 2019-08-14 PROCEDURE — A9552 F18 FDG: HCPCS

## 2019-08-14 PROCEDURE — 78815 PET IMAGE W/CT SKULL-THIGH: CPT | Mod: 26,PS,, | Performed by: RADIOLOGY

## 2019-08-14 PROCEDURE — 78815 NM PET CT ROUTINE: ICD-10-PCS | Mod: 26,PS,, | Performed by: RADIOLOGY

## 2019-08-16 ENCOUNTER — INFUSION (OUTPATIENT)
Dept: INFUSION THERAPY | Facility: HOSPITAL | Age: 84
End: 2019-08-16
Attending: INTERNAL MEDICINE
Payer: MEDICARE

## 2019-08-16 ENCOUNTER — OFFICE VISIT (OUTPATIENT)
Dept: HEMATOLOGY/ONCOLOGY | Facility: CLINIC | Age: 84
End: 2019-08-16
Payer: MEDICARE

## 2019-08-16 VITALS
OXYGEN SATURATION: 95 % | TEMPERATURE: 97 F | HEIGHT: 71 IN | DIASTOLIC BLOOD PRESSURE: 64 MMHG | HEART RATE: 59 BPM | SYSTOLIC BLOOD PRESSURE: 122 MMHG | WEIGHT: 176.81 LBS | RESPIRATION RATE: 18 BRPM | BODY MASS INDEX: 24.75 KG/M2

## 2019-08-16 VITALS — HEIGHT: 71 IN | BODY MASS INDEX: 24.75 KG/M2 | WEIGHT: 176.81 LBS

## 2019-08-16 DIAGNOSIS — C34.82 MALIGNANT NEOPLASM OF OVERLAPPING SITES OF LEFT LUNG: Primary | ICD-10-CM

## 2019-08-16 DIAGNOSIS — D53.9 NUTRITIONAL ANEMIA: ICD-10-CM

## 2019-08-16 DIAGNOSIS — E03.9 HYPOTHYROIDISM (ACQUIRED): ICD-10-CM

## 2019-08-16 DIAGNOSIS — C34.90 METASTATIC NON-SMALL CELL LUNG CANCER: Primary | ICD-10-CM

## 2019-08-16 PROCEDURE — 3288F FALL RISK ASSESSMENT DOCD: CPT | Mod: CPTII,S$GLB,, | Performed by: INTERNAL MEDICINE

## 2019-08-16 PROCEDURE — 63600175 PHARM REV CODE 636 W HCPCS: Mod: JG | Performed by: INTERNAL MEDICINE

## 2019-08-16 PROCEDURE — 1100F PTFALLS ASSESS-DOCD GE2>/YR: CPT | Mod: CPTII,S$GLB,, | Performed by: INTERNAL MEDICINE

## 2019-08-16 PROCEDURE — 99215 OFFICE O/P EST HI 40 MIN: CPT | Mod: 25,S$GLB,, | Performed by: INTERNAL MEDICINE

## 2019-08-16 PROCEDURE — 99999 PR PBB SHADOW E&M-EST. PATIENT-LVL III: CPT | Mod: PBBFAC,,, | Performed by: INTERNAL MEDICINE

## 2019-08-16 PROCEDURE — 3288F PR FALLS RISK ASSESSMENT DOCUMENTED: ICD-10-PCS | Mod: CPTII,S$GLB,, | Performed by: INTERNAL MEDICINE

## 2019-08-16 PROCEDURE — 99215 PR OFFICE/OUTPT VISIT, EST, LEVL V, 40-54 MIN: ICD-10-PCS | Mod: 25,S$GLB,, | Performed by: INTERNAL MEDICINE

## 2019-08-16 PROCEDURE — 96413 CHEMO IV INFUSION 1 HR: CPT

## 2019-08-16 PROCEDURE — 99999 PR PBB SHADOW E&M-EST. PATIENT-LVL III: ICD-10-PCS | Mod: PBBFAC,,, | Performed by: INTERNAL MEDICINE

## 2019-08-16 PROCEDURE — 1100F PR PT FALLS ASSESS DOC 2+ FALLS/FALL W/INJURY/YR: ICD-10-PCS | Mod: CPTII,S$GLB,, | Performed by: INTERNAL MEDICINE

## 2019-08-16 RX ORDER — HEPARIN 100 UNIT/ML
500 SYRINGE INTRAVENOUS
Status: DISCONTINUED | OUTPATIENT
Start: 2019-08-16 | End: 2019-08-16 | Stop reason: HOSPADM

## 2019-08-16 RX ORDER — LEVOTHYROXINE SODIUM 50 UG/1
100 TABLET ORAL DAILY
Qty: 90 TABLET | Refills: 2 | Status: SHIPPED | OUTPATIENT
Start: 2019-08-16 | End: 2019-09-06 | Stop reason: SDUPTHER

## 2019-08-16 RX ORDER — HEPARIN 100 UNIT/ML
500 SYRINGE INTRAVENOUS
Status: CANCELLED | OUTPATIENT
Start: 2019-08-16

## 2019-08-16 RX ORDER — SODIUM CHLORIDE 0.9 % (FLUSH) 0.9 %
10 SYRINGE (ML) INJECTION
Status: CANCELLED | OUTPATIENT
Start: 2019-08-16

## 2019-08-16 RX ADMIN — HEPARIN 500 UNITS: 100 SYRINGE at 04:08

## 2019-08-16 RX ADMIN — SODIUM CHLORIDE 200 MG: 9 INJECTION, SOLUTION INTRAVENOUS at 03:08

## 2019-08-16 NOTE — PLAN OF CARE
"Problem: Adult Inpatient Plan of Care  Goal: Plan of Care Review  Outcome: Ongoing (interventions implemented as appropriate)  Pt states,"I'm feeling fine today."      "

## 2019-08-16 NOTE — DISCHARGE INSTRUCTIONS
..  [facility]  [address]  [address] [address]  [phone] phone     [phone] fax  Hours of Operation: Monday- Friday 8:00am- 5:00pm  After hours phone  [phone]  Hematology / Oncology Physicians on call      Dr. Carlos Manuel Win                        Please call with any concerns regarding your appointment today.  ..FALL PREVENTION   Falls often occur due to slipping, tripping or losing your balance. Here are ways to reduce your risk of falling again.   Was there anything that caused your fall that can be fixed, removed or replaced?   Make your home safe by keeping walkways clear of objects you may trip over.   Use non-slip pads under rugs.   Do not walk in poorly lit areas.   Do not stand on chairs or wobbly ladders.   Use caution when reaching overhead or looking upward. This position can cause a loss of balance.   Be sure your shoes fit properly, have non-slip bottoms and are in good condition.   Be cautious when going up and down stairs, curbs, and when walking on uneven sidewalks.   If your balance is poor, consider using a cane or walker.   If your fall was related to alcohol use, stop or limit alcohol intake.   If your fall was related to use of sleeping medicines, talk to your doctor about this. You may need to reduce your dosage at bedtime if you awaken during the night to go to the bathroom.   To reduce the need for nighttime bathroom trips:   Avoid drinking fluids for several hours before going to bed   Empty your bladder before going to bed   Men can keep a urinal at the bedside   © 4685-3631 [employer], [address] [place] [address], [place] [address]. All rights reserved. This information is not intended as a substitute for professional medical care. Always follow your healthcare professional's instructions.    Preventing the Spread of Infection  Certain infections can spread from person to person. This is why your friend  or family member may be put in a special room while in the hospital. Restrictions may be placed on who can go in and out of that room and what protection must be worn. Read this sheet to better understand why this is done.     Practice good hand hygiene to help stop the spread of germs.     Practice good hand hygiene to help stop the spread of germs.   How infection spreads  Infection is caused by germs. An infected person carries germs that he or she can give to others. Even a person who doesnt feel sick can still carry and spread germs. Germs can cause infection by traveling through the air or through direct contact or when each of you touch the same surface in a room such as a doorknob, sink faucet, tabletop or chair.   How you can become infected  To infect you, germs first have to get inside your body. You can get infected by touching a contaminated person or object. You can also get infected by breathing contaminated air. This is why good hand hygiene and other infection control recommendations, like wearing a mask, are so important. Hand hygiene refers to handwashing with soap and water or the use of alcohol-based gels or foams that do not need using water.  Preventing infection  To stop infection from spreading, healthcare workers may do one or more of the following:  · Place an infected patient in a private room, or in a room with others who have exactly the same infection. (This depends on what kind of infection the patient has.)  · Wear a mask, gloves, gown, or other items.  · Wear a respirator (air filter) for some infections.  What you can do  Heres how to help stop the spread of infection:  · You may be asked to wear a mask, gloves, gown, or respirator when you visit. Follow any instructions carefully.  · Practice good hand hygiene, especially after using the bathroom and before and after touching the patient or the patient surroundings.  · Keep your hands away from your face.  · Cough or sneeze only  into a tissue.  · Do not use the patients bathroom.  · Do not visit a patient if you feel sick. Do not visit if you have been exposed to an illness such as the flu, chickenpox, or measles.  Date Last Reviewed: 12/1/2016  © 3560-2981 [employer]. [address] [place] [address]. All rights reserved. This information is not intended as a substitute for professional medical care. Always follow your healthcare professional's instructions.

## 2019-08-16 NOTE — PROGRESS NOTES
Subjective:       Patient ID: Brant Lees is a 88 y.o. male.    Chief Complaint: Malignant neoplasm of overlapping sites of left lung [C34.82]  HPI: We have an opportunity to see Mr. Brant Lees in Hematology Oncology clinic at [facility] on 08/16/2019.  Mr. Brant Lees is a 88 y.o. gentleman who had screening Ct lung, was found to have multiple lung lesions. PET CT showed involvement of both right and left lungs as well as hilar nodes.  Biopsy showed NSCLC squamous type.  Reported no cancer symptoms, denies pain.  Molecular analysis showed high PDL1 expression 50%.  Currently on keytruda.  Reported has decreased appetite and mild fatigue.       Malignant neoplasm of overlapping sites of lung    4/15/2019 Initial Diagnosis     Malignant neoplasm of overlapping sites of lung         4/15/2019 Cancer Staged     Staging form: Lung, AJCC 8th Edition  - Clinical stage from 4/15/2019: Stage IV (cT3, cN3, cM1a) - Signed by Simone Goins MD on 4/15/2019           Metastatic non-small cell lung cancer    4/15/2019 Initial Diagnosis     Metastatic non-small cell lung cancer         5/7/2019 -  Chemotherapy     Treatment Summary   Plan Name: OP PEMBROLIZUMAB 200MG Q3W  Treatment Goal: Curative  Status: Active  Start Date: 5/24/2019  End Date: 9/6/2019 (Planned)  Provider: Simone Goins MD  Chemotherapy: pembrolizumab (KEYTRUDA) 200 mg in sodium chloride 0.9% 108 mL chemo infusion, 200 mg, Intravenous, Clinic/HOD 1 time, 4 of 6 cycles  Administration: 200 mg (5/24/2019), 200 mg (6/14/2019), 200 mg (7/5/2019), 200 mg (7/26/2019)          Past Medical History:   Diagnosis Date    Anticoagulant long-term use     plavix    Cancer     Metastatic non-small cell lung cancer    Cardiomyopathy 9/07    Ischemic    HBP (high blood pressure) 1997    Hyperlipidemia     LBP (low back pain)     MI (myocardial infarction) 12/06    Status post primary angioplasty with coronary stent 12/06    Thyroid disease      Family History    Problem Relation Age of Onset    Heart disease Mother     Cancer Mother      Social History     Socioeconomic History    Marital status:      Spouse name: Not on file    Number of children: 1    Years of education: Not on file    Highest education level: Not on file   Occupational History    Not on file   Social Needs    Financial resource strain: Not on file    Food insecurity:     Worry: Not on file     Inability: Not on file    Transportation needs:     Medical: Not on file     Non-medical: Not on file   Tobacco Use    Smoking status: Never Smoker    Smokeless tobacco: Never Used   Substance and Sexual Activity    Alcohol use: No    Drug use: No    Sexual activity: Not Currently     Partners: Female   Lifestyle    Physical activity:     Days per week: Not on file     Minutes per session: Not on file    Stress: Not on file   Relationships    Social connections:     Talks on phone: Not on file     Gets together: Not on file     Attends [employer] service: Not on file     Active member of club or organization: Not on file     Attends meetings of clubs or organizations: Not on file     Relationship status: Not on file   Other Topics Concern    Not on file   Social History Narrative    Not on file     Past Surgical History:   Procedure Laterality Date    CORONARY STENT PLACEMENT      EXCISION-BLADDER TUMOR-TRANSURETHRAL (TURBT) N/A 8/22/2017    Performed by [facility]cocer [facility] MD at [facility] OR    HERNIA REPAIR      INSERTION, VENOUS ACCESS PORT Left 5/17/2019    Performed by Lester Trejo MD at [facility] OR     Current Outpatient Medications   Medication Sig Dispense Refill    acetaminophen (TYLENOL) 325 MG tablet Take 325 mg by mouth every 6 (six) hours as needed for Pain.      amlodipine (NORVASC) 10 MG tablet Take 2.5 tablets by mouth 2 (two) times daily. 1/2 two times daily       ASPIRIN (ASPIR-81 ORAL) Take by mouth.      dronabinol (MARINOL) 2.5 MG capsule Take 1 capsule (2.5 mg  total) by mouth 2 (two) times daily before meals. 60 capsule 1    levothyroxine (SYNTHROID) 50 MCG tablet Take 2 tablets (100 mcg total) by mouth once daily. 90 tablet 2    lisinopril (PRINIVIL,ZESTRIL) 20 MG tablet Take 1 tablet by mouth once daily.       nitroGLYCERIN (NITROSTAT) 0.4 MG SL tablet Place 0.4 mg under the tongue every 5 (five) minutes as needed for Chest pain.      omeprazole (PRILOSEC) 20 MG capsule Take 20 mg by mouth once daily.      simvastatin (ZOCOR) 20 MG tablet Take 20 mg by mouth every evening.       No current facility-administered medications for this visit.        Labs:  Lab Results   Component Value Date    WBC 9.96 08/16/2019    HGB 14.3 08/16/2019    HCT 43.4 08/16/2019    MCV 91 08/16/2019     08/16/2019     BMP  Lab Results   Component Value Date     08/16/2019    K 4.8 08/16/2019     08/16/2019    CO2 25 08/16/2019    BUN 21 08/16/2019    CREATININE 1.4 08/16/2019    CALCIUM 9.6 08/16/2019    ANIONGAP 10 08/16/2019    ESTGFRAFRICA 51 (A) 08/16/2019    EGFRNONAA 45 (A) 08/16/2019     Lab Results   Component Value Date    ALT 21 08/16/2019    AST 19 08/16/2019    ALKPHOS 106 08/16/2019    BILITOT 0.3 08/16/2019       No results found for: IRON, TIBC, FERRITIN, SATURATEDIRO  No results found for: CAXZAXLB80  No results found for: FOLATE  Lab Results   Component Value Date    TSH 29.524 (H) 07/26/2019       I have reviewed the radiology reports and examined the scan/xray images.    Review of Systems   Constitutional: Negative.    HENT: Negative.    Eyes: Negative.    Respiratory: Negative.    Cardiovascular: Negative.    Gastrointestinal: Negative.    Endocrine: Negative.    Genitourinary: Negative.    Musculoskeletal: Negative.    Skin: Negative.    Allergic/Immunologic: Negative.    Neurological: Negative.    Hematological: Negative.    Psychiatric/Behavioral: Negative.      ECOG SCORE              Objective:     Vitals:    08/16/19 1430   BP: 122/64   Pulse:  (!) 59   Resp: 18   Temp: 97 °F (36.1 °C)   Body mass index is 24.66 kg/m².  Physical Exam   Constitutional: He is oriented to person, place, and time. He appears well-developed and well-nourished.   HENT:   Head: Normocephalic and atraumatic.   Eyes: Conjunctivae and EOM are normal.   Neck: Normal range of motion. Neck supple.   Cardiovascular: Normal rate and regular rhythm.   Pulmonary/Chest: Effort normal and breath sounds normal.   Abdominal: Soft. Bowel sounds are normal.   Musculoskeletal: Normal range of motion.   Neurological: He is alert and oriented to person, place, and time.   Skin: Skin is warm and dry.   Psychiatric: He has a normal mood and affect. His behavior is normal. Judgment and thought content normal.   Nursing note and vitals reviewed.        Assessment:      1. Malignant neoplasm of overlapping sites of left lung    2. Hypothyroidism (acquired)    3. Nutritional anemia            Plan:     Malignant neoplasm of overlapping sites of left lung    PET CT showed good response.  Continue on keytruda every 3 weeks.  -     CBC auto differential; Future; Expected date: 09/06/2019  -     Comprehensive metabolic panel; Future; Expected date: 09/06/2019  -     TSH; Future; Expected date: 09/06/2019    Hypothyroidism (acquired)  -     levothyroxine (SYNTHROID) 50 MCG tablet; Take 2 tablets (100 mcg total) by mouth once daily.  Dispense: 90 tablet; Refill: 2

## 2019-09-06 ENCOUNTER — OFFICE VISIT (OUTPATIENT)
Dept: HEMATOLOGY/ONCOLOGY | Facility: CLINIC | Age: 84
End: 2019-09-06
Payer: MEDICARE

## 2019-09-06 ENCOUNTER — INFUSION (OUTPATIENT)
Dept: INFUSION THERAPY | Facility: HOSPITAL | Age: 84
End: 2019-09-06
Attending: INTERNAL MEDICINE
Payer: MEDICARE

## 2019-09-06 VITALS
TEMPERATURE: 98 F | DIASTOLIC BLOOD PRESSURE: 63 MMHG | SYSTOLIC BLOOD PRESSURE: 118 MMHG | OXYGEN SATURATION: 96 % | WEIGHT: 179.69 LBS | HEIGHT: 71 IN | BODY MASS INDEX: 25.02 KG/M2 | HEART RATE: 67 BPM | BODY MASS INDEX: 25.16 KG/M2 | WEIGHT: 178.69 LBS | RESPIRATION RATE: 18 BRPM | HEIGHT: 71 IN

## 2019-09-06 DIAGNOSIS — E03.9 HYPOTHYROIDISM (ACQUIRED): ICD-10-CM

## 2019-09-06 DIAGNOSIS — C34.90 METASTATIC NON-SMALL CELL LUNG CANCER: Primary | ICD-10-CM

## 2019-09-06 PROCEDURE — 99999 PR PBB SHADOW E&M-EST. PATIENT-LVL III: CPT | Mod: PBBFAC,,, | Performed by: INTERNAL MEDICINE

## 2019-09-06 PROCEDURE — 63600175 PHARM REV CODE 636 W HCPCS: Performed by: INTERNAL MEDICINE

## 2019-09-06 PROCEDURE — 99999 PR PBB SHADOW E&M-EST. PATIENT-LVL III: ICD-10-PCS | Mod: PBBFAC,,, | Performed by: INTERNAL MEDICINE

## 2019-09-06 PROCEDURE — 99215 PR OFFICE/OUTPT VISIT, EST, LEVL V, 40-54 MIN: ICD-10-PCS | Mod: 25,S$GLB,, | Performed by: INTERNAL MEDICINE

## 2019-09-06 PROCEDURE — 1101F PR PT FALLS ASSESS DOC 0-1 FALLS W/OUT INJ PAST YR: ICD-10-PCS | Mod: CPTII,S$GLB,, | Performed by: INTERNAL MEDICINE

## 2019-09-06 PROCEDURE — 25000003 PHARM REV CODE 250: Performed by: INTERNAL MEDICINE

## 2019-09-06 PROCEDURE — 96413 CHEMO IV INFUSION 1 HR: CPT

## 2019-09-06 PROCEDURE — 1101F PT FALLS ASSESS-DOCD LE1/YR: CPT | Mod: CPTII,S$GLB,, | Performed by: INTERNAL MEDICINE

## 2019-09-06 PROCEDURE — A4216 STERILE WATER/SALINE, 10 ML: HCPCS | Performed by: INTERNAL MEDICINE

## 2019-09-06 PROCEDURE — 99215 OFFICE O/P EST HI 40 MIN: CPT | Mod: 25,S$GLB,, | Performed by: INTERNAL MEDICINE

## 2019-09-06 RX ORDER — SODIUM CHLORIDE 0.9 % (FLUSH) 0.9 %
10 SYRINGE (ML) INJECTION
Status: DISCONTINUED | OUTPATIENT
Start: 2019-09-06 | End: 2019-09-06 | Stop reason: HOSPADM

## 2019-09-06 RX ORDER — SODIUM CHLORIDE 0.9 % (FLUSH) 0.9 %
10 SYRINGE (ML) INJECTION
Status: CANCELLED | OUTPATIENT
Start: 2019-09-06

## 2019-09-06 RX ORDER — HEPARIN 100 UNIT/ML
500 SYRINGE INTRAVENOUS
Status: CANCELLED | OUTPATIENT
Start: 2019-09-06

## 2019-09-06 RX ORDER — LEVOTHYROXINE SODIUM 50 UG/1
100 TABLET ORAL DAILY
Qty: 90 TABLET | Refills: 2 | Status: SHIPPED | OUTPATIENT
Start: 2019-09-06 | End: 2019-09-27 | Stop reason: SDUPTHER

## 2019-09-06 RX ORDER — HEPARIN 100 UNIT/ML
500 SYRINGE INTRAVENOUS
Status: DISCONTINUED | OUTPATIENT
Start: 2019-09-06 | End: 2019-09-06 | Stop reason: HOSPADM

## 2019-09-06 RX ADMIN — SODIUM CHLORIDE 200 MG: 9 INJECTION, SOLUTION INTRAVENOUS at 03:09

## 2019-09-06 RX ADMIN — Medication 500 UNITS: at 03:09

## 2019-09-06 RX ADMIN — SODIUM CHLORIDE 10 ML: 9 INJECTION, SOLUTION INTRAMUSCULAR; INTRAVENOUS; SUBCUTANEOUS at 03:09

## 2019-09-06 RX ADMIN — SODIUM CHLORIDE: 9 INJECTION, SOLUTION INTRAVENOUS at 03:09

## 2019-09-06 NOTE — DISCHARGE INSTRUCTIONS
[facility]  [address]  [address] [address]  [phone] phone     [phone] fax  Hours of Operation: Monday- Friday 8:00am- 5:00pm  After hours phone  [phone]  Hematology / Oncology Physicians on call      Dr. Carlos Manuel Win                        Please call with any concerns regarding your appointment today.      HOME CARE AFTER CHEMOTHERAPY   Meals   Many patients feel sick and lose their appetites during treatment. Eat small meals several times a day. Choose bland foods with little taste or smell if you have problems with nausea. Be sure to cook all food thoroughly. This kills bacteria and helps you avoid intestinal infection. Soft foods are easier to swallow and digest.   Activity   Exercise keeps you strong and keeps your heart and lungs active. Talk to your doctor about an appropriate exercise program for you.   Skin Care   To prevent a skin infection, bathe or shower once a day. Use a moisturizing soap and wash with warm water. Avoid very hot or cold water. Chemotherapy can make your skin dry . Apply moisturizing lotion to help relieve dry skin. Some drugs used in high doses can cause slight burns to appear (like sunburn). Ask for a special cream to help relieve the burn and protect your skin.   Prevent Mouth Sores   During chemotherapy, many people get mouth sores. Do the following to help prevent mouth sores or to ease discomfort.   Brush your teeth with a soft-bristle toothbrush after every meal.  Don't use dental floss if your platelet count is below 50,000. Your doctor or nurse will tell you if this is the case.  Use an oral swab or special soft toothbrush if your gums bleed during regular brushing.  Use mouthwash as directed. If you can't tolerate commercial mouthwash, use salt and baking soda to clean your mouth. Mix 1 teaspoon of salt and 1 teaspoon of baking soda into a glass of water. Swish and spit.  Call your  doctor or return to this facility if you develop any of the following:   Sore throat   White patches in the mouth or throat   Fever of 100.4ºF (38ºC) or higher, or as directed by your healthcare provider  © 3968-0562 [employer], [address] [place] [address], [place] [address]. All rights reserved. This information is not intended as a substitute for professional medical care. Always follow your healthcare professional's       WAYS TO HELP PREVENT INFECTION         WASH YOUR HANDS OFTEN DURING THE DAY, ESPECIALLY BEFORE YOU EAT, AFTER USING THE BATHROOM, AND AFTER TOUCHING ANIMALS     STAY AWAY FROM PEOPLE WHO HAVE ILLNESSES YOU CAN CATCH; SUCH AS COLDS, FLU, CHICKEN POX     TRY TO AVOID CROWDS     STAY AWAY FROM CHILDREN WHO RECENTLY HAVE RECEIVED LIVE VIRUS VACCINES     MAINTAIN GOOD MOUTH CARE     DO NOT SQUEEZE OR SCRATCH PIMPLES     CLEAN CUTS & SCRAPES RIGHT AWAY AND DAILY UNTIL HEALED WITH WARM WATER, SOAP & AN ANTISEPTIC     AVOID CONTACT WITH LITTER BOXES, BIRD CAGES, & FISH TANKS     AVOID STANDING WATER, IE., BIRD BATHS, FLOWER POTS/VASES, OR HUMIDIFIERS     WEAR GLOVES WHEN GARDENING OR CLEANING UP AFTER OTHERS, ESPECIALLY BABIES & SMALL CHILDREN     DO NOT EAT RAW FISH, SEAFOOD, MEAT, OR EGGS    YOU HAVE STARTED ON CHEMOTHERAPY. IF YOU EXPERIENCE ANY OF THE FOLLOWING PROBLEMS, CALL THE OFFICE IMMEDIATELY.    *FEVER .0 OR GREATER    *CHILLS, ESPECIALLY SHAKING CHILLS, OR SWEATING    *A SEVERE COUGH OR SORE THROAT, OR SINUS PAIN/     PRESSURE    *REDNESS, SWELLING, OR TENDERNESS AROUND A WOUND,     SORE, PIMPLE, RECTAL AREA, OR IV SITE    *SORES OR ULCERS IN THE MOUTH    *BLISTERS ON THE LIPS OR SKIN    *FREQUENT URGENCY TO URINATE OR A BURNING FEELING   WHEN YOU URINATE    *BLOOD IN THE URINE OR STOOL    *ANY UNEXPLAINED BRUISING OR PROLONGED BLEEDING,     (NOSEBLEEDS OR BLEEDING GUMS)    *LOOSE BOWEL MOVEMENTS THAT DO NOT RESPOND TO     IMODIUM OR MORE THAN THREE TIMES A  DAY    *VOMITING UNRESPONSIVE TO ANTINAUSEA MEDICINE    *ANY UNUSUAL PHYSICAL SYMPTOMS THAT BEGAN AFTER     CHEMOTHERAPY    DURING WEEKDAYS, CALL AND ASK TO SPEAK DIRECTLY TO A NURSE.  AT OTHER TIMES, CALL THE OFFICE PHONE NUMBER; THE ANSWERING SERVICE WILL CONTACT THE ON-CALL PHYSICIAN.  SOMEONE IS AVAILABLE 24 HOURS A DAY, SEVEN DAYS A WEEK.    FALL PREVENTION   Falls often occur due to slipping, tripping or losing your balance. Here are ways to reduce your risk of falling again.   Was there anything that caused your fall that can be fixed, removed or replaced?   Make your home safe by keeping walkways clear of objects you may trip over.   Use non-slip pads under rugs.   Do not walk in poorly lit areas.   Do not stand on chairs or wobbly ladders.   Use caution when reaching overhead or looking upward. This position can cause a loss of balance.   Be sure your shoes fit properly, have non-slip bottoms and are in good condition.   Be cautious when going up and down stairs, curbs, and when walking on uneven sidewalks.   If your balance is poor, consider using a cane or walker.   If your fall was related to alcohol use, stop or limit alcohol intake.   If your fall was related to use of sleeping medicines, talk to your doctor about this. You may need to reduce your dosage at bedtime if you awaken during the night to go to the bathroom.   To reduce the need for nighttime bathroom trips:   Avoid drinking fluids for several hours before going to bed   Empty your bladder before going to bed   Men can keep a urinal at the bedside   © 1446-9234 [facility], [address] [place] [address], [place] [address]. All rights reserved. This information is not intended as a substitute for professional medical care. Always follow your healthcare professional's instructions.

## 2019-09-06 NOTE — PLAN OF CARE
Problem: Adult Inpatient Plan of Care  Goal: Plan of Care Review  Outcome: Ongoing (interventions implemented as appropriate)  Pt states feeling well today.

## 2019-09-06 NOTE — PROGRESS NOTES
Subjective:       Patient ID: Brant Lees is a 88 y.o. male.    Chief Complaint: Chemotherapy and Lung Cancer    HPI 88-year-old male history of metastatic non-small cell lung carcinoma currently on Keytruda therapy patient's last imaging performed in August of 2019 with decreasing PET avid findings ECOG status 1    Past Medical History:   Diagnosis Date    Anticoagulant long-term use     plavix    Cancer     Metastatic non-small cell lung cancer    Cardiomyopathy 9/07    Ischemic    HBP (high blood pressure) 1997    Hyperlipidemia     LBP (low back pain)     MI (myocardial infarction) 12/06    Status post primary angioplasty with coronary stent 12/06    Thyroid disease      Family History   Problem Relation Age of Onset    Heart disease Mother     Cancer Mother      Social History     Socioeconomic History    Marital status:      Spouse name: Not on file    Number of children: 1    Years of education: Not on file    Highest education level: Not on file   Occupational History    Not on file   Social Needs    Financial resource strain: Not on file    Food insecurity:     Worry: Not on file     Inability: Not on file    Transportation needs:     Medical: Not on file     Non-medical: Not on file   Tobacco Use    Smoking status: Never Smoker    Smokeless tobacco: Never Used   Substance and Sexual Activity    Alcohol use: No    Drug use: No    Sexual activity: Not Currently     Partners: Female   Lifestyle    Physical activity:     Days per week: Not on file     Minutes per session: Not on file    Stress: Not on file   Relationships    Social connections:     Talks on phone: Not on file     Gets together: Not on file     Attends Mu-ism service: Not on file     Active member of club or organization: Not on file     Attends meetings of clubs or organizations: Not on file     Relationship status: Not on file   Other Topics Concern    Not on file   Social History Narrative    Not on file      Past Surgical History:   Procedure Laterality Date    CORONARY STENT PLACEMENT      EXCISION-BLADDER TUMOR-TRANSURETHRAL (TURBT) N/A 8/22/2017    Performed by Jorge Alcocer IV, [facility] at [facility] OR    HERNIA REPAIR      INSERTION, VENOUS ACCESS PORT Left 5/17/2019    Performed by Lester Terjo MD at [facility] OR       Labs:  Lab Results   Component Value Date    WBC 11.07 09/06/2019    HGB 14.7 09/06/2019    HCT 44.5 09/06/2019    MCV 92 09/06/2019     09/06/2019     BMP  Lab Results   Component Value Date     09/06/2019    K 4.8 09/06/2019     09/06/2019    CO2 22 (L) 09/06/2019    BUN 26 (H) 09/06/2019    CREATININE 1.2 09/06/2019    CALCIUM 9.3 09/06/2019    ANIONGAP 10 09/06/2019    ESTGFRAFRICA >60 09/06/2019    EGFRNONAA 54 (A) 09/06/2019     Lab Results   Component Value Date    ALT 23 09/06/2019    AST 23 09/06/2019    ALKPHOS 95 09/06/2019    BILITOT 0.2 09/06/2019       No results found for: IRON, TIBC, FERRITIN, SATURATEDIRO  No results found for: [id number]  No results found for: FOLATE  Lab Results   Component Value Date    TSH 16.059 (H) 08/16/2019         Review of Systems   Constitutional: Negative for activity change, appetite change, chills, diaphoresis, fatigue, fever and unexpected weight change.   HENT: Negative for congestion, dental problem, drooling, ear discharge, ear pain, facial swelling, hearing loss, mouth sores, nosebleeds, postnasal drip, rhinorrhea, sinus pressure, sneezing, sore throat, tinnitus, trouble swallowing and voice change.    Eyes: Negative for photophobia, pain, discharge, redness, itching and visual disturbance.   Respiratory: Negative for apnea, cough, choking, chest tightness, shortness of breath, wheezing and stridor.    Cardiovascular: Negative for chest pain, palpitations and leg swelling.   Gastrointestinal: Negative for abdominal distention, abdominal pain, anal bleeding, blood in stool, constipation, diarrhea, nausea, rectal pain and  vomiting.   Endocrine: Negative for cold intolerance, heat intolerance, polydipsia, polyphagia and polyuria.   Genitourinary: Negative for decreased urine volume, difficulty urinating, discharge, dysuria, enuresis, flank pain, frequency, genital sores, hematuria, penile pain, penile swelling, scrotal swelling, testicular pain and urgency.   Musculoskeletal: Negative for arthralgias, back pain, gait problem, joint swelling, myalgias, neck pain and neck stiffness.   Skin: Negative for color change, pallor, rash and wound.   Allergic/Immunologic: Negative for environmental allergies, food allergies and immunocompromised state.   Neurological: Negative for dizziness, tremors, seizures, syncope, facial asymmetry, speech difficulty, weakness, light-headedness, numbness and headaches.   Hematological: Negative for adenopathy. Does not bruise/bleed easily.   Psychiatric/Behavioral: Positive for dysphoric mood. Negative for agitation, behavioral problems, confusion, decreased concentration, hallucinations, self-injury, sleep disturbance and suicidal ideas. The patient is nervous/anxious. The patient is not hyperactive.        Objective:      Physical Exam   Constitutional: He is oriented to person, place, and time. He appears well-developed and well-nourished. He appears distressed.   HENT:   Head: Normocephalic.   Right Ear: External ear normal.   Left Ear: External ear normal.   Nose: Nose normal. Right sinus exhibits no maxillary sinus tenderness and no frontal sinus tenderness. Left sinus exhibits no maxillary sinus tenderness and no frontal sinus tenderness.   Mouth/Throat: Oropharynx is clear and moist. No oropharyngeal exudate.   Eyes: Pupils are equal, round, and reactive to light. EOM and lids are normal. Right eye exhibits no discharge. Left eye exhibits no discharge. Right conjunctiva is not injected. Right conjunctiva has no hemorrhage. Left conjunctiva is not injected. Left conjunctiva has no hemorrhage. No  scleral icterus. Right eye exhibits normal extraocular motion. Left eye exhibits normal extraocular motion.   Neck: Normal range of motion. Neck supple. No JVD present. No tracheal deviation present. No thyromegaly present.   Cardiovascular: Normal rate and regular rhythm.   Pulmonary/Chest: Effort normal. No stridor. No respiratory distress.   Abdominal: Soft. He exhibits no mass. There is no hepatosplenomegaly, splenomegaly or hepatomegaly. There is no tenderness.   Musculoskeletal: Normal range of motion. He exhibits no edema or tenderness.   Lymphadenopathy:        Head (right side): No posterior auricular and no occipital adenopathy present.        Head (left side): No posterior auricular and no occipital adenopathy present.     He has no cervical adenopathy.        Right cervical: No superficial cervical, no deep cervical and no posterior cervical adenopathy present.       Left cervical: No superficial cervical, no deep cervical and no posterior cervical adenopathy present.     He has no axillary adenopathy.        Right: No supraclavicular adenopathy present.        Left: No supraclavicular adenopathy present.   Neurological: He is alert and oriented to person, place, and time. He has normal strength. No cranial nerve deficit. Coordination normal.   Skin: Skin is dry. No rash noted. He is not diaphoretic. No cyanosis or erythema. Nails show no clubbing.   Psychiatric: He has a normal mood and affect. His behavior is normal. Judgment and thought content normal. Cognition and memory are normal.   Vitals reviewed.          Assessment:      1. Metastatic non-small cell lung cancer    2. Hypothyroidism (acquired)           Plan:     Metastatic non-small cell lung carcinoma continue on Keytruda therapy orders written reviewed return in 3 weeks with laboratory studies told that he would need to continue on can treat in definitely will repeat PET scan most likely in November of 2019  Unless clinically warranted  prior        Ez Horowitz Jr, MD FACP

## 2019-09-09 DIAGNOSIS — E78.5 ELEVATED LIPIDS: Primary | ICD-10-CM

## 2019-09-25 ENCOUNTER — LAB VISIT (OUTPATIENT)
Dept: LAB | Facility: HOSPITAL | Age: 84
End: 2019-09-25
Attending: INTERNAL MEDICINE
Payer: MEDICARE

## 2019-09-25 ENCOUNTER — OFFICE VISIT (OUTPATIENT)
Dept: UROLOGY | Facility: CLINIC | Age: 84
End: 2019-09-25
Payer: MEDICARE

## 2019-09-25 VITALS — WEIGHT: 179.69 LBS | HEIGHT: 71 IN | BODY MASS INDEX: 25.16 KG/M2

## 2019-09-25 DIAGNOSIS — E78.5 ELEVATED LIPIDS: ICD-10-CM

## 2019-09-25 DIAGNOSIS — C67.9 MALIGNANT NEOPLASM OF URINARY BLADDER, UNSPECIFIED SITE: Primary | ICD-10-CM

## 2019-09-25 PROCEDURE — 81002 URINALYSIS NONAUTO W/O SCOPE: CPT | Mod: S$GLB,,, | Performed by: UROLOGY

## 2019-09-25 PROCEDURE — 52000 PR CYSTOURETHROSCOPY: ICD-10-PCS | Mod: S$GLB,,, | Performed by: UROLOGY

## 2019-09-25 PROCEDURE — 52000 CYSTOURETHROSCOPY: CPT | Mod: S$GLB,,, | Performed by: UROLOGY

## 2019-09-25 PROCEDURE — 99999 PR PBB SHADOW E&M-EST. PATIENT-LVL II: ICD-10-PCS | Mod: PBBFAC,,, | Performed by: UROLOGY

## 2019-09-25 PROCEDURE — 99499 UNLISTED E&M SERVICE: CPT | Mod: S$GLB,,, | Performed by: UROLOGY

## 2019-09-25 PROCEDURE — 80061 LIPID PANEL: CPT

## 2019-09-25 PROCEDURE — 81002 POCT URINE DIPSTICK WITHOUT MICROSCOPE: ICD-10-PCS | Mod: S$GLB,,, | Performed by: UROLOGY

## 2019-09-25 PROCEDURE — 99499 NO LOS: ICD-10-PCS | Mod: S$GLB,,, | Performed by: UROLOGY

## 2019-09-25 PROCEDURE — 36415 COLL VENOUS BLD VENIPUNCTURE: CPT

## 2019-09-25 PROCEDURE — 99999 PR PBB SHADOW E&M-EST. PATIENT-LVL II: CPT | Mod: PBBFAC,,, | Performed by: UROLOGY

## 2019-09-25 NOTE — PROGRESS NOTES
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  12/19/18: 15 mo cysto normal  10/17/18: 12 mo cysto normal.  6/14/18: 9 mo cysto normal.  3/14/18: 6 mo cysto normal.  No BCG from now on.  1/10/18: Had a lot of problems with BCG re-try and we agreed today to not do it again.  Voiding fine otherwise, no other complaints.   1/3/18: No problems in interim will try BCG again.  12/20/17: Had flu-like symptoms very severe after last dose.  Got better in a couple days.  12/13/17: BCG 1/3 1/2 strength today.  12/7/17: 3 mo cysto normal.  11/17/17: Pt would like to not do the BCG today.  We reviewed the indications.  We will respect his wishes.  Agreed to cysto soon, then do 3 wks maint BCG; just not do it today.  11/9/17: LUTS for a half-day last time but not bad enough to skip this week. BCG again today.  11/3/17: Had sig LUTS after last BCG and we skipped last week.  BCG 1/2 strength 4/6 today.  10/19/17: BCG 3/6 today.  9/22/17: Came for BCG today, but still too much microhematuria and leuk.  9/5/17: High grade Ta on path report.  OAB now settling down.  8/10/17: Cleared for surgery, reviewed findings, arranging TURBT.  7/10/17: Hematuria confirmed, CT Urogram reassuring.  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  6/16/17: 85 yo man referred by Dr. Gibson for evaluation of nocturia and LUTS.  Has incontinence at night, sudden urgency has trouble making it to the toilet.  Nocturia like this 2-3/night.  No daytime problems.  Six months so far.   No abd/pelvic pain and no exac/rel factors.  No gross hematuria.  No urolithiasis.  No other urinary bother.  No  history.  PSA was always normal.  2 cups coffee in AM, tea at lunch and dinner (green tea).  Good stream.     Allergies:  Patient has no known  allergies.    Medications:  has a current medication list which includes the following prescription(s): acetaminophen, amlodipine, aspirin, dronabinol, levothyroxine, lisinopril, nitroglycerin, omeprazole, and simvastatin.    Review of Systems:  General: No fever, chills, fatigability, or weight loss.  Skin: No rashes, itching, or changes in color or texture of skin.  Chest: Denies DELUCA, cyanosis, wheezing, cough, and sputum production.  Abdomen: Appetite fine. No weight loss. Denies diarrhea, abdominal pain, hematemesis, or blood in stool.  Musculoskeletal: No joint stiffness or swelling. Denies back pain.  : As above.  All other review of systems negative.    PMH:   has a past medical history of Anticoagulant long-term use, Cancer, Cardiomyopathy (9/07), HBP (high blood pressure) (1997), Hyperlipidemia, LBP (low back pain), MI (myocardial infarction) (12/06), Status post primary angioplasty with coronary stent (12/06), and Thyroid disease.    PSH:   has a past surgical history that includes Hernia repair; Coronary stent placement; and Insertion of venous access port (Left, 5/17/2019).    FamHx: family history includes Cancer in his mother; Heart disease in his mother.    SocHx:  reports that he has never smoked. He has never used smokeless tobacco. He reports that he does not drink alcohol or use drugs.      Physical Exam:  There were no vitals filed for this visit.  General: A&Ox3, no apparent distress, no deformities  Neck: No masses, normal thyroid  Lungs: normal inspiration, no use of accessory muscles  Heart: normal pulse, no arrhythmias  Abdomen: Soft, NT, ND  Skin: The skin is warm and dry. No jaundice.  Ext: No c/c/e.  :   6/17: Test desc remington, no abnormalities of epididymus. Penis normal, with normal penile and scrotal skin. Meatus normal. Normal rectal tone, no hemorrhoids. Prost 40 gm no nodules or masses appreciated. SV not palpable. Perineum and anus normal.    Labs/Studies:   Urinalysis performed  in clinic, summary:    10/6/17: UA normal exc tr prot and 50 blood, less on micro exam  Bladder Scan performed in office:     6/17: PVR 4 ml.    Procedure: Diagnostic Cystoscopy    Procedure in Detail: After proper consents were obtained, the patient was prepped and draped in normal sterile fashion for diagnostic cystoscopy. 5 ml of lidocaine jelly was instilled in the urethra. The flexible cystoscope was then introduced into the urethra, and advanced into the bladder under direct vision. The urethral mucosa appeared normal, and no strictures were noted. The sphincter appeared to be normal, and the veru montanum was unremarkable. The prostatic mucosa and the lateral lobes of the prostate were normal. The bladder neck was normal. Inspection of the interior of the bladder was then carried out. The trigone was unremarkable, with no mucosal lesions. The ureteral orifices were normal in position and configuration. Systematic inspection of the mucosa of the bladder it was then carried out, rotating the cystoscope so that all areas of the left and right lateral walls, the dome of the bladder, and the posterior wall were all visualized. The cystoscope was then advanced further into the bladder, and maximum deflection of the scope was performed so that the bladder neck could be inspected. No mucosal lesions were noted there. The cystoscope was then removed, and the procedure terminated.     Findings: normal cysto    Impression/Plan:   1. 30 mo cysto 3/7/20. No more BCG

## 2019-09-26 LAB
CHOLEST SERPL-MCNC: 144 MG/DL (ref 120–199)
CHOLEST/HDLC SERPL: 3.9 {RATIO} (ref 2–5)
HDLC SERPL-MCNC: 37 MG/DL (ref 40–75)
HDLC SERPL: 25.7 % (ref 20–50)
LDLC SERPL CALC-MCNC: 76.8 MG/DL (ref 63–159)
NONHDLC SERPL-MCNC: 107 MG/DL
TRIGL SERPL-MCNC: 151 MG/DL (ref 30–150)

## 2019-09-27 ENCOUNTER — INFUSION (OUTPATIENT)
Dept: INFUSION THERAPY | Facility: HOSPITAL | Age: 84
End: 2019-09-27
Attending: INTERNAL MEDICINE
Payer: MEDICARE

## 2019-09-27 ENCOUNTER — OFFICE VISIT (OUTPATIENT)
Dept: HEMATOLOGY/ONCOLOGY | Facility: CLINIC | Age: 84
End: 2019-09-27
Payer: MEDICARE

## 2019-09-27 ENCOUNTER — TELEPHONE (OUTPATIENT)
Dept: HEMATOLOGY/ONCOLOGY | Facility: CLINIC | Age: 84
End: 2019-09-27

## 2019-09-27 VITALS
BODY MASS INDEX: 25.12 KG/M2 | HEART RATE: 57 BPM | SYSTOLIC BLOOD PRESSURE: 123 MMHG | DIASTOLIC BLOOD PRESSURE: 68 MMHG | HEIGHT: 71 IN | WEIGHT: 179.44 LBS

## 2019-09-27 VITALS
HEART RATE: 64 BPM | WEIGHT: 179.44 LBS | BODY MASS INDEX: 25.12 KG/M2 | RESPIRATION RATE: 18 BRPM | TEMPERATURE: 97 F | HEIGHT: 71 IN | SYSTOLIC BLOOD PRESSURE: 130 MMHG | OXYGEN SATURATION: 95 % | DIASTOLIC BLOOD PRESSURE: 66 MMHG

## 2019-09-27 DIAGNOSIS — C34.90 METASTATIC NON-SMALL CELL LUNG CANCER: Primary | ICD-10-CM

## 2019-09-27 DIAGNOSIS — E03.9 HYPOTHYROIDISM (ACQUIRED): ICD-10-CM

## 2019-09-27 DIAGNOSIS — D53.9 NUTRITIONAL ANEMIA: ICD-10-CM

## 2019-09-27 PROCEDURE — 99215 OFFICE O/P EST HI 40 MIN: CPT | Mod: 25,S$GLB,, | Performed by: INTERNAL MEDICINE

## 2019-09-27 PROCEDURE — 96413 CHEMO IV INFUSION 1 HR: CPT

## 2019-09-27 PROCEDURE — 1101F PR PT FALLS ASSESS DOC 0-1 FALLS W/OUT INJ PAST YR: ICD-10-PCS | Mod: CPTII,S$GLB,, | Performed by: INTERNAL MEDICINE

## 2019-09-27 PROCEDURE — 99999 PR PBB SHADOW E&M-EST. PATIENT-LVL III: ICD-10-PCS | Mod: PBBFAC,,, | Performed by: INTERNAL MEDICINE

## 2019-09-27 PROCEDURE — A4216 STERILE WATER/SALINE, 10 ML: HCPCS | Performed by: INTERNAL MEDICINE

## 2019-09-27 PROCEDURE — 25000003 PHARM REV CODE 250: Performed by: INTERNAL MEDICINE

## 2019-09-27 PROCEDURE — 63600175 PHARM REV CODE 636 W HCPCS: Performed by: INTERNAL MEDICINE

## 2019-09-27 PROCEDURE — 99999 PR PBB SHADOW E&M-EST. PATIENT-LVL III: CPT | Mod: PBBFAC,,, | Performed by: INTERNAL MEDICINE

## 2019-09-27 PROCEDURE — 1101F PT FALLS ASSESS-DOCD LE1/YR: CPT | Mod: CPTII,S$GLB,, | Performed by: INTERNAL MEDICINE

## 2019-09-27 PROCEDURE — 99215 PR OFFICE/OUTPT VISIT, EST, LEVL V, 40-54 MIN: ICD-10-PCS | Mod: 25,S$GLB,, | Performed by: INTERNAL MEDICINE

## 2019-09-27 RX ORDER — HEPARIN 100 UNIT/ML
500 SYRINGE INTRAVENOUS
Status: CANCELLED | OUTPATIENT
Start: 2019-09-27

## 2019-09-27 RX ORDER — SODIUM CHLORIDE 0.9 % (FLUSH) 0.9 %
10 SYRINGE (ML) INJECTION
Status: CANCELLED | OUTPATIENT
Start: 2019-09-27

## 2019-09-27 RX ORDER — HEPARIN 100 UNIT/ML
500 SYRINGE INTRAVENOUS
Status: DISCONTINUED | OUTPATIENT
Start: 2019-09-27 | End: 2019-09-27 | Stop reason: HOSPADM

## 2019-09-27 RX ORDER — LEVOTHYROXINE SODIUM 50 UG/1
100 TABLET ORAL DAILY
Qty: 90 TABLET | Refills: 2 | Status: SHIPPED | OUTPATIENT
Start: 2019-09-27 | End: 2020-01-02 | Stop reason: SDUPTHER

## 2019-09-27 RX ORDER — SODIUM CHLORIDE 0.9 % (FLUSH) 0.9 %
10 SYRINGE (ML) INJECTION
Status: DISCONTINUED | OUTPATIENT
Start: 2019-09-27 | End: 2019-09-27 | Stop reason: HOSPADM

## 2019-09-27 RX ADMIN — HEPARIN 500 UNITS: 100 SYRINGE at 02:09

## 2019-09-27 RX ADMIN — Medication 10 ML: at 01:09

## 2019-09-27 RX ADMIN — SODIUM CHLORIDE 200 MG: 9 INJECTION, SOLUTION INTRAVENOUS at 01:09

## 2019-09-27 NOTE — PROGRESS NOTES
Subjective:       Patient ID: Brant Lees is a 88 y.o. male.    Chief Complaint: Metastatic non-small cell lung cancer [C34.90]  HPI: We have an opportunity to see Mr. Brant Lees in Hematology Oncology clinic at [facility] on 09/27/2019.  Mr. Brant Lees is a 88 y.o. gentleman who had screening Ct lung, was found to have multiple lung lesions. PET CT showed involvement of both right and left lungs as well as hilar nodes.  Biopsy showed NSCLC squamous type.  Reported no cancer symptoms, denies pain.  Molecular analysis showed high PDL1 expression 50%.  Currently on keytruda.         Malignant neoplasm of overlapping sites of lung    4/15/2019 Initial Diagnosis     Malignant neoplasm of overlapping sites of lung      4/15/2019 Cancer Staged     Staging form: Lung, AJCC 8th Edition  - Clinical stage from 4/15/2019: Stage IV (cT3, cN3, cM1a) - Signed by Simone Goins MD on 4/15/2019        Metastatic non-small cell lung cancer    4/15/2019 Initial Diagnosis     Metastatic non-small cell lung cancer      5/7/2019 -  Chemotherapy     Treatment Summary   Plan Name: OP PEMBROLIZUMAB 200MG Q3W  Treatment Goal: Curative  Status: Active  Start Date: 5/24/2019  End Date: 1/10/2020 (Planned)  Provider: Simone Goins MD  Chemotherapy: pembrolizumab (KEYTRUDA) 200 mg in sodium chloride 0.9% 108 mL chemo infusion, 200 mg, Intravenous, Clinic/HOD 1 time, 7 of 12 cycles  Administration: 200 mg (5/24/2019), 200 mg (6/14/2019), 200 mg (7/5/2019), 200 mg (7/26/2019), 200 mg (8/16/2019), 200 mg (9/6/2019)       Past Medical History:   Diagnosis Date    Anticoagulant long-term use     plavix    Cancer     Metastatic non-small cell lung cancer    Cardiomyopathy 9/07    Ischemic    HBP (high blood pressure) 1997    Hyperlipidemia     LBP (low back pain)     MI (myocardial infarction) 12/06    Status post primary angioplasty with coronary stent 12/06    Thyroid disease      Family History   Problem Relation Age of Onset     Heart disease Mother     Cancer Mother      Social History     Socioeconomic History    Marital status:      Spouse name: Not on file    Number of children: 1    Years of education: Not on file    Highest education level: Not on file   Occupational History    Not on file   Social Needs    Financial resource strain: Not on file    Food insecurity:     Worry: Not on file     Inability: Not on file    Transportation needs:     Medical: Not on file     Non-medical: Not on file   Tobacco Use    Smoking status: Never Smoker    Smokeless tobacco: Never Used   Substance and Sexual Activity    Alcohol use: No    Drug use: No    Sexual activity: Not Currently     Partners: Female   Lifestyle    Physical activity:     Days per week: Not on file     Minutes per session: Not on file    Stress: Not on file   Relationships    Social connections:     Talks on phone: Not on file     Gets together: Not on file     Attends Buddhism service: Not on file     Active member of club or organization: Not on file     Attends meetings of clubs or organizations: Not on file     Relationship status: Not on file   Other Topics Concern    Not on file   Social History Narrative    Not on file     Past Surgical History:   Procedure Laterality Date    CORONARY STENT PLACEMENT      HERNIA REPAIR      INSERTION OF VENOUS ACCESS PORT Left 5/17/2019    Procedure: INSERTION, VENOUS ACCESS PORT;  Surgeon: Lester Trejo MD;  Location: [facility];  Service: General;  Laterality: Left;     Current Outpatient Medications   Medication Sig Dispense Refill    acetaminophen (TYLENOL) 325 MG tablet Take 325 mg by mouth every 6 (six) hours as needed for Pain.      amlodipine (NORVASC) 10 MG tablet Take 2.5 tablets by mouth 2 (two) times daily. 1/2 two times daily       ASPIRIN (ASPIR-81 ORAL) Take by mouth.      dronabinol (MARINOL) 2.5 MG capsule Take 1 capsule (2.5 mg total) by mouth 2 (two) times daily before meals. 60  capsule 1    levothyroxine (SYNTHROID) 50 MCG tablet Take 2 tablets (100 mcg total) by mouth once daily. 90 tablet 2    lisinopril (PRINIVIL,ZESTRIL) 20 MG tablet Take 1 tablet by mouth once daily.       nitroGLYCERIN (NITROSTAT) 0.4 MG SL tablet Place 0.4 mg under the tongue every 5 (five) minutes as needed for Chest pain.      omeprazole (PRILOSEC) 20 MG capsule Take 20 mg by mouth once daily.      simvastatin (ZOCOR) 20 MG tablet Take 20 mg by mouth every evening.       No current facility-administered medications for this visit.      Facility-Administered Medications Ordered in Other Visits   Medication Dose Route Frequency Provider Last Rate Last Dose    heparin, porcine (PF) 100 unit/mL injection flush 500 Units  500 Units Intravenous PRN Simone Goins MD        pembrolizumab (KEYTRUDA) 200 mg in sodium chloride 0.9% 108 mL chemo infusion  200 mg Intravenous 1 time in Clinic/HOD Simone Goins  mL/hr at 09/27/19 1352 200 mg at 09/27/19 1352    sodium chloride 0.9% flush 10 mL  10 mL Intravenous PRN Simone Goins MD   10 mL at 09/27/19 1348       Labs:  Lab Results   Component Value Date    WBC 9.52 09/27/2019    HGB 14.3 09/27/2019    HCT 43.3 09/27/2019    MCV 92 09/27/2019     09/27/2019     BMP  Lab Results   Component Value Date     09/27/2019    K 4.4 09/27/2019     09/27/2019    CO2 27 09/27/2019    BUN 18 09/27/2019    CREATININE 1.3 09/27/2019    CALCIUM 9.5 09/27/2019    ANIONGAP 9 09/27/2019    ESTGFRAFRICA 56 (A) 09/27/2019    EGFRNONAA 49 (A) 09/27/2019     Lab Results   Component Value Date    ALT 16 09/27/2019    AST 16 09/27/2019    ALKPHOS 94 09/27/2019    BILITOT 0.3 09/27/2019       No results found for: IRON, TIBC, FERRITIN, SATURATEDIRO  No results found for: MGVAQGRG28  No results found for: FOLATE  Lab Results   Component Value Date    TSH 5.418 (H) 09/06/2019       I have reviewed the radiology reports and examined the scan/xray images.    Review of  Systems   Constitutional: Negative.    HENT: Negative.    Eyes: Negative.    Respiratory: Negative.    Cardiovascular: Negative.    Gastrointestinal: Negative.    Endocrine: Negative.    Genitourinary: Negative.    Musculoskeletal: Negative.    Skin: Negative.    Allergic/Immunologic: Negative.    Neurological: Negative.    Hematological: Negative.    Psychiatric/Behavioral: Negative.      ECOG SCORE              Objective:     Vitals:    09/27/19 1313   BP: 130/66   Pulse: 64   Resp: 18   Temp: 97.1 °F (36.2 °C)   Body mass index is 25.03 kg/m².  Physical Exam   Constitutional: He is oriented to person, place, and time. He appears well-developed and well-nourished.   HENT:   Head: Normocephalic and atraumatic.   Eyes: Conjunctivae and EOM are normal.   Neck: Normal range of motion. Neck supple.   Cardiovascular: Normal rate and regular rhythm.   Pulmonary/Chest: Effort normal and breath sounds normal.   Abdominal: Soft. Bowel sounds are normal.   Musculoskeletal: Normal range of motion.   Neurological: He is alert and oriented to person, place, and time.   Skin: Skin is warm and dry.   Psychiatric: He has a normal mood and affect. His behavior is normal. Judgment and thought content normal.   Nursing note and vitals reviewed.        Assessment:      1. Metastatic non-small cell lung cancer    2. Nutritional anemia            Plan:     Metastatic non-small cell lung cancer  Continue on keytruda. PET CT showed response.  -     CBC auto differential; Future; Expected date: 10/18/2019  -     Comprehensive metabolic panel; Future; Expected date: 10/18/2019  -     TSH; Future; Expected date: 10/18/2019    Nutritional anemia   -     TSH; Future; Expected date: 10/18/2019

## 2019-09-27 NOTE — DISCHARGE INSTRUCTIONS
[facility]  [address] [address]  [address]  [phone] phone     [phone] fax  Hours of Operation: Monday- Friday 8:00am- 5:00pm  After hours phone  [phone]  Hematology / Oncology Physicians on call      Dr. Carlos Manuel Goins      Please call with any concerns regarding your appointment today.    HOME CARE AFTER CHEMOTHERAPY   Meals   Many patients feel sick and lose their appetites during treatment. Eat small meals several times a day. Choose bland foods with little taste or smell if you have problems with nausea. Be sure to cook all food thoroughly. This kills bacteria and helps you avoid intestinal infection. Soft foods are easier to swallow and digest.   Activity   Exercise keeps you strong and keeps your heart and lungs active. Talk to your doctor about an appropriate exercise program for you.   Skin Care   To prevent a skin infection, bathe or shower once a day. Use a moisturizing soap and wash with warm water. Avoid very hot or cold water. Chemotherapy can make your skin dry . Apply moisturizing lotion to help relieve dry skin. Some drugs used in high doses can cause slight burns to appear (like sunburn). Ask for a special cream to help relieve the burn and protect your skin.   Prevent Mouth Sores   During chemotherapy, many people get mouth sores. Do the following to help prevent mouth sores or to ease discomfort.   Brush your teeth with a soft-bristle toothbrush after every meal.  Don't use dental floss if your platelet count is below 50,000. Your doctor or nurse will tell you if this is the case.  Use an oral swab or special soft toothbrush if your gums bleed during regular brushing.  Use mouthwash as directed. If you can't tolerate commercial mouthwash, use salt and baking soda to clean your mouth. Mix 1 teaspoon of salt and 1 teaspoon of baking soda into a glass of water. Swish and spit.  Call your  doctor or return to this facility if you develop any of the following:   Sore throat   White patches in the mouth or throat   Fever of 100.4ºF (38ºC) or higher, or as directed by your healthcare provider  © 2249-6744 [facility] [facility], [address] [place] [address]. All rights reserved. This information is not intended as a substitute for professional medical care. Always follow your healthcare professional's   FALL PREVENTION   Falls often occur due to slipping, tripping or losing your balance. Here are ways to reduce your risk of falling again.   Was there anything that caused your fall that can be fixed, removed or replaced?   Make your home safe by keeping walkways clear of objects you may trip over.   Use non-slip pads under rugs.   Do not walk in poorly lit areas.   Do not stand on chairs or wobbly ladders.   Use caution when reaching overhead or looking upward. This position can cause a loss of balance.   Be sure your shoes fit properly, have non-slip bottoms and are in good condition.   Be cautious when going up and down stairs, curbs, and when walking on uneven sidewalks.   If your balance is poor, consider using a cane or walker.   If your fall was related to alcohol use, stop or limit alcohol intake.   If your fall was related to use of sleeping medicines, talk to your doctor about this. You may need to reduce your dosage at bedtime if you awaken during the night to go to the bathroom.   To reduce the need for nighttime bathroom trips:   Avoid drinking fluids for several hours before going to bed   Empty your bladder before going to bed   Men can keep a urinal at the bedside   © 2000-2011 [facility], [address] [place] [address]. All rights reserved. This information is not intended as a substitute for professional medical care. Always follow your healthcare professional's instructions.

## 2019-09-27 NOTE — TELEPHONE ENCOUNTER
----- Message from Patt Oviedo sent at 9/27/2019  3:15 PM CDT -----  Contact: [facility]  CALLING TO GET CLARIFICATION OF LEVOTHYROXINE FOR PATIENT. PLEASE CALL [facility] @ [phone]. THANKS, GARRET

## 2019-10-18 ENCOUNTER — INFUSION (OUTPATIENT)
Dept: INFUSION THERAPY | Facility: HOSPITAL | Age: 84
End: 2019-10-18
Attending: INTERNAL MEDICINE
Payer: MEDICARE

## 2019-10-18 ENCOUNTER — OFFICE VISIT (OUTPATIENT)
Dept: HEMATOLOGY/ONCOLOGY | Facility: CLINIC | Age: 84
End: 2019-10-18
Payer: MEDICARE

## 2019-10-18 VITALS
WEIGHT: 180.31 LBS | TEMPERATURE: 98 F | BODY MASS INDEX: 25.24 KG/M2 | DIASTOLIC BLOOD PRESSURE: 64 MMHG | RESPIRATION RATE: 18 BRPM | SYSTOLIC BLOOD PRESSURE: 125 MMHG | OXYGEN SATURATION: 95 % | HEART RATE: 63 BPM | HEIGHT: 71 IN

## 2019-10-18 DIAGNOSIS — C34.80 MALIGNANT NEOPLASM OF OVERLAPPING SITES OF LUNG, UNSPECIFIED LATERALITY: ICD-10-CM

## 2019-10-18 DIAGNOSIS — C34.90 METASTATIC NON-SMALL CELL LUNG CANCER: Primary | ICD-10-CM

## 2019-10-18 DIAGNOSIS — E03.9 HYPOTHYROIDISM (ACQUIRED): ICD-10-CM

## 2019-10-18 PROCEDURE — 99999 PR PBB SHADOW E&M-EST. PATIENT-LVL III: CPT | Mod: PBBFAC,,, | Performed by: INTERNAL MEDICINE

## 2019-10-18 PROCEDURE — 1101F PT FALLS ASSESS-DOCD LE1/YR: CPT | Mod: CPTII,S$GLB,, | Performed by: INTERNAL MEDICINE

## 2019-10-18 PROCEDURE — 1101F PR PT FALLS ASSESS DOC 0-1 FALLS W/OUT INJ PAST YR: ICD-10-PCS | Mod: CPTII,S$GLB,, | Performed by: INTERNAL MEDICINE

## 2019-10-18 PROCEDURE — 96413 CHEMO IV INFUSION 1 HR: CPT

## 2019-10-18 PROCEDURE — 99215 OFFICE O/P EST HI 40 MIN: CPT | Mod: 25,S$GLB,, | Performed by: INTERNAL MEDICINE

## 2019-10-18 PROCEDURE — 99999 PR PBB SHADOW E&M-EST. PATIENT-LVL III: ICD-10-PCS | Mod: PBBFAC,,, | Performed by: INTERNAL MEDICINE

## 2019-10-18 PROCEDURE — 99215 PR OFFICE/OUTPT VISIT, EST, LEVL V, 40-54 MIN: ICD-10-PCS | Mod: 25,S$GLB,, | Performed by: INTERNAL MEDICINE

## 2019-10-18 PROCEDURE — 63600175 PHARM REV CODE 636 W HCPCS: Performed by: INTERNAL MEDICINE

## 2019-10-18 RX ORDER — SODIUM CHLORIDE 0.9 % (FLUSH) 0.9 %
10 SYRINGE (ML) INJECTION
Status: CANCELLED | OUTPATIENT
Start: 2019-10-18

## 2019-10-18 RX ORDER — HEPARIN 100 UNIT/ML
500 SYRINGE INTRAVENOUS
Status: CANCELLED | OUTPATIENT
Start: 2019-10-18

## 2019-10-18 RX ORDER — HEPARIN 100 UNIT/ML
500 SYRINGE INTRAVENOUS
Status: DISCONTINUED | OUTPATIENT
Start: 2019-10-18 | End: 2019-10-18 | Stop reason: HOSPADM

## 2019-10-18 RX ADMIN — HEPARIN 500 UNITS: 100 SYRINGE at 03:10

## 2019-10-18 RX ADMIN — SODIUM CHLORIDE 200 MG: 9 INJECTION, SOLUTION INTRAVENOUS at 02:10

## 2019-10-18 NOTE — PROGRESS NOTES
Subjective:       Patient ID: Brant Lees is a 88 y.o. male.    Chief Complaint: Follow-up; Results; Chemotherapy; and Lung Cancer    HPI 88-year-old male history of metastatic non-small cell lung carcinoma continues on maintenance Keytruda last PET scan in August 2019 patient returns for clinical follow-up ECOG status 1    Past Medical History:   Diagnosis Date    Anticoagulant long-term use     plavix    Cancer     Metastatic non-small cell lung cancer    Cardiomyopathy 9/07    Ischemic    HBP (high blood pressure) 1997    Hyperlipidemia     LBP (low back pain)     MI (myocardial infarction) 12/06    Status post primary angioplasty with coronary stent 12/06    Thyroid disease      Family History   Problem Relation Age of Onset    Heart disease Mother     Cancer Mother      Social History     Socioeconomic History    Marital status:      Spouse name: Not on file    Number of children: 1    Years of education: Not on file    Highest education level: Not on file   Occupational History    Not on file   Social Needs    Financial resource strain: Not on file    Food insecurity:     Worry: Not on file     Inability: Not on file    Transportation needs:     Medical: Not on file     Non-medical: Not on file   Tobacco Use    Smoking status: Never Smoker    Smokeless tobacco: Never Used   Substance and Sexual Activity    Alcohol use: No    Drug use: No    Sexual activity: Not Currently     Partners: Female   Lifestyle    Physical activity:     Days per week: Not on file     Minutes per session: Not on file    Stress: Not on file   Relationships    Social connections:     Talks on phone: Not on file     Gets together: Not on file     Attends Adventism service: Not on file     Active member of club or organization: Not on file     Attends meetings of clubs or organizations: Not on file     Relationship status: Not on file   Other Topics Concern    Not on file   Social History Narrative    Not  on file     Past Surgical History:   Procedure Laterality Date    CORONARY STENT PLACEMENT      HERNIA REPAIR      INSERTION OF VENOUS ACCESS PORT Left 5/17/2019    Procedure: INSERTION, VENOUS ACCESS PORT;  Surgeon: Lester Trejo MD;  Location: [facility];  Service: General;  Laterality: Left;       Labs:  Lab Results   Component Value Date    WBC 10.03 10/18/2019    HGB 14.9 10/18/2019    HCT 47.3 10/18/2019    MCV 95 10/18/2019     10/18/2019     BMP  Lab Results   Component Value Date     09/27/2019    K 4.4 09/27/2019     09/27/2019    CO2 27 09/27/2019    BUN 18 09/27/2019    CREATININE 1.3 09/27/2019    CALCIUM 9.5 09/27/2019    ANIONGAP 9 09/27/2019    ESTGFRAFRICA 56 (A) 09/27/2019    EGFRNONAA 49 (A) 09/27/2019     Lab Results   Component Value Date    ALT 16 09/27/2019    AST 16 09/27/2019    ALKPHOS 94 09/27/2019    BILITOT 0.3 09/27/2019       No results found for: IRON, TIBC, FERRITIN, SATURATEDIRO  No results found for: [id number]  No results found for: FOLATE  Lab Results   Component Value Date    TSH 6.963 (H) 09/27/2019         Review of Systems   Constitutional: Negative for activity change, appetite change, chills, diaphoresis, fatigue, fever and unexpected weight change.   HENT: Negative for congestion, dental problem, drooling, ear discharge, ear pain, facial swelling, hearing loss, mouth sores, nosebleeds, postnasal drip, rhinorrhea, sinus pressure, sneezing, sore throat, tinnitus, trouble swallowing and voice change.    Eyes: Negative for photophobia, pain, discharge, redness, itching and visual disturbance.   Respiratory: Negative for apnea, cough, choking, chest tightness, shortness of breath, wheezing and stridor.    Cardiovascular: Negative for chest pain, palpitations and leg swelling.   Gastrointestinal: Negative for abdominal distention, abdominal pain, anal bleeding, blood in stool, constipation, diarrhea, nausea, rectal pain and vomiting.   Endocrine: Negative  for cold intolerance, heat intolerance, polydipsia, polyphagia and polyuria.   Genitourinary: Negative for decreased urine volume, difficulty urinating, discharge, dysuria, enuresis, flank pain, frequency, genital sores, hematuria, penile pain, penile swelling, scrotal swelling, testicular pain and urgency.   Musculoskeletal: Negative for arthralgias, back pain, gait problem, joint swelling, myalgias, neck pain and neck stiffness.   Skin: Negative for color change, pallor, rash and wound.   Allergic/Immunologic: Negative for environmental allergies, food allergies and immunocompromised state.   Neurological: Negative for dizziness, tremors, seizures, syncope, facial asymmetry, speech difficulty, weakness, light-headedness, numbness and headaches.   Hematological: Negative for adenopathy. Does not bruise/bleed easily.   Psychiatric/Behavioral: Positive for dysphoric mood. Negative for agitation, behavioral problems, confusion, decreased concentration, hallucinations, self-injury, sleep disturbance and suicidal ideas. The patient is nervous/anxious. The patient is not hyperactive.        Objective:      Physical Exam   Constitutional: He is oriented to person, place, and time. He appears well-developed and well-nourished. He appears distressed.   HENT:   Head: Normocephalic.   Right Ear: External ear normal.   Left Ear: External ear normal.   Nose: Nose normal. Right sinus exhibits no maxillary sinus tenderness and no frontal sinus tenderness. Left sinus exhibits no maxillary sinus tenderness and no frontal sinus tenderness.   Mouth/Throat: Oropharynx is clear and moist. No oropharyngeal exudate.   Eyes: Pupils are equal, round, and reactive to light. EOM and lids are normal. Right eye exhibits no discharge. Left eye exhibits no discharge. Right conjunctiva is not injected. Right conjunctiva has no hemorrhage. Left conjunctiva is not injected. Left conjunctiva has no hemorrhage. No scleral icterus. Right eye exhibits  normal extraocular motion. Left eye exhibits normal extraocular motion.   Neck: Normal range of motion. Neck supple. No JVD present. No tracheal deviation present. No thyromegaly present.   Cardiovascular: Normal rate and regular rhythm.   Pulmonary/Chest: Effort normal. No stridor. No respiratory distress.   Abdominal: Soft. He exhibits no mass. There is no hepatosplenomegaly, splenomegaly or hepatomegaly. There is no tenderness.   Musculoskeletal: Normal range of motion. He exhibits no edema or tenderness.   Lymphadenopathy:        Head (right side): No posterior auricular and no occipital adenopathy present.        Head (left side): No posterior auricular and no occipital adenopathy present.     He has no cervical adenopathy.        Right cervical: No superficial cervical, no deep cervical and no posterior cervical adenopathy present.       Left cervical: No superficial cervical, no deep cervical and no posterior cervical adenopathy present.     He has no axillary adenopathy.        Right: No supraclavicular adenopathy present.        Left: No supraclavicular adenopathy present.   Neurological: He is alert and oriented to person, place, and time. He has normal strength. No cranial nerve deficit. Coordination normal.   Skin: Skin is dry. No rash noted. He is not diaphoretic. No cyanosis or erythema. Nails show no clubbing.   Psychiatric: He has a normal mood and affect. His behavior is normal. Judgment and thought content normal. Cognition and memory are normal.   Vitals reviewed.          Assessment:      1. Metastatic non-small cell lung cancer    2. Malignant neoplasm of overlapping sites of lung, unspecified laterality    3. Hypothyroidism (acquired)           Plan:     Metastatic non-small cell lung carcinoma currently on maintenance Keytruda laboratory studies 6 of treatment proceed with follow-up next imaging in end of November 2019        Ez Horowitz Jr, MD FACP

## 2019-10-18 NOTE — DISCHARGE INSTRUCTIONS
[facility]  [address] [address]  [address]  [phone] phone     [phone] fax  Hours of Operation: Monday- Friday 8:00am- 5:00pm  After hours phone  [phone]  Hematology / Oncology Physicians on call      Dr. Carlos Manuel Noyola, LINDA Galeana NP Tyesha Taylor, NP    Please call with any concerns regarding your appointment today.FALL PREVENTION   Falls often occur due to slipping, tripping or losing your balance. Here are ways to reduce your risk of falling again.   Was there anything that caused your fall that can be fixed, removed or replaced?   Make your home safe by keeping walkways clear of objects you may trip over.   Use non-slip pads under rugs.   Do not walk in poorly lit areas.   Do not stand on chairs or wobbly ladders.   Use caution when reaching overhead or looking upward. This position can cause a loss of balance.   Be sure your shoes fit properly, have non-slip bottoms and are in good condition.   Be cautious when going up and down stairs, curbs, and when walking on uneven sidewalks.   If your balance is poor, consider using a cane or walker.   If your fall was related to alcohol use, stop or limit alcohol intake.   If your fall was related to use of sleeping medicines, talk to your doctor about this. You may need to reduce your dosage at bedtime if you awaken during the night to go to the bathroom.   To reduce the need for nighttime bathroom trips:   Avoid drinking fluids for several hours before going to bed   Empty your bladder before going to bed   Men can keep a urinal at the bedside   © 6817-0474 [facility], [address] [place] [address], [place] [address]. All rights reserved. This information is not intended as a substitute for professional medical care. Always follow your healthcare professional's instructions.

## 2019-11-08 ENCOUNTER — OFFICE VISIT (OUTPATIENT)
Dept: HEMATOLOGY/ONCOLOGY | Facility: CLINIC | Age: 84
End: 2019-11-08
Payer: MEDICARE

## 2019-11-08 ENCOUNTER — INFUSION (OUTPATIENT)
Dept: INFUSION THERAPY | Facility: HOSPITAL | Age: 84
End: 2019-11-08
Attending: INTERNAL MEDICINE
Payer: MEDICARE

## 2019-11-08 ENCOUNTER — TELEPHONE (OUTPATIENT)
Dept: INFUSION THERAPY | Facility: HOSPITAL | Age: 84
End: 2019-11-08

## 2019-11-08 VITALS
DIASTOLIC BLOOD PRESSURE: 71 MMHG | HEIGHT: 71 IN | HEART RATE: 74 BPM | OXYGEN SATURATION: 96 % | SYSTOLIC BLOOD PRESSURE: 136 MMHG | TEMPERATURE: 97 F | WEIGHT: 180.75 LBS | BODY MASS INDEX: 25.31 KG/M2

## 2019-11-08 DIAGNOSIS — D89.89 OTHER SPECIFIED DISORDERS INVOLVING THE IMMUNE MECHANISM, NOT ELSEWHERE CLASSIFIED: ICD-10-CM

## 2019-11-08 DIAGNOSIS — C34.82 MALIGNANT NEOPLASM OF OVERLAPPING SITES OF LEFT BRONCHUS AND LUNG: ICD-10-CM

## 2019-11-08 DIAGNOSIS — C34.90 METASTATIC NON-SMALL CELL LUNG CANCER: Primary | ICD-10-CM

## 2019-11-08 DIAGNOSIS — E03.9 HYPOTHYROIDISM (ACQUIRED): ICD-10-CM

## 2019-11-08 DIAGNOSIS — E07.9 THYROID DYSFUNCTION: ICD-10-CM

## 2019-11-08 DIAGNOSIS — C34.80 MALIGNANT NEOPLASM OF OVERLAPPING SITES OF LUNG, UNSPECIFIED LATERALITY: ICD-10-CM

## 2019-11-08 DIAGNOSIS — Z29.89 IMMUNOTHERAPY: ICD-10-CM

## 2019-11-08 PROCEDURE — 96413 CHEMO IV INFUSION 1 HR: CPT

## 2019-11-08 PROCEDURE — 99215 OFFICE O/P EST HI 40 MIN: CPT | Mod: S$GLB,,, | Performed by: NURSE PRACTITIONER

## 2019-11-08 PROCEDURE — 1101F PR PT FALLS ASSESS DOC 0-1 FALLS W/OUT INJ PAST YR: ICD-10-PCS | Mod: CPTII,S$GLB,, | Performed by: NURSE PRACTITIONER

## 2019-11-08 PROCEDURE — 99999 PR PBB SHADOW E&M-EST. PATIENT-LVL III: CPT | Mod: PBBFAC,,, | Performed by: NURSE PRACTITIONER

## 2019-11-08 PROCEDURE — 99215 PR OFFICE/OUTPT VISIT, EST, LEVL V, 40-54 MIN: ICD-10-PCS | Mod: S$GLB,,, | Performed by: NURSE PRACTITIONER

## 2019-11-08 PROCEDURE — 99999 PR PBB SHADOW E&M-EST. PATIENT-LVL III: ICD-10-PCS | Mod: PBBFAC,,, | Performed by: NURSE PRACTITIONER

## 2019-11-08 PROCEDURE — 63600175 PHARM REV CODE 636 W HCPCS: Mod: JG | Performed by: INTERNAL MEDICINE

## 2019-11-08 PROCEDURE — 1101F PT FALLS ASSESS-DOCD LE1/YR: CPT | Mod: CPTII,S$GLB,, | Performed by: NURSE PRACTITIONER

## 2019-11-08 RX ORDER — HEPARIN 100 UNIT/ML
500 SYRINGE INTRAVENOUS
Status: DISCONTINUED | OUTPATIENT
Start: 2019-11-08 | End: 2019-11-08 | Stop reason: HOSPADM

## 2019-11-08 RX ORDER — SODIUM CHLORIDE 0.9 % (FLUSH) 0.9 %
10 SYRINGE (ML) INJECTION
Status: CANCELLED | OUTPATIENT
Start: 2019-11-08

## 2019-11-08 RX ORDER — SODIUM CHLORIDE 0.9 % (FLUSH) 0.9 %
10 SYRINGE (ML) INJECTION
Status: DISCONTINUED | OUTPATIENT
Start: 2019-11-08 | End: 2019-11-08 | Stop reason: HOSPADM

## 2019-11-08 RX ORDER — HEPARIN 100 UNIT/ML
500 SYRINGE INTRAVENOUS
Status: CANCELLED | OUTPATIENT
Start: 2019-11-08

## 2019-11-08 RX ADMIN — HEPARIN 500 UNITS: 100 SYRINGE at 02:11

## 2019-11-08 RX ADMIN — SODIUM CHLORIDE 200 MG: 9 INJECTION, SOLUTION INTRAVENOUS at 02:11

## 2019-11-08 NOTE — PROGRESS NOTES
Subjective:      Patient ID: Brant Lees is a 88 y.o. male.    Chief Complaint: Labs and chemo    HPI:  Patient is an 88 year old male who presents today for labs and every 3 week dose of maintenance Keytruda for treatment of NSCLC.  He denies fever, diarrhea, shortness of breath, rash, increased fatigue.  He states is currently taking levothyroxine 100 mcg PO daily.       Social History     Socioeconomic History    Marital status:      Spouse name: Not on file    Number of children: 1    Years of education: Not on file    Highest education level: Not on file   Occupational History    Not on file   Social Needs    Financial resource strain: Not on file    Food insecurity:     Worry: Not on file     Inability: Not on file    Transportation needs:     Medical: Not on file     Non-medical: Not on file   Tobacco Use    Smoking status: Never Smoker    Smokeless tobacco: Never Used   Substance and Sexual Activity    Alcohol use: No    Drug use: No    Sexual activity: Not Currently     Partners: Female   Lifestyle    Physical activity:     Days per week: Not on file     Minutes per session: Not on file    Stress: Not on file   Relationships    Social connections:     Talks on phone: Not on file     Gets together: Not on file     Attends [employer]ism service: Not on file     Active member of club or organization: Not on file     Attends meetings of clubs or organizations: Not on file     Relationship status: Not on file   Other Topics Concern    Not on file   Social History Narrative    Not on file       Family History   Problem Relation Age of Onset    Heart disease Mother     Cancer Mother        Past Surgical History:   Procedure Laterality Date    CORONARY STENT PLACEMENT      HERNIA REPAIR      INSERTION OF VENOUS ACCESS PORT Left 5/17/2019    Procedure: INSERTION, VENOUS ACCESS PORT;  Surgeon: Lester Trejo MD;  Location: [facility];  Service: General;  Laterality: Left;       Past Medical  History:   Diagnosis Date    Anticoagulant long-term use     plavix    Cancer     Metastatic non-small cell lung cancer    Cardiomyopathy 9/07    Ischemic    HBP (high blood pressure) 1997    Hyperlipidemia     LBP (low back pain)     MI (myocardial infarction) 12/06    Status post primary angioplasty with coronary stent 12/06    Thyroid disease        Review of Systems   Constitutional: Negative for activity change, appetite change, chills, diaphoresis, fatigue, fever and unexpected weight change.   Respiratory: Negative for cough and shortness of breath.    Cardiovascular: Negative for chest pain and palpitations.   Gastrointestinal: Negative for abdominal distention, diarrhea, nausea and vomiting.   Genitourinary: Negative for dysuria and flank pain.   Skin: Negative for rash and wound.   Allergic/Immunologic: Negative for immunocompromised state.   Neurological: Negative for dizziness, syncope, light-headedness and headaches.   Hematological: Negative for adenopathy. Does not bruise/bleed easily.   Psychiatric/Behavioral: Negative for confusion, decreased concentration, dysphoric mood and hallucinations. The patient is nervous/anxious.           Medication List with Changes/Refills   Current Medications    ACETAMINOPHEN (TYLENOL) 325 MG TABLET    Take 325 mg by mouth every 6 (six) hours as needed for Pain.    AMLODIPINE (NORVASC) 10 MG TABLET    Take 2.5 tablets by mouth 2 (two) times daily. 1/2 two times daily     ASPIRIN (ASPIR-81 ORAL)    Take by mouth.    DRONABINOL (MARINOL) 2.5 MG CAPSULE    Take 1 capsule (2.5 mg total) by mouth 2 (two) times daily before meals.    LEVOTHYROXINE (SYNTHROID) 50 MCG TABLET    Take 2 tablets (100 mcg total) by mouth once daily.    LISINOPRIL (PRINIVIL,ZESTRIL) 20 MG TABLET    Take 1 tablet by mouth once daily.     NITROGLYCERIN (NITROSTAT) 0.4 MG SL TABLET    Place 0.4 mg under the tongue every 5 (five) minutes as needed for Chest pain.    OMEPRAZOLE (PRILOSEC) 20  MG CAPSULE    Take 20 mg by mouth once daily.    SIMVASTATIN (ZOCOR) 20 MG TABLET    Take 20 mg by mouth every evening.        Objective:     Vitals:    11/08/19 1248   BP: 136/71   Pulse: 74   Temp: 96.9 °F (36.1 °C)       Physical Exam   Constitutional: He is oriented to person, place, and time. Vital signs are normal. He appears well-developed and well-nourished.  Non-toxic appearance. He does not have a sickly appearance. He does not appear ill. No distress.   HENT:   Head: Normocephalic and atraumatic.   Right Ear: External ear normal.   Left Ear: External ear normal.   Nose: Nose normal.   Eyes: Conjunctivae, EOM and lids are normal. Right eye exhibits no discharge. Left eye exhibits no discharge. No scleral icterus.   Cardiovascular: Normal rate, regular rhythm, S1 normal, S2 normal and normal heart sounds. Exam reveals no gallop and no friction rub.   No murmur heard.  Pulmonary/Chest: Effort normal and breath sounds normal. No accessory muscle usage or stridor. No apnea, no tachypnea and no bradypnea. No respiratory distress. He has no decreased breath sounds. He has no wheezes. He has no rales. He exhibits no tenderness.   Abdominal: Soft. Normal appearance and bowel sounds are normal. He exhibits no distension.   Musculoskeletal: Normal range of motion.   Neurological: He is alert and oriented to person, place, and time.   Skin: Skin is warm, dry and intact. No bruising, no lesion and no rash noted. He is not diaphoretic. No pallor.   Psychiatric: His speech is normal and behavior is normal. Judgment and thought content normal. His mood appears anxious. He is not actively hallucinating. Cognition and memory are normal. He is attentive.   Nursing note and vitals reviewed.      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         Hypothyroidism (acquired)        Thyroid dysfunction             Lab Results   Component Value Date    WBC 10.16 11/08/2019    HGB 14.5 11/08/2019    HCT 45.8 11/08/2019    MCV 94 11/08/2019     11/08/2019       BMP  Lab Results   Component Value Date     11/08/2019    K 4.7 11/08/2019     11/08/2019    CO2 24 11/08/2019    BUN 17 11/08/2019    CREATININE 1.3 11/08/2019    CALCIUM 9.8 11/08/2019    ANIONGAP 13 11/08/2019    ESTGFRAFRICA 56 (A) 11/08/2019    EGFRNONAA 49 (A) 11/08/2019     Lab Results   Component Value Date    ALT 22 11/08/2019    AST 19 11/08/2019    ALKPHOS 95 11/08/2019    BILITOT 0.3 11/08/2019     Lab Results   Component Value Date    TSH 28.440 (H) 10/18/2019       Plan:   Metastatic non-small cell lung cancer    Immunotherapy    Malignant neoplasm of overlapping sites of lung, unspecified laterality    Other specified disorders involving the immune mechanism, not elsewhere classified     Malignant neoplasm of overlapping sites of left bronchus and lung     Hypothyroidism (acquired)    Thyroid dysfunction    Will proceed with next dose of Keytruda - labs looks good.  TSH pending.  Patient will continue taking levothyroxine 100 mcg PO daily.  Will notify once labs results if needs to adjust dose.  He will follow up in 3 weeks with PET scan prior repeat labs, see MD, and next dose of Keytruda.       I will review assessment/plan with collaborating physician, Dr. Simone Goins    Thank You,  Carmen Noyola, AMBREEN-C

## 2019-11-08 NOTE — PLAN OF CARE
Problem: Neutropenia (Chemotherapy Effects)  Goal: Absence of Infection  Outcome: Ongoing, Progressing     Problem: Fall Injury Risk  Goal: Absence of Fall and Fall-Related Injury  Outcome: Ongoing, Progressing     Problem: Adult Inpatient Plan of Care  Goal: Plan of Care Review  Outcome: Ongoing, Progressing  Goal: Patient-Specific Goal (Individualization)  Outcome: Ongoing, Progressing  Flowsheets (Taken 11/8/2019 1416)  Individualized Care Needs: pt likes feet up, blanket, and pillow  Anxieties, Fears or Concerns: pt states none  Patient-Specific Goals (Include Timeframe): Pt. will tolerate keytruda w/out a reaction today    Patient reports feeling tired today.

## 2019-11-08 NOTE — DISCHARGE INSTRUCTIONS
"[facility]  [address] [address].  or  [address]  [phone] phone     [phone] fax  Hours of Operation: Monday- Friday 8:00am- 5:00pm  After hours phone  [phone]  Hematology / Oncology Physicians on call      Dr. Carlos Manuel Noyola, LINDA Galeana NP Tyesha Taylor, NP    Please call with any concerns regarding your appointment today.  Support Groups/Classes    Support groups and classes are being offered at the   [facility] and [facility]!!    "Cooking with Cancer" (Nutrition Class):  Second Wednesday of each month   at noon at the [facility].  Metastatic Support Group:  Third Tuesday of each month   at noon at the [facility].  Next Steps Class/Group: Second and fourth Thursday of each month at noon at the [facility].  Hope Chest (Breast Cancer Support Group): First Tuesday of each month   at 5:30pm at the [facility] location.  [facility] [facility]: [facility]: Second and third Tuesday of each month from 7:30am - 2pm.  [facility]: First and fourth Tuesday of each month from 7:30am - 2pm    If you are interested in attending or would like more information please ask our social workers or your nurse!      "

## 2019-11-20 ENCOUNTER — TELEPHONE (OUTPATIENT)
Dept: INTERNAL MEDICINE | Facility: CLINIC | Age: 84
End: 2019-11-20

## 2019-11-20 RX ORDER — SIMVASTATIN 20 MG/1
20 TABLET, FILM COATED ORAL NIGHTLY
Qty: 90 TABLET | Refills: 4 | Status: SHIPPED | OUTPATIENT
Start: 2019-11-20 | End: 2020-12-01 | Stop reason: SDUPTHER

## 2019-11-20 NOTE — TELEPHONE ENCOUNTER
----- Message from Rizwana Figueroa sent at 11/20/2019 12:42 PM CST -----  Contact: Tiara- granddaughter   Type:  Patient Returning Call    Who Called: Tiara  Who Left Message for Patient: not sure  Does the patient know what this is regarding?:   Would the patient rather a call back or a response via [facility]? Call or my chart  Best Call Back Number: [phone]  Additional Information: Pt is needing refill on simvastatin through [facility].

## 2019-11-20 NOTE — TELEPHONE ENCOUNTER
Patient been asked if he still taking simvastatin (requested from [facility]) that was last refilled on 2014. Patient did not confirm but said he needs a refill on his prescription. Patient wanted to call his daughter to confirm what prescription needs a refill. Verbally understood.    Called the daughter Tiara, received no answer. Left the message to call the clinic back.

## 2019-11-21 NOTE — TELEPHONE ENCOUNTER
Called bella to advise that prescription was sent to the pharmacy.  Received no answer.  Left message.

## 2019-11-25 ENCOUNTER — TELEPHONE (OUTPATIENT)
Dept: RADIOLOGY | Facility: HOSPITAL | Age: 84
End: 2019-11-25

## 2019-11-26 ENCOUNTER — TELEPHONE (OUTPATIENT)
Dept: RADIOLOGY | Facility: HOSPITAL | Age: 84
End: 2019-11-26

## 2019-11-27 ENCOUNTER — HOSPITAL ENCOUNTER (OUTPATIENT)
Dept: RADIOLOGY | Facility: HOSPITAL | Age: 84
Discharge: HOME | End: 2019-11-27
Attending: NURSE PRACTITIONER
Payer: MEDICARE

## 2019-11-27 DIAGNOSIS — C34.82 MALIGNANT NEOPLASM OF OVERLAPPING SITES OF LEFT BRONCHUS AND LUNG: ICD-10-CM

## 2019-11-27 DIAGNOSIS — C34.90 METASTATIC NON-SMALL CELL LUNG CANCER: ICD-10-CM

## 2019-11-27 DIAGNOSIS — C34.80 MALIGNANT NEOPLASM OF OVERLAPPING SITES OF LUNG, UNSPECIFIED LATERALITY: ICD-10-CM

## 2019-11-27 DIAGNOSIS — Z29.89 IMMUNOTHERAPY: ICD-10-CM

## 2019-11-27 PROCEDURE — A9552 F18 FDG: HCPCS

## 2019-11-27 PROCEDURE — 78815 PET IMAGE W/CT SKULL-THIGH: CPT | Mod: 26,PS,, | Performed by: RADIOLOGY

## 2019-11-27 PROCEDURE — 78815 NM PET CT ROUTINE: ICD-10-PCS | Mod: 26,PS,, | Performed by: RADIOLOGY

## 2019-11-27 PROCEDURE — 78815 PET IMAGE W/CT SKULL-THIGH: CPT | Mod: TC,PS

## 2019-11-29 ENCOUNTER — INFUSION (OUTPATIENT)
Dept: INFUSION THERAPY | Facility: HOSPITAL | Age: 84
End: 2019-11-29
Attending: INTERNAL MEDICINE
Payer: MEDICARE

## 2019-11-29 ENCOUNTER — OFFICE VISIT (OUTPATIENT)
Dept: HEMATOLOGY/ONCOLOGY | Facility: CLINIC | Age: 84
End: 2019-11-29
Payer: MEDICARE

## 2019-11-29 VITALS
BODY MASS INDEX: 25.95 KG/M2 | OXYGEN SATURATION: 95 % | SYSTOLIC BLOOD PRESSURE: 133 MMHG | DIASTOLIC BLOOD PRESSURE: 69 MMHG | HEART RATE: 69 BPM | WEIGHT: 186.06 LBS | TEMPERATURE: 98 F

## 2019-11-29 DIAGNOSIS — D53.9 NUTRITIONAL ANEMIA, UNSPECIFIED: ICD-10-CM

## 2019-11-29 DIAGNOSIS — C34.90 METASTATIC NON-SMALL CELL LUNG CANCER: Primary | ICD-10-CM

## 2019-11-29 DIAGNOSIS — I10 HYPERTENSION, UNSPECIFIED TYPE: ICD-10-CM

## 2019-11-29 DIAGNOSIS — C34.80 MALIGNANT NEOPLASM OF OVERLAPPING SITES OF LUNG, UNSPECIFIED LATERALITY: Primary | ICD-10-CM

## 2019-11-29 PROCEDURE — 1126F AMNT PAIN NOTED NONE PRSNT: CPT | Mod: S$GLB,,, | Performed by: INTERNAL MEDICINE

## 2019-11-29 PROCEDURE — 99215 OFFICE O/P EST HI 40 MIN: CPT | Mod: 25,S$GLB,, | Performed by: INTERNAL MEDICINE

## 2019-11-29 PROCEDURE — 99999 PR PBB SHADOW E&M-EST. PATIENT-LVL II: CPT | Mod: PBBFAC,,, | Performed by: INTERNAL MEDICINE

## 2019-11-29 PROCEDURE — 1126F PR PAIN SEVERITY QUANTIFIED, NO PAIN PRESENT: ICD-10-PCS | Mod: S$GLB,,, | Performed by: INTERNAL MEDICINE

## 2019-11-29 PROCEDURE — 99999 PR PBB SHADOW E&M-EST. PATIENT-LVL II: ICD-10-PCS | Mod: PBBFAC,,, | Performed by: INTERNAL MEDICINE

## 2019-11-29 PROCEDURE — 1101F PR PT FALLS ASSESS DOC 0-1 FALLS W/OUT INJ PAST YR: ICD-10-PCS | Mod: CPTII,S$GLB,, | Performed by: INTERNAL MEDICINE

## 2019-11-29 PROCEDURE — 63600175 PHARM REV CODE 636 W HCPCS: Performed by: INTERNAL MEDICINE

## 2019-11-29 PROCEDURE — 1101F PT FALLS ASSESS-DOCD LE1/YR: CPT | Mod: CPTII,S$GLB,, | Performed by: INTERNAL MEDICINE

## 2019-11-29 PROCEDURE — 99215 PR OFFICE/OUTPT VISIT, EST, LEVL V, 40-54 MIN: ICD-10-PCS | Mod: 25,S$GLB,, | Performed by: INTERNAL MEDICINE

## 2019-11-29 PROCEDURE — 1159F MED LIST DOCD IN RCRD: CPT | Mod: S$GLB,,, | Performed by: INTERNAL MEDICINE

## 2019-11-29 PROCEDURE — 96413 CHEMO IV INFUSION 1 HR: CPT

## 2019-11-29 PROCEDURE — 1159F PR MEDICATION LIST DOCUMENTED IN MEDICAL RECORD: ICD-10-PCS | Mod: S$GLB,,, | Performed by: INTERNAL MEDICINE

## 2019-11-29 RX ORDER — AMLODIPINE BESYLATE 5 MG/1
5 TABLET ORAL 2 TIMES DAILY
Qty: 180 TABLET | Refills: 1 | Status: SHIPPED | OUTPATIENT
Start: 2019-11-29 | End: 2020-05-13

## 2019-11-29 RX ORDER — SODIUM CHLORIDE 0.9 % (FLUSH) 0.9 %
10 SYRINGE (ML) INJECTION
Status: CANCELLED | OUTPATIENT
Start: 2019-11-29

## 2019-11-29 RX ORDER — HEPARIN 100 UNIT/ML
500 SYRINGE INTRAVENOUS
Status: CANCELLED | OUTPATIENT
Start: 2019-11-29

## 2019-11-29 RX ORDER — SODIUM CHLORIDE 0.9 % (FLUSH) 0.9 %
10 SYRINGE (ML) INJECTION
Status: DISCONTINUED | OUTPATIENT
Start: 2019-11-29 | End: 2019-11-29 | Stop reason: HOSPADM

## 2019-11-29 RX ORDER — HEPARIN 100 UNIT/ML
500 SYRINGE INTRAVENOUS
Status: DISCONTINUED | OUTPATIENT
Start: 2019-11-29 | End: 2019-11-29 | Stop reason: HOSPADM

## 2019-11-29 RX ADMIN — SODIUM CHLORIDE: 9 INJECTION, SOLUTION INTRAVENOUS at 01:11

## 2019-11-29 RX ADMIN — HEPARIN 500 UNITS: 100 SYRINGE at 02:11

## 2019-11-29 RX ADMIN — SODIUM CHLORIDE 200 MG: 9 INJECTION, SOLUTION INTRAVENOUS at 02:11

## 2019-11-29 NOTE — DISCHARGE INSTRUCTIONS
[facility]  [address] [address]  [address]  [phone] phone     [phone] fax  Hours of Operation: Monday- Friday 8:00am- 5:00pm  After hours phone  [phone]  Hematology / Oncology Physicians on call      LINDA Swanson Dr., Dr., Dr., Dr., NP Sydney Prescott, NP Tyesha Taylor, NP    Please call with any concerns regarding your appointment today.FALL PREVENTION   Falls often occur due to slipping, tripping or losing your balance. Here are ways to reduce your risk of falling again.   Was there anything that caused your fall that can be fixed, removed or replaced?   Make your home safe by keeping walkways clear of objects you may trip over.   Use non-slip pads under rugs.   Do not walk in poorly lit areas.   Do not stand on chairs or wobbly ladders.   Use caution when reaching overhead or looking upward. This position can cause a loss of balance.   Be sure your shoes fit properly, have non-slip bottoms and are in good condition.   Be cautious when going up and down stairs, curbs, and when walking on uneven sidewalks.   If your balance is poor, consider using a cane or walker.   If your fall was related to alcohol use, stop or limit alcohol intake.   If your fall was related to use of sleeping medicines, talk to your doctor about this. You may need to reduce your dosage at bedtime if you awaken during the night to go to the bathroom.   To reduce the need for nighttime bathroom trips:   Avoid drinking fluids for several hours before going to bed   Empty your bladder before going to bed   Men can keep a urinal at the bedside   © 4613-8558 [facility], [address] [place] [address], [place] [address]. All rights reserved. This information is not intended as a substitute for professional medical care. Always follow your healthcare professional's instructions.  WAYS TO HELP PREVENT  "INFECTION         WASH YOUR HANDS OFTEN DURING THE DAY, ESPECIALLY BEFORE YOU EAT, AFTER USING THE BATHROOM, AND AFTER TOUCHING ANIMALS     STAY AWAY FROM PEOPLE WHO HAVE ILLNESSES YOU CAN CATCH; SUCH AS COLDS, FLU, CHICKEN POX     TRY TO AVOID CROWDS     STAY AWAY FROM CHILDREN WHO RECENTLY HAVE RECEIVED LIVE VIRUS VACCINES     MAINTAIN GOOD MOUTH CARE     DO NOT SQUEEZE OR SCRATCH PIMPLES     CLEAN CUTS & SCRAPES RIGHT AWAY AND DAILY UNTIL HEALED WITH WARM WATER, SOAP & AN ANTISEPTIC     AVOID CONTACT WITH LITTER BOXES, BIRD CAGES, & FISH TANKS     AVOID STANDING WATER, IE., BIRD BATHS, FLOWER POTS/VASES, OR HUMIDIFIERS     WEAR GLOVES WHEN GARDENING OR CLEANING UP AFTER OTHERS, ESPECIALLY BABIES & SMALL CHILDREN    DO NOT EAT RAW FISH, SEAFOOD, MEAT, OR EGGSSupport Groups/Classes    Support groups and classes are being offered at the   [facility] and [facility]!!    "Cooking with Cancer" (Nutrition Class):  Second Wednesday of each month   at noon at the [facility].  Metastatic Support Group:  Third Tuesday of each month   at noon at the [facility].  Next Steps Class/Group: Second and fourth Thursday of each month at noon at the [facility].  Hope Chest (Breast Cancer Support Group): First Tuesday of each month   at 5:30pm at the [facility] location.  [employer]: [facility]: Second and third Tuesday of each month from 7:30am - 2pm.  [facility]: First and fourth Tuesday of each month from 7:30am - 2pm     If you are interested in attending or would like more information please ask our social workers or your nurse!  "

## 2019-11-29 NOTE — PROGRESS NOTES
Subjective:       Patient ID: Brant Lees is a 88 y.o. male.    Chief Complaint: Malignant neoplasm of overlapping sites of lung, unspecified laterality [C34.80]  HPI: We have an opportunity to see Mr. Brant Lees in Hematology Oncology clinic at [facility] on 11/29/2019.  Mr. Brant Lees is a 88 y.o. gentleman who had screening Ct lung, was found to have multiple lung lesions. PET CT showed involvement of both right and left lungs as well as hilar nodes.  Biopsy showed NSCLC squamous type.  Reported no cancer symptoms, denies pain.  Molecular analysis showed high PDL1 expression 50%.  Currently on keytruda.         Malignant neoplasm of overlapping sites of lung    4/15/2019 Initial Diagnosis     Malignant neoplasm of overlapping sites of lung      4/15/2019 Cancer Staged     Staging form: Lung, AJCC 8th Edition  - Clinical stage from 4/15/2019: Stage IV (cT3, cN3, cM1a) - Signed by Simone Goins MD on 4/15/2019        Metastatic non-small cell lung cancer    4/15/2019 Initial Diagnosis     Metastatic non-small cell lung cancer      5/7/2019 -  Chemotherapy     Treatment Summary   Plan Name: OP PEMBROLIZUMAB 200MG Q3W  Treatment Goal: Curative  Status: Active  Start Date: 5/24/2019  End Date: 1/10/2020 (Planned)  Provider: Simone Goins MD  Chemotherapy: pembrolizumab (KEYTRUDA) 200 mg in sodium chloride 0.9% 108 mL chemo infusion, 200 mg, Intravenous, Clinic/HOD 1 time, 9 of 12 cycles  Administration: 200 mg (5/24/2019), 200 mg (6/14/2019), 200 mg (7/5/2019), 200 mg (7/26/2019), 200 mg (8/16/2019), 200 mg (9/6/2019), 200 mg (9/27/2019), 200 mg (10/18/2019), 200 mg (11/8/2019)       Past Medical History:   Diagnosis Date    Anticoagulant long-term use     plavix    Cancer     Metastatic non-small cell lung cancer    Cardiomyopathy 9/07    Ischemic    HBP (high blood pressure) 1997    Hyperlipidemia     LBP (low back pain)     MI (myocardial infarction) 12/06    Status post primary angioplasty with  coronary stent 12/06    Thyroid disease      Family History   Problem Relation Age of Onset    Heart disease Mother     Cancer Mother      Social History     Socioeconomic History    Marital status:      Spouse name: Not on file    Number of children: 1    Years of education: Not on file    Highest education level: Not on file   Occupational History    Not on file   Social Needs    Financial resource strain: Not on file    Food insecurity:     Worry: Not on file     Inability: Not on file    Transportation needs:     Medical: Not on file     Non-medical: Not on file   Tobacco Use    Smoking status: Never Smoker    Smokeless tobacco: Never Used   Substance and Sexual Activity    Alcohol use: No    Drug use: No    Sexual activity: Not Currently     Partners: Female   Lifestyle    Physical activity:     Days per week: Not on file     Minutes per session: Not on file    Stress: Not on file   Relationships    Social connections:     Talks on phone: Not on file     Gets together: Not on file     Attends Evangelical service: Not on file     Active member of club or organization: Not on file     Attends meetings of clubs or organizations: Not on file     Relationship status: Not on file   Other Topics Concern    Not on file   Social History Narrative    Not on file     Past Surgical History:   Procedure Laterality Date    CORONARY STENT PLACEMENT      HERNIA REPAIR      INSERTION OF VENOUS ACCESS PORT Left 5/17/2019    Procedure: INSERTION, VENOUS ACCESS PORT;  Surgeon: Lester Trejo MD;  Location: [facility];  Service: General;  Laterality: Left;     Current Outpatient Medications   Medication Sig Dispense Refill    acetaminophen (TYLENOL) 325 MG tablet Take 325 mg by mouth every 6 (six) hours as needed for Pain.      amlodipine (NORVASC) 10 MG tablet Take 2.5 tablets by mouth 2 (two) times daily. 1/2 two times daily       ASPIRIN (ASPIR-81 ORAL) Take by mouth.      dronabinol (MARINOL)  2.5 MG capsule Take 1 capsule (2.5 mg total) by mouth 2 (two) times daily before meals. (Patient not taking: Reported on 11/8/2019) 60 capsule 1    levothyroxine (SYNTHROID) 50 MCG tablet Take 2 tablets (100 mcg total) by mouth once daily. 90 tablet 2    lisinopril (PRINIVIL,ZESTRIL) 20 MG tablet Take 1 tablet by mouth once daily.       nitroGLYCERIN (NITROSTAT) 0.4 MG SL tablet Place 0.4 mg under the tongue every 5 (five) minutes as needed for Chest pain.      omeprazole (PRILOSEC) 20 MG capsule Take 20 mg by mouth once daily.      simvastatin (ZOCOR) 20 MG tablet Take 1 tablet (20 mg total) by mouth every evening. 90 tablet 4     No current facility-administered medications for this visit.        Labs:  Lab Results   Component Value Date    WBC 10.16 11/08/2019    HGB 14.5 11/08/2019    HCT 45.8 11/08/2019    MCV 94 11/08/2019     11/08/2019     BMP  Lab Results   Component Value Date     11/08/2019    K 4.7 11/08/2019     11/08/2019    CO2 24 11/08/2019    BUN 17 11/08/2019    CREATININE 1.3 11/08/2019    CALCIUM 9.8 11/08/2019    ANIONGAP 13 11/08/2019    ESTGFRAFRICA 56 (A) 11/08/2019    EGFRNONAA 49 (A) 11/08/2019     Lab Results   Component Value Date    ALT 22 11/08/2019    AST 19 11/08/2019    ALKPHOS 95 11/08/2019    BILITOT 0.3 11/08/2019       No results found for: IRON, TIBC, FERRITIN, SATURATEDIRO  No results found for: DZOJOMFC07  No results found for: FOLATE  Lab Results   Component Value Date    TSH 6.503 (H) 11/08/2019       I have reviewed the radiology reports and examined the scan/xray images.    Review of Systems   Constitutional: Negative.    HENT: Negative.    Eyes: Negative.    Respiratory: Negative.    Cardiovascular: Negative.    Gastrointestinal: Negative.    Endocrine: Negative.    Genitourinary: Negative.    Musculoskeletal: Negative.    Skin: Negative.    Allergic/Immunologic: Negative.    Neurological: Negative.    Hematological: Negative.     Psychiatric/Behavioral: Negative.      ECOG SCORE              Objective:   There were no vitals filed for this visit.There is no height or weight on file to calculate BMI.  Physical Exam   Constitutional: He is oriented to person, place, and time. He appears well-developed and well-nourished.   HENT:   Head: Normocephalic and atraumatic.   Eyes: Conjunctivae and EOM are normal.   Neck: Normal range of motion. Neck supple.   Cardiovascular: Normal rate and regular rhythm.   Pulmonary/Chest: Effort normal and breath sounds normal.   Abdominal: Soft. Bowel sounds are normal.   Musculoskeletal: Normal range of motion.   Neurological: He is alert and oriented to person, place, and time.   Skin: Skin is warm and dry.   Psychiatric: He has a normal mood and affect. His behavior is normal. Judgment and thought content normal.   Nursing note and vitals reviewed.        Assessment:      1. Malignant neoplasm of overlapping sites of lung, unspecified laterality    2. Nutritional anemia, unspecified     3. Hypertension, unspecified type           Plan:     Malignant neoplasm of overlapping sites of lung, unspecified laterality  PET CT showed continued response.  Continue on keytruda.  -     CBC auto differential; Future; Expected date: 12/20/2019  -     Comprehensive metabolic panel; Future; Expected date: 12/20/2019  -     TSH; Future; Expected date: 12/20/2019    Nutritional anemia, unspecified   -     TSH; Future; Expected date: 12/20/2019    Hypertension, unspecified type  -     amLODIPine (NORVASC) 5 MG tablet; Take 1 tablet (5 mg total) by mouth 2 (two) times daily.  Dispense: 180 tablet; Refill: 1

## 2019-12-20 ENCOUNTER — OFFICE VISIT (OUTPATIENT)
Dept: HEMATOLOGY/ONCOLOGY | Facility: CLINIC | Age: 84
End: 2019-12-20
Payer: MEDICARE

## 2019-12-20 ENCOUNTER — INFUSION (OUTPATIENT)
Dept: INFUSION THERAPY | Facility: HOSPITAL | Age: 84
End: 2019-12-20
Attending: INTERNAL MEDICINE
Payer: MEDICARE

## 2019-12-20 VITALS
HEIGHT: 71 IN | OXYGEN SATURATION: 95 % | HEART RATE: 69 BPM | TEMPERATURE: 98 F | SYSTOLIC BLOOD PRESSURE: 133 MMHG | BODY MASS INDEX: 25.44 KG/M2 | RESPIRATION RATE: 18 BRPM | WEIGHT: 181.69 LBS | DIASTOLIC BLOOD PRESSURE: 69 MMHG

## 2019-12-20 DIAGNOSIS — C34.81 MALIGNANT NEOPLASM OF OVERLAPPING SITES OF RIGHT LUNG: ICD-10-CM

## 2019-12-20 DIAGNOSIS — N18.30 CHRONIC RENAL FAILURE, STAGE 3 (MODERATE): ICD-10-CM

## 2019-12-20 DIAGNOSIS — R64 CACHEXIA: ICD-10-CM

## 2019-12-20 DIAGNOSIS — Z95.828 PORT-A-CATH IN PLACE: ICD-10-CM

## 2019-12-20 DIAGNOSIS — Z51.11 CHEMOTHERAPY MANAGEMENT, ENCOUNTER FOR: ICD-10-CM

## 2019-12-20 DIAGNOSIS — C34.90 METASTATIC NON-SMALL CELL LUNG CANCER: Primary | ICD-10-CM

## 2019-12-20 DIAGNOSIS — E07.9 THYROID DYSFUNCTION: ICD-10-CM

## 2019-12-20 PROCEDURE — 1126F PR PAIN SEVERITY QUANTIFIED, NO PAIN PRESENT: ICD-10-PCS | Mod: S$GLB,,, | Performed by: INTERNAL MEDICINE

## 2019-12-20 PROCEDURE — 63600175 PHARM REV CODE 636 W HCPCS: Performed by: INTERNAL MEDICINE

## 2019-12-20 PROCEDURE — 1101F PR PT FALLS ASSESS DOC 0-1 FALLS W/OUT INJ PAST YR: ICD-10-PCS | Mod: CPTII,S$GLB,, | Performed by: INTERNAL MEDICINE

## 2019-12-20 PROCEDURE — 99215 OFFICE O/P EST HI 40 MIN: CPT | Mod: 25,S$GLB,, | Performed by: INTERNAL MEDICINE

## 2019-12-20 PROCEDURE — 99999 PR PBB SHADOW E&M-EST. PATIENT-LVL III: CPT | Mod: PBBFAC,,, | Performed by: INTERNAL MEDICINE

## 2019-12-20 PROCEDURE — 99215 PR OFFICE/OUTPT VISIT, EST, LEVL V, 40-54 MIN: ICD-10-PCS | Mod: 25,S$GLB,, | Performed by: INTERNAL MEDICINE

## 2019-12-20 PROCEDURE — 1126F AMNT PAIN NOTED NONE PRSNT: CPT | Mod: S$GLB,,, | Performed by: INTERNAL MEDICINE

## 2019-12-20 PROCEDURE — 96413 CHEMO IV INFUSION 1 HR: CPT

## 2019-12-20 PROCEDURE — 1159F MED LIST DOCD IN RCRD: CPT | Mod: S$GLB,,, | Performed by: INTERNAL MEDICINE

## 2019-12-20 PROCEDURE — 1159F PR MEDICATION LIST DOCUMENTED IN MEDICAL RECORD: ICD-10-PCS | Mod: S$GLB,,, | Performed by: INTERNAL MEDICINE

## 2019-12-20 PROCEDURE — 99999 PR PBB SHADOW E&M-EST. PATIENT-LVL III: ICD-10-PCS | Mod: PBBFAC,,, | Performed by: INTERNAL MEDICINE

## 2019-12-20 PROCEDURE — 1101F PT FALLS ASSESS-DOCD LE1/YR: CPT | Mod: CPTII,S$GLB,, | Performed by: INTERNAL MEDICINE

## 2019-12-20 RX ORDER — HEPARIN 100 UNIT/ML
500 SYRINGE INTRAVENOUS
Status: DISCONTINUED | OUTPATIENT
Start: 2019-12-20 | End: 2019-12-20 | Stop reason: HOSPADM

## 2019-12-20 RX ORDER — HEPARIN 100 UNIT/ML
500 SYRINGE INTRAVENOUS
Status: CANCELLED | OUTPATIENT
Start: 2019-12-20

## 2019-12-20 RX ORDER — SODIUM CHLORIDE 0.9 % (FLUSH) 0.9 %
10 SYRINGE (ML) INJECTION
Status: CANCELLED | OUTPATIENT
Start: 2019-12-20

## 2019-12-20 RX ADMIN — HEPARIN 500 UNITS: 100 SYRINGE at 03:12

## 2019-12-20 RX ADMIN — SODIUM CHLORIDE: 9 INJECTION, SOLUTION INTRAVENOUS at 02:12

## 2019-12-20 RX ADMIN — SODIUM CHLORIDE 200 MG: 9 INJECTION, SOLUTION INTRAVENOUS at 03:12

## 2019-12-20 NOTE — DISCHARGE INSTRUCTIONS
[facility]  [address] [address]  [address]  [phone] phone     [phone] fax  Hours of Operation: Monday- Friday 8:00am- 5:00pm  After hours phone  [phone]  Hematology / Oncology Physicians on call      Dr. Carlos Manuel Noyola, LINDA Galeana NP Tyesha Taylor, NP    Please call with any concerns regarding your appointment today.FALL PREVENTION   Falls often occur due to slipping, tripping or losing your balance. Here are ways to reduce your risk of falling again.   Was there anything that caused your fall that can be fixed, removed or replaced?   Make your home safe by keeping walkways clear of objects you may trip over.   Use non-slip pads under rugs.   Do not walk in poorly lit areas.   Do not stand on chairs or wobbly ladders.   Use caution when reaching overhead or looking upward. This position can cause a loss of balance.   Be sure your shoes fit properly, have non-slip bottoms and are in good condition.   Be cautious when going up and down stairs, curbs, and when walking on uneven sidewalks.   If your balance is poor, consider using a cane or walker.   If your fall was related to alcohol use, stop or limit alcohol intake.   If your fall was related to use of sleeping medicines, talk to your doctor about this. You may need to reduce your dosage at bedtime if you awaken during the night to go to the bathroom.   To reduce the need for nighttime bathroom trips:   Avoid drinking fluids for several hours before going to bed   Empty your bladder before going to bed   Men can keep a urinal at the bedside   © 0202-1199 [facility], [address] [place] [address], [place] [address]. All rights reserved. This information is not intended as a substitute for professional medical care. Always follow your healthcare professional's instructions.

## 2019-12-20 NOTE — PROGRESS NOTES
Subjective:       Patient ID: Brant Lees is a 88 y.o. male.    Chief Complaint: Results; Chemotherapy; and Lung Cancer    HPI 88-year-old male history of metastatic non-small cell lung carcinoma continues on Keytruda therapy patient is tolerating therapy well denies any nausea vomiting fevers chills night sweats.  Patient's last imaging 11/26/2019 stable findings no evidence of progression ECOG status 1    Past Medical History:   Diagnosis Date    Anticoagulant long-term use     plavix    Cancer     Metastatic non-small cell lung cancer    Cardiomyopathy 9/07    Ischemic    HBP (high blood pressure) 1997    Hyperlipidemia     LBP (low back pain)     MI (myocardial infarction) 12/06    Status post primary angioplasty with coronary stent 12/06    Thyroid disease      Family History   Problem Relation Age of Onset    Heart disease Mother     Cancer Mother      Social History     Socioeconomic History    Marital status:      Spouse name: Not on file    Number of children: 1    Years of education: Not on file    Highest education level: Not on file   Occupational History    Not on file   Social Needs    Financial resource strain: Not on file    Food insecurity:     Worry: Not on file     Inability: Not on file    Transportation needs:     Medical: Not on file     Non-medical: Not on file   Tobacco Use    Smoking status: Never Smoker    Smokeless tobacco: Never Used   Substance and Sexual Activity    Alcohol use: No    Drug use: No    Sexual activity: Not Currently     Partners: Female   Lifestyle    Physical activity:     Days per week: Not on file     Minutes per session: Not on file    Stress: Not on file   Relationships    Social connections:     Talks on phone: Not on file     Gets together: Not on file     Attends Scientologist service: Not on file     Active member of club or organization: Not on file     Attends meetings of clubs or organizations: Not on file     Relationship status:  Not on file   Other Topics Concern    Not on file   Social History Narrative    Not on file     Past Surgical History:   Procedure Laterality Date    CORONARY STENT PLACEMENT      HERNIA REPAIR      INSERTION OF VENOUS ACCESS PORT Left 5/17/2019    Procedure: INSERTION, VENOUS ACCESS PORT;  Surgeon: Lester Trejo MD;  Location: [facility];  Service: General;  Laterality: Left;       Labs:  Lab Results   Component Value Date    WBC 11.52 12/20/2019    HGB 14.3 12/20/2019    HCT 45.4 12/20/2019    MCV 96 12/20/2019     12/20/2019     BMP  Lab Results   Component Value Date     11/29/2019    K 4.7 11/29/2019     11/29/2019    CO2 27 11/29/2019    BUN 18 11/29/2019    CREATININE 1.4 11/29/2019    CALCIUM 9.7 11/29/2019    ANIONGAP 10 11/29/2019    ESTGFRAFRICA 51 (A) 11/29/2019    EGFRNONAA 45 (A) 11/29/2019     Lab Results   Component Value Date    ALT 24 11/29/2019    AST 16 11/29/2019    ALKPHOS 101 11/29/2019    BILITOT 0.3 11/29/2019       No results found for: IRON, TIBC, FERRITIN, SATURATEDIRO  No results found for: [id number]  No results found for: FOLATE  Lab Results   Component Value Date    TSH 6.544 (H) 11/29/2019         Review of Systems   Constitutional: Negative for activity change, appetite change, chills, diaphoresis, fatigue, fever and unexpected weight change.   HENT: Negative for congestion, dental problem, drooling, ear discharge, ear pain, facial swelling, hearing loss, mouth sores, nosebleeds, postnasal drip, rhinorrhea, sinus pressure, sneezing, sore throat, tinnitus, trouble swallowing and voice change.    Eyes: Negative for photophobia, pain, discharge, redness, itching and visual disturbance.   Respiratory: Negative for apnea, cough, choking, chest tightness, shortness of breath, wheezing and stridor.    Cardiovascular: Negative for chest pain, palpitations and leg swelling.   Gastrointestinal: Negative for abdominal distention, abdominal pain, anal bleeding, blood in  stool, constipation, diarrhea, nausea, rectal pain and vomiting.   Endocrine: Negative for cold intolerance, heat intolerance, polydipsia, polyphagia and polyuria.   Genitourinary: Negative for decreased urine volume, difficulty urinating, discharge, dysuria, enuresis, flank pain, frequency, genital sores, hematuria, penile pain, penile swelling, scrotal swelling, testicular pain and urgency.   Musculoskeletal: Negative for arthralgias, back pain, gait problem, joint swelling, myalgias, neck pain and neck stiffness.   Skin: Negative for color change, pallor, rash and wound.   Allergic/Immunologic: Negative for environmental allergies, food allergies and immunocompromised state.   Neurological: Negative for dizziness, tremors, seizures, syncope, facial asymmetry, speech difficulty, weakness, light-headedness, numbness and headaches.   Hematological: Negative for adenopathy. Does not bruise/bleed easily.   Psychiatric/Behavioral: Negative for agitation, behavioral problems, confusion, decreased concentration, dysphoric mood, hallucinations, self-injury, sleep disturbance and suicidal ideas. The patient is not nervous/anxious and is not hyperactive.        Objective:      Physical Exam   Constitutional: He is oriented to person, place, and time. He appears well-developed and well-nourished. No distress.   HENT:   Head: Normocephalic.   Right Ear: External ear normal.   Left Ear: External ear normal.   Nose: Nose normal. Right sinus exhibits no maxillary sinus tenderness and no frontal sinus tenderness. Left sinus exhibits no maxillary sinus tenderness and no frontal sinus tenderness.   Mouth/Throat: Oropharynx is clear and moist. No oropharyngeal exudate.   Eyes: Pupils are equal, round, and reactive to light. EOM and lids are normal. Right eye exhibits no discharge. Left eye exhibits no discharge. Right conjunctiva is not injected. Right conjunctiva has no hemorrhage. Left conjunctiva is not injected. Left conjunctiva  has no hemorrhage. No scleral icterus. Right eye exhibits normal extraocular motion. Left eye exhibits normal extraocular motion.   Neck: Normal range of motion. Neck supple. No JVD present. No tracheal deviation present. No thyromegaly present.   Cardiovascular: Normal rate and regular rhythm.   Pulmonary/Chest: Effort normal. No stridor. No respiratory distress.   Abdominal: Soft. He exhibits no mass. There is no hepatosplenomegaly, splenomegaly or hepatomegaly. There is no tenderness.   Musculoskeletal: Normal range of motion. He exhibits no edema or tenderness.   Lymphadenopathy:        Head (right side): No posterior auricular and no occipital adenopathy present.        Head (left side): No posterior auricular and no occipital adenopathy present.     He has no cervical adenopathy.        Right cervical: No superficial cervical, no deep cervical and no posterior cervical adenopathy present.       Left cervical: No superficial cervical, no deep cervical and no posterior cervical adenopathy present.     He has no axillary adenopathy.        Right: No supraclavicular adenopathy present.        Left: No supraclavicular adenopathy present.   Neurological: He is alert and oriented to person, place, and time. He has normal strength. No cranial nerve deficit. Coordination normal.   Skin: Skin is dry. No rash noted. He is not diaphoretic. No cyanosis or erythema. Nails show no clubbing.   Psychiatric: He has a normal mood and affect. His behavior is normal. Judgment and thought content normal. Cognition and memory are normal.   Vitals reviewed.          Assessment:      1. Metastatic non-small cell lung cancer    2. Thyroid dysfunction    3. Malignant neoplasm of overlapping sites of right lung    4. Chronic renal failure, stage 3 (moderate)    5. CT compatible an MRI safe power Port-A-Cath in place    6. Chemotherapy management, encounter for    7. Cachexia           Plan:     Reviewed imaging studies reviewed  laboratory studies continue with current treatment recommendations orders written for Keytruda therapy patient will return in 3 weeks for next cycle PET scan findings noted discussed with patient the fact that we do not know when to stop treatment in this setting with as long as he has NON  progressive disease would recommend continuation with current immunotherapy dosing.  Information may change in the future to determine whether not patient needs to continue or to be able to stop and observe.  Orders written reviewed discussed implications with him        Ez Horowitz Jr, MD FACP

## 2020-01-01 ENCOUNTER — PATIENT MESSAGE (OUTPATIENT)
Dept: HEMATOLOGY/ONCOLOGY | Facility: CLINIC | Age: 85
End: 2020-01-01

## 2020-01-02 DIAGNOSIS — E03.9 HYPOTHYROIDISM (ACQUIRED): ICD-10-CM

## 2020-01-02 RX ORDER — LISINOPRIL 20 MG/1
20 TABLET ORAL DAILY
Qty: 30 TABLET | Refills: 2 | Status: SHIPPED | OUTPATIENT
Start: 2020-01-02 | End: 2020-04-13 | Stop reason: SDUPTHER

## 2020-01-02 RX ORDER — LEVOTHYROXINE SODIUM 50 UG/1
100 TABLET ORAL DAILY
Qty: 90 TABLET | Refills: 2 | Status: SHIPPED | OUTPATIENT
Start: 2020-01-02 | End: 2020-01-31 | Stop reason: SDUPTHER

## 2020-01-10 ENCOUNTER — INFUSION (OUTPATIENT)
Dept: INFUSION THERAPY | Facility: HOSPITAL | Age: 85
End: 2020-01-10
Attending: INTERNAL MEDICINE
Payer: MEDICARE

## 2020-01-10 ENCOUNTER — OFFICE VISIT (OUTPATIENT)
Dept: HEMATOLOGY/ONCOLOGY | Facility: CLINIC | Age: 85
End: 2020-01-10
Payer: MEDICARE

## 2020-01-10 VITALS
HEIGHT: 71 IN | TEMPERATURE: 97 F | HEART RATE: 66 BPM | SYSTOLIC BLOOD PRESSURE: 137 MMHG | DIASTOLIC BLOOD PRESSURE: 65 MMHG | OXYGEN SATURATION: 92 % | WEIGHT: 182.31 LBS | BODY MASS INDEX: 25.52 KG/M2

## 2020-01-10 VITALS — WEIGHT: 182 LBS | HEIGHT: 71 IN | BODY MASS INDEX: 25.48 KG/M2

## 2020-01-10 DIAGNOSIS — E03.8 HYPOTHYROIDISM DUE TO HASHIMOTO'S THYROIDITIS: ICD-10-CM

## 2020-01-10 DIAGNOSIS — R64 CACHEXIA: ICD-10-CM

## 2020-01-10 DIAGNOSIS — I10 ESSENTIAL HYPERTENSION: ICD-10-CM

## 2020-01-10 DIAGNOSIS — Z51.11 CHEMOTHERAPY MANAGEMENT, ENCOUNTER FOR: ICD-10-CM

## 2020-01-10 DIAGNOSIS — C34.90 METASTATIC NON-SMALL CELL LUNG CANCER: Primary | ICD-10-CM

## 2020-01-10 DIAGNOSIS — E06.3 HYPOTHYROIDISM DUE TO HASHIMOTO'S THYROIDITIS: ICD-10-CM

## 2020-01-10 DIAGNOSIS — Z95.828 PORT-A-CATH IN PLACE: ICD-10-CM

## 2020-01-10 DIAGNOSIS — C34.81 MALIGNANT NEOPLASM OF OVERLAPPING SITES OF RIGHT LUNG: ICD-10-CM

## 2020-01-10 DIAGNOSIS — C78.01 MALIGNANT NEOPLASM METASTATIC TO RIGHT LUNG: ICD-10-CM

## 2020-01-10 DIAGNOSIS — Z85.51 HISTORY OF BLADDER CANCER: ICD-10-CM

## 2020-01-10 PROCEDURE — 99215 PR OFFICE/OUTPT VISIT, EST, LEVL V, 40-54 MIN: ICD-10-PCS | Mod: 25,S$GLB,, | Performed by: INTERNAL MEDICINE

## 2020-01-10 PROCEDURE — 96413 CHEMO IV INFUSION 1 HR: CPT

## 2020-01-10 PROCEDURE — 1126F PR PAIN SEVERITY QUANTIFIED, NO PAIN PRESENT: ICD-10-PCS | Mod: S$GLB,,, | Performed by: INTERNAL MEDICINE

## 2020-01-10 PROCEDURE — 1101F PT FALLS ASSESS-DOCD LE1/YR: CPT | Mod: CPTII,S$GLB,, | Performed by: INTERNAL MEDICINE

## 2020-01-10 PROCEDURE — 1159F PR MEDICATION LIST DOCUMENTED IN MEDICAL RECORD: ICD-10-PCS | Mod: S$GLB,,, | Performed by: INTERNAL MEDICINE

## 2020-01-10 PROCEDURE — 1159F MED LIST DOCD IN RCRD: CPT | Mod: S$GLB,,, | Performed by: INTERNAL MEDICINE

## 2020-01-10 PROCEDURE — 99999 PR PBB SHADOW E&M-EST. PATIENT-LVL III: ICD-10-PCS | Mod: PBBFAC,,, | Performed by: INTERNAL MEDICINE

## 2020-01-10 PROCEDURE — 1101F PR PT FALLS ASSESS DOC 0-1 FALLS W/OUT INJ PAST YR: ICD-10-PCS | Mod: CPTII,S$GLB,, | Performed by: INTERNAL MEDICINE

## 2020-01-10 PROCEDURE — 99999 PR PBB SHADOW E&M-EST. PATIENT-LVL III: CPT | Mod: PBBFAC,,, | Performed by: INTERNAL MEDICINE

## 2020-01-10 PROCEDURE — 1126F AMNT PAIN NOTED NONE PRSNT: CPT | Mod: S$GLB,,, | Performed by: INTERNAL MEDICINE

## 2020-01-10 PROCEDURE — 99215 OFFICE O/P EST HI 40 MIN: CPT | Mod: 25,S$GLB,, | Performed by: INTERNAL MEDICINE

## 2020-01-10 PROCEDURE — 63600175 PHARM REV CODE 636 W HCPCS: Performed by: INTERNAL MEDICINE

## 2020-01-10 RX ORDER — HEPARIN 100 UNIT/ML
500 SYRINGE INTRAVENOUS
Status: DISCONTINUED | OUTPATIENT
Start: 2020-01-10 | End: 2020-01-10 | Stop reason: HOSPADM

## 2020-01-10 RX ORDER — HEPARIN 100 UNIT/ML
500 SYRINGE INTRAVENOUS
Status: CANCELLED | OUTPATIENT
Start: 2020-01-10

## 2020-01-10 RX ORDER — SODIUM CHLORIDE 0.9 % (FLUSH) 0.9 %
10 SYRINGE (ML) INJECTION
Status: CANCELLED | OUTPATIENT
Start: 2020-01-10

## 2020-01-10 RX ADMIN — HEPARIN 500 UNITS: 100 SYRINGE at 02:01

## 2020-01-10 RX ADMIN — SODIUM CHLORIDE 200 MG: 9 INJECTION, SOLUTION INTRAVENOUS at 01:01

## 2020-01-10 NOTE — PROGRESS NOTES
Subjective:       Patient ID: Brant Lees is a 88 y.o. male.    Chief Complaint: Results; Chemotherapy; and Lung Cancer    HPI 88-year-old male history of metastatic non-small cell lung carcinoma currently being treated with Keytruda Q 3 weeks patient is tolerating therapy well ECOG status 1    Past Medical History:   Diagnosis Date    Anticoagulant long-term use     plavix    Cancer     Metastatic non-small cell lung cancer    Cardiomyopathy 9/07    Ischemic    HBP (high blood pressure) 1997    Hyperlipidemia     LBP (low back pain)     MI (myocardial infarction) 12/06    Status post primary angioplasty with coronary stent 12/06    Thyroid disease      Family History   Problem Relation Age of Onset    Heart disease Mother     Cancer Mother      Social History     Socioeconomic History    Marital status:      Spouse name: Not on file    Number of children: 1    Years of education: Not on file    Highest education level: Not on file   Occupational History    Not on file   Social Needs    Financial resource strain: Not on file    Food insecurity:     Worry: Not on file     Inability: Not on file    Transportation needs:     Medical: Not on file     Non-medical: Not on file   Tobacco Use    Smoking status: Never Smoker    Smokeless tobacco: Never Used   Substance and Sexual Activity    Alcohol use: No    Drug use: No    Sexual activity: Not Currently     Partners: Female   Lifestyle    Physical activity:     Days per week: Not on file     Minutes per session: Not on file    Stress: Not on file   Relationships    Social connections:     Talks on phone: Not on file     Gets together: Not on file     Attends Gnosticism service: Not on file     Active member of club or organization: Not on file     Attends meetings of clubs or organizations: Not on file     Relationship status: Not on file   Other Topics Concern    Not on file   Social History Narrative    Not on file     Past Surgical  History:   Procedure Laterality Date    CORONARY STENT PLACEMENT      HERNIA REPAIR      INSERTION OF VENOUS ACCESS PORT Left 5/17/2019    Procedure: INSERTION, VENOUS ACCESS PORT;  Surgeon: Lester Trejo MD;  Location: [facility];  Service: General;  Laterality: Left;       Labs:  Lab Results   Component Value Date    WBC 11.68 01/10/2020    HGB 14.3 01/10/2020    HCT 45.2 01/10/2020    MCV 94 01/10/2020     01/10/2020     BMP  Lab Results   Component Value Date     01/10/2020    K 4.7 01/10/2020     01/10/2020    CO2 28 01/10/2020    BUN 20 01/10/2020    CREATININE 1.4 01/10/2020    CALCIUM 9.5 01/10/2020    ANIONGAP 9 01/10/2020    ESTGFRAFRICA 51 (A) 01/10/2020    EGFRNONAA 45 (A) 01/10/2020     Lab Results   Component Value Date    ALT 18 01/10/2020    AST 15 01/10/2020    ALKPHOS 103 01/10/2020    BILITOT 0.3 01/10/2020       No results found for: IRON, TIBC, FERRITIN, SATURATEDIRO  No results found for: [id number]  No results found for: FOLATE  Lab Results   Component Value Date    TSH 6.256 (H) 01/10/2020         Review of Systems   Constitutional: Positive for fatigue. Negative for activity change, appetite change, chills, diaphoresis, fever and unexpected weight change.   HENT: Negative for congestion, dental problem, drooling, ear discharge, ear pain, facial swelling, hearing loss, mouth sores, nosebleeds, postnasal drip, rhinorrhea, sinus pressure, sneezing, sore throat, tinnitus, trouble swallowing and voice change.    Eyes: Negative for photophobia, pain, discharge, redness, itching and visual disturbance.   Respiratory: Negative for apnea, cough, choking, chest tightness, shortness of breath, wheezing and stridor.    Cardiovascular: Negative for chest pain, palpitations and leg swelling.   Gastrointestinal: Negative for abdominal distention, abdominal pain, anal bleeding, blood in stool, constipation, diarrhea, nausea, rectal pain and vomiting.   Endocrine: Negative for cold  intolerance, heat intolerance, polydipsia, polyphagia and polyuria.   Genitourinary: Negative for decreased urine volume, difficulty urinating, discharge, dysuria, enuresis, flank pain, frequency, genital sores, hematuria, penile pain, penile swelling, scrotal swelling, testicular pain and urgency.   Musculoskeletal: Negative for arthralgias, back pain, gait problem, joint swelling, myalgias, neck pain and neck stiffness.   Skin: Negative for color change, pallor, rash and wound.   Allergic/Immunologic: Negative for environmental allergies, food allergies and immunocompromised state.   Neurological: Positive for weakness. Negative for dizziness, tremors, seizures, syncope, facial asymmetry, speech difficulty, light-headedness, numbness and headaches.   Hematological: Negative for adenopathy. Does not bruise/bleed easily.   Psychiatric/Behavioral: Positive for dysphoric mood. Negative for agitation, behavioral problems, confusion, decreased concentration, hallucinations, self-injury, sleep disturbance and suicidal ideas. The patient is nervous/anxious. The patient is not hyperactive.        Objective:      Physical Exam   Constitutional: He is oriented to person, place, and time. He appears well-developed and well-nourished. He appears distressed.   HENT:   Head: Normocephalic.   Right Ear: External ear normal.   Left Ear: External ear normal.   Nose: Nose normal. Right sinus exhibits no maxillary sinus tenderness and no frontal sinus tenderness. Left sinus exhibits no maxillary sinus tenderness and no frontal sinus tenderness.   Mouth/Throat: Oropharynx is clear and moist. No oropharyngeal exudate.   Eyes: Pupils are equal, round, and reactive to light. EOM and lids are normal. Right eye exhibits no discharge. Left eye exhibits no discharge. Right conjunctiva is not injected. Right conjunctiva has no hemorrhage. Left conjunctiva is not injected. Left conjunctiva has no hemorrhage. No scleral icterus. Right eye  exhibits normal extraocular motion. Left eye exhibits normal extraocular motion.   Neck: Normal range of motion. Neck supple. No JVD present. No tracheal deviation present. No thyromegaly present.   Cardiovascular: Normal rate and regular rhythm.   Pulmonary/Chest: Effort normal. No stridor. No respiratory distress.   Abdominal: Soft. He exhibits no mass. There is no hepatosplenomegaly, splenomegaly or hepatomegaly. There is no tenderness.   Musculoskeletal: Normal range of motion. He exhibits no edema or tenderness.   Lymphadenopathy:        Head (right side): No posterior auricular and no occipital adenopathy present.        Head (left side): No posterior auricular and no occipital adenopathy present.     He has no cervical adenopathy.        Right cervical: No superficial cervical, no deep cervical and no posterior cervical adenopathy present.       Left cervical: No superficial cervical, no deep cervical and no posterior cervical adenopathy present.     He has no axillary adenopathy.        Right: No supraclavicular adenopathy present.        Left: No supraclavicular adenopathy present.   Neurological: He is alert and oriented to person, place, and time. He has normal strength. No cranial nerve deficit. Coordination abnormal.   Skin: Skin is dry. No rash noted. He is not diaphoretic. No cyanosis or erythema. Nails show no clubbing.   Psychiatric: He has a normal mood and affect. His behavior is normal. Judgment and thought content normal. Cognition and memory are normal.   Vitals reviewed.          Assessment:      1. Metastatic non-small cell lung cancer    2. Malignant neoplasm of overlapping sites of right lung    3. History of bladder cancer    4. Chemotherapy management, encounter for    5. Hypothyroidism due to Hashimoto's thyroiditis    6. CT compatible an MRI safe power Port-A-Cath in place    7. Essential hypertension    8. Malignant neoplasm metastatic to right lung    9. Cachexia           Plan:      Review of laboratory studies stable proceed with next dosing of Keytruda will repeat repeat PET scan for response to therapy in maintenance of status in approximately 3-4 months after last dosing.  Patient is tolerating therapy well  40 min face-to-face time coordination of care greater than 50% time face-to-face with patient reviewed rationale for continuation of treatment imaging requirements        Ez Horowitz Jr, MD FACP

## 2020-01-10 NOTE — DISCHARGE INSTRUCTIONS
.[facility]  [address] [facility]  [address]  [phone] phone     [phone] fax  Hours of Operation: Monday- Friday 8:00am- 5:00pm  After hours phone  [phone]  Hematology / Oncology Physicians on call      Dr. Carlos Manuel Noyola, LINDA Galaena NP Tyesha Taylor, NP    Please call with any concerns regarding your appointment today.  .FALL PREVENTION   Falls often occur due to slipping, tripping or losing your balance. Here are ways to reduce your risk of falling again.   Was there anything that caused your fall that can be fixed, removed or replaced?   Make your home safe by keeping walkways clear of objects you may trip over.   Use non-slip pads under rugs.   Do not walk in poorly lit areas.   Do not stand on chairs or wobbly ladders.   Use caution when reaching overhead or looking upward. This position can cause a loss of balance.   Be sure your shoes fit properly, have non-slip bottoms and are in good condition.   Be cautious when going up and down stairs, curbs, and when walking on uneven sidewalks.   If your balance is poor, consider using a cane or walker.   If your fall was related to alcohol use, stop or limit alcohol intake.   If your fall was related to use of sleeping medicines, talk to your doctor about this. You may need to reduce your dosage at bedtime if you awaken during the night to go to the bathroom.   To reduce the need for nighttime bathroom trips:   Avoid drinking fluids for several hours before going to bed   Empty your bladder before going to bed   Men can keep a urinal at the bedside   © 6740-5074 [employer] [facility], [address] [place] [address], [place] [address]. All rights reserved. This information is not intended as a substitute for professional medical care. Always follow your healthcare professional's instructions.    .WAYS TO HELP PREVENT  INFECTION         WASH YOUR HANDS OFTEN DURING THE DAY, ESPECIALLY BEFORE YOU EAT, AFTER USING THE BATHROOM, AND AFTER TOUCHING ANIMALS     STAY AWAY FROM PEOPLE WHO HAVE ILLNESSES YOU CAN CATCH; SUCH AS COLDS, FLU, CHICKEN POX     TRY TO AVOID CROWDS     STAY AWAY FROM CHILDREN WHO RECENTLY HAVE RECEIVED LIVE VIRUS VACCINES     MAINTAIN GOOD MOUTH CARE     DO NOT SQUEEZE OR SCRATCH PIMPLES     CLEAN CUTS & SCRAPES RIGHT AWAY AND DAILY UNTIL HEALED WITH WARM WATER, SOAP & AN ANTISEPTIC     AVOID CONTACT WITH LITTER BOXES, BIRD CAGES, & FISH TANKS     AVOID STANDING WATER, IE., BIRD BATHS, FLOWER POTS/VASES, OR HUMIDIFIERS     WEAR GLOVES WHEN GARDENING OR CLEANING UP AFTER OTHERS, ESPECIALLY BABIES & SMALL CHILDREN     DO NOT EAT RAW FISH, SEAFOOD, MEAT, OR EGGS  .HOME CARE AFTER CHEMOTHERAPY   Meals   Many patients feel sick and lose their appetites during treatment. Eat small meals several times a day. Choose bland foods with little taste or smell if you have problems with nausea. Be sure to cook all food thoroughly. This kills bacteria and helps you avoid intestinal infection. Soft foods are easier to swallow and digest.   Activity   Exercise keeps you strong and keeps your heart and lungs active. Talk to your doctor about an appropriate exercise program for you.   Skin Care   To prevent a skin infection, bathe or shower once a day. Use a moisturizing soap and wash with warm water. Avoid very hot or cold water. Chemotherapy can make your skin dry . Apply moisturizing lotion to help relieve dry skin. Some drugs used in high doses can cause slight burns to appear (like sunburn). Ask for a special cream to help relieve the burn and protect your skin.   Prevent Mouth Sores   During chemotherapy, many people get mouth sores. Do the following to help prevent mouth sores or to ease discomfort.   Brush your teeth with a soft-bristle toothbrush after every meal.  Don't use dental floss if your platelet count  "is below 50,000. Your doctor or nurse will tell you if this is the case.  Use an oral swab or special soft toothbrush if your gums bleed during regular brushing.  Use mouthwash as directed. If you can't tolerate commercial mouthwash, use salt and baking soda to clean your mouth. Mix 1 teaspoon of salt and 1 teaspoon of baking soda into a glass of water. Swish and spit.  Call your doctor or return to this facility if you develop any of the following:   Sore throat   White patches in the mouth or throat   Fever of 100.4ºF (38ºC) or higher, or as directed by your healthcare provider  © 1289-1737 [facility], [address] [place] [address], [place] [id number]. All rights reserved. This information is not intended as a substitute for professional medical care. Always follow your healthcare professional's     .Support Groups/Classes    Support groups and classes are being offered at the   [facility] and [facility]!!    "Cooking with Cancer" (Nutrition Class):  Second Wednesday of each month   at noon at the [facility].  Metastatic Support Group:  Third Tuesday of each month   at noon at the [facility].  Next Steps Class/Group: Second and fourth Thursday of each month at noon at the [facility].  [facility] (Breast Cancer Support Group): First Tuesday of each month   at 5:30pm at the [facility] location.  [facility]: [facility]: Second and third Tuesday of each month from 7:30am - 2pm.  [facility]: First and fourth Tuesday of each month from 7:30am - 2pm    If you are interested in attending or would like more information please ask our social workers or your nurse!    "

## 2020-01-31 ENCOUNTER — INFUSION (OUTPATIENT)
Dept: INFUSION THERAPY | Facility: HOSPITAL | Age: 85
End: 2020-01-31
Attending: INTERNAL MEDICINE
Payer: MEDICARE

## 2020-01-31 ENCOUNTER — OFFICE VISIT (OUTPATIENT)
Dept: HEMATOLOGY/ONCOLOGY | Facility: CLINIC | Age: 85
End: 2020-01-31
Payer: MEDICARE

## 2020-01-31 VITALS
BODY MASS INDEX: 25.77 KG/M2 | SYSTOLIC BLOOD PRESSURE: 140 MMHG | RESPIRATION RATE: 14 BRPM | TEMPERATURE: 97 F | OXYGEN SATURATION: 96 % | DIASTOLIC BLOOD PRESSURE: 65 MMHG | HEIGHT: 71 IN | WEIGHT: 184.06 LBS | HEART RATE: 63 BPM

## 2020-01-31 DIAGNOSIS — Z08 ENCOUNTER FOR FOLLOW-UP EXAMINATION AFTER COMPLETED TREATMENT FOR MALIGNANT NEOPLASM: ICD-10-CM

## 2020-01-31 DIAGNOSIS — E03.9 HYPOTHYROIDISM (ACQUIRED): ICD-10-CM

## 2020-01-31 DIAGNOSIS — I10 ESSENTIAL HYPERTENSION: ICD-10-CM

## 2020-01-31 DIAGNOSIS — C34.90 METASTATIC NON-SMALL CELL LUNG CANCER: Primary | ICD-10-CM

## 2020-01-31 DIAGNOSIS — Z51.11 CHEMOTHERAPY MANAGEMENT, ENCOUNTER FOR: ICD-10-CM

## 2020-01-31 DIAGNOSIS — N18.30 CHRONIC RENAL FAILURE, STAGE 3 (MODERATE): ICD-10-CM

## 2020-01-31 DIAGNOSIS — C34.81 MALIGNANT NEOPLASM OF OVERLAPPING SITES OF RIGHT LUNG: Primary | ICD-10-CM

## 2020-01-31 PROCEDURE — 99999 PR PBB SHADOW E&M-EST. PATIENT-LVL IV: CPT | Mod: PBBFAC,,, | Performed by: INTERNAL MEDICINE

## 2020-01-31 PROCEDURE — 99215 OFFICE O/P EST HI 40 MIN: CPT | Mod: S$GLB,,, | Performed by: INTERNAL MEDICINE

## 2020-01-31 PROCEDURE — 1101F PR PT FALLS ASSESS DOC 0-1 FALLS W/OUT INJ PAST YR: ICD-10-PCS | Mod: CPTII,S$GLB,, | Performed by: INTERNAL MEDICINE

## 2020-01-31 PROCEDURE — 96413 CHEMO IV INFUSION 1 HR: CPT

## 2020-01-31 PROCEDURE — 1159F MED LIST DOCD IN RCRD: CPT | Mod: S$GLB,,, | Performed by: INTERNAL MEDICINE

## 2020-01-31 PROCEDURE — 1159F PR MEDICATION LIST DOCUMENTED IN MEDICAL RECORD: ICD-10-PCS | Mod: S$GLB,,, | Performed by: INTERNAL MEDICINE

## 2020-01-31 PROCEDURE — 1126F PR PAIN SEVERITY QUANTIFIED, NO PAIN PRESENT: ICD-10-PCS | Mod: S$GLB,,, | Performed by: INTERNAL MEDICINE

## 2020-01-31 PROCEDURE — A4216 STERILE WATER/SALINE, 10 ML: HCPCS | Performed by: INTERNAL MEDICINE

## 2020-01-31 PROCEDURE — 99215 PR OFFICE/OUTPT VISIT, EST, LEVL V, 40-54 MIN: ICD-10-PCS | Mod: S$GLB,,, | Performed by: INTERNAL MEDICINE

## 2020-01-31 PROCEDURE — 25000003 PHARM REV CODE 250: Performed by: INTERNAL MEDICINE

## 2020-01-31 PROCEDURE — 63600175 PHARM REV CODE 636 W HCPCS: Performed by: INTERNAL MEDICINE

## 2020-01-31 PROCEDURE — 1126F AMNT PAIN NOTED NONE PRSNT: CPT | Mod: S$GLB,,, | Performed by: INTERNAL MEDICINE

## 2020-01-31 PROCEDURE — 1101F PT FALLS ASSESS-DOCD LE1/YR: CPT | Mod: CPTII,S$GLB,, | Performed by: INTERNAL MEDICINE

## 2020-01-31 PROCEDURE — 99999 PR PBB SHADOW E&M-EST. PATIENT-LVL IV: ICD-10-PCS | Mod: PBBFAC,,, | Performed by: INTERNAL MEDICINE

## 2020-01-31 RX ORDER — HEPARIN 100 UNIT/ML
500 SYRINGE INTRAVENOUS
Status: CANCELLED | OUTPATIENT
Start: 2020-01-31

## 2020-01-31 RX ORDER — SODIUM CHLORIDE 0.9 % (FLUSH) 0.9 %
10 SYRINGE (ML) INJECTION
Status: CANCELLED | OUTPATIENT
Start: 2020-01-31

## 2020-01-31 RX ORDER — LEVOTHYROXINE SODIUM 100 UG/1
100 TABLET ORAL DAILY
Qty: 90 TABLET | Refills: 5 | Status: SHIPPED | OUTPATIENT
Start: 2020-02-29 | End: 2020-07-20

## 2020-01-31 RX ORDER — SODIUM CHLORIDE 0.9 % (FLUSH) 0.9 %
10 SYRINGE (ML) INJECTION
Status: DISCONTINUED | OUTPATIENT
Start: 2020-01-31 | End: 2020-01-31 | Stop reason: HOSPADM

## 2020-01-31 RX ORDER — HEPARIN 100 UNIT/ML
500 SYRINGE INTRAVENOUS
Status: DISCONTINUED | OUTPATIENT
Start: 2020-01-31 | End: 2020-01-31 | Stop reason: HOSPADM

## 2020-01-31 RX ADMIN — SODIUM CHLORIDE, PRESERVATIVE FREE 10 ML: 5 INJECTION INTRAVENOUS at 03:01

## 2020-01-31 RX ADMIN — HEPARIN 500 UNITS: 100 SYRINGE at 03:01

## 2020-01-31 RX ADMIN — SODIUM CHLORIDE 200 MG: 9 INJECTION, SOLUTION INTRAVENOUS at 02:01

## 2020-01-31 NOTE — DISCHARGE INSTRUCTIONS
[facility]  [address] [facility]  [address]  [phone] phone     [phone] fax  Hours of Operation: Monday- Friday 8:00am- 5:00pm  After hours phone  [phone]  Hematology / Oncology Physicians on call      LINDA Swanson Dr., Dr., Dr., Dr., NP Sydney Prescott, NP Tyesha Taylor, NP    Please call with any concerns regarding your appointment today.FALL PREVENTION   Falls often occur due to slipping, tripping or losing your balance. Here are ways to reduce your risk of falling again.   Was there anything that caused your fall that can be fixed, removed or replaced?   Make your home safe by keeping walkways clear of objects you may trip over.   Use non-slip pads under rugs.   Do not walk in poorly lit areas.   Do not stand on chairs or wobbly ladders.   Use caution when reaching overhead or looking upward. This position can cause a loss of balance.   Be sure your shoes fit properly, have non-slip bottoms and are in good condition.   Be cautious when going up and down stairs, curbs, and when walking on uneven sidewalks.   If your balance is poor, consider using a cane or walker.   If your fall was related to alcohol use, stop or limit alcohol intake.   If your fall was related to use of sleeping medicines, talk to your doctor about this. You may need to reduce your dosage at bedtime if you awaken during the night to go to the bathroom.   To reduce the need for nighttime bathroom trips:   Avoid drinking fluids for several hours before going to bed   Empty your bladder before going to bed   Men can keep a urinal at the bedside   © 3502-0041 [employer] [facility], [address] [place] [address], [place] [address]. All rights reserved. This information is not intended as a substitute for professional medical care. Always follow your healthcare professional's instructions.  WAYS TO HELP PREVENT  "INFECTION         WASH YOUR HANDS OFTEN DURING THE DAY, ESPECIALLY BEFORE YOU EAT, AFTER USING THE BATHROOM, AND AFTER TOUCHING ANIMALS     STAY AWAY FROM PEOPLE WHO HAVE ILLNESSES YOU CAN CATCH; SUCH AS COLDS, FLU, CHICKEN POX     TRY TO AVOID CROWDS     STAY AWAY FROM CHILDREN WHO RECENTLY HAVE RECEIVED LIVE VIRUS VACCINES     MAINTAIN GOOD MOUTH CARE     DO NOT SQUEEZE OR SCRATCH PIMPLES     CLEAN CUTS & SCRAPES RIGHT AWAY AND DAILY UNTIL HEALED WITH WARM WATER, SOAP & AN ANTISEPTIC     AVOID CONTACT WITH LITTER BOXES, BIRD CAGES, & FISH TANKS     AVOID STANDING WATER, IE., BIRD BATHS, FLOWER POTS/VASES, OR HUMIDIFIERS     WEAR GLOVES WHEN GARDENING OR CLEANING UP AFTER OTHERS, ESPECIALLY BABIES & SMALL CHILDREN    DO NOT EAT RAW FISH, SEAFOOD, MEAT, OR EGGSSupport Groups/Classes    Support groups and classes are being offered at the   [facility] and [facility]!!    "Cooking with Cancer" (Nutrition Class):  Second Wednesday of each month   at noon at the [facility].  Metastatic Support Group:  Third Tuesday of each month   at noon at the [facility].  Next Steps Class/Group: Second and fourth Thursday of each month at noon at the [facility].  Hope Chest (Breast Cancer Support Group): First Tuesday of each month   at 5:30pm at the [facility] location.  [employer] [facility]: [facility]: Second and third Tuesday of each month from 7:30am - 2pm.  [facility]: First and fourth Tuesday of each month from 7:30am - 2pm     If you are interested in attending or would like more information please ask our social workers or your nurse!  "

## 2020-01-31 NOTE — PROGRESS NOTES
Subjective:      DATE OF VISIT: 1/31/2020   ?   ?   Patient ID:?Brant Lees is a 89 y.o. male.?? MR#: [id number]   ?   PRIMARY PROVIDER:   Dr. Goins  ?   CHIEF COMPLAINT:  Follow-up?????   ?   ONCOLOGIC DIAGNOSIS:  Metastatic non-small cell lung cancer  ?   CURRENT TREATMENT:  Pembolzumab Q 3 weeks      HPI    Today I had the pleasure of meeting for the 1st time Mr. Lees, a 89-year-old gentleman treated for metastatic non-small cell lung cancer and pembrolizumab.  He was last seen in clinic on 01/10/20.    He has been tolerating therapy exceptionally well.  He does have dryness on shins and superficial mild ulceration on left lateral shin secondary to itching.    Review of Systems    ?   A comprehensive 14-point review of systems was reviewed with patient and was negative other than as specified above.   ?     Objective:      Physical Exam      ?   Vitals:    01/31/20 1342   BP: (!) 140/65   Pulse: 63   Resp: 14   Temp: 97.1 °F (36.2 °C)      ?   ECOG:?0   General appearance: Generally well appearing, in no acute distress.   Head, eyes, ears, nose, and throat: Oropharynx clear with moist mucous membranes.   Cardiovascular: Regular rate and rhythm, S1, S2, no audible murmurs.   Respiratory: Lungs clear to auscultation bilaterally.   Abdomen: nontender, nondistended.   Extremities: Warm, without edema.   Neurologic: Alert and oriented. Grossly normal strength, coordination, and gait.   Skin:  Left lateral shin with superficial abrasion.No ecchymoses or petechial lesion.   Psychiatric: normal mood and affect, conversant and appropriate    ?   Laboratory:  ?   Lab Visit on 01/31/2020   Component Date Value Ref Range Status    Sodium 01/31/2020 139  136 - 145 mmol/L Final    Potassium 01/31/2020 4.5  3.5 - 5.1 mmol/L Final    Chloride 01/31/2020 103  95 - 110 mmol/L Final    CO2 01/31/2020 26  23 - 29 mmol/L Final    Glucose 01/31/2020 158* 70 - 110 mg/dL Final    BUN, Bld 01/31/2020 16  8 - 23 mg/dL Final     Creatinine 01/31/2020 1.5* 0.5 - 1.4 mg/dL Final    Calcium 01/31/2020 9.5  8.7 - 10.5 mg/dL Final    Total Protein 01/31/2020 8.2  6.0 - 8.4 g/dL Final    Albumin 01/31/2020 3.7  3.5 - 5.2 g/dL Final    Total Bilirubin 01/31/2020 0.3  0.1 - 1.0 mg/dL Final    Alkaline Phosphatase 01/31/2020 104  55 - 135 U/L Final    AST 01/31/2020 15  10 - 40 U/L Final    ALT 01/31/2020 17  10 - 44 U/L Final    Anion Gap 01/31/2020 10  8 - 16 mmol/L Final    eGFR if African American 01/31/2020 47* >60 mL/min/1.73 m^2 Final    eGFR if non African American 01/31/2020 41* >60 mL/min/1.73 m^2 Final    WBC 01/31/2020 10.74  3.90 - 12.70 K/uL Final    RBC 01/31/2020 4.66  4.60 - 6.20 M/uL Final    Hemoglobin 01/31/2020 13.8* 14.0 - 18.0 g/dL Final    Hematocrit 01/31/2020 44.4  40.0 - 54.0 % Final    Mean Corpuscular Volume 01/31/2020 95  82 - 98 fL Final    Mean Corpuscular Hemoglobin 01/31/2020 29.6  27.0 - 31.0 pg Final    Mean Corpuscular Hemoglobin Conc 01/31/2020 31.1* 32.0 - 36.0 g/dL Final    RDW 01/31/2020 14.1  11.5 - 14.5 % Final    Platelets 01/31/2020 320  150 - 350 K/uL Final    MPV 01/31/2020 9.6  9.2 - 12.9 fL Final    Immature Granulocytes 01/31/2020 0.4  0.0 - 0.5 % Final    Gran # (ANC) 01/31/2020 7.4  1.8 - 7.7 K/uL Final    Immature Grans (Abs) 01/31/2020 0.04  0.00 - 0.04 K/uL Final    Lymph # 01/31/2020 2.0  1.0 - 4.8 K/uL Final    Mono # 01/31/2020 0.9  0.3 - 1.0 K/uL Final    Eos # 01/31/2020 0.4  0.0 - 0.5 K/uL Final    Baso # 01/31/2020 0.07  0.00 - 0.20 K/uL Final    nRBC 01/31/2020 0  0 /100 WBC Final    Gran% 01/31/2020 69.1  38.0 - 73.0 % Final    Lymph% 01/31/2020 18.4  18.0 - 48.0 % Final    Mono% 01/31/2020 8.0  4.0 - 15.0 % Final    Eosinophil% 01/31/2020 3.4  0.0 - 8.0 % Final    Basophil% 01/31/2020 0.7  0.0 - 1.9 % Final    Differential Method 01/31/2020 Automated   Final      ?   ?   Assessment/Plan:       1. Malignant neoplasm of overlapping sites of right lung     2. Chemotherapy management, encounter for    3. Essential hypertension    4. Chronic renal failure, stage 3 (moderate)    5. Encounter for follow-up examination after completed treatment for malignant neoplasm     6. Hypothyroidism (acquired)          Plan:     #   Metastatic non-small cell lung cancer:  He is doing very well on immunotherapy.  Labs reviewed and notable for mild hyperglycemia, similar to prior and  Renal impairment CKD stage 3, as has been his baseline.  Thyroid function pending.  Will proceed with immunotherapy today in follow-up in 3 weeks for next planned infusion.    # Hypothyroidism:  On levothyroxine 100 mcg daily.  TSH today pending.    # Hypertension:  Well-controlled, continue to monitor    # CKD stage 3:  Baseline renal dysfunction stable.    Follow-Up:   - pembrolizumab today and in 3 weeks with RV and labs

## 2020-02-19 ENCOUNTER — PATIENT MESSAGE (OUTPATIENT)
Dept: HEMATOLOGY/ONCOLOGY | Facility: CLINIC | Age: 85
End: 2020-02-19

## 2020-02-21 ENCOUNTER — OFFICE VISIT (OUTPATIENT)
Dept: HEMATOLOGY/ONCOLOGY | Facility: CLINIC | Age: 85
End: 2020-02-21
Payer: MEDICARE

## 2020-02-21 ENCOUNTER — INFUSION (OUTPATIENT)
Dept: INFUSION THERAPY | Facility: HOSPITAL | Age: 85
End: 2020-02-21
Attending: INTERNAL MEDICINE
Payer: MEDICARE

## 2020-02-21 VITALS
BODY MASS INDEX: 25.4 KG/M2 | SYSTOLIC BLOOD PRESSURE: 112 MMHG | WEIGHT: 181.44 LBS | TEMPERATURE: 97 F | DIASTOLIC BLOOD PRESSURE: 65 MMHG | HEART RATE: 78 BPM | HEIGHT: 71 IN | OXYGEN SATURATION: 95 %

## 2020-02-21 DIAGNOSIS — R50.2 DRUG INDUCED FEVER: ICD-10-CM

## 2020-02-21 DIAGNOSIS — E03.8 HYPOTHYROIDISM DUE TO HASHIMOTO'S THYROIDITIS: ICD-10-CM

## 2020-02-21 DIAGNOSIS — C34.90 METASTATIC NON-SMALL CELL LUNG CANCER: Primary | ICD-10-CM

## 2020-02-21 DIAGNOSIS — C34.81 MALIGNANT NEOPLASM OF OVERLAPPING SITES OF RIGHT LUNG: Primary | ICD-10-CM

## 2020-02-21 DIAGNOSIS — N18.30 CHRONIC RENAL FAILURE, STAGE 3 (MODERATE): ICD-10-CM

## 2020-02-21 DIAGNOSIS — E06.3 HYPOTHYROIDISM DUE TO HASHIMOTO'S THYROIDITIS: ICD-10-CM

## 2020-02-21 PROCEDURE — 1101F PR PT FALLS ASSESS DOC 0-1 FALLS W/OUT INJ PAST YR: ICD-10-PCS | Mod: CPTII,S$GLB,, | Performed by: INTERNAL MEDICINE

## 2020-02-21 PROCEDURE — 63600175 PHARM REV CODE 636 W HCPCS: Performed by: INTERNAL MEDICINE

## 2020-02-21 PROCEDURE — 99999 PR PBB SHADOW E&M-EST. PATIENT-LVL III: CPT | Mod: PBBFAC,,, | Performed by: INTERNAL MEDICINE

## 2020-02-21 PROCEDURE — 96413 CHEMO IV INFUSION 1 HR: CPT

## 2020-02-21 PROCEDURE — 1126F AMNT PAIN NOTED NONE PRSNT: CPT | Mod: S$GLB,,, | Performed by: INTERNAL MEDICINE

## 2020-02-21 PROCEDURE — 99215 PR OFFICE/OUTPT VISIT, EST, LEVL V, 40-54 MIN: ICD-10-PCS | Mod: 25,S$GLB,, | Performed by: INTERNAL MEDICINE

## 2020-02-21 PROCEDURE — 1159F PR MEDICATION LIST DOCUMENTED IN MEDICAL RECORD: ICD-10-PCS | Mod: S$GLB,,, | Performed by: INTERNAL MEDICINE

## 2020-02-21 PROCEDURE — 1101F PT FALLS ASSESS-DOCD LE1/YR: CPT | Mod: CPTII,S$GLB,, | Performed by: INTERNAL MEDICINE

## 2020-02-21 PROCEDURE — 99215 OFFICE O/P EST HI 40 MIN: CPT | Mod: 25,S$GLB,, | Performed by: INTERNAL MEDICINE

## 2020-02-21 PROCEDURE — 1159F MED LIST DOCD IN RCRD: CPT | Mod: S$GLB,,, | Performed by: INTERNAL MEDICINE

## 2020-02-21 PROCEDURE — 1126F PR PAIN SEVERITY QUANTIFIED, NO PAIN PRESENT: ICD-10-PCS | Mod: S$GLB,,, | Performed by: INTERNAL MEDICINE

## 2020-02-21 PROCEDURE — 99999 PR PBB SHADOW E&M-EST. PATIENT-LVL III: ICD-10-PCS | Mod: PBBFAC,,, | Performed by: INTERNAL MEDICINE

## 2020-02-21 RX ORDER — SODIUM CHLORIDE 0.9 % (FLUSH) 0.9 %
10 SYRINGE (ML) INJECTION
Status: CANCELLED | OUTPATIENT
Start: 2020-02-22

## 2020-02-21 RX ORDER — SODIUM CHLORIDE 0.9 % (FLUSH) 0.9 %
10 SYRINGE (ML) INJECTION
Status: DISCONTINUED | OUTPATIENT
Start: 2020-02-21 | End: 2020-02-21 | Stop reason: HOSPADM

## 2020-02-21 RX ORDER — SODIUM CHLORIDE 0.9 % (FLUSH) 0.9 %
10 SYRINGE (ML) INJECTION
Status: CANCELLED | OUTPATIENT
Start: 2020-06-04

## 2020-02-21 RX ORDER — HEPARIN 100 UNIT/ML
500 SYRINGE INTRAVENOUS
Status: CANCELLED | OUTPATIENT
Start: 2020-06-04

## 2020-02-21 RX ORDER — HEPARIN 100 UNIT/ML
500 SYRINGE INTRAVENOUS
Status: CANCELLED | OUTPATIENT
Start: 2020-02-22

## 2020-02-21 RX ORDER — HEPARIN 100 UNIT/ML
500 SYRINGE INTRAVENOUS
Status: DISCONTINUED | OUTPATIENT
Start: 2020-02-21 | End: 2020-02-21 | Stop reason: HOSPADM

## 2020-02-21 RX ADMIN — HEPARIN 500 UNITS: 100 SYRINGE at 02:02

## 2020-02-21 RX ADMIN — SODIUM CHLORIDE 200 MG: 9 INJECTION, SOLUTION INTRAVENOUS at 02:02

## 2020-02-21 NOTE — ASSESSMENT & PLAN NOTE
Reviewed labs, no concern cytopenia.  Will proceed with immunotherapy.  Plan is to see him back in 3 weeks with repeat labs.    Also ordered for PET-CT scan to assess for therapy response.  We reviewed the most recent imaging study that was done in November of 2019.

## 2020-02-21 NOTE — PLAN OF CARE
Problem: Neutropenia (Chemotherapy Effects)  Goal: Absence of Infection  Outcome: Ongoing, Progressing   Pt stated feeling great and no complaints today .

## 2020-02-21 NOTE — PROGRESS NOTES
Subjective:      DATE OF VISIT: 2/21/2020   ?   ?   Patient ID:?Brant Lees is a 89 y.o. male.?? MR#: [id number]   ?   PRIMARY PROVIDER:   Dr. Goins  ?   CHIEF COMPLAINT:  Follow-up?????   ?   ONCOLOGIC DIAGNOSIS:  Metastatic non-small cell lung cancer  ?   CURRENT TREATMENT:  Pembolzumab Q 3 weeks      HPI  Mr. Lees was seen today in the company of his granddaughter.  I had the pleasure of meeting for the 1st time today.  He is a patient with metastatic non-small cell lung cancer who is currently on pembrolizumab and seems to be tolerating well.    He denies worsening fatigue, shortness of breath, chest pain, abdominal pain, diarrhea.  There were scattered rashes in bilateral lower extremity which he claims much better with a cortisol cream.    Review of Systems    ?   A comprehensive 14-point review of systems was reviewed with patient and was negative other than as specified above.   ?     Objective:      Physical Exam      ?   Vitals:    02/21/20 1255   BP: 112/65   Pulse: 78   Temp: 96.5 °F (35.8 °C)      ?   ECOG:?0   General appearance: Generally well appearing, in no acute distress.   Head, eyes, ears, nose, and throat: Oropharynx clear with moist mucous membranes.   Cardiovascular: Regular rate and rhythm, S1, S2, no audible murmurs.   Respiratory: Lungs clear to auscultation bilaterally.   Abdomen: nontender, nondistended.   Extremities: Warm, without edema.   Neurologic: Alert and oriented. Grossly normal strength, coordination, and gait.   Skin:  Left lateral shin with superficial abrasion.No ecchymoses or petechial lesion.   Psychiatric: normal mood and affect, conversant and appropriate    ?   Laboratory:  ?   Lab Visit on 02/21/2020   Component Date Value Ref Range Status    WBC 02/21/2020 9.30  3.90 - 12.70 K/uL Final    RBC 02/21/2020 4.72  4.60 - 6.20 M/uL Final    Hemoglobin 02/21/2020 13.8* 14.0 - 18.0 g/dL Final    Hematocrit 02/21/2020 44.6  40.0 - 54.0 % Final    Mean Corpuscular Volume  02/21/2020 95  82 - 98 fL Final    Mean Corpuscular Hemoglobin 02/21/2020 29.2  27.0 - 31.0 pg Final    Mean Corpuscular Hemoglobin Conc 02/21/2020 30.9* 32.0 - 36.0 g/dL Final    RDW 02/21/2020 14.0  11.5 - 14.5 % Final    Platelets 02/21/2020 302  150 - 350 K/uL Final    MPV 02/21/2020 9.3  9.2 - 12.9 fL Final    Immature Granulocytes 02/21/2020 0.3  0.0 - 0.5 % Final    Gran # (ANC) 02/21/2020 6.4  1.8 - 7.7 K/uL Final    Immature Grans (Abs) 02/21/2020 0.03  0.00 - 0.04 K/uL Final    Lymph # 02/21/2020 1.6  1.0 - 4.8 K/uL Final    Mono # 02/21/2020 0.9  0.3 - 1.0 K/uL Final    Eos # 02/21/2020 0.4  0.0 - 0.5 K/uL Final    Baso # 02/21/2020 0.07  0.00 - 0.20 K/uL Final    nRBC 02/21/2020 0  0 /100 WBC Final    Gran% 02/21/2020 68.2  38.0 - 73.0 % Final    Lymph% 02/21/2020 16.7* 18.0 - 48.0 % Final    Mono% 02/21/2020 10.1  4.0 - 15.0 % Final    Eosinophil% 02/21/2020 3.9  0.0 - 8.0 % Final    Basophil% 02/21/2020 0.8  0.0 - 1.9 % Final    Differential Method 02/21/2020 Automated   Final      ?   ?   Assessment/Plan:       No diagnosis found.      Plan:     Malignant neoplasm of overlapping sites of lung  Reviewed labs, no concern cytopenia.  Will proceed with immunotherapy.  Plan is to see him back in 3 weeks with repeat labs.    Also ordered for PET-CT scan to assess for therapy response.  We reviewed the most recent imaging study that was done in November of 2019.    Hypothyroidism due to Hashimoto's thyroiditis  On Synthroid at 100 mcg daily.    Chronic renal failure, stage 3 (moderate)  Recommend follow-up with the Nephrology Service.    Bilateral lower extremity rash:  Improving per patient.  This may be related to immunotherapy side effect.  Recommend continuing monitoring and may need oral steroid if no resolution is noted in the next 2-3 weeks.      Lior Adkins MD

## 2020-02-21 NOTE — DISCHARGE INSTRUCTIONS
[facility]  [address] [facility]  [address]  [phone] phone     [phone] fax  Hours of Operation: Monday- Friday 8:00am- 5:00pm  After hours phone  [phone]  Hematology / Oncology Physicians on call      Dr. Carlos Manuel Noyola, LINDA Galeana NP Tyesha Taylor, NP    Please call with any concerns regarding your appointment today.FALL PREVENTION   Falls often occur due to slipping, tripping or losing your balance. Here are ways to reduce your risk of falling again.   Was there anything that caused your fall that can be fixed, removed or replaced?   Make your home safe by keeping walkways clear of objects you may trip over.   Use non-slip pads under rugs.   Do not walk in poorly lit areas.   Do not stand on chairs or wobbly ladders.   Use caution when reaching overhead or looking upward. This position can cause a loss of balance.   Be sure your shoes fit properly, have non-slip bottoms and are in good condition.   Be cautious when going up and down stairs, curbs, and when walking on uneven sidewalks.   If your balance is poor, consider using a cane or walker.   If your fall was related to alcohol use, stop or limit alcohol intake.   If your fall was related to use of sleeping medicines, talk to your doctor about this. You may need to reduce your dosage at bedtime if you awaken during the night to go to the bathroom.   To reduce the need for nighttime bathroom trips:   Avoid drinking fluids for several hours before going to bed   Empty your bladder before going to bed   Men can keep a urinal at the bedside   © 4800-1100 [employer] [facility], [address] [place] [address], [place] [address]. All rights reserved. This information is not intended as a substitute for professional medical care. Always follow your healthcare professional's instructions.  Support Groups/Classes    Support groups  "and classes are being offered at the   [facility] and [facility]!!    "Cooking with Cancer" (Nutrition Class):  Second Wednesday of each month   at noon at the Cancer Center.  Metastatic Support Group:  Third Tuesday of each month   at noon at the [facility].  Next Steps Class/Group: Second and fourth Thursday of each month at noon at the [facility].  Hope Chest (Breast Cancer Support Group): First Tuesday of each month   at 5:30pm at the [facility] location.  [employer] [facility]: [facility]: Second and third Tuesday of each month from 7:30am - 2pm.  [facility]: First and fourth Tuesday of each month from 7:30am - 2pm    If you are interested in attending or would like more information please ask our social workers or your nurse!  "

## 2020-02-25 ENCOUNTER — TELEPHONE (OUTPATIENT)
Dept: INTERNAL MEDICINE | Facility: CLINIC | Age: 85
End: 2020-02-25

## 2020-02-25 NOTE — TELEPHONE ENCOUNTER
Patient latest lab test results requested by Nurses of Dr. Kirby Figueroa of [facility] has fax to [phone]

## 2020-03-11 DIAGNOSIS — E03.9 HYPOTHYROIDISM (ACQUIRED): ICD-10-CM

## 2020-03-11 RX ORDER — LISINOPRIL 20 MG/1
TABLET ORAL
Qty: 90 TABLET | OUTPATIENT
Start: 2020-03-11

## 2020-03-13 ENCOUNTER — OFFICE VISIT (OUTPATIENT)
Dept: HEMATOLOGY/ONCOLOGY | Facility: CLINIC | Age: 85
End: 2020-03-13
Payer: MEDICARE

## 2020-03-13 ENCOUNTER — LAB VISIT (OUTPATIENT)
Dept: LAB | Facility: HOSPITAL | Age: 85
End: 2020-03-13
Attending: INTERNAL MEDICINE
Payer: MEDICARE

## 2020-03-13 VITALS
HEIGHT: 71 IN | OXYGEN SATURATION: 96 % | DIASTOLIC BLOOD PRESSURE: 70 MMHG | BODY MASS INDEX: 25.22 KG/M2 | SYSTOLIC BLOOD PRESSURE: 135 MMHG | HEART RATE: 60 BPM | WEIGHT: 180.13 LBS | TEMPERATURE: 97 F

## 2020-03-13 DIAGNOSIS — H53.9 VISION DISORDER: ICD-10-CM

## 2020-03-13 DIAGNOSIS — R50.2 DRUG INDUCED FEVER: ICD-10-CM

## 2020-03-13 DIAGNOSIS — C34.81 MALIGNANT NEOPLASM OF OVERLAPPING SITES OF RIGHT LUNG: ICD-10-CM

## 2020-03-13 DIAGNOSIS — C34.81 MALIGNANT NEOPLASM OF OVERLAPPING SITES OF RIGHT LUNG: Primary | ICD-10-CM

## 2020-03-13 DIAGNOSIS — R21 RASH: ICD-10-CM

## 2020-03-13 DIAGNOSIS — Z85.51 HISTORY OF BLADDER CANCER: ICD-10-CM

## 2020-03-13 DIAGNOSIS — D53.9 NUTRITIONAL ANEMIA, UNSPECIFIED: ICD-10-CM

## 2020-03-13 LAB
ALBUMIN SERPL BCP-MCNC: 3.5 G/DL (ref 3.5–5.2)
ALP SERPL-CCNC: 93 U/L (ref 55–135)
ALT SERPL W/O P-5'-P-CCNC: 13 U/L (ref 10–44)
ANION GAP SERPL CALC-SCNC: 10 MMOL/L (ref 8–16)
AST SERPL-CCNC: 13 U/L (ref 10–40)
BASOPHILS # BLD AUTO: 0.08 K/UL (ref 0–0.2)
BASOPHILS NFR BLD: 0.8 % (ref 0–1.9)
BILIRUB SERPL-MCNC: 0.3 MG/DL (ref 0.1–1)
BUN SERPL-MCNC: 19 MG/DL (ref 8–23)
CALCIUM SERPL-MCNC: 9.3 MG/DL (ref 8.7–10.5)
CHLORIDE SERPL-SCNC: 102 MMOL/L (ref 95–110)
CO2 SERPL-SCNC: 27 MMOL/L (ref 23–29)
CREAT SERPL-MCNC: 1.2 MG/DL (ref 0.5–1.4)
DIFFERENTIAL METHOD: ABNORMAL
EOSINOPHIL # BLD AUTO: 0.5 K/UL (ref 0–0.5)
EOSINOPHIL NFR BLD: 4.7 % (ref 0–8)
ERYTHROCYTE [DISTWIDTH] IN BLOOD BY AUTOMATED COUNT: 14.4 % (ref 11.5–14.5)
EST. GFR  (AFRICAN AMERICAN): >60 ML/MIN/1.73 M^2
EST. GFR  (NON AFRICAN AMERICAN): 53 ML/MIN/1.73 M^2
GLUCOSE SERPL-MCNC: 132 MG/DL (ref 70–110)
HCT VFR BLD AUTO: 42.2 % (ref 40–54)
HGB BLD-MCNC: 13.3 G/DL (ref 14–18)
IMM GRANULOCYTES # BLD AUTO: 0.03 K/UL (ref 0–0.04)
IMM GRANULOCYTES NFR BLD AUTO: 0.3 % (ref 0–0.5)
LYMPHOCYTES # BLD AUTO: 2 K/UL (ref 1–4.8)
LYMPHOCYTES NFR BLD: 20.4 % (ref 18–48)
MCH RBC QN AUTO: 29.2 PG (ref 27–31)
MCHC RBC AUTO-ENTMCNC: 31.5 G/DL (ref 32–36)
MCV RBC AUTO: 93 FL (ref 82–98)
MONOCYTES # BLD AUTO: 0.8 K/UL (ref 0.3–1)
MONOCYTES NFR BLD: 8 % (ref 4–15)
NEUTROPHILS # BLD AUTO: 6.5 K/UL (ref 1.8–7.7)
NEUTROPHILS NFR BLD: 65.8 % (ref 38–73)
NRBC BLD-RTO: 0 /100 WBC
PLATELET # BLD AUTO: 358 K/UL (ref 150–350)
PMV BLD AUTO: 9.2 FL (ref 9.2–12.9)
POTASSIUM SERPL-SCNC: 4.4 MMOL/L (ref 3.5–5.1)
PROT SERPL-MCNC: 7.9 G/DL (ref 6–8.4)
RBC # BLD AUTO: 4.55 M/UL (ref 4.6–6.2)
SODIUM SERPL-SCNC: 139 MMOL/L (ref 136–145)
T4 FREE SERPL-MCNC: 0.86 NG/DL (ref 0.71–1.51)
TSH SERPL DL<=0.005 MIU/L-ACNC: 9.72 UIU/ML (ref 0.4–4)
WBC # BLD AUTO: 9.93 K/UL (ref 3.9–12.7)

## 2020-03-13 PROCEDURE — 99215 PR OFFICE/OUTPT VISIT, EST, LEVL V, 40-54 MIN: ICD-10-PCS | Mod: S$GLB,,, | Performed by: INTERNAL MEDICINE

## 2020-03-13 PROCEDURE — 1159F MED LIST DOCD IN RCRD: CPT | Mod: S$GLB,,, | Performed by: INTERNAL MEDICINE

## 2020-03-13 PROCEDURE — 1126F PR PAIN SEVERITY QUANTIFIED, NO PAIN PRESENT: ICD-10-PCS | Mod: S$GLB,,, | Performed by: INTERNAL MEDICINE

## 2020-03-13 PROCEDURE — 99999 PR PBB SHADOW E&M-EST. PATIENT-LVL IV: CPT | Mod: PBBFAC,,, | Performed by: INTERNAL MEDICINE

## 2020-03-13 PROCEDURE — 1101F PT FALLS ASSESS-DOCD LE1/YR: CPT | Mod: CPTII,S$GLB,, | Performed by: INTERNAL MEDICINE

## 2020-03-13 PROCEDURE — 99215 OFFICE O/P EST HI 40 MIN: CPT | Mod: S$GLB,,, | Performed by: INTERNAL MEDICINE

## 2020-03-13 PROCEDURE — 1126F AMNT PAIN NOTED NONE PRSNT: CPT | Mod: S$GLB,,, | Performed by: INTERNAL MEDICINE

## 2020-03-13 PROCEDURE — 1159F PR MEDICATION LIST DOCUMENTED IN MEDICAL RECORD: ICD-10-PCS | Mod: S$GLB,,, | Performed by: INTERNAL MEDICINE

## 2020-03-13 PROCEDURE — 84439 ASSAY OF FREE THYROXINE: CPT

## 2020-03-13 PROCEDURE — 99999 PR PBB SHADOW E&M-EST. PATIENT-LVL IV: ICD-10-PCS | Mod: PBBFAC,,, | Performed by: INTERNAL MEDICINE

## 2020-03-13 PROCEDURE — 84443 ASSAY THYROID STIM HORMONE: CPT

## 2020-03-13 PROCEDURE — 36415 COLL VENOUS BLD VENIPUNCTURE: CPT

## 2020-03-13 PROCEDURE — 1101F PR PT FALLS ASSESS DOC 0-1 FALLS W/OUT INJ PAST YR: ICD-10-PCS | Mod: CPTII,S$GLB,, | Performed by: INTERNAL MEDICINE

## 2020-03-13 PROCEDURE — 80053 COMPREHEN METABOLIC PANEL: CPT

## 2020-03-13 RX ORDER — TRIAMCINOLONE ACETONIDE 5 MG/G
CREAM TOPICAL DAILY
Qty: 30 G | Refills: 1 | Status: SHIPPED | OUTPATIENT
Start: 2020-03-13 | End: 2020-06-08

## 2020-03-13 NOTE — PROGRESS NOTES
Subjective:       Patient ID: Brant Lees is a 89 y.o. male.    Chief Complaint: Malignant neoplasm of overlapping sites of right lung [C34.81]  HPI: We have an opportunity to see Mr. Brant Lees in Hematology Oncology clinic at [facility] on 03/12/2020.  Mr. Brant Lees is a 89 y.o. gentleman who had screening Ct lung, was found to have multiple lung lesions. PET CT showed involvement of both right and left lungs as well as hilar nodes.  Biopsy showed NSCLC squamous type.  Reported no cancer symptoms, denies pain.  Molecular analysis showed high PDL1 expression 50%.  Currently on keytruda.      Developed pruritic rash in both lower legs, used OTC benadryl cream and cortisone cream with some relief.       Malignant neoplasm of overlapping sites of lung    4/15/2019 Initial Diagnosis     Malignant neoplasm of overlapping sites of lung      4/15/2019 Cancer Staged     Staging form: Lung, AJCC 8th Edition  - Clinical stage from 4/15/2019: Stage IV (cT3, cN3, cM1a) - Signed by Simone Goins MD on 4/15/2019        Metastatic non-small cell lung cancer    4/15/2019 Initial Diagnosis     Metastatic non-small cell lung cancer      5/7/2019 -  Chemotherapy     Treatment Summary   Plan Name: OP PEMBROLIZUMAB 200MG Q3W  Treatment Goal: Curative  Status: Active  Start Date: 5/24/2019  End Date: 3/14/2020 (Planned)  Provider: Simone Goins MD  Chemotherapy: pembrolizumab (KEYTRUDA) 200 mg in sodium chloride 0.9% 108 mL chemo infusion, 200 mg, Intravenous, Clinic/HOD 1 time, 14 of 14 cycles  Administration: 200 mg (5/24/2019), 200 mg (6/14/2019), 200 mg (7/5/2019), 200 mg (7/26/2019), 200 mg (8/16/2019), 200 mg (9/6/2019), 200 mg (9/27/2019), 200 mg (10/18/2019), 200 mg (11/8/2019), 200 mg (11/29/2019), 200 mg (12/20/2019), 200 mg (1/10/2020), 200 mg (1/31/2020), 200 mg (2/21/2020)       Past Medical History:   Diagnosis Date    Anticoagulant long-term use     plavix    Cancer     Metastatic non-small cell lung cancer     Cardiomyopathy 9/07    Ischemic    HBP (high blood pressure) 1997    Hyperlipidemia     LBP (low back pain)     MI (myocardial infarction) 12/06    Status post primary angioplasty with coronary stent 12/06    Thyroid disease      Family History   Problem Relation Age of Onset    Heart disease Mother     Cancer Mother      Social History     Socioeconomic History    Marital status:      Spouse name: Not on file    Number of children: 1    Years of education: Not on file    Highest education level: Not on file   Occupational History    Not on file   Social Needs    Financial resource strain: Not on file    Food insecurity:     Worry: Not on file     Inability: Not on file    Transportation needs:     Medical: Not on file     Non-medical: Not on file   Tobacco Use    Smoking status: Never Smoker    Smokeless tobacco: Never Used   Substance and Sexual Activity    Alcohol use: No    Drug use: No    Sexual activity: Not Currently     Partners: Female   Lifestyle    Physical activity:     Days per week: Not on file     Minutes per session: Not on file    Stress: Not on file   Relationships    Social connections:     Talks on phone: Not on file     Gets together: Not on file     Attends [employer]azdan[employer] service: Not on file     Active member of club or organization: Not on file     Attends meetings of clubs or organizations: Not on file     Relationship status: Not on file   Other Topics Concern    Not on file   Social History Narrative    Not on file     Past Surgical History:   Procedure Laterality Date    CORONARY STENT PLACEMENT      HERNIA REPAIR      INSERTION OF VENOUS ACCESS PORT Left 5/17/2019    Procedure: INSERTION, VENOUS ACCESS PORT;  Surgeon: Lester Trejo MD;  Location: [facility];  Service: General;  Laterality: Left;     Current Outpatient Medications   Medication Sig Dispense Refill    acetaminophen (TYLENOL) 325 MG tablet Take 325 mg by mouth every 6 (six) hours as  needed for Pain.      amLODIPine (NORVASC) 5 MG tablet Take 1 tablet (5 mg total) by mouth 2 (two) times daily. 180 tablet 1    ASPIRIN (ASPIR-81 ORAL) Take 1 tablet by mouth once daily.       dronabinol (MARINOL) 2.5 MG capsule Take 1 capsule (2.5 mg total) by mouth 2 (two) times daily before meals. 60 capsule 1    levothyroxine (SYNTHROID) 100 MCG tablet Take 1 tablet (100 mcg total) by mouth once daily. 90 tablet 5    lisinopril (PRINIVIL,ZESTRIL) 20 MG tablet Take 1 tablet (20 mg total) by mouth once daily. 30 tablet 2    nitroGLYCERIN (NITROSTAT) 0.4 MG SL tablet Place 0.4 mg under the tongue every 5 (five) minutes as needed for Chest pain.      omeprazole (PRILOSEC) 20 MG capsule Take 20 mg by mouth once daily.      simvastatin (ZOCOR) 20 MG tablet Take 1 tablet (20 mg total) by mouth every evening. 90 tablet 4     No current facility-administered medications for this visit.        Labs:  Lab Results   Component Value Date    WBC 9.30 02/21/2020    HGB 13.8 (L) 02/21/2020    HCT 44.6 02/21/2020    MCV 95 02/21/2020     02/21/2020     BMP  Lab Results   Component Value Date     02/21/2020    K 4.7 02/21/2020     02/21/2020    CO2 25 02/21/2020    BUN 21 02/21/2020    CREATININE 1.4 02/21/2020    CALCIUM 9.6 02/21/2020    ANIONGAP 10 02/21/2020    ESTGFRAFRICA 51 (A) 02/21/2020    EGFRNONAA 44 (A) 02/21/2020     Lab Results   Component Value Date    ALT 14 02/21/2020    AST 17 02/21/2020    ALKPHOS 101 02/21/2020    BILITOT 0.3 02/21/2020       No results found for: IRON, TIBC, FERRITIN, SATURATEDIRO  No results found for: [id number]  No results found for: FOLATE  Lab Results   Component Value Date    TSH 3.518 02/21/2020       I have reviewed the radiology reports and examined the scan/xray images.    Review of Systems   Constitutional: Negative.    HENT: Negative.    Eyes: Negative.    Respiratory: Negative.    Cardiovascular: Negative.    Gastrointestinal: Negative.    Endocrine:  Negative.    Genitourinary: Negative.    Musculoskeletal: Negative.    Skin: Negative.    Allergic/Immunologic: Negative.    Neurological: Negative.    Hematological: Negative.    Psychiatric/Behavioral: Negative.      ECOG SCORE    0 - Fully active-able to carry on all pre-disease performance without restriction            Objective:     Vitals:    03/13/20 1316   BP: 135/70   Pulse: 60   Temp: 97 °F (36.1 °C)   Body mass index is 25.12 kg/m².  Physical Exam   Constitutional: He is oriented to person, place, and time. He appears well-developed and well-nourished.   HENT:   Head: Normocephalic and atraumatic.   Eyes: Conjunctivae and EOM are normal.   Neck: Normal range of motion. Neck supple.   Cardiovascular: Normal rate and regular rhythm.   Pulmonary/Chest: Effort normal and breath sounds normal.   Abdominal: Soft. Bowel sounds are normal.   Musculoskeletal: Normal range of motion.   Neurological: He is alert and oriented to person, place, and time.   Skin: Skin is warm and dry.   Psychiatric: He has a normal mood and affect. His behavior is normal. Judgment and thought content normal.   Nursing note and vitals reviewed.        Assessment:      1. Malignant neoplasm of overlapping sites of right lung    2. History of bladder cancer    3. Rash    4. Vision disorder    5. Nutritional anemia, unspecified            Plan:     Malignant neoplasm of overlapping sites of right lung  Will hold keytruda today due to significant rash on both lower legs, likely due to IO induced dermatitis.  Reevaluate rash in 3 weeks, if better, resume keytruda.  Also has upcoming PET CT scan to evaluate response.  -     CBC auto differential; Future; Expected date: 04/03/2020  -     Comprehensive metabolic panel; Future; Expected date: 04/03/2020  -     TSH; Future; Expected date: 04/03/2020    History of bladder cancer    Rash  Continue on OTC benadryl cream, will increase steroid cream with triamcinolone 0.5%, and neosporin  ointment.  -     triamcinolone acetonide 0.5% (KENALOG) 0.5 % Crea; Apply topically once daily.  Dispense: 30 g; Refill: 1  -     Ambulatory referral/consult to Dermatology; Future; Expected date: 03/20/2020    Vision disorder  Has mass in left eye per outside Ophthalmologist.  In re-reviewing MRI brain images, I do see left eye mass in back of eye ball.  -     Ambulatory referral/consult to Ophthalmology; Future; Expected date: 03/20/2020    Nutritional anemia, unspecified   -     TSH; Future; Expected date: 04/03/2020

## 2020-03-25 ENCOUNTER — PATIENT MESSAGE (OUTPATIENT)
Dept: UROLOGY | Facility: CLINIC | Age: 85
End: 2020-03-25

## 2020-03-25 ENCOUNTER — PATIENT MESSAGE (OUTPATIENT)
Dept: HEMATOLOGY/ONCOLOGY | Facility: CLINIC | Age: 85
End: 2020-03-25

## 2020-04-13 DIAGNOSIS — E03.9 HYPOTHYROIDISM (ACQUIRED): ICD-10-CM

## 2020-04-13 RX ORDER — LISINOPRIL 20 MG/1
20 TABLET ORAL DAILY
Qty: 30 TABLET | Refills: 2 | Status: SHIPPED | OUTPATIENT
Start: 2020-04-13 | End: 2020-06-03

## 2020-04-15 ENCOUNTER — PATIENT MESSAGE (OUTPATIENT)
Dept: HEMATOLOGY/ONCOLOGY | Facility: CLINIC | Age: 85
End: 2020-04-15

## 2020-04-15 ENCOUNTER — PATIENT MESSAGE (OUTPATIENT)
Dept: UROLOGY | Facility: CLINIC | Age: 85
End: 2020-04-15

## 2020-04-17 DIAGNOSIS — Z03.818 ENCOUNTER FOR OBSERVATION FOR SUSPECTED EXPOSURE TO OTHER BIOLOGICAL AGENTS RULED OUT: ICD-10-CM

## 2020-04-17 DIAGNOSIS — R91.8 MULTIPLE LUNG NODULES: Primary | Chronic | ICD-10-CM

## 2020-05-07 ENCOUNTER — PATIENT MESSAGE (OUTPATIENT)
Dept: HEMATOLOGY/ONCOLOGY | Facility: CLINIC | Age: 85
End: 2020-05-07

## 2020-05-13 DIAGNOSIS — I10 HYPERTENSION, UNSPECIFIED TYPE: ICD-10-CM

## 2020-05-13 RX ORDER — AMLODIPINE BESYLATE 5 MG/1
TABLET ORAL
Qty: 180 TABLET | Refills: 1 | Status: SHIPPED | OUTPATIENT
Start: 2020-05-13 | End: 2020-12-02

## 2020-05-18 ENCOUNTER — OFFICE VISIT (OUTPATIENT)
Dept: OPHTHALMOLOGY | Facility: CLINIC | Age: 85
End: 2020-05-18
Payer: MEDICARE

## 2020-05-18 DIAGNOSIS — C34.90 METASTATIC NON-SMALL CELL LUNG CANCER: ICD-10-CM

## 2020-05-18 DIAGNOSIS — C79.49 MALIGNANT NEOPLASM METASTATIC TO CHOROID WITH UNKNOWN PRIMARY SITE: ICD-10-CM

## 2020-05-18 DIAGNOSIS — C80.1 MALIGNANT NEOPLASM METASTATIC TO CHOROID WITH UNKNOWN PRIMARY SITE: ICD-10-CM

## 2020-05-18 DIAGNOSIS — H35.3233 EXUDATIVE AGE-RELATED MACULAR DEGENERATION OF BOTH EYES WITH INACTIVE SCAR: Primary | ICD-10-CM

## 2020-05-18 PROCEDURE — 99999 PR PBB SHADOW E&M-EST. PATIENT-LVL II: ICD-10-PCS | Mod: PBBFAC,,, | Performed by: OPHTHALMOLOGY

## 2020-05-18 PROCEDURE — 99999 PR PBB SHADOW E&M-EST. PATIENT-LVL II: CPT | Mod: PBBFAC,,, | Performed by: OPHTHALMOLOGY

## 2020-05-18 PROCEDURE — 92004 PR EYE EXAM, NEW PATIENT,COMPREHESV: ICD-10-PCS | Mod: S$GLB,,, | Performed by: OPHTHALMOLOGY

## 2020-05-18 PROCEDURE — 92004 COMPRE OPH EXAM NEW PT 1/>: CPT | Mod: S$GLB,,, | Performed by: OPHTHALMOLOGY

## 2020-05-18 NOTE — PROGRESS NOTES
===============================  Brant Lees,  5/18/2020 today   89 y.o. male   Last visit [facility]: :Visit date not found   Last visit eye dept. Visit date not found  [facility]:  Corrected distance visual acuity was CF at 3' in the right eye and HM in the left eye.  Tonometry     Tonometry (Applanation, 10:46 AM)       Right Left    Pressure 20 20              Wearing Rx     Wearing Rx       Sphere Cylinder Axis Add    Right -0.25 +0.75 005 +3.50    Left Nelson +0.50 005 +3.50    Type:  Bifocal               Not recorded         Not recorded        Chief Complaint   Patient presents with    Blurred Vision     Pt states that he can not see out of his left eye,  has been like this for about 6 months now, also he said that his cancer doctor wanted him to come see Dr. Centeno .       ________________  5/18/2020 today  HPI     Blurred Vision      Additional comments: Pt states that he can not see out of his left eye,    has been like this for about 6 months now, also he said that his cancer   doctor wanted him to come see Dr. Centeno .              Comments     pciol ou  Macular degeneration, history of injections, 4 years ago  He quit secondary to no improvement          Last edited by SYDNEY Centeno MD on 5/18/2020 11:38 AM. 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

## 2020-05-26 ENCOUNTER — TELEPHONE (OUTPATIENT)
Dept: RADIOLOGY | Facility: HOSPITAL | Age: 85
End: 2020-05-26

## 2020-05-27 ENCOUNTER — OFFICE VISIT (OUTPATIENT)
Dept: UROLOGY | Facility: CLINIC | Age: 85
End: 2020-05-27
Payer: MEDICARE

## 2020-05-27 VITALS
BODY MASS INDEX: 25.22 KG/M2 | SYSTOLIC BLOOD PRESSURE: 132 MMHG | HEIGHT: 71 IN | HEART RATE: 73 BPM | DIASTOLIC BLOOD PRESSURE: 58 MMHG | WEIGHT: 180.13 LBS

## 2020-05-27 DIAGNOSIS — C67.9 MALIGNANT NEOPLASM OF URINARY BLADDER, UNSPECIFIED SITE: Primary | ICD-10-CM

## 2020-05-27 PROCEDURE — 52000 CYSTOURETHROSCOPY: CPT | Mod: S$GLB,,, | Performed by: UROLOGY

## 2020-05-27 PROCEDURE — 99999 PR PBB SHADOW E&M-EST. PATIENT-LVL III: CPT | Mod: PBBFAC,,, | Performed by: UROLOGY

## 2020-05-27 PROCEDURE — 99499 UNLISTED E&M SERVICE: CPT | Mod: S$GLB,,, | Performed by: UROLOGY

## 2020-05-27 PROCEDURE — 52000 PR CYSTOURETHROSCOPY: ICD-10-PCS | Mod: S$GLB,,, | Performed by: UROLOGY

## 2020-05-27 PROCEDURE — 99999 PR PBB SHADOW E&M-EST. PATIENT-LVL III: ICD-10-PCS | Mod: PBBFAC,,, | Performed by: UROLOGY

## 2020-05-27 PROCEDURE — 99499 NO LOS: ICD-10-PCS | Mod: S$GLB,,, | Performed by: UROLOGY

## 2020-05-27 NOTE — PROGRESS NOTES
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  12/19/18: 15 mo cysto normal  10/17/18: 12 mo cysto normal.  6/14/18: 9 mo cysto normal.  3/14/18: 6 mo cysto normal.  No BCG from now on.  1/10/18: Had a lot of problems with BCG re-try and we agreed today to not do it again.  Voiding fine otherwise, no other complaints.   1/3/18: No problems in interim will try BCG again.  12/20/17: Had flu-like symptoms very severe after last dose.  Got better in a couple days.  12/13/17: BCG 1/3 1/2 strength today.  12/7/17: 3 mo cysto normal.  11/17/17: Pt would like to not do the BCG today.  We reviewed the indications.  We will respect his wishes.  Agreed to cysto soon, then do 3 wks maint BCG; just not do it today.  11/9/17: LUTS for a half-day last time but not bad enough to skip this week. BCG again today.  11/3/17: Had sig LUTS after last BCG and we skipped last week.  BCG 1/2 strength 4/6 today.  10/19/17: BCG 3/6 today.  9/22/17: Came for BCG today, but still too much microhematuria and leuk.  9/5/17: High grade Ta on path report.  OAB now settling down.  8/10/17: Cleared for surgery, reviewed findings, arranging TURBT.  7/10/17: Hematuria confirmed, CT Urogram reassuring.  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  6/16/17: 87 yo man referred by Dr. Gibson for evaluation of nocturia and LUTS.  Has incontinence at night, sudden urgency has trouble making it to the toilet.  Nocturia like this 2-3/night.  No daytime problems.  Six months so far.   No abd/pelvic pain and no exac/rel factors.  No gross hematuria.  No urolithiasis.  No other urinary bother.  No  history.  PSA was always normal.  2 cups coffee in AM, tea at lunch and dinner (green tea).  Good stream.      Allergies:  Patient has no known allergies.    Medications:  has a current medication list which includes the following prescription(s): acetaminophen, amlodipine, aspirin, levothyroxine, lisinopril, nitroglycerin, omeprazole, simvastatin, triamcinolone acetonide 0.5%, and dronabinol.    Review of Systems:  General: No fever, chills, fatigability, or weight loss.  Skin: No rashes, itching, or changes in color or texture of skin.  Chest: Denies DELUCA, cyanosis, wheezing, cough, and sputum production.  Abdomen: Appetite fine. No weight loss. Denies diarrhea, abdominal pain, hematemesis, or blood in stool.  Musculoskeletal: No joint stiffness or swelling. Denies back pain.  : As above.  All other review of systems negative.    PMH:   has a past medical history of Anticoagulant long-term use, Cancer, Cardiomyopathy (9/07), HBP (high blood pressure) (1997), Hyperlipidemia, LBP (low back pain), MI (myocardial infarction) (12/06), Status post primary angioplasty with coronary stent (12/06), and Thyroid disease.    PSH:   has a past surgical history that includes Hernia repair; Coronary stent placement; Insertion of venous access port (Left, 5/17/2019); and Cataract extraction.    FamHx: family history includes Cancer in his mother; Heart disease in his mother.    SocHx:  reports that he has never smoked. He has never used smokeless tobacco. He reports that he does not drink alcohol or use drugs.      Physical Exam:  Vitals:    05/27/20 0844   BP: (!) 132/58   Pulse: 73     General: A&Ox3, no apparent distress, no deformities  Neck: No masses, normal thyroid  Lungs: normal inspiration, no use of accessory muscles  Heart: normal pulse, no arrhythmias  Abdomen: Soft, NT, ND  Skin: The skin is warm and dry. No jaundice.  Ext: No c/c/e.  :   6/17: Test desc remington, no abnormalities of epididymus. Penis normal, with normal penile and scrotal skin. Meatus normal. Normal rectal tone, no hemorrhoids. Prost 40 gm no nodules or  masses appreciated. SV not palpable. Perineum and anus normal.    Labs/Studies:   Urinalysis performed in clinic, summary:    10/6/17: UA normal exc tr prot and 50 blood, less on micro exam  Bladder Scan performed in office:     6/17: PVR 4 ml.    Procedure: Diagnostic Cystoscopy    Procedure in Detail: After proper consents were obtained, the patient was prepped and draped in normal sterile fashion for diagnostic cystoscopy. 5 ml of lidocaine jelly was instilled in the urethra. The flexible cystoscope was then introduced into the urethra, and advanced into the bladder under direct vision. The urethral mucosa appeared normal, and no strictures were noted. The sphincter appeared to be normal, and the veru montanum was unremarkable. The prostatic mucosa and the lateral lobes of the prostate were normal. The bladder neck was normal. Inspection of the interior of the bladder was then carried out. The trigone was unremarkable, with no mucosal lesions. The ureteral orifices were normal in position and configuration. Systematic inspection of the mucosa of the bladder it was then carried out, rotating the cystoscope so that all areas of the left and right lateral walls, the dome of the bladder, and the posterior wall were all visualized. The cystoscope was then advanced further into the bladder, and maximum deflection of the scope was performed so that the bladder neck could be inspected. No mucosal lesions were noted there. The cystoscope was then removed, and the procedure terminated.     Findings: normal cysto    Impression/Plan:   1. 36 mo cysto 9/7/20. No more BCG

## 2020-05-28 ENCOUNTER — TELEPHONE (OUTPATIENT)
Dept: RADIOLOGY | Facility: HOSPITAL | Age: 85
End: 2020-05-28

## 2020-05-29 ENCOUNTER — TELEPHONE (OUTPATIENT)
Dept: RADIOLOGY | Facility: HOSPITAL | Age: 85
End: 2020-05-29

## 2020-06-01 ENCOUNTER — HOSPITAL ENCOUNTER (OUTPATIENT)
Dept: RADIOLOGY | Facility: HOSPITAL | Age: 85
Discharge: HOME | End: 2020-06-01
Attending: INTERNAL MEDICINE
Payer: MEDICARE

## 2020-06-01 DIAGNOSIS — C34.81 MALIGNANT NEOPLASM OF OVERLAPPING SITES OF RIGHT LUNG: ICD-10-CM

## 2020-06-01 PROCEDURE — 78815 PET IMAGE W/CT SKULL-THIGH: CPT | Mod: 26,PS,, | Performed by: RADIOLOGY

## 2020-06-01 PROCEDURE — A9552 F18 FDG: HCPCS

## 2020-06-01 PROCEDURE — 78815 NM PET CT ROUTINE: ICD-10-PCS | Mod: 26,PS,, | Performed by: RADIOLOGY

## 2020-06-01 PROCEDURE — 78815 PET IMAGE W/CT SKULL-THIGH: CPT | Mod: TC

## 2020-06-03 NOTE — PROGRESS NOTES
Subjective:       Patient ID: Brant Lees is a 89 y.o. male.    Chief Complaint: Malignant neoplasm of overlapping sites of right lung [C34.81]  HPI: We have an opportunity to see Mr. Brant Lees in Hematology Oncology clinic at [facility] on 06/03/2020.  Mr. Brant Lees is a 89 y.o. gentleman who had screening Ct lung, was found to have multiple lung lesions. PET CT showed involvement of both right and left lungs as well as hilar nodes.  Biopsy showed NSCLC squamous type.  Reported no cancer symptoms, denies pain.  Molecular analysis showed high PDL1 expression 50%.  Currently on keytruda.       Developed pruritic rash in both lower legs, used OTC benadryl cream and cortisone cream with some relief.       Malignant neoplasm of overlapping sites of lung    4/15/2019 Initial Diagnosis     Malignant neoplasm of overlapping sites of lung      4/15/2019 Cancer Staged     Staging form: Lung, AJCC 8th Edition  - Clinical stage from 4/15/2019: Stage IV (cT3, cN3, cM1a) - Signed by Simone Goins MD on 4/15/2019        Metastatic non-small cell lung cancer    4/15/2019 Initial Diagnosis     Metastatic non-small cell lung cancer      5/7/2019 -  Chemotherapy     Treatment Summary   Plan Name: OP PEMBROLIZUMAB 200MG Q3W  Treatment Goal: Curative  Status: Active  Start Date: 5/24/2019  End Date: 6/25/2020 (Planned)  Provider: Simone Goins MD  Chemotherapy: pembrolizumab (KEYTRUDA) 200 mg in sodium chloride 0.9% 108 mL chemo infusion, 200 mg, Intravenous, Clinic/HOD 1 time, 14 of 15 cycles  Administration: 200 mg (5/24/2019), 200 mg (6/14/2019), 200 mg (7/5/2019), 200 mg (7/26/2019), 200 mg (8/16/2019), 200 mg (9/6/2019), 200 mg (9/27/2019), 200 mg (10/18/2019), 200 mg (11/8/2019), 200 mg (11/29/2019), 200 mg (12/20/2019), 200 mg (1/10/2020), 200 mg (1/31/2020), 200 mg (2/21/2020)       Past Medical History:   Diagnosis Date    Anticoagulant long-term use     plavix    Cancer     Metastatic non-small cell lung cancer     Cardiomyopathy 9/07    Ischemic    HBP (high blood pressure) 1997    Hyperlipidemia     LBP (low back pain)     MI (myocardial infarction) 12/06    Status post primary angioplasty with coronary stent 12/06    Thyroid disease      Family History   Problem Relation Age of Onset    Heart disease Mother     Cancer Mother      Social History     Socioeconomic History    Marital status:      Spouse name: Not on file    Number of children: 1    Years of education: Not on file    Highest education level: Not on file   Occupational History    Not on file   Social Needs    Financial resource strain: Not on file    Food insecurity:     Worry: Not on file     Inability: Not on file    Transportation needs:     Medical: Not on file     Non-medical: Not on file   Tobacco Use    Smoking status: Never Smoker    Smokeless tobacco: Never Used   Substance and Sexual Activity    Alcohol use: No    Drug use: No    Sexual activity: Not Currently     Partners: Female   Lifestyle    Physical activity:     Days per week: Not on file     Minutes per session: Not on file    Stress: Not on file   Relationships    Social connections:     Talks on phone: Not on file     Gets together: Not on file     Attends [employer] service: Not on file     Active member of club or organization: Not on file     Attends meetings of clubs or organizations: Not on file     Relationship status: Not on file   Other Topics Concern    Not on file   Social History Narrative    Not on file     Past Surgical History:   Procedure Laterality Date    CATARACT EXTRACTION      CORONARY STENT PLACEMENT      HERNIA REPAIR      INSERTION OF VENOUS ACCESS PORT Left 5/17/2019    Procedure: INSERTION, VENOUS ACCESS PORT;  Surgeon: Lester Trejo MD;  Location: [facility];  Service: General;  Laterality: Left;     Current Outpatient Medications   Medication Sig Dispense Refill    acetaminophen (TYLENOL) 325 MG tablet Take 325 mg by mouth  every 6 (six) hours as needed for Pain.      amLODIPine (NORVASC) 5 MG tablet TAKE 1 TABLET TWICE DAILY 180 tablet 1    ASPIRIN (ASPIR-81 ORAL) Take 1 tablet by mouth once daily.       levothyroxine (SYNTHROID) 100 MCG tablet Take 1 tablet (100 mcg total) by mouth once daily. 90 tablet 5    lisinopriL (PRINIVIL,ZESTRIL) 20 MG tablet TAKE 1 TABLET EVERY DAY 90 tablet 1    nitroGLYCERIN (NITROSTAT) 0.4 MG SL tablet Place 0.4 mg under the tongue every 5 (five) minutes as needed for Chest pain.      omeprazole (PRILOSEC) 20 MG capsule Take 20 mg by mouth once daily.      simvastatin (ZOCOR) 20 MG tablet Take 1 tablet (20 mg total) by mouth every evening. 90 tablet 4    triamcinolone acetonide 0.5% (KENALOG) 0.5 % Crea Apply topically once daily. 30 g 1     No current facility-administered medications for this visit.        Labs:  Lab Results   Component Value Date    WBC 9.93 03/13/2020    HGB 13.3 (L) 03/13/2020    HCT 42.2 03/13/2020    MCV 93 03/13/2020     (H) 03/13/2020     BMP  Lab Results   Component Value Date     03/13/2020    K 4.4 03/13/2020     03/13/2020    CO2 27 03/13/2020    BUN 19 03/13/2020    CREATININE 1.2 03/13/2020    CALCIUM 9.3 03/13/2020    ANIONGAP 10 03/13/2020    ESTGFRAFRICA >60 03/13/2020    EGFRNONAA 53 (A) 03/13/2020     Lab Results   Component Value Date    ALT 13 03/13/2020    AST 13 03/13/2020    ALKPHOS 93 03/13/2020    BILITOT 0.3 03/13/2020       No results found for: IRON, TIBC, FERRITIN, SATURATEDIRO  No results found for: [id number]  No results found for: FOLATE  Lab Results   Component Value Date    TSH 9.717 (H) 03/13/2020       I have reviewed the radiology reports and examined the scan/xray images.    Review of Systems   Constitutional: Negative.    HENT: Negative.    Eyes: Negative.    Respiratory: Negative.    Cardiovascular: Negative.    Gastrointestinal: Negative.    Endocrine: Negative.    Genitourinary: Negative.    Musculoskeletal: Negative.     Skin: Negative.    Allergic/Immunologic: Negative.    Neurological: Negative.    Hematological: Negative.    Psychiatric/Behavioral: Negative.      ECOG SCORE    0 - Fully active-able to carry on all pre-disease performance without restriction            Objective:     Vitals:    06/04/20 0921   BP: 137/73   Pulse: 68   Resp: 16   Temp: 97 °F (36.1 °C)   Body mass index is 24.75 kg/m².  Physical Exam   Constitutional: He is oriented to person, place, and time. He appears well-developed and well-nourished.   HENT:   Head: Normocephalic and atraumatic.   Eyes: Conjunctivae and EOM are normal.   Neck: Normal range of motion. Neck supple.   Cardiovascular: Normal rate and regular rhythm.   Pulmonary/Chest: Effort normal and breath sounds normal.   Abdominal: Soft. Bowel sounds are normal.   Musculoskeletal: Normal range of motion.   Neurological: He is alert and oriented to person, place, and time.   Skin: Skin is warm and dry.   Psychiatric: He has a normal mood and affect. His behavior is normal. Judgment and thought content normal.   Nursing note and vitals reviewed.        Assessment:      1. Malignant neoplasm of overlapping sites of right lung    2. Malignant neoplasm metastatic to right lung    3. Nutritional anemia, unspecified            Plan:     Malignant neoplasm of overlapping sites of right lung  Cancer progressed off keytruda  Resume keytruda  -     CBC auto differential; Future; Expected date: 06/25/2020  -     Comprehensive metabolic panel; Future; Expected date: 06/25/2020  -     TSH; Future; Expected date: 06/25/2020    Malignant neoplasm metastatic to right lung    Nutritional anemia, unspecified   -     TSH; Future; Expected date: 06/25/2020

## 2020-06-04 ENCOUNTER — INFUSION (OUTPATIENT)
Dept: INFUSION THERAPY | Facility: HOSPITAL | Age: 85
End: 2020-06-04
Attending: INTERNAL MEDICINE
Payer: MEDICARE

## 2020-06-04 ENCOUNTER — OFFICE VISIT (OUTPATIENT)
Dept: HEMATOLOGY/ONCOLOGY | Facility: CLINIC | Age: 85
End: 2020-06-04
Payer: MEDICARE

## 2020-06-04 ENCOUNTER — LAB VISIT (OUTPATIENT)
Dept: LAB | Facility: HOSPITAL | Age: 85
End: 2020-06-04
Attending: INTERNAL MEDICINE
Payer: MEDICARE

## 2020-06-04 VITALS
OXYGEN SATURATION: 95 % | HEIGHT: 71 IN | DIASTOLIC BLOOD PRESSURE: 73 MMHG | HEART RATE: 68 BPM | SYSTOLIC BLOOD PRESSURE: 137 MMHG | TEMPERATURE: 97 F | BODY MASS INDEX: 24.85 KG/M2 | RESPIRATION RATE: 16 BRPM | WEIGHT: 177.5 LBS

## 2020-06-04 VITALS
HEART RATE: 60 BPM | BODY MASS INDEX: 24.85 KG/M2 | OXYGEN SATURATION: 95 % | SYSTOLIC BLOOD PRESSURE: 118 MMHG | HEIGHT: 71 IN | WEIGHT: 177.5 LBS | DIASTOLIC BLOOD PRESSURE: 56 MMHG | RESPIRATION RATE: 18 BRPM | TEMPERATURE: 98 F

## 2020-06-04 DIAGNOSIS — D53.9 NUTRITIONAL ANEMIA, UNSPECIFIED: ICD-10-CM

## 2020-06-04 DIAGNOSIS — C78.01 MALIGNANT NEOPLASM METASTATIC TO RIGHT LUNG: ICD-10-CM

## 2020-06-04 DIAGNOSIS — Z03.818 ENCNTR FOR OBS FOR SUSP EXPSR TO OTH BIOLG AGENTS RULED OUT: ICD-10-CM

## 2020-06-04 DIAGNOSIS — C34.81 MALIGNANT NEOPLASM OF OVERLAPPING SITES OF RIGHT LUNG: Primary | ICD-10-CM

## 2020-06-04 DIAGNOSIS — C34.90 METASTATIC NON-SMALL CELL LUNG CANCER: Primary | ICD-10-CM

## 2020-06-04 DIAGNOSIS — C34.81 MALIGNANT NEOPLASM OF OVERLAPPING SITES OF RIGHT LUNG: ICD-10-CM

## 2020-06-04 LAB
ALBUMIN SERPL BCP-MCNC: 3.3 G/DL (ref 3.5–5.2)
ALP SERPL-CCNC: 90 U/L (ref 55–135)
ALT SERPL W/O P-5'-P-CCNC: 19 U/L (ref 10–44)
ANION GAP SERPL CALC-SCNC: 11 MMOL/L (ref 8–16)
AST SERPL-CCNC: 16 U/L (ref 10–40)
BASOPHILS # BLD AUTO: 0.06 K/UL (ref 0–0.2)
BASOPHILS NFR BLD: 0.5 % (ref 0–1.9)
BILIRUB SERPL-MCNC: 0.3 MG/DL (ref 0.1–1)
BUN SERPL-MCNC: 17 MG/DL (ref 8–23)
CALCIUM SERPL-MCNC: 9.5 MG/DL (ref 8.7–10.5)
CHLORIDE SERPL-SCNC: 101 MMOL/L (ref 95–110)
CO2 SERPL-SCNC: 25 MMOL/L (ref 23–29)
CREAT SERPL-MCNC: 1.2 MG/DL (ref 0.5–1.4)
DIFFERENTIAL METHOD: ABNORMAL
EOSINOPHIL # BLD AUTO: 0.5 K/UL (ref 0–0.5)
EOSINOPHIL NFR BLD: 4 % (ref 0–8)
ERYTHROCYTE [DISTWIDTH] IN BLOOD BY AUTOMATED COUNT: 14.5 % (ref 11.5–14.5)
EST. GFR  (AFRICAN AMERICAN): >60 ML/MIN/1.73 M^2
EST. GFR  (NON AFRICAN AMERICAN): 53 ML/MIN/1.73 M^2
GLUCOSE SERPL-MCNC: 201 MG/DL (ref 70–110)
HCT VFR BLD AUTO: 42.7 % (ref 40–54)
HGB BLD-MCNC: 13.1 G/DL (ref 14–18)
IMM GRANULOCYTES # BLD AUTO: 0.06 K/UL (ref 0–0.04)
IMM GRANULOCYTES NFR BLD AUTO: 0.5 % (ref 0–0.5)
LYMPHOCYTES # BLD AUTO: 1.6 K/UL (ref 1–4.8)
LYMPHOCYTES NFR BLD: 13.1 % (ref 18–48)
MCH RBC QN AUTO: 28.1 PG (ref 27–31)
MCHC RBC AUTO-ENTMCNC: 30.7 G/DL (ref 32–36)
MCV RBC AUTO: 91 FL (ref 82–98)
MONOCYTES # BLD AUTO: 0.8 K/UL (ref 0.3–1)
MONOCYTES NFR BLD: 6.7 % (ref 4–15)
NEUTROPHILS # BLD AUTO: 8.9 K/UL (ref 1.8–7.7)
NEUTROPHILS NFR BLD: 75.2 % (ref 38–73)
NRBC BLD-RTO: 0 /100 WBC
PLATELET # BLD AUTO: 350 K/UL (ref 150–350)
PMV BLD AUTO: 9.5 FL (ref 9.2–12.9)
POTASSIUM SERPL-SCNC: 4.2 MMOL/L (ref 3.5–5.1)
PROT SERPL-MCNC: 7.9 G/DL (ref 6–8.4)
RBC # BLD AUTO: 4.67 M/UL (ref 4.6–6.2)
SODIUM SERPL-SCNC: 137 MMOL/L (ref 136–145)
T4 FREE SERPL-MCNC: 1 NG/DL (ref 0.71–1.51)
TSH SERPL DL<=0.005 MIU/L-ACNC: 4.99 UIU/ML (ref 0.4–4)
WBC # BLD AUTO: 11.82 K/UL (ref 3.9–12.7)

## 2020-06-04 PROCEDURE — 85025 COMPLETE CBC W/AUTO DIFF WBC: CPT

## 2020-06-04 PROCEDURE — 99999 PR PBB SHADOW E&M-EST. PATIENT-LVL III: CPT | Mod: PBBFAC,,, | Performed by: INTERNAL MEDICINE

## 2020-06-04 PROCEDURE — 1125F PR PAIN SEVERITY QUANTIFIED, PAIN PRESENT: ICD-10-PCS | Mod: S$GLB,,, | Performed by: INTERNAL MEDICINE

## 2020-06-04 PROCEDURE — 1125F AMNT PAIN NOTED PAIN PRSNT: CPT | Mod: S$GLB,,, | Performed by: INTERNAL MEDICINE

## 2020-06-04 PROCEDURE — 99215 PR OFFICE/OUTPT VISIT, EST, LEVL V, 40-54 MIN: ICD-10-PCS | Mod: 25,S$GLB,, | Performed by: INTERNAL MEDICINE

## 2020-06-04 PROCEDURE — 96413 CHEMO IV INFUSION 1 HR: CPT

## 2020-06-04 PROCEDURE — 3288F PR FALLS RISK ASSESSMENT DOCUMENTED: ICD-10-PCS | Mod: CPTII,S$GLB,, | Performed by: INTERNAL MEDICINE

## 2020-06-04 PROCEDURE — 80053 COMPREHEN METABOLIC PANEL: CPT

## 2020-06-04 PROCEDURE — 63600175 PHARM REV CODE 636 W HCPCS: Performed by: INTERNAL MEDICINE

## 2020-06-04 PROCEDURE — 84439 ASSAY OF FREE THYROXINE: CPT

## 2020-06-04 PROCEDURE — 1159F PR MEDICATION LIST DOCUMENTED IN MEDICAL RECORD: ICD-10-PCS | Mod: S$GLB,,, | Performed by: INTERNAL MEDICINE

## 2020-06-04 PROCEDURE — 1100F PR PT FALLS ASSESS DOC 2+ FALLS/FALL W/INJURY/YR: ICD-10-PCS | Mod: CPTII,S$GLB,, | Performed by: INTERNAL MEDICINE

## 2020-06-04 PROCEDURE — 3288F FALL RISK ASSESSMENT DOCD: CPT | Mod: CPTII,S$GLB,, | Performed by: INTERNAL MEDICINE

## 2020-06-04 PROCEDURE — 25000003 PHARM REV CODE 250: Performed by: INTERNAL MEDICINE

## 2020-06-04 PROCEDURE — 1159F MED LIST DOCD IN RCRD: CPT | Mod: S$GLB,,, | Performed by: INTERNAL MEDICINE

## 2020-06-04 PROCEDURE — 84443 ASSAY THYROID STIM HORMONE: CPT

## 2020-06-04 PROCEDURE — 36415 COLL VENOUS BLD VENIPUNCTURE: CPT

## 2020-06-04 PROCEDURE — 1100F PTFALLS ASSESS-DOCD GE2>/YR: CPT | Mod: CPTII,S$GLB,, | Performed by: INTERNAL MEDICINE

## 2020-06-04 PROCEDURE — 99999 PR PBB SHADOW E&M-EST. PATIENT-LVL III: ICD-10-PCS | Mod: PBBFAC,,, | Performed by: INTERNAL MEDICINE

## 2020-06-04 PROCEDURE — 99215 OFFICE O/P EST HI 40 MIN: CPT | Mod: 25,S$GLB,, | Performed by: INTERNAL MEDICINE

## 2020-06-04 RX ORDER — HEPARIN 100 UNIT/ML
500 SYRINGE INTRAVENOUS
Status: DISCONTINUED | OUTPATIENT
Start: 2020-06-04 | End: 2020-06-04 | Stop reason: HOSPADM

## 2020-06-04 RX ADMIN — SODIUM CHLORIDE 200 MG: 9 INJECTION, SOLUTION INTRAVENOUS at 10:06

## 2020-06-04 RX ADMIN — HEPARIN 500 UNITS: 100 SYRINGE at 11:06

## 2020-06-04 NOTE — DISCHARGE INSTRUCTIONS
[facility]  [address] [facility]  [address] [address]  [phone] phone     [phone] fax  Hours of Operation: Monday- Friday 8:00am- 5:00pm  After hours phone  [phone]  Hematology / Oncology Physicians on call      LINDA Swanson Dr., Dr., Dr., Dr., NP Sydney Prescott, NP Tyesha Taylor, NP    Please call with any concerns regarding your appointment today.

## 2020-06-04 NOTE — PLAN OF CARE
Pt states he feels well today.  C/o rash on both legs, MD aware, going to see Derm.  Infection precautions reviewed.  Pt states full understanding to call with any sign of infection, including temp >100.4.

## 2020-06-16 ENCOUNTER — OFFICE VISIT (OUTPATIENT)
Dept: DERMATOLOGY | Facility: CLINIC | Age: 85
End: 2020-06-16
Payer: MEDICARE

## 2020-06-16 DIAGNOSIS — L98.9 DERMATOSIS: Primary | ICD-10-CM

## 2020-06-16 PROCEDURE — 1159F PR MEDICATION LIST DOCUMENTED IN MEDICAL RECORD: ICD-10-PCS | Mod: S$GLB,,, | Performed by: DERMATOLOGY

## 2020-06-16 PROCEDURE — 99203 PR OFFICE/OUTPT VISIT, NEW, LEVL III, 30-44 MIN: ICD-10-PCS | Mod: 25,S$GLB,, | Performed by: DERMATOLOGY

## 2020-06-16 PROCEDURE — 11104 PUNCH BX SKIN SINGLE LESION: CPT | Mod: S$GLB,,, | Performed by: DERMATOLOGY

## 2020-06-16 PROCEDURE — 3288F FALL RISK ASSESSMENT DOCD: CPT | Mod: CPTII,S$GLB,, | Performed by: DERMATOLOGY

## 2020-06-16 PROCEDURE — 99203 OFFICE O/P NEW LOW 30 MIN: CPT | Mod: 25,S$GLB,, | Performed by: DERMATOLOGY

## 2020-06-16 PROCEDURE — 88305 TISSUE EXAM BY PATHOLOGIST: CPT | Mod: 26,,, | Performed by: PATHOLOGY

## 2020-06-16 PROCEDURE — 88305 TISSUE EXAM BY PATHOLOGIST: CPT | Performed by: PATHOLOGY

## 2020-06-16 PROCEDURE — 88350 IMFLUOR EA ADDL 1ANTB STN PX: CPT | Performed by: PATHOLOGY

## 2020-06-16 PROCEDURE — 11104 PR PUNCH BIOPSY, SKIN, SINGLE LESION: ICD-10-PCS | Mod: S$GLB,,, | Performed by: DERMATOLOGY

## 2020-06-16 PROCEDURE — 88346 IMFLUOR 1ST 1ANTB STAIN PX: CPT | Performed by: PATHOLOGY

## 2020-06-16 PROCEDURE — 11105 PUNCH BX SKIN EA SEP/ADDL: CPT | Mod: S$GLB,,, | Performed by: DERMATOLOGY

## 2020-06-16 PROCEDURE — 99999 PR PBB SHADOW E&M-EST. PATIENT-LVL II: CPT | Mod: PBBFAC,,, | Performed by: DERMATOLOGY

## 2020-06-16 PROCEDURE — 3288F PR FALLS RISK ASSESSMENT DOCUMENTED: ICD-10-PCS | Mod: CPTII,S$GLB,, | Performed by: DERMATOLOGY

## 2020-06-16 PROCEDURE — 1100F PTFALLS ASSESS-DOCD GE2>/YR: CPT | Mod: CPTII,S$GLB,, | Performed by: DERMATOLOGY

## 2020-06-16 PROCEDURE — 99999 PR PBB SHADOW E&M-EST. PATIENT-LVL II: ICD-10-PCS | Mod: PBBFAC,,, | Performed by: DERMATOLOGY

## 2020-06-16 PROCEDURE — 11105 PR PUNCH BIOPSY, SKIN, EA ADDTL LESION: ICD-10-PCS | Mod: S$GLB,,, | Performed by: DERMATOLOGY

## 2020-06-16 PROCEDURE — 1100F PR PT FALLS ASSESS DOC 2+ FALLS/FALL W/INJURY/YR: ICD-10-PCS | Mod: CPTII,S$GLB,, | Performed by: DERMATOLOGY

## 2020-06-16 PROCEDURE — 1159F MED LIST DOCD IN RCRD: CPT | Mod: S$GLB,,, | Performed by: DERMATOLOGY

## 2020-06-16 PROCEDURE — 88305 TISSUE EXAM BY PATHOLOGIST: ICD-10-PCS | Mod: 26,,, | Performed by: PATHOLOGY

## 2020-06-16 NOTE — PROGRESS NOTES
Subjective:       Patient ID:  Brant Lees is a 89 y.o. male who presents for   Chief Complaint   Patient presents with    Rash     Pt complains of a skin rash all over his body and states that they itch and have been there for several weeks.     Hx of lung cancer (on Keytruda), followed by Dr. Goins    History of Present Illness: The patient presents with chief complaint of rash.  Location: bilateral legs  Duration: 3-4 weeks  Signs/Symptoms: pruritus    Prior treatments: TAC 0.5% cream        Review of Systems   Constitutional: Negative for fever and chills.   Gastrointestinal: Negative for nausea and vomiting.   Skin: Positive for itching and rash. Negative for daily sunscreen use, activity-related sunscreen use and recent sunburn.   Hematologic/Lymphatic: Does not bruise/bleed easily.        Objective:    Physical Exam   Constitutional: He appears well-developed and well-nourished. No distress.   Neurological: He is alert and oriented to person, place, and time. He is not disoriented.   Psychiatric: He has a normal mood and affect.   Skin:   Areas Examined (abnormalities noted in diagram):   Head / Face Inspection Performed  Neck Inspection Performed  Chest / Axilla Inspection Performed  Abdomen Inspection Performed  Back Inspection Performed  RUE Inspected  LUE Inspection Performed  RLE Inspected  LLE Inspection Performed  Nails and Digits Inspection Performed               Assessment / Plan:      Pathology Orders:     Normal Orders This Visit    Specimen to Pathology, Dermatology     Questions:    Procedure Type: Dermatology and skin neoplasms    Number of Specimens: 2    ------------------------: -------------------------    Spec 1 Procedure: Biopsy    Spec 1 Clinical Impression: BP vs. linear IgA vs. bullous drug    Spec 1 Source: left leg    ------------------------: -------------------------    Spec 2 Procedure: Biopsy    Spec 2 Clinical Impression: BP vs. linear IgA vs. bullous drug    Spec 2 Source:  right leg    Clinical Information: see above    Specimen to Pathology, Dermatology     Comments:    SPECIMEN IN MILLA'S/TISSUE FIXATIVE FOR DIRECT IMMUNOFLUORESCENCE    Questions:    Procedure Type: Dermatology and skin neoplasms    Number of Specimens: 1    ------------------------: -------------------------    Spec 1 Procedure: Biopsy    Spec 1 Clinical Impression: SPECIMEN IN MILLA'S/TISSUE FIXATIVE FOR DIRECT IMMUNOFLUORESCENCE. BP vs. linear IgA vs. bullous drug    Spec 1 Source: left leg    Clinical Information: see above        Dermatosis  -     Specimen to Pathology, Dermatology  -     Specimen to Pathology, Dermatology  -     Bullous eruption.  Will avoid PO prednisone given age and mildly decreased renal function (GFR 53).  Will instruct pt's granddaughter (Tiara Sena [phone]) to have him start nicotinamide supplement until results available. 2 punch biopsies done.  RTC in 3 weeks for results.           Follow up in about 3 weeks (around 7/7/2020).     PROCEDURE NOTE -- PUNCH BIOPSY  Location: see above    After risks, benefits, and alternatives were discussed with the patient, the patient agrees to the procedure by verbal consent. The area(s) is cleansed with alcohol. A total of 2 cc of lidocaine 1% with epinephrine and sodium bicarbonate was injected for local anesthesia. A 3 mm punch biopsy was used to remove part or all of the lesion(s). The specimen was sent for tissue pathology. The defect(s) was then packed with gelfoam. The area was dressed with antibiotic ointment and bandaged. The patient tolerated the procedure well without adverse events.  Wound care instructions were given to the patient on the AVS.  The patient will be notified of pathology results once available. Results will also be available in Epic.

## 2020-06-16 NOTE — PATIENT INSTRUCTIONS
Niacinamide (nicotinamide) 500 mg twice daily (purchase over the counter B vitamin)    Punch Biopsy Wound Care    Your doctor has performed a punch biopsy today.  A band aid and antibiotic ointment has been placed over the site.  This should remain in place for 24 hours.  It is recommended that you keep the area dry for the first 24 hours.  After 24 hours, you may remove the band aid and wash the area with warm soap and water and apply Vaseline jelly.  Many patients prefer to use Neosporin or Bacitracin ointment.  This is acceptable; however know that you can develop an allergy to this medication even if you have used it safely for years.  It is important to keep the area moist.  Letting it dry out and get air slows healing time, will worsen the scar, and make it more difficult to remove the stitches if they were placed.  Band aid is optional after first 24 hours.      If you notice increasing redness, tenderness, pain, or yellow drainage at the biopsy or surgical site, please notify your doctor.  These are signs of an infection.    If your biopsy/surgical site is bleeding, apply firm pressure for 15 minutes straight.  Repeat for another 15 minutes, if it is still bleeding.   If the surgical site continues to bleed, then please contact your doctor.      [facility]   DERMATOLOGY  [address]   [place] [address]   Dept: [phone]   Dept Fax: [phone]

## 2020-06-17 ENCOUNTER — TELEPHONE (OUTPATIENT)
Dept: INFUSION THERAPY | Facility: HOSPITAL | Age: 85
End: 2020-06-17

## 2020-06-17 NOTE — TELEPHONE ENCOUNTER
Left msg for pt re:  No longer requiring routine covid testing for infusion pts.  Left name and number for pt to call as to whether he would like to keep or cancel the covid test.

## 2020-06-24 LAB
COMMENT: NORMAL
FINAL PATHOLOGIC DIAGNOSIS: NORMAL
FINAL PATHOLOGIC DIAGNOSIS: NORMAL
GROSS: NORMAL
GROSS: NORMAL
MICROSCOPIC EXAM: NORMAL

## 2020-06-26 ENCOUNTER — OFFICE VISIT (OUTPATIENT)
Dept: HEMATOLOGY/ONCOLOGY | Facility: CLINIC | Age: 85
End: 2020-06-26
Payer: MEDICARE

## 2020-06-26 ENCOUNTER — INFUSION (OUTPATIENT)
Dept: INFUSION THERAPY | Facility: HOSPITAL | Age: 85
End: 2020-06-26
Attending: INTERNAL MEDICINE
Payer: MEDICARE

## 2020-06-26 ENCOUNTER — LAB VISIT (OUTPATIENT)
Dept: LAB | Facility: HOSPITAL | Age: 85
End: 2020-06-26
Attending: INTERNAL MEDICINE
Payer: MEDICARE

## 2020-06-26 VITALS
HEART RATE: 56 BPM | OXYGEN SATURATION: 97 % | TEMPERATURE: 97 F | DIASTOLIC BLOOD PRESSURE: 69 MMHG | RESPIRATION RATE: 14 BRPM | BODY MASS INDEX: 24.66 KG/M2 | WEIGHT: 176.13 LBS | HEIGHT: 71 IN | SYSTOLIC BLOOD PRESSURE: 137 MMHG

## 2020-06-26 VITALS
SYSTOLIC BLOOD PRESSURE: 135 MMHG | DIASTOLIC BLOOD PRESSURE: 68 MMHG | TEMPERATURE: 98 F | OXYGEN SATURATION: 95 % | RESPIRATION RATE: 18 BRPM | HEART RATE: 68 BPM

## 2020-06-26 DIAGNOSIS — C34.90 METASTATIC NON-SMALL CELL LUNG CANCER: Primary | ICD-10-CM

## 2020-06-26 DIAGNOSIS — E03.8 HYPOTHYROIDISM DUE TO HASHIMOTO'S THYROIDITIS: ICD-10-CM

## 2020-06-26 DIAGNOSIS — E06.3 HYPOTHYROIDISM DUE TO HASHIMOTO'S THYROIDITIS: ICD-10-CM

## 2020-06-26 DIAGNOSIS — D53.9 NUTRITIONAL ANEMIA, UNSPECIFIED: ICD-10-CM

## 2020-06-26 DIAGNOSIS — C34.81 MALIGNANT NEOPLASM OF OVERLAPPING SITES OF RIGHT LUNG: ICD-10-CM

## 2020-06-26 DIAGNOSIS — C34.81 MALIGNANT NEOPLASM OF OVERLAPPING SITES OF RIGHT LUNG: Primary | ICD-10-CM

## 2020-06-26 DIAGNOSIS — E06.4 DRUG-INDUCED THYROIDITIS: ICD-10-CM

## 2020-06-26 LAB
ALBUMIN SERPL BCP-MCNC: 3.2 G/DL (ref 3.5–5.2)
ALP SERPL-CCNC: 91 U/L (ref 55–135)
ALT SERPL W/O P-5'-P-CCNC: 20 U/L (ref 10–44)
ANION GAP SERPL CALC-SCNC: 9 MMOL/L (ref 8–16)
AST SERPL-CCNC: 15 U/L (ref 10–40)
BASOPHILS # BLD AUTO: 0.06 K/UL (ref 0–0.2)
BASOPHILS NFR BLD: 0.5 % (ref 0–1.9)
BILIRUB SERPL-MCNC: 0.2 MG/DL (ref 0.1–1)
BUN SERPL-MCNC: 13 MG/DL (ref 8–23)
CALCIUM SERPL-MCNC: 9.3 MG/DL (ref 8.7–10.5)
CHLORIDE SERPL-SCNC: 101 MMOL/L (ref 95–110)
CO2 SERPL-SCNC: 28 MMOL/L (ref 23–29)
CREAT SERPL-MCNC: 1.1 MG/DL (ref 0.5–1.4)
DIFFERENTIAL METHOD: ABNORMAL
EOSINOPHIL # BLD AUTO: 0.5 K/UL (ref 0–0.5)
EOSINOPHIL NFR BLD: 4.4 % (ref 0–8)
ERYTHROCYTE [DISTWIDTH] IN BLOOD BY AUTOMATED COUNT: 14.7 % (ref 11.5–14.5)
EST. GFR  (AFRICAN AMERICAN): >60 ML/MIN/1.73 M^2
EST. GFR  (NON AFRICAN AMERICAN): 59 ML/MIN/1.73 M^2
GLUCOSE SERPL-MCNC: 172 MG/DL (ref 70–110)
HCT VFR BLD AUTO: 39.8 % (ref 40–54)
HGB BLD-MCNC: 12.5 G/DL (ref 14–18)
IMM GRANULOCYTES # BLD AUTO: 0.05 K/UL (ref 0–0.04)
IMM GRANULOCYTES NFR BLD AUTO: 0.4 % (ref 0–0.5)
LYMPHOCYTES # BLD AUTO: 1.6 K/UL (ref 1–4.8)
LYMPHOCYTES NFR BLD: 12.8 % (ref 18–48)
MCH RBC QN AUTO: 28.3 PG (ref 27–31)
MCHC RBC AUTO-ENTMCNC: 31.4 G/DL (ref 32–36)
MCV RBC AUTO: 90 FL (ref 82–98)
MONOCYTES # BLD AUTO: 0.9 K/UL (ref 0.3–1)
MONOCYTES NFR BLD: 7.1 % (ref 4–15)
NEUTROPHILS # BLD AUTO: 9.3 K/UL (ref 1.8–7.7)
NEUTROPHILS NFR BLD: 75.2 % (ref 38–73)
NRBC BLD-RTO: 0 /100 WBC
PLATELET # BLD AUTO: 358 K/UL (ref 150–350)
PMV BLD AUTO: 9.3 FL (ref 9.2–12.9)
POTASSIUM SERPL-SCNC: 4.3 MMOL/L (ref 3.5–5.1)
PROT SERPL-MCNC: 7.7 G/DL (ref 6–8.4)
RBC # BLD AUTO: 4.42 M/UL (ref 4.6–6.2)
SODIUM SERPL-SCNC: 138 MMOL/L (ref 136–145)
T4 FREE SERPL-MCNC: 0.92 NG/DL (ref 0.71–1.51)
TSH SERPL DL<=0.005 MIU/L-ACNC: 11.12 UIU/ML (ref 0.4–4)
WBC # BLD AUTO: 12.31 K/UL (ref 3.9–12.7)

## 2020-06-26 PROCEDURE — 1101F PT FALLS ASSESS-DOCD LE1/YR: CPT | Mod: CPTII,S$GLB,, | Performed by: INTERNAL MEDICINE

## 2020-06-26 PROCEDURE — 1126F AMNT PAIN NOTED NONE PRSNT: CPT | Mod: S$GLB,,, | Performed by: INTERNAL MEDICINE

## 2020-06-26 PROCEDURE — 99215 OFFICE O/P EST HI 40 MIN: CPT | Mod: 25,S$GLB,, | Performed by: INTERNAL MEDICINE

## 2020-06-26 PROCEDURE — 96413 CHEMO IV INFUSION 1 HR: CPT

## 2020-06-26 PROCEDURE — 84439 ASSAY OF FREE THYROXINE: CPT

## 2020-06-26 PROCEDURE — 1159F PR MEDICATION LIST DOCUMENTED IN MEDICAL RECORD: ICD-10-PCS | Mod: S$GLB,,, | Performed by: INTERNAL MEDICINE

## 2020-06-26 PROCEDURE — 99999 PR PBB SHADOW E&M-EST. PATIENT-LVL III: ICD-10-PCS | Mod: PBBFAC,,, | Performed by: INTERNAL MEDICINE

## 2020-06-26 PROCEDURE — 63600175 PHARM REV CODE 636 W HCPCS: Mod: JG | Performed by: INTERNAL MEDICINE

## 2020-06-26 PROCEDURE — 1101F PR PT FALLS ASSESS DOC 0-1 FALLS W/OUT INJ PAST YR: ICD-10-PCS | Mod: CPTII,S$GLB,, | Performed by: INTERNAL MEDICINE

## 2020-06-26 PROCEDURE — 99999 PR PBB SHADOW E&M-EST. PATIENT-LVL III: CPT | Mod: PBBFAC,,, | Performed by: INTERNAL MEDICINE

## 2020-06-26 PROCEDURE — 36415 COLL VENOUS BLD VENIPUNCTURE: CPT

## 2020-06-26 PROCEDURE — 84443 ASSAY THYROID STIM HORMONE: CPT

## 2020-06-26 PROCEDURE — 99215 PR OFFICE/OUTPT VISIT, EST, LEVL V, 40-54 MIN: ICD-10-PCS | Mod: 25,S$GLB,, | Performed by: INTERNAL MEDICINE

## 2020-06-26 PROCEDURE — 1126F PR PAIN SEVERITY QUANTIFIED, NO PAIN PRESENT: ICD-10-PCS | Mod: S$GLB,,, | Performed by: INTERNAL MEDICINE

## 2020-06-26 PROCEDURE — 25000003 PHARM REV CODE 250: Performed by: INTERNAL MEDICINE

## 2020-06-26 PROCEDURE — 1159F MED LIST DOCD IN RCRD: CPT | Mod: S$GLB,,, | Performed by: INTERNAL MEDICINE

## 2020-06-26 PROCEDURE — 85025 COMPLETE CBC W/AUTO DIFF WBC: CPT

## 2020-06-26 PROCEDURE — 80053 COMPREHEN METABOLIC PANEL: CPT

## 2020-06-26 RX ORDER — HEPARIN 100 UNIT/ML
500 SYRINGE INTRAVENOUS
Status: CANCELLED | OUTPATIENT
Start: 2020-06-26

## 2020-06-26 RX ORDER — HEPARIN 100 UNIT/ML
500 SYRINGE INTRAVENOUS
Status: DISCONTINUED | OUTPATIENT
Start: 2020-06-26 | End: 2020-06-26 | Stop reason: HOSPADM

## 2020-06-26 RX ORDER — SODIUM CHLORIDE 0.9 % (FLUSH) 0.9 %
10 SYRINGE (ML) INJECTION
Status: CANCELLED | OUTPATIENT
Start: 2020-06-26

## 2020-06-26 RX ADMIN — HEPARIN 500 UNITS: 100 SYRINGE at 11:06

## 2020-06-26 RX ADMIN — SODIUM CHLORIDE 200 MG: 9 INJECTION, SOLUTION INTRAVENOUS at 11:06

## 2020-06-26 NOTE — DISCHARGE INSTRUCTIONS
[facility]  [address] [facility]  [address]  [phone] phone     [phone] fax  Hours of Operation: Monday- Friday 8:00am- 5:00pm  After hours phone  [phone]  Hematology / Oncology Physicians on call      Dr. Carlos Manuel Noyola, LINDA Galeana NP Tyesha Taylor, NP    Please call with any concerns regarding your appointment today.          FALL PREVENTION   Falls often occur due to slipping, tripping or losing your balance. Here are ways to reduce your risk of falling again.   Was there anything that caused your fall that can be fixed, removed or replaced?   Make your home safe by keeping walkways clear of objects you may trip over.   Use non-slip pads under rugs.   Do not walk in poorly lit areas.   Do not stand on chairs or wobbly ladders.   Use caution when reaching overhead or looking upward. This position can cause a loss of balance.   Be sure your shoes fit properly, have non-slip bottoms and are in good condition.   Be cautious when going up and down stairs, curbs, and when walking on uneven sidewalks.   If your balance is poor, consider using a cane or walker.   If your fall was related to alcohol use, stop or limit alcohol intake.   If your fall was related to use of sleeping medicines, talk to your doctor about this. You may need to reduce your dosage at bedtime if you awaken during the night to go to the bathroom.   To reduce the need for nighttime bathroom trips:   Avoid drinking fluids for several hours before going to bed   Empty your bladder before going to bed   Men can keep a urinal at the bedside   © 5449-7963 [facility], [address] [place] [address], [place] [address]. All rights reserved. This information is not intended as a substitute for professional medical care. Always follow your healthcare professional's instructions.

## 2020-06-26 NOTE — PLAN OF CARE
"  Problem: Adult Inpatient Plan of Care  Goal: Plan of Care Review  Outcome: Ongoing, Progressing  Goal: Patient-Specific Goal (Individualization)  Outcome: Ongoing, Progressing  Flowsheets (Taken 6/26/2020 1040)  Individualized Care Needs: pt likes pillow and blanket, ice for mediport access  Anxieties, Fears or Concerns: none  Patient-Specific Goals (Include Timeframe): will tolerate Keytruda today     Problem: Anemia (Chemotherapy Effects)  Goal: Anemia Symptom Improvement  Outcome: Ongoing, Progressing     Problem: Urinary Bleeding Risk or Actual (Chemotherapy Effects)  Goal: Absence of Hematuria  Outcome: Ongoing, Progressing     Problem: Nausea and Vomiting (Chemotherapy Effects)  Goal: Fluid and Electrolyte Balance  Outcome: Ongoing, Progressing     Problem: Neurotoxicity (Chemotherapy Effects)  Goal: Neurotoxicity Symptom Control  Outcome: Ongoing, Progressing     Problem: Neutropenia (Chemotherapy Effects)  Goal: Absence of Infection  Outcome: Ongoing, Progressing     Problem: Oral Mucositis (Chemotherapy Effects)  Goal: Improved Oral Mucous Membrane Integrity  Outcome: Ongoing, Progressing     Problem: Thrombocytopenia Bleeding Risk (Chemotherapy Effects)  Goal: Absence of Bleeding  Outcome: Ongoing, Progressing    Patient stable. Patient reports pain to right foot related to "bunyons." Patient received Keytruda today, and is scheduled to return to clinic in 3 weeks.      "

## 2020-06-26 NOTE — ASSESSMENT & PLAN NOTE
Metastatic non-small cell lung cancer with PD L1 expression more than 50%, on immunotherapy with Keytruda every 3 weeks.  Reviewed labs today, no concerning cytopenia, will proceed with treatment.    Reviewed PET-CT scan from 06/01/2020 that showed overall stable disease with mild worsening of left upper lobe mass.  Clinically patient seems pretty stable.  He remains adamant with switching to another systemic therapy.  Will discuss further with his primary oncologist-Dr. Goins regarding future management.  Plan to see him back in 3 weeks with repeat labs or sooner if needed.

## 2020-06-26 NOTE — PROGRESS NOTES
Subjective:      DATE OF VISIT: 6/26/2020   ?   ?   Patient ID:?Brant Lees is a 89 y.o. male.?? MR#: [id number]   ?   PRIMARY PROVIDER:   Dr. Goins  ?   CHIEF COMPLAINT:  Follow-up?????   ?   ONCOLOGIC DIAGNOSIS:  Metastatic non-small cell lung cancer  ?   CURRENT TREATMENT:  Pembolzumab Q 3 weeks      HPI  89-year-old with metastatic non-small cell lung cancer who is currently on pembrolizumab and seems to be tolerating well.    Has history of the mitosis which was biopsied recently by dermatology.  Pathology showed no malignant dermatosis.  He denies prior history for melanoma of family history for skin cancer.    He denies worsening fatigue, shortness of breath, chest pain, abdominal pain, diarrhea.  There were scattered rashes in bilateral lower extremity which he claims much better with a cortisol cream.    Review of Systems    ?   A comprehensive 14-point review of systems was reviewed with patient and was negative other than as specified above.   ?     Objective:      Physical Exam      ?   Vitals:    06/26/20 1015   BP: 137/69   Pulse: (!) 56   Resp: 14   Temp: 96.9 °F (36.1 °C)      ?   ECOG:?0   General appearance: Generally well appearing, in no acute distress.  Difficulty with ambulation given recent right foot skin biopsy  Head, eyes, ears, nose, and throat: Oropharynx clear with moist mucous membranes.   Cardiovascular: Regular rate and rhythm, S1, S2, no audible murmurs.   Respiratory: Lungs clear to auscultation bilaterally.   Abdomen: nontender, nondistended.   Extremities: Warm, without edema.   Neurologic: Alert and oriented. Grossly normal strength, coordination, and gait.   Skin:  Left lateral shin with superficial abrasion.No ecchymoses or petechial lesion.   Psychiatric: normal mood and affect, conversant and appropriate    ?   Laboratory:  ?   Lab Visit on 06/26/2020   Component Date Value Ref Range Status    WBC 06/26/2020 12.31  3.90 - 12.70 K/uL Final    RBC 06/26/2020 4.42* 4.60 - 6.20  M/uL Final    Hemoglobin 06/26/2020 12.5* 14.0 - 18.0 g/dL Final    Hematocrit 06/26/2020 39.8* 40.0 - 54.0 % Final    Mean Corpuscular Volume 06/26/2020 90  82 - 98 fL Final    Mean Corpuscular Hemoglobin 06/26/2020 28.3  27.0 - 31.0 pg Final    Mean Corpuscular Hemoglobin Conc 06/26/2020 31.4* 32.0 - 36.0 g/dL Final    RDW 06/26/2020 14.7* 11.5 - 14.5 % Final    Platelets 06/26/2020 358* 150 - 350 K/uL Final    MPV 06/26/2020 9.3  9.2 - 12.9 fL Final    Immature Granulocytes 06/26/2020 0.4  0.0 - 0.5 % Final    Gran # (ANC) 06/26/2020 9.3* 1.8 - 7.7 K/uL Final    Immature Grans (Abs) 06/26/2020 0.05* 0.00 - 0.04 K/uL Final    Lymph # 06/26/2020 1.6  1.0 - 4.8 K/uL Final    Mono # 06/26/2020 0.9  0.3 - 1.0 K/uL Final    Eos # 06/26/2020 0.5  0.0 - 0.5 K/uL Final    Baso # 06/26/2020 0.06  0.00 - 0.20 K/uL Final    nRBC 06/26/2020 0  0 /100 WBC Final    Gran% 06/26/2020 75.2* 38.0 - 73.0 % Final    Lymph% 06/26/2020 12.8* 18.0 - 48.0 % Final    Mono% 06/26/2020 7.1  4.0 - 15.0 % Final    Eosinophil% 06/26/2020 4.4  0.0 - 8.0 % Final    Basophil% 06/26/2020 0.5  0.0 - 1.9 % Final    Differential Method 06/26/2020 Automated   Final    Sodium 06/26/2020 138  136 - 145 mmol/L Final    Potassium 06/26/2020 4.3  3.5 - 5.1 mmol/L Final    Chloride 06/26/2020 101  95 - 110 mmol/L Final    CO2 06/26/2020 28  23 - 29 mmol/L Final    Glucose 06/26/2020 172* 70 - 110 mg/dL Final    BUN, Bld 06/26/2020 13  8 - 23 mg/dL Final    Creatinine 06/26/2020 1.1  0.5 - 1.4 mg/dL Final    Calcium 06/26/2020 9.3  8.7 - 10.5 mg/dL Final    Total Protein 06/26/2020 7.7  6.0 - 8.4 g/dL Final    Albumin 06/26/2020 3.2* 3.5 - 5.2 g/dL Final    Total Bilirubin 06/26/2020 0.2  0.1 - 1.0 mg/dL Final    Alkaline Phosphatase 06/26/2020 91  55 - 135 U/L Final    AST 06/26/2020 15  10 - 40 U/L Final    ALT 06/26/2020 20  10 - 44 U/L Final    Anion Gap 06/26/2020 9  8 - 16 mmol/L Final    eGFR if   06/26/2020 >60  >60 mL/min/1.73 m^2 Final    eGFR if non African American 06/26/2020 59* >60 mL/min/1.73 m^2 Final      ?   ?   Assessment/Plan:       1. Malignant neoplasm of overlapping sites of right lung    2. Drug-induced thyroiditis     3. Hypothyroidism due to Hashimoto's thyroiditis          Plan:     Malignant neoplasm of overlapping sites of lung  Metastatic non-small cell lung cancer with PD L1 expression more than 50%, on immunotherapy with Keytruda every 3 weeks.  Reviewed labs today, no concerning cytopenia, will proceed with treatment.    Reviewed PET-CT scan from 06/01/2020 that showed overall stable disease with mild worsening of left upper lobe mass.  Clinically patient seems pretty stable.  He remains adamant with switching to another systemic therapy.  Will discuss further with his primary oncologist-Dr. Goins regarding future management.  Plan to see him back in 3 weeks with repeat labs or sooner if needed.    Hypothyroidism due to Hashimoto's thyroiditis  Continue Synthroid 100 mcg daily.    Skin dermatosis:  Recent right foot skin biopsy that was benign.  Follows with dermatology service-Dr. Abdias Adkins MD

## 2020-06-29 ENCOUNTER — TELEPHONE (OUTPATIENT)
Dept: DERMATOLOGY | Facility: CLINIC | Age: 85
End: 2020-06-29

## 2020-06-29 NOTE — TELEPHONE ENCOUNTER
----- Message from Marilee Norman RN sent at 6/26/2020 11:22 AM CDT -----  Regarding: Itching  Patient requesting a medication for itching of legs, palms of hands, and occasionally chest. He is requesting a call from your staff before his f/u appt on 7/7/2020. Thanks.

## 2020-07-07 ENCOUNTER — LAB VISIT (OUTPATIENT)
Dept: LAB | Facility: HOSPITAL | Age: 85
End: 2020-07-07
Attending: DERMATOLOGY
Payer: MEDICARE

## 2020-07-07 ENCOUNTER — OFFICE VISIT (OUTPATIENT)
Dept: PODIATRY | Facility: CLINIC | Age: 85
End: 2020-07-07
Payer: MEDICARE

## 2020-07-07 ENCOUNTER — OFFICE VISIT (OUTPATIENT)
Dept: DERMATOLOGY | Facility: CLINIC | Age: 85
End: 2020-07-07
Payer: MEDICARE

## 2020-07-07 VITALS
HEART RATE: 78 BPM | SYSTOLIC BLOOD PRESSURE: 144 MMHG | DIASTOLIC BLOOD PRESSURE: 71 MMHG | WEIGHT: 177.81 LBS | HEIGHT: 71 IN | BODY MASS INDEX: 24.89 KG/M2

## 2020-07-07 DIAGNOSIS — G60.9 IDIOPATHIC PERIPHERAL NEUROPATHY: Primary | ICD-10-CM

## 2020-07-07 DIAGNOSIS — B35.1 ONYCHOMYCOSIS: ICD-10-CM

## 2020-07-07 DIAGNOSIS — C34.90 METASTATIC NON-SMALL CELL LUNG CANCER: ICD-10-CM

## 2020-07-07 DIAGNOSIS — M79.89 RIGHT LEG SWELLING: ICD-10-CM

## 2020-07-07 DIAGNOSIS — L13.8 LINEAR IGA BULLOUS DERMATOSIS: Primary | ICD-10-CM

## 2020-07-07 DIAGNOSIS — L13.8 LINEAR IGA BULLOUS DERMATOSIS: ICD-10-CM

## 2020-07-07 DIAGNOSIS — L84 CALLUS: ICD-10-CM

## 2020-07-07 DIAGNOSIS — S80.821S: ICD-10-CM

## 2020-07-07 DIAGNOSIS — L30.9 DERMATITIS: ICD-10-CM

## 2020-07-07 DIAGNOSIS — C34.81 MALIGNANT NEOPLASM OF OVERLAPPING SITES OF RIGHT LUNG: ICD-10-CM

## 2020-07-07 DIAGNOSIS — R60.9 EDEMA, UNSPECIFIED TYPE: ICD-10-CM

## 2020-07-07 PROCEDURE — 83516 IMMUNOASSAY NONANTIBODY: CPT | Mod: 59

## 2020-07-07 PROCEDURE — 87070 CULTURE OTHR SPECIMN AEROBIC: CPT

## 2020-07-07 PROCEDURE — 1159F PR MEDICATION LIST DOCUMENTED IN MEDICAL RECORD: ICD-10-PCS | Mod: S$GLB,,, | Performed by: DERMATOLOGY

## 2020-07-07 PROCEDURE — 1126F AMNT PAIN NOTED NONE PRSNT: CPT | Mod: S$GLB,,, | Performed by: DERMATOLOGY

## 2020-07-07 PROCEDURE — 99214 PR OFFICE/OUTPT VISIT, EST, LEVL IV, 30-39 MIN: ICD-10-PCS | Mod: S$GLB,,, | Performed by: DERMATOLOGY

## 2020-07-07 PROCEDURE — 1101F PR PT FALLS ASSESS DOC 0-1 FALLS W/OUT INJ PAST YR: ICD-10-PCS | Mod: CPTII,S$GLB,, | Performed by: DERMATOLOGY

## 2020-07-07 PROCEDURE — 36415 COLL VENOUS BLD VENIPUNCTURE: CPT

## 2020-07-07 PROCEDURE — 99214 OFFICE O/P EST MOD 30 MIN: CPT | Mod: S$GLB,,, | Performed by: DERMATOLOGY

## 2020-07-07 PROCEDURE — 99214 PR OFFICE/OUTPT VISIT, EST, LEVL IV, 30-39 MIN: ICD-10-PCS | Mod: 25,S$GLB,, | Performed by: PODIATRIST

## 2020-07-07 PROCEDURE — 10160 PNXR ASPIR ABSC HMTMA BULLA: CPT | Mod: S$GLB,,, | Performed by: PODIATRIST

## 2020-07-07 PROCEDURE — 99214 OFFICE O/P EST MOD 30 MIN: CPT | Mod: 25,S$GLB,, | Performed by: PODIATRIST

## 2020-07-07 PROCEDURE — 1159F MED LIST DOCD IN RCRD: CPT | Mod: S$GLB,,, | Performed by: DERMATOLOGY

## 2020-07-07 PROCEDURE — 99999 PR PBB SHADOW E&M-EST. PATIENT-LVL III: CPT | Mod: PBBFAC,,, | Performed by: PODIATRIST

## 2020-07-07 PROCEDURE — 99999 PR PBB SHADOW E&M-EST. PATIENT-LVL III: ICD-10-PCS | Mod: PBBFAC,,, | Performed by: PODIATRIST

## 2020-07-07 PROCEDURE — 10160 PR PUNCTURE DRAINAGE, LESION: ICD-10-PCS | Mod: S$GLB,,, | Performed by: PODIATRIST

## 2020-07-07 PROCEDURE — 99999 PR PBB SHADOW E&M-EST. PATIENT-LVL III: CPT | Mod: PBBFAC,,, | Performed by: DERMATOLOGY

## 2020-07-07 PROCEDURE — 11721 DEBRIDE NAIL 6 OR MORE: CPT | Mod: Q9,59,S$GLB, | Performed by: PODIATRIST

## 2020-07-07 PROCEDURE — 11056 PARNG/CUTG B9 HYPRKR LES 2-4: CPT | Mod: Q9,59,S$GLB, | Performed by: PODIATRIST

## 2020-07-07 PROCEDURE — 1159F PR MEDICATION LIST DOCUMENTED IN MEDICAL RECORD: ICD-10-PCS | Mod: S$GLB,,, | Performed by: PODIATRIST

## 2020-07-07 PROCEDURE — 1126F PR PAIN SEVERITY QUANTIFIED, NO PAIN PRESENT: ICD-10-PCS | Mod: S$GLB,,, | Performed by: DERMATOLOGY

## 2020-07-07 PROCEDURE — 11056 PR TRIM BENIGN HYPERKERATOTIC SKIN LESION,2-4: ICD-10-PCS | Mod: Q9,59,S$GLB, | Performed by: PODIATRIST

## 2020-07-07 PROCEDURE — 1101F PT FALLS ASSESS-DOCD LE1/YR: CPT | Mod: CPTII,S$GLB,, | Performed by: DERMATOLOGY

## 2020-07-07 PROCEDURE — 1101F PT FALLS ASSESS-DOCD LE1/YR: CPT | Mod: CPTII,S$GLB,, | Performed by: PODIATRIST

## 2020-07-07 PROCEDURE — 11721 PR DEBRIDEMENT OF NAILS, 6 OR MORE: ICD-10-PCS | Mod: Q9,59,S$GLB, | Performed by: PODIATRIST

## 2020-07-07 PROCEDURE — 82960 TEST FOR G6PD ENZYME: CPT

## 2020-07-07 PROCEDURE — 99999 PR PBB SHADOW E&M-EST. PATIENT-LVL III: ICD-10-PCS | Mod: PBBFAC,,, | Performed by: DERMATOLOGY

## 2020-07-07 PROCEDURE — 1101F PR PT FALLS ASSESS DOC 0-1 FALLS W/OUT INJ PAST YR: ICD-10-PCS | Mod: CPTII,S$GLB,, | Performed by: PODIATRIST

## 2020-07-07 PROCEDURE — 1159F MED LIST DOCD IN RCRD: CPT | Mod: S$GLB,,, | Performed by: PODIATRIST

## 2020-07-07 RX ORDER — DOXYCYCLINE 100 MG/1
CAPSULE ORAL
Qty: 20 CAPSULE | Refills: 0 | Status: SHIPPED | OUTPATIENT
Start: 2020-07-07 | End: 2020-12-14

## 2020-07-07 RX ORDER — MUPIROCIN 20 MG/G
OINTMENT TOPICAL
Qty: 60 G | Refills: 1 | Status: SHIPPED | OUTPATIENT
Start: 2020-07-07 | End: 2020-12-11

## 2020-07-07 RX ORDER — PREDNISONE 20 MG/1
TABLET ORAL
Qty: 42 TABLET | Refills: 0 | Status: SHIPPED | OUTPATIENT
Start: 2020-07-07 | End: 2020-07-09

## 2020-07-07 RX ORDER — LEVOCETIRIZINE DIHYDROCHLORIDE 5 MG/1
TABLET, FILM COATED ORAL
Qty: 30 TABLET | Refills: 11 | Status: SHIPPED | OUTPATIENT
Start: 2020-07-07

## 2020-07-07 NOTE — PROGRESS NOTES
Subjective:       Patient ID: Brant Lees is a 89 y.o. male.    Chief Complaint: Bunions (b/l bunions, ) and Callouses (b/l callouses, reports no pain, wear dress shoes, non diabetic, PCP Dr. Gibson )      HPI: Patient presents to the office with the chief complaint of elongated, thickened and dystrophic nail plates to the B/L foot. This patient does have Peripheral Neuropathy. Patient does follow with Primary Care for management of comorbid states. This patient's PMD is Karlee Gibson MD. This patient last saw his/her primary care provider on 6/26.  Patient does have metastatic lung cancer. Patient does follow up closely with Heme/Onc for the aforementioned. Presents this afternoon with his niece.  Recent diagnosis of bullous dermatitis by dermatology.  Patient was started on a cocktail of topical and oral medications this afternoon.  Patient states a large blister at the anterior medial aspect of the right lower leg.  States several diffuse pruritic rashes of the bilateral upper and lower extremity.  Patient is pending ultrasound of the lower extremity due to persistent right lower leg swelling.    Review of patient's allergies indicates:  No Known Allergies    Past Medical History:   Diagnosis Date    Anticoagulant long-term use     plavix    Cancer     Metastatic non-small cell lung cancer    Cardiomyopathy 9/07    Ischemic    HBP (high blood pressure) 1997    Hyperlipidemia     LBP (low back pain)     MI (myocardial infarction) 12/06    Status post primary angioplasty with coronary stent 12/06    Thyroid disease        Family History   Problem Relation Age of Onset    Heart disease Mother     Cancer Mother        Social History     Socioeconomic History    Marital status:      Spouse name: Not on file    Number of children: 1    Years of education: Not on file    Highest education level: Not on file   Occupational History    Not on file   Social Needs    Financial resource strain:  Not on file    Food insecurity     Worry: Not on file     Inability: Not on file    Transportation needs     Medical: Not on file     Non-medical: Not on file   Tobacco Use    Smoking status: Never Smoker    Smokeless tobacco: Never Used   Substance and Sexual Activity    Alcohol use: No    Drug use: No    Sexual activity: Not Currently     Partners: Female   Lifestyle    Physical activity     Days per week: Not on file     Minutes per session: Not on file    Stress: Not on file   Relationships    Social connections     Talks on phone: Not on file     Gets together: Not on file     Attends Voodoo service: Not on file     Active member of club or organization: Not on file     Attends meetings of clubs or organizations: Not on file     Relationship status: Not on file   Other Topics Concern    Not on file   Social History Narrative    Not on file       Past Surgical History:   Procedure Laterality Date    CATARACT EXTRACTION      CORONARY STENT PLACEMENT      HERNIA REPAIR      INSERTION OF VENOUS ACCESS PORT Left 5/17/2019    Procedure: INSERTION, VENOUS ACCESS PORT;  Surgeon: Lester Trejo MD;  Location: [facility];  Service: General;  Laterality: Left;       Review of Systems   Constitutional: Negative for chills, fatigue and fever.   HENT: Negative for hearing loss.    Eyes: Negative for photophobia and visual disturbance.        Legal blindness   Respiratory: Negative for cough, chest tightness, shortness of breath and wheezing.    Cardiovascular: Positive for leg swelling. Negative for chest pain and palpitations.   Gastrointestinal: Negative for constipation, diarrhea, nausea and vomiting.   Endocrine: Negative for cold intolerance and heat intolerance.   Genitourinary: Negative for flank pain.   Musculoskeletal: Positive for arthralgias, back pain and gait problem. Negative for neck pain and neck stiffness.   Skin: Positive for rash and wound.   Neurological: Positive for numbness. Negative  "for light-headedness and headaches.   Psychiatric/Behavioral: Negative for sleep disturbance.          Objective:   BP (!) 144/71   Pulse 78   Ht 5' 11" (1.803 m)   Wt 80.7 kg (177 lb 12.8 oz)   BMI 24.80 kg/m²     Physical Exam  LOWER EXTREMITY PHYSICAL EXAMINATION    NEUROLOGY: Protective sensation is not intact to the left and right plantar surfaces of the foot and digits, as the patient has no sensation/detection at greater than 4 distinct points of contact with 5.07 [place] monofilament. Sensation to light touch is intact on the left and right foot. Proprioception is intact, bilateral. Sensation to pin prick is reduced to absent. Vibratory sensation is diminished    DERMATOLOGY: On the left foot, nails 1, 2, 3, 4 and 5 are suggestive of onychomycotic changes. On the right foot, nails 2, 3, 4 and 5 are suggestive of onychomycotic changes. These nail plates are thickened, are dystrophic, chaulky in appearance and malodorous with substantial subungual debris. These nail plates are yellow to brown in appearance. The remaining nail plates are elongated and do not have suggestive clinical features of onychomycosis.  Large blister, anterior medial aspect right lower leg.  Upon lancing, copious serosanguineous drainage noted.  No local signs of infection.  Several areas of dermatitis on erythematous base at the right anterior ankle joint.    ORTHOPEDIC: Manual Muscle Testing is 5/5 in all planes on the left and right, without pains, with and without resistance.  Rectus foot type.  Patient ambulatory the assistance of a wheelchair.    VASCULAR: Warm to warm, proximal to distal. Capillary refill time is within normal limits and less  than 3 seconds. Hair growth is sparse on the left and right dorsal foot and at the digits. The left dorsalis pedis pulse is 1/4 and on the right is 1/4, and the left posterior tibial pulse is 1/4 and is 1/4 on the right.  Mild edema is noted to the bilateral lower extremity, " slightly greater on the right.      Assessment:     1. Idiopathic peripheral neuropathy    2. Malignant neoplasm of overlapping sites of right lung    3. Metastatic non-small cell lung cancer    4. Onychomycosis    5. Callus    6. Blister (nonthermal), right lower leg, sequela    7. Dermatitis        Plan:     Idiopathic peripheral neuropathy    Malignant neoplasm of overlapping sites of right lung  Metastatic non-small cell lung cancer  Patient advised to follow up with HEME/ONC for management of comorbid states.    Onychomycosis  Onychomycotic nail plates, as outlined above, are sharply debrided with double action nail nipper, and/or with the assistance of a mechanical rotary cary for reduction of pains. Nails are reduced in terms of length, width and girth with removal of subungual debris to facilitate pain free weight bearing and ambulation. Elongated nails as outlined in the objective portion of this note, were trimmed to appropriate length for alleviation/reduction of pains as well. Follow up in approx. 9-12 weeks.    Callus  Hypertrophic skin formation, as outlined within the examination portion of this note, is/are trimmed/pared surgically debrided with sharp #10/#15 blade, to alleviate discomfort with weight bearing and ambulation, and to lessen the possibility of skin complications, e.g., ulceration due to pressure. No ulceration(s) is are noted with/post debridement.     Blister (nonthermal), right lower leg, sequela  Thorough discussion is had with the patient today, concerning the diagnosis, its etiology, and the treatment algorithm at present.  The area(s) of pathology was incised and drained just beneath the epidermal layer of skin, after adequate prep with alcohol and/or betadine paint. The procedure was performed using sharp #10/#15 blade, followed by either a tissue nipper and/or a sharp iris scissor to remove the nonviable tissues from atop the area pathology and to the periwound. Hemostasis was  achieved. Patient tolerated procedure well and without complications. Local woundcare with Betadine paint dressings and bandage thereafter.  Patient will continue daily dressing changes with Bactroban.    Dermatitis  Management as per dermatology.            Future Appointments   Date Time Provider Department Center   7/9/2020  3:00 PM [facility]ARDIOVASCULAR, [facility]   7/14/2020  1:40 PM Lennie Anne DPM [facility] P[facility] [place]   7/16/2020 10:10 AM MIKEL PARR [facility] LAB [facility] LAB DS [facility]   7/17/2020  2:30 PM Daysi Noyola NP [facility] HEM ONC [facility]   7/17/2020  3:00 PM CHAIR 01 BR [facility] CHEMO [facility]   7/28/2020  2:00 PM Gillian Calero MD [facility] DERM [place]   8/18/2020 10:15 AM SYDNEY Centeno MD [facility] OPHTHAL [facility] [place]   9/9/2020  9:00 AM Jorge Alcocer IV, MD [facility] UROLOGY [facility]   10/20/2020  1:30 PM Angeles Madden MD [facility] DERM [place]   11/10/2020 10:45 AM Justus Nazario DPM [facility] P[facility]

## 2020-07-07 NOTE — PROGRESS NOTES
Subjective:       Patient ID:  Brant Lees is a 89 y.o. male who presents for   Chief Complaint   Patient presents with    Skin Cancer     discuss results     Itching     legs and hands      Hx of lung cancer (on Keytruda), and bullous rash x 2 months, last seen on 6/16/20.  Here today for biopsy results.  + worsening of bullous lesions and right lower leg swelling since last visit.  Pt is uncertain if he has started niacinamide.         Review of Systems   Constitutional: Negative for fever and chills.   Gastrointestinal: Negative for nausea and vomiting.   Skin: Positive for activity-related sunscreen use. Negative for daily sunscreen use and recent sunburn.   Hematologic/Lymphatic: Does not bruise/bleed easily.        Objective:    Physical Exam   Constitutional: He appears well-developed and well-nourished. No distress.   Neurological: He is alert and oriented to person, place, and time. He is not disoriented.   Psychiatric: He has a normal mood and affect.   Skin:   Areas Examined (abnormalities noted in diagram):   Head / Face Inspection Performed  Neck Inspection Performed  Chest / Axilla Inspection Performed  Abdomen Inspection Performed  Back Inspection Performed  RUE Inspected  LUE Inspection Performed  RLE Inspected  LLE Inspection Performed  Nails and Digits Inspection Performed               Cutaneous Direct IFA, Biopsy   Interpretation:   A. Left leg, Skin punch biopsy, Immunofluorescence:   IgG: Negative   IgM: Negative   IgA: Segmental linear basement membrane zone fluorescence restricted to the   basement membrane zone area of the epidermis at the roof of the blister   C3: Negative   Fibrinogen: Negative   IMPRESSION   Suggestive of IgA bullous dermatosis (see comment)   COMMENT   The findings above are not entirely specific due to a weak and segmental   linear basement membrane zone fluorescence seen with only IgA. There is no   convincing evidence in this study for bullous pemphigoid or any of  its   variants. A bullous drug eruption could manifest as IgA bullous dermatosis   however in this specimen the epidermis was completely avulsed from the dermis   due to blister formation throughout. This finding could reduce the   sensitivity and specificity of this test. Therefore, suggest correlation with   routine microscopy as well as the clinical pattern of disease to finalize   diagnosis.     1.  SECTIONS SHOW A SUBEPIDERMAL VESICLE FILLED WITH MIXED INFLAMMATORY   CELLS, INCLUDING PROMINENT NEUTROPHILS AND EOSINOPHILS.  THE OVERLYING   BLISTER ROOF IS INTACT AND EXHIBITS OVERLYING PARAKERATOSIS.  THERE IS   PRESERVATION OF THE DERMAL PAPILLAE.  THE UNDERLYING SUPERFICIAL DERMIS SHOWS   A PERIVASCULAR, AND TO A LESSER EXTENT INTERSTITIAL INFILTRATE CONSISTING OF   LYMPHOCYTES, HISTIOCYTES, AND PLASMA CELLS, IN ADDITION TO NEUTROPHILS AND   EOSINOPHILS.   2.  SECTIONS SHOW A SUBEPIDERMAL VESICLE WITH IMMEDIATELY UNDERLYING   PROMINENT PAPILLARY DERMAL NEUTROPHILS.  SIMILAR TO SPECIMEN 1, THE REMAINDER   OF THE SUPERFICIAL DERMIS SHOWS A PERIVASCULAR, AND TO A LESSER EXTENT   INTERSTITIAL INFILTRATE CONSISTING OF LYMPHOCYTES, HISTIOCYTES, PLASMA CELLS,   NEUTROPHILS AND EOSINOPHILS.     CONSIDERED ALONGSIDE DIRECT IMMUNOFLUORESCENCE RESULTS, THESE FEATURES ARE   CONSISTENT WITH LINEAR IGA BULLOUS DERMATOSIS.     Assessment / Plan:        Linear IgA bullous dermatosis  -     GLUCOSE 6 PHOSPHATE DEHYDROGENASE; Future; Expected date: 07/07/2020  -     Bullous Pemphigoid, , , IgG, Serum; Future; Expected date: 07/07/2020  -     mupirocin (BACTROBAN) 2 % ointment; AAA bid with Q-tip. Antibiotic ointment. For sores on left thigh  Dispense: 60 g; Refill: 1  -     predniSONE (DELTASONE) 20 MG tablet; Take 3 pills po qam with breakfast x 1 wk then take 2 po qam with breakfast x 1 wk then take 1 po qam with breakfast x 1 wk  Dispense: 42 tablet; Refill: 0  -     levocetirizine (XYZAL) 5 MG tablet; Take 1 tablet  each evening.  Dispense: 30 tablet; Refill: 11  -     doxycycline (MONODOX) 100 MG capsule; Take twice a day with food. May cause upset stomach  Dispense: 20 capsule; Refill: 0  -     Aerobic culture  -     Worsening bullous lesions.  Discussed diagnosis, will notify pt's granddaughter/caretaker (Tiara Sena [phone]).  Will start above meds.  Reviewed side effect of prednisone potentially worsening renal function.  Will check above labs.  Will avoid hydroxyzine given pt is elderly, lives alone and hx of falls in the past.  Culture done today.  Will consider starting dapsone after checking G6PD levels. Start niacinamide if have not already done so.     Right leg swelling  Edema, unspecified type   -     CV Ultrasound doppler venous DVT leg right; Future  -     In setting of malignancy, will check U/S to r/o DVT.              No follow-ups on file.

## 2020-07-09 ENCOUNTER — HOSPITAL ENCOUNTER (OUTPATIENT)
Dept: CARDIOLOGY | Facility: HOSPITAL | Age: 85
Discharge: HOME | End: 2020-07-09
Attending: DERMATOLOGY
Payer: MEDICARE

## 2020-07-09 ENCOUNTER — TELEPHONE (OUTPATIENT)
Dept: INTERNAL MEDICINE | Facility: CLINIC | Age: 85
End: 2020-07-09

## 2020-07-09 VITALS
WEIGHT: 177 LBS | DIASTOLIC BLOOD PRESSURE: 80 MMHG | SYSTOLIC BLOOD PRESSURE: 120 MMHG | BODY MASS INDEX: 24.78 KG/M2 | HEIGHT: 71 IN

## 2020-07-09 DIAGNOSIS — L13.8 LINEAR IGA BULLOUS DERMATOSIS: Primary | ICD-10-CM

## 2020-07-09 DIAGNOSIS — R60.9 EDEMA, UNSPECIFIED TYPE: ICD-10-CM

## 2020-07-09 DIAGNOSIS — M79.89 RIGHT LEG SWELLING: ICD-10-CM

## 2020-07-09 DIAGNOSIS — Z79.899 ENCOUNTER FOR LONG-TERM (CURRENT) USE OF MEDICATIONS: ICD-10-CM

## 2020-07-09 DIAGNOSIS — R73.09 HIGH GLUCOSE LEVEL: ICD-10-CM

## 2020-07-09 DIAGNOSIS — R73.09 ELEVATED RANDOM BLOOD GLUCOSE LEVEL: Primary | ICD-10-CM

## 2020-07-09 LAB — GLUCOSE-6-PHOSPHATE DEHYDROGENASE QUAL: NORMAL

## 2020-07-09 PROCEDURE — 93971 EXTREMITY STUDY: CPT | Mod: 26,RT,, | Performed by: INTERNAL MEDICINE

## 2020-07-09 PROCEDURE — 93971 CV US DOPPLER VENOUS LEG RIGHT (CUPID ONLY): ICD-10-PCS | Mod: 26,RT,, | Performed by: INTERNAL MEDICINE

## 2020-07-09 PROCEDURE — 93971 EXTREMITY STUDY: CPT | Mod: RT

## 2020-07-09 RX ORDER — DAPSONE 25 MG/1
50 TABLET ORAL DAILY
Qty: 60 TABLET | Refills: 1 | Status: SHIPPED | OUTPATIENT
Start: 2020-07-09 | End: 2020-07-29

## 2020-07-10 LAB
BACTERIA SPEC AEROBE CULT: NORMAL
BMZ BP180 IGG SERPL-ACNC: >200 RU/ML
BMZ BP230 IGG SERPL-ACNC: <2 RU/ML

## 2020-07-10 NOTE — TELEPHONE ENCOUNTER
Hemoglobin A1c lab is ordered.  Please link it to his scheduled lab appointment for 07/16/2020.  Please notify the patient that we are adding a lab and he will be getting a call from us about its results. BSs have been elevated over time.    (Derm had started patient on prednisone but noted persistent elevated blood sugars over time.  She stopped prednisone and she brought my attention to these levels.)

## 2020-07-10 NOTE — TELEPHONE ENCOUNTER
Spoke with granddaughter in regards to A1C lab being added and calling when results are in. She thanked me for calling

## 2020-07-13 NOTE — TELEPHONE ENCOUNTER
A1C lab has been linked to other labs. Spoke with granddaughter and informed her. She thanked me for calling

## 2020-07-16 ENCOUNTER — LAB VISIT (OUTPATIENT)
Dept: LAB | Facility: HOSPITAL | Age: 85
End: 2020-07-16
Attending: INTERNAL MEDICINE
Payer: MEDICARE

## 2020-07-16 DIAGNOSIS — E06.4 DRUG-INDUCED THYROIDITIS: ICD-10-CM

## 2020-07-16 DIAGNOSIS — C34.81 MALIGNANT NEOPLASM OF OVERLAPPING SITES OF RIGHT LUNG: ICD-10-CM

## 2020-07-16 DIAGNOSIS — R73.09 ELEVATED RANDOM BLOOD GLUCOSE LEVEL: ICD-10-CM

## 2020-07-16 LAB
ALBUMIN SERPL BCP-MCNC: 3.3 G/DL (ref 3.5–5.2)
ALP SERPL-CCNC: 91 U/L (ref 55–135)
ALT SERPL W/O P-5'-P-CCNC: 22 U/L (ref 10–44)
ANION GAP SERPL CALC-SCNC: 10 MMOL/L (ref 8–16)
AST SERPL-CCNC: 15 U/L (ref 10–40)
BASOPHILS # BLD AUTO: 0.09 K/UL (ref 0–0.2)
BASOPHILS NFR BLD: 0.6 % (ref 0–1.9)
BILIRUB SERPL-MCNC: 0.3 MG/DL (ref 0.1–1)
BUN SERPL-MCNC: 17 MG/DL (ref 8–23)
CALCIUM SERPL-MCNC: 9.5 MG/DL (ref 8.7–10.5)
CHLORIDE SERPL-SCNC: 101 MMOL/L (ref 95–110)
CO2 SERPL-SCNC: 26 MMOL/L (ref 23–29)
CREAT SERPL-MCNC: 1.1 MG/DL (ref 0.5–1.4)
DIFFERENTIAL METHOD: ABNORMAL
EOSINOPHIL # BLD AUTO: 1.1 K/UL (ref 0–0.5)
EOSINOPHIL NFR BLD: 7.4 % (ref 0–8)
ERYTHROCYTE [DISTWIDTH] IN BLOOD BY AUTOMATED COUNT: 15.6 % (ref 11.5–14.5)
EST. GFR  (AFRICAN AMERICAN): >60 ML/MIN/1.73 M^2
EST. GFR  (NON AFRICAN AMERICAN): 59.2 ML/MIN/1.73 M^2
GLUCOSE SERPL-MCNC: 131 MG/DL (ref 70–110)
HCT VFR BLD AUTO: 40.4 % (ref 40–54)
HGB BLD-MCNC: 12.6 G/DL (ref 14–18)
IMM GRANULOCYTES # BLD AUTO: 0.06 K/UL (ref 0–0.04)
IMM GRANULOCYTES NFR BLD AUTO: 0.4 % (ref 0–0.5)
LYMPHOCYTES # BLD AUTO: 1.6 K/UL (ref 1–4.8)
LYMPHOCYTES NFR BLD: 10.8 % (ref 18–48)
MCH RBC QN AUTO: 28.4 PG (ref 27–31)
MCHC RBC AUTO-ENTMCNC: 31.2 G/DL (ref 32–36)
MCV RBC AUTO: 91 FL (ref 82–98)
MONOCYTES # BLD AUTO: 1.2 K/UL (ref 0.3–1)
MONOCYTES NFR BLD: 7.9 % (ref 4–15)
NEUTROPHILS # BLD AUTO: 10.6 K/UL (ref 1.8–7.7)
NEUTROPHILS NFR BLD: 72.9 % (ref 38–73)
NRBC BLD-RTO: 0 /100 WBC
PLATELET # BLD AUTO: 355 K/UL (ref 150–350)
PMV BLD AUTO: 10.7 FL (ref 9.2–12.9)
POTASSIUM SERPL-SCNC: 4.6 MMOL/L (ref 3.5–5.1)
PROT SERPL-MCNC: 7.5 G/DL (ref 6–8.4)
RBC # BLD AUTO: 4.44 M/UL (ref 4.6–6.2)
SODIUM SERPL-SCNC: 137 MMOL/L (ref 136–145)
TSH SERPL DL<=0.005 MIU/L-ACNC: 15.72 UIU/ML (ref 0.4–4)
WBC # BLD AUTO: 14.57 K/UL (ref 3.9–12.7)

## 2020-07-16 PROCEDURE — 84443 ASSAY THYROID STIM HORMONE: CPT

## 2020-07-16 PROCEDURE — 85025 COMPLETE CBC W/AUTO DIFF WBC: CPT

## 2020-07-16 PROCEDURE — 36415 COLL VENOUS BLD VENIPUNCTURE: CPT | Mod: PO

## 2020-07-16 PROCEDURE — 84439 ASSAY OF FREE THYROXINE: CPT

## 2020-07-16 PROCEDURE — 83036 HEMOGLOBIN GLYCOSYLATED A1C: CPT

## 2020-07-16 PROCEDURE — 80053 COMPREHEN METABOLIC PANEL: CPT

## 2020-07-17 ENCOUNTER — OFFICE VISIT (OUTPATIENT)
Dept: HEMATOLOGY/ONCOLOGY | Facility: CLINIC | Age: 85
End: 2020-07-17
Payer: MEDICARE

## 2020-07-17 VITALS
HEART RATE: 64 BPM | SYSTOLIC BLOOD PRESSURE: 127 MMHG | BODY MASS INDEX: 24.75 KG/M2 | TEMPERATURE: 98 F | HEIGHT: 71 IN | DIASTOLIC BLOOD PRESSURE: 62 MMHG | WEIGHT: 176.81 LBS | OXYGEN SATURATION: 97 %

## 2020-07-17 DIAGNOSIS — E03.9 HYPOTHYROIDISM (ACQUIRED): ICD-10-CM

## 2020-07-17 DIAGNOSIS — C34.90 METASTATIC NON-SMALL CELL LUNG CANCER: ICD-10-CM

## 2020-07-17 DIAGNOSIS — Z29.89 IMMUNOTHERAPY: ICD-10-CM

## 2020-07-17 DIAGNOSIS — L27.1 HAND FOOT SYNDROME: Primary | ICD-10-CM

## 2020-07-17 DIAGNOSIS — C78.01 MALIGNANT NEOPLASM METASTATIC TO RIGHT LUNG: ICD-10-CM

## 2020-07-17 LAB
ESTIMATED AVG GLUCOSE: 143 MG/DL (ref 68–131)
HBA1C MFR BLD HPLC: 6.6 % (ref 4–5.6)
T4 FREE SERPL-MCNC: 0.98 NG/DL (ref 0.71–1.51)

## 2020-07-17 PROCEDURE — 1101F PR PT FALLS ASSESS DOC 0-1 FALLS W/OUT INJ PAST YR: ICD-10-PCS | Mod: CPTII,S$GLB,, | Performed by: NURSE PRACTITIONER

## 2020-07-17 PROCEDURE — 1125F PR PAIN SEVERITY QUANTIFIED, PAIN PRESENT: ICD-10-PCS | Mod: S$GLB,,, | Performed by: NURSE PRACTITIONER

## 2020-07-17 PROCEDURE — 1101F PT FALLS ASSESS-DOCD LE1/YR: CPT | Mod: CPTII,S$GLB,, | Performed by: NURSE PRACTITIONER

## 2020-07-17 PROCEDURE — 1125F AMNT PAIN NOTED PAIN PRSNT: CPT | Mod: S$GLB,,, | Performed by: NURSE PRACTITIONER

## 2020-07-17 PROCEDURE — 99214 OFFICE O/P EST MOD 30 MIN: CPT | Mod: S$GLB,,, | Performed by: NURSE PRACTITIONER

## 2020-07-17 PROCEDURE — 1159F MED LIST DOCD IN RCRD: CPT | Mod: S$GLB,,, | Performed by: NURSE PRACTITIONER

## 2020-07-17 PROCEDURE — 99999 PR PBB SHADOW E&M-EST. PATIENT-LVL IV: CPT | Mod: PBBFAC,,, | Performed by: NURSE PRACTITIONER

## 2020-07-17 PROCEDURE — 99214 PR OFFICE/OUTPT VISIT, EST, LEVL IV, 30-39 MIN: ICD-10-PCS | Mod: S$GLB,,, | Performed by: NURSE PRACTITIONER

## 2020-07-17 PROCEDURE — 99999 PR PBB SHADOW E&M-EST. PATIENT-LVL IV: ICD-10-PCS | Mod: PBBFAC,,, | Performed by: NURSE PRACTITIONER

## 2020-07-17 PROCEDURE — 1159F PR MEDICATION LIST DOCUMENTED IN MEDICAL RECORD: ICD-10-PCS | Mod: S$GLB,,, | Performed by: NURSE PRACTITIONER

## 2020-07-17 RX ORDER — CLOBETASOL PROPIONATE 0.5 MG/G
OINTMENT TOPICAL 2 TIMES DAILY
Qty: 45 G | Refills: 2 | Status: SHIPPED | OUTPATIENT
Start: 2020-07-17

## 2020-07-19 NOTE — PROGRESS NOTES
Subjective:      Patient ID: Brant Lees is a 89 y.o. male.    Chief Complaint: bilateral itching palms    HPI:  Patient is an 88 year old male who presents today for labs and every 3 week dose of maintenance Keytruda for treatment of NSCLC.  He denies fever, diarrhea, shortness of breath,or increased fatigue.  He states is currently taking levothyroxine 100 mcg PO daily.  TSH elevated at 15; however free T4 normal.      Complains of itchiness to bilateral hands that wakes him up at night - noted with blood like blisters on finger tips, dry scaly skin.  Also with scattered macular papular rash generalized to trunk - states non itchy.  Also with sores and reddened scaly skin to left ankle/foot - with bulla noted.  7/9/2020 had cardiac US of lower leg to check blood flow - found with no blockage/good blood flow (under cardiac procedures tab).  States has been seen by dermatology and was started on several different medication including steroids, but was taken off of steroid due to causing hyperglycemia.  Diagnoses with bullous pemphigoid per dermatology.   - medical treatment currently includes bactoban, doxycycline, dapsone, and Xyzal.      Patient states that he does not want to be placed on any medication that will increase his blood sugar.  Patient states that she just started taking doxycyline within the past day - prescribed for 10 days.  Patient is accompanied by his granddaughter.       Social History     Socioeconomic History    Marital status:      Spouse name: Not on file    Number of children: 1    Years of education: Not on file    Highest education level: Not on file   Occupational History    Not on file   Social Needs    Financial resource strain: Not on file    Food insecurity     Worry: Not on file     Inability: Not on file    Transportation needs     Medical: Not on file     Non-medical: Not on file   Tobacco Use    Smoking status: Never Smoker    Smokeless tobacco: Never Used    Substance and Sexual Activity    Alcohol use: No    Drug use: No    Sexual activity: Not Currently     Partners: Female   Lifestyle    Physical activity     Days per week: Not on file     Minutes per session: Not on file    Stress: Not on file   Relationships    Social connections     Talks on phone: Not on file     Gets together: Not on file     Attends Voodoo service: Not on file     Active member of club or organization: Not on file     Attends meetings of clubs or organizations: Not on file     Relationship status: Not on file   Other Topics Concern    Not on file   Social History Narrative    Not on file       Family History   Problem Relation Age of Onset    Heart disease Mother     Cancer Mother        Past Surgical History:   Procedure Laterality Date    CATARACT EXTRACTION      CORONARY STENT PLACEMENT      HERNIA REPAIR      INSERTION OF VENOUS ACCESS PORT Left 5/17/2019    Procedure: INSERTION, VENOUS ACCESS PORT;  Surgeon: Lester Trejo MD;  Location: [facility];  Service: General;  Laterality: Left;       Past Medical History:   Diagnosis Date    Anticoagulant long-term use     plavix    Cancer     Metastatic non-small cell lung cancer    Cardiomyopathy 9/07    Ischemic    HBP (high blood pressure) 1997    Hyperlipidemia     LBP (low back pain)     MI (myocardial infarction) 12/06    Status post primary angioplasty with coronary stent 12/06    Thyroid disease        Review of Systems   Constitutional: Negative for activity change, appetite change, chills, diaphoresis, fatigue, fever and unexpected weight change.   HENT: Negative.    Eyes: Negative.    Respiratory: Negative for cough and shortness of breath.    Cardiovascular: Negative for chest pain and palpitations.   Gastrointestinal: Negative for abdominal distention, diarrhea, nausea and vomiting.   Endocrine: Negative.    Genitourinary: Negative for dysuria and flank pain.   Musculoskeletal: Negative.    Skin: Positive  for rash (itchy rash to palms of hands, non itchy rash to abdomen) and wound (to left ankle, left upper thigh).   Allergic/Immunologic: Negative for immunocompromised state.   Neurological: Negative for dizziness, syncope, light-headedness and headaches.   Hematological: Negative for adenopathy. Does not bruise/bleed easily.   Psychiatric/Behavioral: Negative for confusion, decreased concentration, dysphoric mood and hallucinations. The patient is nervous/anxious.           Medication List with Changes/Refills   New Medications    CLOBETASOL 0.05% (TEMOVATE) 0.05 % OINT    Apply topically 2 (two) times daily.   Current Medications    ACETAMINOPHEN (TYLENOL) 325 MG TABLET    Take 325 mg by mouth every 6 (six) hours as needed for Pain.    AMLODIPINE (NORVASC) 5 MG TABLET    TAKE 1 TABLET TWICE DAILY    ASPIRIN (ASPIR-81 ORAL)    Take 1 tablet by mouth once daily.     DAPSONE 25 MG TAB    Take 2 tablets (50 mg total) by mouth once daily.    DOXYCYCLINE (MONODOX) 100 MG CAPSULE    Take twice a day with food. May cause upset stomach    LEVOCETIRIZINE (XYZAL) 5 MG TABLET    Take 1 tablet each evening.    LEVOTHYROXINE (SYNTHROID) 100 MCG TABLET    Take 1 tablet (100 mcg total) by mouth once daily.    LISINOPRIL (PRINIVIL,ZESTRIL) 20 MG TABLET    TAKE 1 TABLET EVERY DAY    MUPIROCIN (BACTROBAN) 2 % OINTMENT    AAA bid with Q-tip. Antibiotic ointment. For sores on left thigh    NITROGLYCERIN (NITROSTAT) 0.4 MG SL TABLET    Place 0.4 mg under the tongue every 5 (five) minutes as needed for Chest pain.    OMEPRAZOLE (PRILOSEC) 20 MG CAPSULE    Take 20 mg by mouth once daily.    SIMVASTATIN (ZOCOR) 20 MG TABLET    Take 1 tablet (20 mg total) by mouth every evening.    TRIAMCINOLONE ACETONIDE 0.5% (KENALOG) 0.5 % CREA    APPLY TOPICALLY ONCE DAILY.        Objective:     Vitals:    07/17/20 1452   BP: 127/62   Pulse: 64   Temp: 97.8 °F (36.6 °C)       Physical Exam  Vitals signs and nursing note reviewed.   Constitutional:        General: He is not in acute distress.     Appearance: Normal appearance. He is well-developed. He is not ill-appearing, toxic-appearing or diaphoretic.   HENT:      Head: Normocephalic and atraumatic.      Right Ear: External ear normal.      Left Ear: External ear normal.      Nose: Nose normal.   Eyes:      General: Lids are normal. No scleral icterus.        Right eye: No discharge.         Left eye: No discharge.      Conjunctiva/sclera: Conjunctivae normal.   Cardiovascular:      Rate and Rhythm: Normal rate and regular rhythm.      Heart sounds: Normal heart sounds, S1 normal and S2 normal. No murmur. No friction rub. No gallop.    Pulmonary:      Effort: Pulmonary effort is normal. No tachypnea, bradypnea, accessory muscle usage or respiratory distress.      Breath sounds: Normal breath sounds. No stridor. No decreased breath sounds, wheezing or rales.   Chest:      Chest wall: No tenderness.   Abdominal:      General: Bowel sounds are normal. There is no distension.      Palpations: Abdomen is soft.   Musculoskeletal: Normal range of motion.   Skin:     General: Skin is warm and dry.      Coloration: Skin is not pale.      Findings: No bruising, lesion or rash.             Comments: ithcy rash to palms of hands; erythema and sores noted to upper left thigh, macular papular non itchy rash scattered on trunk   Neurological:      Mental Status: He is alert and oriented to person, place, and time.   Psychiatric:         Attention and Perception: He is attentive.         Mood and Affect: Mood is anxious.         Speech: Speech normal.         Behavior: Behavior normal.         Thought Content: Thought content normal.         Judgment: Judgment normal.         Assessment:     Problem List Items Addressed This Visit        Oncology    Malignant neoplasm metastatic to right lung       Other    Metastatic non-small cell lung cancer    Relevant Orders    CBC auto differential    Comprehensive metabolic panel       Other Visit Diagnoses     Hand foot syndrome    -  Primary    Relevant Medications    clobetasol 0.05% (TEMOVATE) 0.05 % Oint    Other Relevant Orders    CBC auto differential    Comprehensive metabolic panel    Immunotherapy        Relevant Orders    TSH    T3, free    FREE T4    Hypothyroidism (acquired)        Relevant Orders    TSH    T3, free    FREE T4         Lab Results   Component Value Date    WBC 10.16 11/08/2019    HGB 14.5 11/08/2019    HCT 45.8 11/08/2019    MCV 94 11/08/2019     11/08/2019       BMP  Lab Results   Component Value Date     07/16/2020    K 4.6 07/16/2020     07/16/2020    CO2 26 07/16/2020    BUN 17 07/16/2020    CREATININE 1.1 07/16/2020    CALCIUM 9.5 07/16/2020    ANIONGAP 10 07/16/2020    ESTGFRAFRICA >60.0 07/16/2020    EGFRNONAA 59.2 (A) 07/16/2020     Lab Results   Component Value Date    ALT 22 07/16/2020    AST 15 07/16/2020    ALKPHOS 91 07/16/2020    BILITOT 0.3 07/16/2020     Lab Results   Component Value Date    TSH 15.716 (H) 07/16/2020       Plan:   Hand foot syndrome  -     clobetasol 0.05% (TEMOVATE) 0.05 % Oint; Apply topically 2 (two) times daily.  Dispense: 45 g; Refill: 2  -     CBC auto differential; Future; Expected date: 07/17/2020  -     Comprehensive metabolic panel; Future; Expected date: 07/17/2020    Metastatic non-small cell lung cancer  -     CBC auto differential; Future; Expected date: 07/17/2020  -     Comprehensive metabolic panel; Future; Expected date: 07/17/2020    Malignant neoplasm metastatic to right lung    Immunotherapy  -     TSH; Future; Expected date: 07/19/2020  -     T3, free; Future; Expected date: 07/19/2020  -     FREE T4; Future; Expected date: 07/19/2020    Hypothyroidism (acquired)  -     TSH; Future; Expected date: 07/19/2020  -     T3, free; Future; Expected date: 07/19/2020  -     FREE T4; Future; Expected date: 07/19/2020    Hold Keytruda today due to severe skin infection.  Will prescribe clobetasol to be  applied twice daily to the palms of his hands.  Encouraged heavy moisturizing with Aquaphor at least twice daily.  F/U in 3 weeks with labs, see Briseida, next dose of Keytruda.  Per patient request will not initiate on systemic steroids due to increased blood sugars. Increase Levothyroxine to 112 mcg PO daily per Dr. Goins.  Will continue to monitor TSH.         I will review assessment/plan with collaborating physician, Dr. Simone Goins    Thank You,  Carmen Noyola, JENNIEP-C

## 2020-07-20 ENCOUNTER — TELEPHONE (OUTPATIENT)
Dept: HEMATOLOGY/ONCOLOGY | Facility: CLINIC | Age: 85
End: 2020-07-20

## 2020-07-20 RX ORDER — LEVOTHYROXINE SODIUM 112 UG/1
112 TABLET ORAL DAILY
Qty: 90 TABLET | Refills: 2 | Status: SHIPPED | OUTPATIENT
Start: 2020-07-20 | End: 2020-10-19 | Stop reason: SDUPTHER

## 2020-07-20 NOTE — PROGRESS NOTES
Next dose increase would be 112 mcg- is this ok with you?  If still high can go up to 125 mcg with next dose increase?

## 2020-07-28 ENCOUNTER — LAB VISIT (OUTPATIENT)
Dept: LAB | Facility: HOSPITAL | Age: 85
End: 2020-07-28
Attending: DERMATOLOGY
Payer: MEDICARE

## 2020-07-28 ENCOUNTER — OFFICE VISIT (OUTPATIENT)
Dept: DERMATOLOGY | Facility: CLINIC | Age: 85
End: 2020-07-28
Payer: MEDICARE

## 2020-07-28 DIAGNOSIS — L13.8 LINEAR IGA BULLOUS DERMATOSIS: Primary | ICD-10-CM

## 2020-07-28 DIAGNOSIS — Z79.899 ENCOUNTER FOR LONG-TERM (CURRENT) USE OF MEDICATIONS: ICD-10-CM

## 2020-07-28 DIAGNOSIS — L13.8 LINEAR IGA BULLOUS DERMATOSIS: ICD-10-CM

## 2020-07-28 LAB
ALBUMIN SERPL BCP-MCNC: 3.7 G/DL (ref 3.5–5.2)
ALP SERPL-CCNC: 102 U/L (ref 55–135)
ALT SERPL W/O P-5'-P-CCNC: 20 U/L (ref 10–44)
ANION GAP SERPL CALC-SCNC: 10 MMOL/L (ref 8–16)
AST SERPL-CCNC: 18 U/L (ref 10–40)
BASOPHILS # BLD AUTO: 0.08 K/UL (ref 0–0.2)
BASOPHILS NFR BLD: 0.6 % (ref 0–1.9)
BILIRUB SERPL-MCNC: 0.3 MG/DL (ref 0.1–1)
BUN SERPL-MCNC: 19 MG/DL (ref 8–23)
CALCIUM SERPL-MCNC: 9.6 MG/DL (ref 8.7–10.5)
CHLORIDE SERPL-SCNC: 99 MMOL/L (ref 95–110)
CO2 SERPL-SCNC: 28 MMOL/L (ref 23–29)
CREAT SERPL-MCNC: 1.2 MG/DL (ref 0.5–1.4)
DIFFERENTIAL METHOD: ABNORMAL
EOSINOPHIL # BLD AUTO: 1.8 K/UL (ref 0–0.5)
EOSINOPHIL NFR BLD: 13.5 % (ref 0–8)
ERYTHROCYTE [DISTWIDTH] IN BLOOD BY AUTOMATED COUNT: 15.9 % (ref 11.5–14.5)
EST. GFR  (AFRICAN AMERICAN): >60 ML/MIN/1.73 M^2
EST. GFR  (NON AFRICAN AMERICAN): 53.3 ML/MIN/1.73 M^2
GLUCOSE SERPL-MCNC: 83 MG/DL (ref 70–110)
HCT VFR BLD AUTO: 42.7 % (ref 40–54)
HGB BLD-MCNC: 12.5 G/DL (ref 14–18)
IMM GRANULOCYTES # BLD AUTO: 0.07 K/UL (ref 0–0.04)
IMM GRANULOCYTES NFR BLD AUTO: 0.5 % (ref 0–0.5)
LYMPHOCYTES # BLD AUTO: 1.9 K/UL (ref 1–4.8)
LYMPHOCYTES NFR BLD: 14.4 % (ref 18–48)
MCH RBC QN AUTO: 27.7 PG (ref 27–31)
MCHC RBC AUTO-ENTMCNC: 29.3 G/DL (ref 32–36)
MCV RBC AUTO: 95 FL (ref 82–98)
MONOCYTES # BLD AUTO: 1.2 K/UL (ref 0.3–1)
MONOCYTES NFR BLD: 9.1 % (ref 4–15)
NEUTROPHILS # BLD AUTO: 8 K/UL (ref 1.8–7.7)
NEUTROPHILS NFR BLD: 61.9 % (ref 38–73)
NRBC BLD-RTO: 0 /100 WBC
PLATELET # BLD AUTO: 419 K/UL (ref 150–350)
PMV BLD AUTO: 9.8 FL (ref 9.2–12.9)
POTASSIUM SERPL-SCNC: 4.6 MMOL/L (ref 3.5–5.1)
PROT SERPL-MCNC: 8.4 G/DL (ref 6–8.4)
RBC # BLD AUTO: 4.51 M/UL (ref 4.6–6.2)
SODIUM SERPL-SCNC: 137 MMOL/L (ref 136–145)
WBC # BLD AUTO: 12.97 K/UL (ref 3.9–12.7)

## 2020-07-28 PROCEDURE — 85025 COMPLETE CBC W/AUTO DIFF WBC: CPT

## 2020-07-28 PROCEDURE — 96372 THER/PROPH/DIAG INJ SC/IM: CPT | Mod: S$GLB,,, | Performed by: DERMATOLOGY

## 2020-07-28 PROCEDURE — 1159F MED LIST DOCD IN RCRD: CPT | Mod: S$GLB,,, | Performed by: DERMATOLOGY

## 2020-07-28 PROCEDURE — 36415 COLL VENOUS BLD VENIPUNCTURE: CPT

## 2020-07-28 PROCEDURE — 1101F PT FALLS ASSESS-DOCD LE1/YR: CPT | Mod: CPTII,S$GLB,, | Performed by: DERMATOLOGY

## 2020-07-28 PROCEDURE — 96372 PR INJECTION,THERAP/PROPH/DIAG2ST, IM OR SUBCUT: ICD-10-PCS | Mod: S$GLB,,, | Performed by: DERMATOLOGY

## 2020-07-28 PROCEDURE — 1101F PR PT FALLS ASSESS DOC 0-1 FALLS W/OUT INJ PAST YR: ICD-10-PCS | Mod: CPTII,S$GLB,, | Performed by: DERMATOLOGY

## 2020-07-28 PROCEDURE — 99214 OFFICE O/P EST MOD 30 MIN: CPT | Mod: 25,S$GLB,, | Performed by: DERMATOLOGY

## 2020-07-28 PROCEDURE — 1159F PR MEDICATION LIST DOCUMENTED IN MEDICAL RECORD: ICD-10-PCS | Mod: S$GLB,,, | Performed by: DERMATOLOGY

## 2020-07-28 PROCEDURE — 99214 PR OFFICE/OUTPT VISIT, EST, LEVL IV, 30-39 MIN: ICD-10-PCS | Mod: 25,S$GLB,, | Performed by: DERMATOLOGY

## 2020-07-28 PROCEDURE — 80053 COMPREHEN METABOLIC PANEL: CPT

## 2020-07-28 PROCEDURE — 99999 PR PBB SHADOW E&M-EST. PATIENT-LVL III: ICD-10-PCS | Mod: PBBFAC,,, | Performed by: DERMATOLOGY

## 2020-07-28 PROCEDURE — 99999 PR PBB SHADOW E&M-EST. PATIENT-LVL III: CPT | Mod: PBBFAC,,, | Performed by: DERMATOLOGY

## 2020-07-28 RX ORDER — TRIAMCINOLONE ACETONIDE 40 MG/ML
40 INJECTION, SUSPENSION INTRA-ARTICULAR; INTRAMUSCULAR
Status: COMPLETED | OUTPATIENT
Start: 2020-07-28 | End: 2020-07-28

## 2020-07-28 RX ORDER — BETAMETHASONE SODIUM PHOSPHATE AND BETAMETHASONE ACETATE 3; 3 MG/ML; MG/ML
6 INJECTION, SUSPENSION INTRA-ARTICULAR; INTRALESIONAL; INTRAMUSCULAR; SOFT TISSUE
Status: COMPLETED | OUTPATIENT
Start: 2020-07-28 | End: 2020-07-28

## 2020-07-28 RX ORDER — BETAMETHASONE DIPROPIONATE 0.5 MG/G
OINTMENT TOPICAL 2 TIMES DAILY
Qty: 90 G | Refills: 3 | Status: SHIPPED | OUTPATIENT
Start: 2020-07-28 | End: 2020-12-11

## 2020-07-28 RX ORDER — HYDROXYZINE HYDROCHLORIDE 10 MG/1
10 TABLET, FILM COATED ORAL NIGHTLY PRN
Qty: 30 TABLET | Refills: 1 | Status: SHIPPED | OUTPATIENT
Start: 2020-07-28 | End: 2020-08-27

## 2020-07-28 RX ADMIN — BETAMETHASONE SODIUM PHOSPHATE AND BETAMETHASONE ACETATE 6 MG: 3; 3 INJECTION, SUSPENSION INTRA-ARTICULAR; INTRALESIONAL; INTRAMUSCULAR; SOFT TISSUE at 02:07

## 2020-07-28 RX ADMIN — TRIAMCINOLONE ACETONIDE 40 MG: 40 INJECTION, SUSPENSION INTRA-ARTICULAR; INTRAMUSCULAR at 02:07

## 2020-07-28 NOTE — PROGRESS NOTES
Subjective:       Patient ID:  Brant Lees is a 89 y.o. male who presents for   Chief Complaint   Patient presents with    Rash     on hands and feet and around waist. Pt states he's been dealing with it for several months. Pt states it itches. He has tried different hand creams      Hx of lung cancer (on Keytruda followed by Dr. Goins), linear IgA bullous dermatosis, and bullous rash x 2 months, last seen on 7/7/20. He was started on prednisone x 2 days but discontinued due to hyperglycemia.  He is currently on dapsone 50 mg qD and xyzal.  He moisturizes with olive oil.  Pt awaiting appt with Dr. Gibson for 8/2020.  He c/o severe pruritus of the bilateral palms.  + pruritus.     DVT right U/S negative.  + continued swelling of the right foot.       Review of Systems   Constitutional: Negative for fever and chills.   Gastrointestinal: Negative for nausea and vomiting.   Skin: Positive for itching and rash. Negative for daily sunscreen use, activity-related sunscreen use and recent sunburn.   Hematologic/Lymphatic: Does not bruise/bleed easily.        Objective:    Physical Exam   Constitutional: He appears well-developed and well-nourished. No distress.   Neurological: He is alert and oriented to person, place, and time. He is not disoriented.   Psychiatric: He has a normal mood and affect.   Skin:   Areas Examined (abnormalities noted in diagram):   Head / Face Inspection Performed  Neck Inspection Performed  RUE Inspected  LUE Inspection Performed  RLE Inspected  LLE Inspection Performed  Nails and Digits Inspection Performed            Assessment / Plan:        Linear IgA bullous dermatosis  Encounter for long-term (current) use of medications  -     CBC auto differential; Future; Expected date: 07/28/2020  -     Comprehensive metabolic panel; Future; Expected date: 07/28/2020  -     betamethasone dipropionate (DIPROLENE) 0.05 % ointment; Apply topically 2 (two) times daily. For hands  Dispense: 90 g; Refill:  3  -     triamcinolone acetonide injection 40 mg  -     betamethasone acetate-betamethasone sodium phosphate injection 6 mg  -     hydrOXYzine HCL (ATARAX) 10 MG Tab; Take 1 tablet (10 mg total) by mouth nightly as needed. May cause drowsiness.  Do not drive or operate heavy machinery.  Dispense: 30 tablet; Refill: 1  -     + improvement in lower legs, however flaring on bilateral hands.  Will attempt to reach Dr. Gibson's office regarding f/u this week regarding patient's hyperglycemia since elevated BG is preventing start of prednisone and pt is currently flaring c/ increased pain and burning in hands.  Will give IM steroids today instead. Will check above labs and if wnl, will increase to dapsone 100 mg qD.  Will change to betamethasone oint for hands.    Lengthy discussion with patient and granddaughter-in-law regarding use of anti-histamines which can cause sedation especially considering pt's age, that he lives alone and that he has suffered from falls in the past.  Pt states he understands the fall risk involved with hydroxyzine and given that the burning sensation of the hands are severe, he wishes to proceed with medication.  Pt states that he will take a cellphone/mobile phone with him if he moves around after taking hydroxyzine.              Follow up in about 4 weeks (around 8/25/2020).

## 2020-07-28 NOTE — Clinical Note
Hi, Dr. Gibson.    Could you try to see this patient sooner?  The inability to start prednisone due to possible undiagnosed diabetes is really causing his skin to flare and making it difficult to provide the patient some relief. We were hoping to get him a PCP appt this week.    Thanks,    Gillian

## 2020-07-29 ENCOUNTER — TELEPHONE (OUTPATIENT)
Dept: DERMATOLOGY | Facility: CLINIC | Age: 85
End: 2020-07-29

## 2020-07-29 DIAGNOSIS — L13.8 LINEAR IGA BULLOUS DERMATOSIS: Primary | ICD-10-CM

## 2020-07-29 DIAGNOSIS — Z79.899 ENCOUNTER FOR LONG-TERM (CURRENT) USE OF MEDICATIONS: ICD-10-CM

## 2020-07-29 RX ORDER — DAPSONE 100 MG/1
TABLET ORAL
Qty: 30 TABLET | Refills: 2 | Status: SHIPPED | OUTPATIENT
Start: 2020-07-29 | End: 2020-08-17 | Stop reason: SDUPTHER

## 2020-08-01 ENCOUNTER — TELEPHONE (OUTPATIENT)
Dept: INTERNAL MEDICINE | Facility: CLINIC | Age: 85
End: 2020-08-01

## 2020-08-01 DIAGNOSIS — R73.09 ELEVATED RANDOM BLOOD GLUCOSE LEVEL: Primary | ICD-10-CM

## 2020-08-01 DIAGNOSIS — R73.09 HIGH GLUCOSE LEVEL: ICD-10-CM

## 2020-08-02 NOTE — TELEPHONE ENCOUNTER
pls schedule pt for this week, the sooner the better.  We can keep the 8/21/2020 visit for now in case we need to use it for f/u from earlier visit.

## 2020-08-05 ENCOUNTER — OFFICE VISIT (OUTPATIENT)
Dept: INTERNAL MEDICINE | Facility: CLINIC | Age: 85
End: 2020-08-05
Payer: MEDICARE

## 2020-08-05 VITALS
BODY MASS INDEX: 24.17 KG/M2 | WEIGHT: 173.31 LBS | HEART RATE: 60 BPM | OXYGEN SATURATION: 95 % | DIASTOLIC BLOOD PRESSURE: 58 MMHG | TEMPERATURE: 97 F | SYSTOLIC BLOOD PRESSURE: 128 MMHG

## 2020-08-05 DIAGNOSIS — E06.3 HYPOTHYROIDISM DUE TO HASHIMOTO'S THYROIDITIS: ICD-10-CM

## 2020-08-05 DIAGNOSIS — I10 HYPERTENSION, ESSENTIAL: ICD-10-CM

## 2020-08-05 DIAGNOSIS — E03.8 HYPOTHYROIDISM DUE TO HASHIMOTO'S THYROIDITIS: ICD-10-CM

## 2020-08-05 DIAGNOSIS — E78.2 MIXED HYPERLIPIDEMIA: ICD-10-CM

## 2020-08-05 DIAGNOSIS — J43.1 PANLOBULAR EMPHYSEMA: ICD-10-CM

## 2020-08-05 DIAGNOSIS — E11.9 NEWLY DIAGNOSED DIABETES: Primary | ICD-10-CM

## 2020-08-05 DIAGNOSIS — I42.9 CARDIOMYOPATHY, UNSPECIFIED TYPE: ICD-10-CM

## 2020-08-05 PROCEDURE — 1101F PT FALLS ASSESS-DOCD LE1/YR: CPT | Mod: CPTII,S$GLB,, | Performed by: FAMILY MEDICINE

## 2020-08-05 PROCEDURE — 1159F PR MEDICATION LIST DOCUMENTED IN MEDICAL RECORD: ICD-10-PCS | Mod: S$GLB,,, | Performed by: FAMILY MEDICINE

## 2020-08-05 PROCEDURE — 99214 PR OFFICE/OUTPT VISIT, EST, LEVL IV, 30-39 MIN: ICD-10-PCS | Mod: S$GLB,,, | Performed by: FAMILY MEDICINE

## 2020-08-05 PROCEDURE — 1159F MED LIST DOCD IN RCRD: CPT | Mod: S$GLB,,, | Performed by: FAMILY MEDICINE

## 2020-08-05 PROCEDURE — 1101F PR PT FALLS ASSESS DOC 0-1 FALLS W/OUT INJ PAST YR: ICD-10-PCS | Mod: CPTII,S$GLB,, | Performed by: FAMILY MEDICINE

## 2020-08-05 PROCEDURE — 99214 OFFICE O/P EST MOD 30 MIN: CPT | Mod: S$GLB,,, | Performed by: FAMILY MEDICINE

## 2020-08-05 PROCEDURE — 99999 PR PBB SHADOW E&M-EST. PATIENT-LVL V: CPT | Mod: PBBFAC,,, | Performed by: FAMILY MEDICINE

## 2020-08-05 PROCEDURE — 1126F PR PAIN SEVERITY QUANTIFIED, NO PAIN PRESENT: ICD-10-PCS | Mod: S$GLB,,, | Performed by: FAMILY MEDICINE

## 2020-08-05 PROCEDURE — 1126F AMNT PAIN NOTED NONE PRSNT: CPT | Mod: S$GLB,,, | Performed by: FAMILY MEDICINE

## 2020-08-05 PROCEDURE — 99999 PR PBB SHADOW E&M-EST. PATIENT-LVL V: ICD-10-PCS | Mod: PBBFAC,,, | Performed by: FAMILY MEDICINE

## 2020-08-05 RX ORDER — LANCETS
EACH MISCELLANEOUS
Qty: 100 EACH | Refills: 4 | Status: SHIPPED | OUTPATIENT
Start: 2020-08-05

## 2020-08-05 RX ORDER — INSULIN PUMP SYRINGE, 3 ML
EACH MISCELLANEOUS
Qty: 1 EACH | Refills: 0 | Status: SHIPPED | OUTPATIENT
Start: 2020-08-05

## 2020-08-05 NOTE — PROGRESS NOTES
Subjective:       Patient ID: Brant Lees is a 89 y.o. male.    Chief Complaint: Annual Exam    89-year-old male with metastatic non-small cell lung cancer dxd 18 months ago, ASCVD status post angioplasty, hypertension, hyperlipidemia, back pain, history of bladder cancer, panlobular emphysema, CKD 3 currently being seen by Derm for Linear IgA bullous dermatosis. Was to start prednisone tx but BS levels have been elevated - chart check shows elevation present low levels and from early 2019.  Increasing levels over the last 8 months - 201 in June.  He is here today to get started on diabetic management.  A recent A1c showed 6.6 level.  Also note elevated TSH.  Recent start on levothyroxine 112 by oncology.    The patient's last visit with me was on 4/17/2018.    Lab Results       Component                Value               Date                       WBC                      12.97 (H)           07/28/2020                 HGB                      12.5 (L)            07/28/2020                 HCT                      42.7                07/28/2020                 PLT                      419 (H)             07/28/2020                 CHOL                     144                 09/25/2019                 TRIG                     151 (H)             09/25/2019                 HDL                      37 (L)              09/25/2019                 LDLCALC                  76.8                09/25/2019                 ALT                      20                  07/28/2020                 AST                      18                  07/28/2020                 NA                       137                 07/28/2020                 K                        4.6                 07/28/2020                 CL                       99                  07/28/2020                 CALCIUM                  9.6                 07/28/2020                 CREATININE               1.2                 07/28/2020                 BUN                       19                  07/28/2020                 CO2                      28                  07/28/2020                 TSH                      15.716 (H)          07/16/2020                 INR                      0.9                 03/21/2019                 GLU                      83                  07/28/2020                 ESTGFRAFRICA             >60.0               07/28/2020                 EGFRNONAA                53.3 (A)            07/28/2020                 HGBA1C                   6.6 (H)             07/16/2020              BP today 08/05/20 : (!) 128/58  Hypertension Medications        amLODIPine (NORVASC) 5 MG tablet TAKE 1 TABLET TWICE DAILY    lisinopriL (PRINIVIL,ZESTRIL) 20 MG tablet TAKE 1 TABLET EVERY DAY    nitroGLYCERIN (NITROSTAT) 0.4 MG SL tablet Place 0.4 mg under the tongue every 5 (five) minutes as needed for Chest pain.    Lab Results on simvastatin 20:       Component                Value               Date                             CHOL                     144                 09/25/2019                 CHOL                     140                 04/10/2017            Lab Results       Component                Value               Date                           HDL                      37 (L)              09/25/2019                 HDL                      38 (L)              04/10/2017            Lab Results       Component                Value               Date                        LDLCALC                  76.8                09/25/2019                 LDLCALC                  79.8                04/10/2017            Lab Results       Component                Value               Date                       TRIG                     151 (H)             09/25/2019                 TRIG                     111                 04/10/2017             Due for recheck lipids.       Review of Systems   Constitutional: Negative for chills, diaphoresis and fever.   HENT:  Negative.    Eyes: Negative.    Respiratory: Negative.  Negative for shortness of breath and wheezing.    Cardiovascular: Negative.  Negative for chest pain and leg swelling.   Gastrointestinal: Negative.    Endocrine: Negative.    Genitourinary: Negative.    Musculoskeletal: Positive for arthralgias (right knee).   Skin: Positive for rash.   Allergic/Immunologic: Negative.    Neurological: Negative.    Hematological: Negative.    Psychiatric/Behavioral: Negative.        Objective:      Physical Exam  HENT:      Head: Normocephalic and atraumatic.      Right Ear: External ear normal.      Left Ear: External ear normal.      Mouth/Throat:      Pharynx: No oropharyngeal exudate.   Neck:      Musculoskeletal: Normal range of motion and neck supple.   Cardiovascular:      Rate and Rhythm: Normal rate and regular rhythm.      Pulses:           Dorsalis pedis pulses are 2+ on the right side and 2+ on the left side.        Posterior tibial pulses are 1+ on the right side and 1+ on the left side.      Heart sounds: Normal heart sounds.   Pulmonary:      Effort: Pulmonary effort is normal.      Breath sounds: Normal breath sounds.   Abdominal:      General: Bowel sounds are normal. There is no distension.      Palpations: Abdomen is soft.      Tenderness: There is no abdominal tenderness.   Musculoskeletal:      Right foot: Normal range of motion. No deformity.      Left foot: Normal range of motion. No deformity.   Feet:      Right foot:      Protective Sensation: 10 sites tested. 10 sites sensed.      Skin integrity: No ulcer or skin breakdown.      Left foot:      Protective Sensation: 10 sites tested. 10 sites sensed.      Skin integrity: No ulcer or skin breakdown.   Skin:     General: Skin is warm and dry.      Comments: Bullae to digits - fingers toes, recent skin changes to U/L extremities   Neurological:      Mental Status: He is alert and oriented to person, place, and time.   Psychiatric:         Behavior:  Behavior normal.           Assessment/Plan:     1. Newly diagnosed diabetes  Microalbumin/creatinine urine ratio    blood-glucose meter kit    lancets Misc    blood sugar diagnostic Strp    Ambulatory referral/consult to Diabetes Education   2. Hypertension, essential     3. Hypothyroidism due to Hashimoto's thyroiditis     4. Mixed hyperlipidemia  Lipid Panel   5. Panlobular emphysema     6. Cardiomyopathy, unspecified type     he will ocntinue to F/U with his cardiologist; pulmonologist prn.  New diagnosis DM2 with A1C of 6.6. Pt with new dx of linear IgA bullus dermatosis and derm initiated prednisone taper a month ago to control - stopped after two days when hx of elevated BS noted - pt now on dapsone, hydroxyzine and topical stteroid. He is happy with current status in terms of symptom relief from itch. I feel the pt's level of daibetes - ocntrolled on diet alone - can tolerate the steroid taper but would want to get him started with a short period of pt BS checks to get him used to normal levels. Then we can use LISA type med during time of prednisone therapy for control as needed.  For short term F/U I asked him to send in his readings after 2 weeks. This could be shortened to a week if needed for derm's purposes.  Further labs, foot check today. Will need ophtho reports - he sees retinal specialist Dr Lai Lewis for hx of retimal hemorrhage S/P vitrectomy.  Recheck 2 months. His GD Tiara Sena is a health professional and helps out - she may be the one doing his finger sticks initially. Referred to DM ed..

## 2020-08-05 NOTE — PATIENT INSTRUCTIONS
Start to do blood sugar checks - most improtant time would be AM before eating - but if Tiara is doing them they can be before any meal.  Send your results to me in 2 weeks via [facility] (attach your readings)

## 2020-08-06 NOTE — PROGRESS NOTES
Subjective:       Patient ID: Brant Lees is a 89 y.o. male.    Chief Complaint: Malignant neoplasm of overlapping sites of right lung [C34.81]  HPI: We have an opportunity to see Mr. Brant Lees in Hematology Oncology clinic at [facility] on 08/06/2020.  Mr. Brant Lees is a 89 y.o.  gentleman who had screening Ct lung, was found to have multiple lung lesions. PET CT showed involvement of both right and left lungs as well as hilar nodes.  Biopsy showed NSCLC squamous type.  Reported no cancer symptoms, denies pain.  Molecular analysis showed high PDL1 expression 50%.  Currently on keytruda.       Developed pruritic rash in both lower legs, used OTC benadryl cream and cortisone cream with some relief.    Oncology History   Malignant neoplasm of overlapping sites of lung   4/15/2019 Initial Diagnosis    Malignant neoplasm of overlapping sites of lung     4/15/2019 Cancer Staged    Staging form: Lung, AJCC 8th Edition  - Clinical stage from 4/15/2019: Stage IV (cT3, cN3, cM1a) - Signed by Simone Goins MD on 4/15/2019     Metastatic non-small cell lung cancer   4/15/2019 Initial Diagnosis    Metastatic non-small cell lung cancer     5/24/2019 -  Chemotherapy    Treatment Summary   Plan Name: OP PEMBROLIZUMAB 200MG Q3W  Treatment Goal: Curative  Status: Active  Start Date: 5/24/2019  End Date: 8/6/2020 (Planned)  Provider: Simone Goins MD  Chemotherapy: pembrolizumab (KEYTRUDA) 200 mg in sodium chloride 0.9% 108 mL chemo infusion, 200 mg, Intravenous, Clinic/HOD 1 time, 15 of 17 cycles  Administration: 200 mg (5/24/2019), 200 mg (6/14/2019), 200 mg (7/5/2019), 200 mg (7/26/2019), 200 mg (8/16/2019), 200 mg (9/6/2019), 200 mg (9/27/2019), 200 mg (10/18/2019), 200 mg (11/8/2019), 200 mg (11/29/2019), 200 mg (12/20/2019), 200 mg (1/10/2020), 200 mg (1/31/2020), 200 mg (2/21/2020), 200 mg (6/4/2020), 200 mg (6/26/2020)       Past Medical History:   Diagnosis Date    Anticoagulant long-term use     plavix     Cardiomyopathy 9/07    Ischemic    HBP (high blood pressure) 1997    History of bladder cancer 2017    history of BCG tx    Hyperlipidemia     LBP (low back pain)     Metastatic non-small cell lung cancer     Metastatic non-small cell lung cancer    MI (myocardial infarction) 12/06    Status post primary angioplasty with coronary stent 12/06    Thyroid disease      Family History   Problem Relation Age of Onset    Heart disease Mother     Cancer Mother      Social History     Socioeconomic History    Marital status:      Spouse name: Not on file    Number of children: 1    Years of education: Not on file    Highest education level: Not on file   Occupational History    Not on file   Social Needs    Financial resource strain: Not on file    Food insecurity     Worry: Not on file     Inability: Not on file    Transportation needs     Medical: Not on file     Non-medical: Not on file   Tobacco Use    Smoking status: Never Smoker    Smokeless tobacco: Never Used   Substance and Sexual Activity    Alcohol use: No    Drug use: No    Sexual activity: Not Currently     Partners: Female   Lifestyle    Physical activity     Days per week: Not on file     Minutes per session: Not on file    Stress: Not on file   Relationships    Social connections     Talks on phone: Not on file     Gets together: Not on file     Attends [employer] service: Not on file     Active member of club or organization: Not on file     Attends meetings of clubs or organizations: Not on file     Relationship status: Not on file   Other Topics Concern    Not on file   Social History Narrative    Not on file     Past Surgical History:   Procedure Laterality Date    amputation toe      distal phalanx right great toe    CATARACT EXTRACTION      CORONARY STENT PLACEMENT      HERNIA REPAIR      INSERTION OF VENOUS ACCESS PORT Left 5/17/2019    Procedure: INSERTION, VENOUS ACCESS PORT;  Surgeon: Lester Trejo MD;   Location: [facility];  Service: General;  Laterality: Left;     Current Outpatient Medications   Medication Sig Dispense Refill    acetaminophen (TYLENOL) 325 MG tablet Take 325 mg by mouth every 6 (six) hours as needed for Pain.      amLODIPine (NORVASC) 5 MG tablet TAKE 1 TABLET TWICE DAILY 180 tablet 1    ASPIRIN (ASPIR-81 ORAL) Take 1 tablet by mouth once daily.       betamethasone dipropionate (DIPROLENE) 0.05 % ointment Apply topically 2 (two) times daily. For hands 90 g 3    blood sugar diagnostic Strp To check BG 1 times daily, to use with insurance preferred meter 100 each 4    blood-glucose meter kit To check BG 1 times daily, to use with insurance preferred meter 1 each 0    clobetasol 0.05% (TEMOVATE) 0.05 % Oint Apply topically 2 (two) times daily. 45 g 2    dapsone 100 MG Tab Take one tablet daily. 30 tablet 2    doxycycline (MONODOX) 100 MG capsule Take twice a day with food. May cause upset stomach 20 capsule 0    hydrOXYzine HCL (ATARAX) 10 MG Tab Take 1 tablet (10 mg total) by mouth nightly as needed. May cause drowsiness.  Do not drive or operate heavy machinery. 30 tablet 1    lancets Misc To check BG 1 times daily, to use with insurance preferred meter 100 each 4    levocetirizine (XYZAL) 5 MG tablet Take 1 tablet each evening. 30 tablet 11    levothyroxine (SYNTHROID) 112 MCG tablet Take 1 tablet (112 mcg total) by mouth once daily. 90 tablet 2    lisinopriL (PRINIVIL,ZESTRIL) 20 MG tablet TAKE 1 TABLET EVERY DAY 90 tablet 1    mupirocin (BACTROBAN) 2 % ointment AAA bid with Q-tip. Antibiotic ointment. For sores on left thigh (Patient not taking: Reported on 8/5/2020) 60 g 1    nitroGLYCERIN (NITROSTAT) 0.4 MG SL tablet Place 0.4 mg under the tongue every 5 (five) minutes as needed for Chest pain.      omeprazole (PRILOSEC) 20 MG capsule Take 20 mg by mouth once daily.      simvastatin (ZOCOR) 20 MG tablet Take 1 tablet (20 mg total) by mouth every evening. 90 tablet 4     triamcinolone acetonide 0.5% (KENALOG) 0.5 % Crea APPLY TOPICALLY ONCE DAILY. (Patient not taking: Reported on 8/5/2020) 30 g 1     No current facility-administered medications for this visit.        Labs:  Lab Results   Component Value Date    WBC 12.97 (H) 07/28/2020    HGB 12.5 (L) 07/28/2020    HCT 42.7 07/28/2020    MCV 95 07/28/2020     (H) 07/28/2020     BMP  Lab Results   Component Value Date     07/28/2020    K 4.6 07/28/2020    CL 99 07/28/2020    CO2 28 07/28/2020    BUN 19 07/28/2020    CREATININE 1.2 07/28/2020    CALCIUM 9.6 07/28/2020    ANIONGAP 10 07/28/2020    ESTGFRAFRICA >60.0 07/28/2020    EGFRNONAA 53.3 (A) 07/28/2020     Lab Results   Component Value Date    ALT 20 07/28/2020    AST 18 07/28/2020    ALKPHOS 102 07/28/2020    BILITOT 0.3 07/28/2020       No results found for: IRON, TIBC, FERRITIN, SATURATEDIRO  No results found for: [id number]  No results found for: FOLATE  Lab Results   Component Value Date    TSH 15.716 (H) 07/16/2020       I have reviewed the radiology reports and examined the scan/xray images.    Review of Systems   Constitutional: Negative.    HENT: Negative.    Eyes: Negative.    Respiratory: Negative.    Cardiovascular: Negative.    Gastrointestinal: Negative.    Endocrine: Negative.    Genitourinary: Negative.    Musculoskeletal: Negative.    Skin: Negative.    Allergic/Immunologic: Negative.    Neurological: Negative.    Hematological: Negative.    Psychiatric/Behavioral: Negative.      ECOG SCORE    0 - Fully active-able to carry on all pre-disease performance without restriction            Objective:     Vitals:    08/07/20 1421   BP: 135/66   Pulse: 70   Temp: 96.7 °F (35.9 °C)   Body mass index is 23.92 kg/m².  Physical Exam  Vitals signs and nursing note reviewed.   Constitutional:       Appearance: He is well-developed.   HENT:      Head: Normocephalic and atraumatic.   Eyes:      Conjunctiva/sclera: Conjunctivae normal.   Neck:      Musculoskeletal:  Normal range of motion and neck supple.   Cardiovascular:      Rate and Rhythm: Normal rate and regular rhythm.   Pulmonary:      Effort: Pulmonary effort is normal.      Breath sounds: Normal breath sounds.   Abdominal:      General: Bowel sounds are normal.      Palpations: Abdomen is soft.   Musculoskeletal: Normal range of motion.   Skin:     General: Skin is warm and dry.   Neurological:      Mental Status: He is alert and oriented to person, place, and time.   Psychiatric:         Behavior: Behavior normal.         Thought Content: Thought content normal.         Judgment: Judgment normal.           Assessment:      1. Malignant neoplasm of overlapping sites of right lung    2. Nutritional anemia           Plan:     Malignant neoplasm of overlapping sites of right lung  Continue on keytruda.  Skin rash currently treated with topical steroids.  -     CBC auto differential; Future; Expected date: 08/07/2020  -     Comprehensive metabolic panel; Future; Expected date: 08/07/2020    Nutritional anemia  -     TSH; Future; Expected date: 08/07/2020  -     Folate; Future; Expected date: 08/07/2020  -     Iron and TIBC; Future; Expected date: 08/07/2020  -     Ferritin; Future; Expected date: 08/07/2020  -     Vitamin B12; Future; Expected date: 08/07/2020

## 2020-08-07 ENCOUNTER — INFUSION (OUTPATIENT)
Dept: INFUSION THERAPY | Facility: HOSPITAL | Age: 85
End: 2020-08-07
Attending: DERMATOLOGY
Payer: MEDICARE

## 2020-08-07 ENCOUNTER — OFFICE VISIT (OUTPATIENT)
Dept: HEMATOLOGY/ONCOLOGY | Facility: CLINIC | Age: 85
End: 2020-08-07
Payer: MEDICARE

## 2020-08-07 VITALS
HEART RATE: 57 BPM | TEMPERATURE: 98 F | BODY MASS INDEX: 24.01 KG/M2 | RESPIRATION RATE: 16 BRPM | WEIGHT: 171.5 LBS | SYSTOLIC BLOOD PRESSURE: 132 MMHG | DIASTOLIC BLOOD PRESSURE: 63 MMHG | HEIGHT: 71 IN | OXYGEN SATURATION: 94 %

## 2020-08-07 VITALS
HEIGHT: 71 IN | SYSTOLIC BLOOD PRESSURE: 135 MMHG | OXYGEN SATURATION: 96 % | WEIGHT: 171.5 LBS | BODY MASS INDEX: 24.01 KG/M2 | HEART RATE: 70 BPM | TEMPERATURE: 97 F | DIASTOLIC BLOOD PRESSURE: 66 MMHG

## 2020-08-07 DIAGNOSIS — C34.90 METASTATIC NON-SMALL CELL LUNG CANCER: Primary | ICD-10-CM

## 2020-08-07 DIAGNOSIS — D53.9 NUTRITIONAL ANEMIA: ICD-10-CM

## 2020-08-07 DIAGNOSIS — C34.81 MALIGNANT NEOPLASM OF OVERLAPPING SITES OF RIGHT LUNG: Primary | ICD-10-CM

## 2020-08-07 PROCEDURE — 63600175 PHARM REV CODE 636 W HCPCS: Mod: JG | Performed by: INTERNAL MEDICINE

## 2020-08-07 PROCEDURE — 99215 PR OFFICE/OUTPT VISIT, EST, LEVL V, 40-54 MIN: ICD-10-PCS | Mod: 25,S$GLB,, | Performed by: INTERNAL MEDICINE

## 2020-08-07 PROCEDURE — 1159F MED LIST DOCD IN RCRD: CPT | Mod: S$GLB,,, | Performed by: INTERNAL MEDICINE

## 2020-08-07 PROCEDURE — 1101F PT FALLS ASSESS-DOCD LE1/YR: CPT | Mod: CPTII,S$GLB,, | Performed by: INTERNAL MEDICINE

## 2020-08-07 PROCEDURE — 1101F PR PT FALLS ASSESS DOC 0-1 FALLS W/OUT INJ PAST YR: ICD-10-PCS | Mod: CPTII,S$GLB,, | Performed by: INTERNAL MEDICINE

## 2020-08-07 PROCEDURE — 99999 PR PBB SHADOW E&M-EST. PATIENT-LVL IV: CPT | Mod: PBBFAC,,, | Performed by: INTERNAL MEDICINE

## 2020-08-07 PROCEDURE — 1126F PR PAIN SEVERITY QUANTIFIED, NO PAIN PRESENT: ICD-10-PCS | Mod: S$GLB,,, | Performed by: INTERNAL MEDICINE

## 2020-08-07 PROCEDURE — 99215 OFFICE O/P EST HI 40 MIN: CPT | Mod: 25,S$GLB,, | Performed by: INTERNAL MEDICINE

## 2020-08-07 PROCEDURE — 1159F PR MEDICATION LIST DOCUMENTED IN MEDICAL RECORD: ICD-10-PCS | Mod: S$GLB,,, | Performed by: INTERNAL MEDICINE

## 2020-08-07 PROCEDURE — 25000003 PHARM REV CODE 250: Performed by: INTERNAL MEDICINE

## 2020-08-07 PROCEDURE — 1126F AMNT PAIN NOTED NONE PRSNT: CPT | Mod: S$GLB,,, | Performed by: INTERNAL MEDICINE

## 2020-08-07 PROCEDURE — 99999 PR PBB SHADOW E&M-EST. PATIENT-LVL IV: ICD-10-PCS | Mod: PBBFAC,,, | Performed by: INTERNAL MEDICINE

## 2020-08-07 PROCEDURE — 96413 CHEMO IV INFUSION 1 HR: CPT

## 2020-08-07 RX ORDER — HEPARIN 100 UNIT/ML
500 SYRINGE INTRAVENOUS
Status: DISCONTINUED | OUTPATIENT
Start: 2020-08-07 | End: 2020-08-07 | Stop reason: HOSPADM

## 2020-08-07 RX ORDER — HEPARIN 100 UNIT/ML
500 SYRINGE INTRAVENOUS
Status: CANCELLED | OUTPATIENT
Start: 2020-08-07

## 2020-08-07 RX ORDER — SODIUM CHLORIDE 0.9 % (FLUSH) 0.9 %
10 SYRINGE (ML) INJECTION
Status: CANCELLED | OUTPATIENT
Start: 2020-08-07

## 2020-08-07 RX ADMIN — SODIUM CHLORIDE 200 MG: 9 INJECTION, SOLUTION INTRAVENOUS at 03:08

## 2020-08-07 RX ADMIN — SODIUM CHLORIDE: 9 INJECTION, SOLUTION INTRAVENOUS at 03:08

## 2020-08-07 RX ADMIN — HEPARIN 500 UNITS: 100 SYRINGE at 03:08

## 2020-08-07 NOTE — DISCHARGE INSTRUCTIONS
[facility]  [address] [address]  [address]  [phone] phone     [phone] fax  Hours of Operation: Monday- Friday 8:00am- 5:00pm  After hours phone  [phone]  Hematology / Oncology Physicians on call      LINDA Swanson Dr., Dr., Dr., Dr., NP Sydney Prescott, NP Tyesha Taylor, NP    Please call with any concerns regarding your appointment today.

## 2020-08-07 NOTE — PLAN OF CARE
"Pt states, "Oh boy, I feel good today."  Infection precautions reviewed.  Pt states full understanding to call with any sign of infection, including temp >100.4.    "

## 2020-08-10 ENCOUNTER — TELEPHONE (OUTPATIENT)
Dept: ENDOCRINOLOGY | Facility: CLINIC | Age: 85
End: 2020-08-10

## 2020-08-10 DIAGNOSIS — E03.9 ACQUIRED HYPOTHYROIDISM: Primary | ICD-10-CM

## 2020-08-11 ENCOUNTER — TELEPHONE (OUTPATIENT)
Dept: DERMATOLOGY | Facility: CLINIC | Age: 85
End: 2020-08-11

## 2020-08-11 NOTE — TELEPHONE ENCOUNTER
----- Message from Gillian Calero MD sent at 8/11/2020  7:54 AM CDT -----  Regarding: Call pt  Please call pt's carolyn mota to see how he is doing and if he needs adjusting of medications.  Please see if overall itch improved and if they think he will still require prednisone taper.    ----- Message -----  From: Karlee Gibson MD  Sent: 8/9/2020   4:02 AM CDT  To: Gillian Calero MD

## 2020-08-13 ENCOUNTER — TELEPHONE (OUTPATIENT)
Dept: DERMATOLOGY | Facility: CLINIC | Age: 85
End: 2020-08-13

## 2020-08-14 ENCOUNTER — TELEPHONE (OUTPATIENT)
Dept: DERMATOLOGY | Facility: CLINIC | Age: 85
End: 2020-08-14

## 2020-08-14 NOTE — TELEPHONE ENCOUNTER
Spoke with patient daughter, say her dad is still itching and the rash is not getting no better, schedule patient appt for Monday at 9:00 am.      Melissa Avendaño MA        ----- Message from Che Valdivia sent at 8/14/2020  2:28 PM CDT -----  Contact: daughter, ms [phone]  Would like to consult with the nurse,  patient hand are itching very, bad, daughter would like to speak with the nurse as soon as possible concerning this, please call back , thanks sj

## 2020-08-16 ENCOUNTER — PATIENT OUTREACH (OUTPATIENT)
Dept: ADMINISTRATIVE | Facility: OTHER | Age: 85
End: 2020-08-16

## 2020-08-17 ENCOUNTER — OFFICE VISIT (OUTPATIENT)
Dept: DERMATOLOGY | Facility: CLINIC | Age: 85
End: 2020-08-17
Payer: MEDICARE

## 2020-08-17 ENCOUNTER — TELEPHONE (OUTPATIENT)
Dept: DERMATOLOGY | Facility: CLINIC | Age: 85
End: 2020-08-17

## 2020-08-17 DIAGNOSIS — Z79.899 ENCOUNTER FOR LONG-TERM (CURRENT) USE OF MEDICATIONS: ICD-10-CM

## 2020-08-17 DIAGNOSIS — L13.8 LINEAR IGA BULLOUS DERMATOSIS: ICD-10-CM

## 2020-08-17 PROCEDURE — 1101F PT FALLS ASSESS-DOCD LE1/YR: CPT | Mod: CPTII,S$GLB,, | Performed by: DERMATOLOGY

## 2020-08-17 PROCEDURE — 99999 PR PBB SHADOW E&M-EST. PATIENT-LVL III: CPT | Mod: PBBFAC,,, | Performed by: DERMATOLOGY

## 2020-08-17 PROCEDURE — 99214 OFFICE O/P EST MOD 30 MIN: CPT | Mod: S$GLB,,, | Performed by: DERMATOLOGY

## 2020-08-17 PROCEDURE — 99214 PR OFFICE/OUTPT VISIT, EST, LEVL IV, 30-39 MIN: ICD-10-PCS | Mod: S$GLB,,, | Performed by: DERMATOLOGY

## 2020-08-17 PROCEDURE — 99999 PR PBB SHADOW E&M-EST. PATIENT-LVL III: ICD-10-PCS | Mod: PBBFAC,,, | Performed by: DERMATOLOGY

## 2020-08-17 PROCEDURE — 1101F PR PT FALLS ASSESS DOC 0-1 FALLS W/OUT INJ PAST YR: ICD-10-PCS | Mod: CPTII,S$GLB,, | Performed by: DERMATOLOGY

## 2020-08-17 PROCEDURE — 1159F PR MEDICATION LIST DOCUMENTED IN MEDICAL RECORD: ICD-10-PCS | Mod: S$GLB,,, | Performed by: DERMATOLOGY

## 2020-08-17 PROCEDURE — 1159F MED LIST DOCD IN RCRD: CPT | Mod: S$GLB,,, | Performed by: DERMATOLOGY

## 2020-08-17 RX ORDER — DOXEPIN HYDROCHLORIDE 50 MG/G
CREAM TOPICAL 3 TIMES DAILY
Qty: 45 G | Refills: 3 | Status: SHIPPED | OUTPATIENT
Start: 2020-08-17 | End: 2020-12-11

## 2020-08-17 RX ORDER — DAPSONE 25 MG/1
25 TABLET ORAL DAILY
Qty: 30 TABLET | Refills: 3 | Status: SHIPPED | OUTPATIENT
Start: 2020-08-17 | End: 2020-09-08 | Stop reason: SDUPTHER

## 2020-08-17 RX ORDER — DAPSONE 100 MG/1
TABLET ORAL
Qty: 30 TABLET | Refills: 2 | Status: SHIPPED | OUTPATIENT
Start: 2020-08-17 | End: 2020-09-08 | Stop reason: SDUPTHER

## 2020-08-17 NOTE — TELEPHONE ENCOUNTER
----- Message from Gillian Calero MD sent at 8/17/2020  1:09 PM CDT -----  Please call Tamara (family member) [phone] and let her know the below info from Dr. Gibson.(BS= blood sugar)  ----- Message -----  From: Karlee Gibson MD  Sent: 8/17/2020  11:36 AM CDT  To: Gillian Calero MD    Sounds good - perhaps you could let him know to contact my office if BSs getting that high and we can adjust/add med to control the BS so he can stay on the taper.  ----- Message -----  From: Gillian Calero MD  Sent: 8/17/2020   9:36 AM CDT  To: Karlee Gibson MD    If okay with you, I am having him start prednisone taper since blisters of hands still flaring and instructed him to d/c if BG goes above 200.  This should give him faster relief.  Thanks,    Gillian

## 2020-08-17 NOTE — PROGRESS NOTES
Subjective:       Patient ID:  Brant Lees is a 89 y.o. male who presents for   Chief Complaint   Patient presents with    Rash     hand not improving      Hx of lung cancer (on Keytruda followed by Dr. Goins), linear IgA bullous dermatosis since 5/2020, last seen on 7/28/20. He was started on prednisone x 2 days but discontinued due to hyperglycemia.  He was eval by Dr. Gibson who is having pt follow BG level and then will start diabetic medication. He is currently on s/p IM steroids at last visit, dapsone 100 mg qD, niacinamide OTC, xyzal, hydroxyzine 10 mg qHS, and betamethasone. He moisturizes with olive oil.  He c/o severe pruritus of the bilateral palms.  Rash on legs has improved.       DVT right U/S negative.  + continued swelling of the right foot.       Review of Systems   Constitutional: Negative for fever and chills.   Gastrointestinal: Negative for nausea and vomiting.   Skin: Positive for itching, rash and dry skin. Negative for daily sunscreen use, activity-related sunscreen use and recent sunburn.   Hematologic/Lymphatic: Does not bruise/bleed easily.        Objective:    Physical Exam   Constitutional: He appears well-developed and well-nourished. No distress.   Neurological: He is alert and oriented to person, place, and time. He is not disoriented.   Psychiatric: He has a normal mood and affect.   Skin:   Areas Examined (abnormalities noted in diagram):   Head / Face Inspection Performed  Neck Inspection Performed  RUE Inspected  LUE Inspection Performed  Nails and Digits Inspection Performed                     Assessment / Plan:        Linear IgA bullous dermatosis  Encounter for long-term (current) use of medications  -     dapsone 25 MG Tab; Take 1 tablet (25 mg total) by mouth once daily. Combine with 100 mg to take total of 125 mg daily  Dispense: 30 tablet; Refill: 3  -     dapsone 100 MG Tab; Take one tablet daily. Combine with 25 mg to take total of 125 mg daily  Dispense: 30 tablet;  Refill: 2  -     doxepin (ZONALON) 5 % cream; Apply topically 3 (three) times daily. For rash on hands  Dispense: 45 g; Refill: 3    + improvement in lower legs, however flaring on bilateral hands.  BG being followed by Dr. Gibson.  Discussed pt can cautiously start prednisone given has glucometer now.  Dicussed that if BG exceeds 200, then he should stop the prednisone immediately and let derm clinic and PCP know.  The patient and Tamara acknowledged understanding. Labs reviewed from 8/10/20.  Will increase to dapsone 125 mg qD, continue betamethasone oint for hands, niacinamide supplement, will see if doxepin cream covered.  Once steroids taper, will see if improved, consider addition of low dose cellcept if okay with Dr. Goins.  Will avoid MTX given pulmonary side effects and pt with lung CA.  Addendum 8/17/20: after further discussion with Dr. Goins, will avoid all immunosuppressants due to possible negation of Keytruda effect and potential worsening lung malignancy.  Other options may include plaquenil for immunomodulation without suppression.               Follow up in about 4 weeks (around 9/14/2020).

## 2020-08-17 NOTE — Clinical Note
He is still flaring on the hands.  I will try to increase dapsone, but we may need to add in an immunosuppressant medication.  Would you be okay with low dose cellcept (250-500 mg bid) or would this put his lung CA at risk of spread?    Thanks,    Gillian

## 2020-08-17 NOTE — Clinical Note
If okay with you, I am having him start prednisone taper since blisters of hands still flaring and instructed him to d/c if BG goes above 200.  This should give him faster relief.  Thanks,    Gillian

## 2020-08-18 ENCOUNTER — TELEPHONE (OUTPATIENT)
Dept: DERMATOLOGY | Facility: CLINIC | Age: 85
End: 2020-08-18

## 2020-08-28 ENCOUNTER — LAB VISIT (OUTPATIENT)
Dept: LAB | Facility: HOSPITAL | Age: 85
End: 2020-08-28
Attending: INTERNAL MEDICINE
Payer: MEDICARE

## 2020-08-28 ENCOUNTER — OFFICE VISIT (OUTPATIENT)
Dept: HEMATOLOGY/ONCOLOGY | Facility: CLINIC | Age: 85
End: 2020-08-28
Payer: MEDICARE

## 2020-08-28 VITALS
TEMPERATURE: 97 F | DIASTOLIC BLOOD PRESSURE: 57 MMHG | BODY MASS INDEX: 23.77 KG/M2 | WEIGHT: 169.75 LBS | OXYGEN SATURATION: 93 % | HEIGHT: 71 IN | SYSTOLIC BLOOD PRESSURE: 123 MMHG | HEART RATE: 68 BPM

## 2020-08-28 DIAGNOSIS — C34.90 MALIGNANT NEOPLASM OF UNSPECIFIED PART OF UNSPECIFIED BRONCHUS OR LUNG: ICD-10-CM

## 2020-08-28 DIAGNOSIS — C34.81 MALIGNANT NEOPLASM OF OVERLAPPING SITES OF RIGHT LUNG: ICD-10-CM

## 2020-08-28 DIAGNOSIS — D53.9 NUTRITIONAL ANEMIA: ICD-10-CM

## 2020-08-28 DIAGNOSIS — C34.82 MALIGNANT NEOPLASM OF OVERLAPPING SITES OF LEFT BRONCHUS AND LUNG: ICD-10-CM

## 2020-08-28 DIAGNOSIS — C34.90 METASTATIC NON-SMALL CELL LUNG CANCER: Primary | ICD-10-CM

## 2020-08-28 LAB
ALBUMIN SERPL BCP-MCNC: 3.4 G/DL (ref 3.5–5.2)
ALP SERPL-CCNC: 89 U/L (ref 55–135)
ALT SERPL W/O P-5'-P-CCNC: 33 U/L (ref 10–44)
ANION GAP SERPL CALC-SCNC: 8 MMOL/L (ref 8–16)
AST SERPL-CCNC: 14 U/L (ref 10–40)
BASOPHILS # BLD AUTO: 0.02 K/UL (ref 0–0.2)
BASOPHILS NFR BLD: 0.1 % (ref 0–1.9)
BILIRUB SERPL-MCNC: 0.7 MG/DL (ref 0.1–1)
BUN SERPL-MCNC: 24 MG/DL (ref 8–23)
CALCIUM SERPL-MCNC: 9.1 MG/DL (ref 8.7–10.5)
CHLORIDE SERPL-SCNC: 100 MMOL/L (ref 95–110)
CO2 SERPL-SCNC: 24 MMOL/L (ref 23–29)
CREAT SERPL-MCNC: 1.2 MG/DL (ref 0.5–1.4)
DIFFERENTIAL METHOD: ABNORMAL
EOSINOPHIL # BLD AUTO: 0 K/UL (ref 0–0.5)
EOSINOPHIL NFR BLD: 0 % (ref 0–8)
ERYTHROCYTE [DISTWIDTH] IN BLOOD BY AUTOMATED COUNT: 17.7 % (ref 11.5–14.5)
EST. GFR  (AFRICAN AMERICAN): >60 ML/MIN/1.73 M^2
EST. GFR  (NON AFRICAN AMERICAN): 53 ML/MIN/1.73 M^2
FERRITIN SERPL-MCNC: 371 NG/ML (ref 20–300)
GLUCOSE SERPL-MCNC: 327 MG/DL (ref 70–110)
HCT VFR BLD AUTO: 39.3 % (ref 40–54)
HGB BLD-MCNC: 11.9 G/DL (ref 14–18)
IMM GRANULOCYTES # BLD AUTO: 0.15 K/UL (ref 0–0.04)
IMM GRANULOCYTES NFR BLD AUTO: 1 % (ref 0–0.5)
IRON SERPL-MCNC: 53 UG/DL (ref 45–160)
LYMPHOCYTES # BLD AUTO: 0.6 K/UL (ref 1–4.8)
LYMPHOCYTES NFR BLD: 3.5 % (ref 18–48)
MCH RBC QN AUTO: 28.9 PG (ref 27–31)
MCHC RBC AUTO-ENTMCNC: 30.3 G/DL (ref 32–36)
MCV RBC AUTO: 95 FL (ref 82–98)
MONOCYTES # BLD AUTO: 0.4 K/UL (ref 0.3–1)
MONOCYTES NFR BLD: 2.2 % (ref 4–15)
NEUTROPHILS # BLD AUTO: 14.5 K/UL (ref 1.8–7.7)
NEUTROPHILS NFR BLD: 93.2 % (ref 38–73)
NRBC BLD-RTO: 0 /100 WBC
PLATELET # BLD AUTO: 310 K/UL (ref 150–350)
PMV BLD AUTO: 9.9 FL (ref 9.2–12.9)
POTASSIUM SERPL-SCNC: 4.6 MMOL/L (ref 3.5–5.1)
PROT SERPL-MCNC: 6.9 G/DL (ref 6–8.4)
RBC # BLD AUTO: 4.12 M/UL (ref 4.6–6.2)
SATURATED IRON: 16 % (ref 20–50)
SODIUM SERPL-SCNC: 132 MMOL/L (ref 136–145)
TOTAL IRON BINDING CAPACITY: 340 UG/DL (ref 250–450)
TRANSFERRIN SERPL-MCNC: 230 MG/DL (ref 200–375)
TSH SERPL DL<=0.005 MIU/L-ACNC: 0.67 UIU/ML (ref 0.4–4)
WBC # BLD AUTO: 15.58 K/UL (ref 3.9–12.7)

## 2020-08-28 PROCEDURE — 82746 ASSAY OF FOLIC ACID SERUM: CPT

## 2020-08-28 PROCEDURE — 99999 PR PBB SHADOW E&M-EST. PATIENT-LVL IV: CPT | Mod: PBBFAC,,, | Performed by: INTERNAL MEDICINE

## 2020-08-28 PROCEDURE — 1101F PR PT FALLS ASSESS DOC 0-1 FALLS W/OUT INJ PAST YR: ICD-10-PCS | Mod: CPTII,S$GLB,, | Performed by: INTERNAL MEDICINE

## 2020-08-28 PROCEDURE — 82728 ASSAY OF FERRITIN: CPT

## 2020-08-28 PROCEDURE — 1126F AMNT PAIN NOTED NONE PRSNT: CPT | Mod: S$GLB,,, | Performed by: INTERNAL MEDICINE

## 2020-08-28 PROCEDURE — 1126F PR PAIN SEVERITY QUANTIFIED, NO PAIN PRESENT: ICD-10-PCS | Mod: S$GLB,,, | Performed by: INTERNAL MEDICINE

## 2020-08-28 PROCEDURE — 99214 OFFICE O/P EST MOD 30 MIN: CPT | Mod: S$GLB,,, | Performed by: INTERNAL MEDICINE

## 2020-08-28 PROCEDURE — 1159F MED LIST DOCD IN RCRD: CPT | Mod: S$GLB,,, | Performed by: INTERNAL MEDICINE

## 2020-08-28 PROCEDURE — 1101F PT FALLS ASSESS-DOCD LE1/YR: CPT | Mod: CPTII,S$GLB,, | Performed by: INTERNAL MEDICINE

## 2020-08-28 PROCEDURE — 99999 PR PBB SHADOW E&M-EST. PATIENT-LVL IV: ICD-10-PCS | Mod: PBBFAC,,, | Performed by: INTERNAL MEDICINE

## 2020-08-28 PROCEDURE — 84443 ASSAY THYROID STIM HORMONE: CPT

## 2020-08-28 PROCEDURE — 99214 PR OFFICE/OUTPT VISIT, EST, LEVL IV, 30-39 MIN: ICD-10-PCS | Mod: S$GLB,,, | Performed by: INTERNAL MEDICINE

## 2020-08-28 PROCEDURE — 83540 ASSAY OF IRON: CPT

## 2020-08-28 PROCEDURE — 82607 VITAMIN B-12: CPT

## 2020-08-28 PROCEDURE — 1159F PR MEDICATION LIST DOCUMENTED IN MEDICAL RECORD: ICD-10-PCS | Mod: S$GLB,,, | Performed by: INTERNAL MEDICINE

## 2020-08-28 PROCEDURE — 85025 COMPLETE CBC W/AUTO DIFF WBC: CPT

## 2020-08-28 PROCEDURE — 80053 COMPREHEN METABOLIC PANEL: CPT

## 2020-08-28 PROCEDURE — 36415 COLL VENOUS BLD VENIPUNCTURE: CPT

## 2020-08-28 RX ORDER — SODIUM CHLORIDE 0.9 % (FLUSH) 0.9 %
10 SYRINGE (ML) INJECTION
Status: CANCELLED | OUTPATIENT
Start: 2020-10-26

## 2020-08-28 RX ORDER — HEPARIN 100 UNIT/ML
500 SYRINGE INTRAVENOUS
Status: CANCELLED | OUTPATIENT
Start: 2020-09-14

## 2020-08-28 RX ORDER — SODIUM CHLORIDE 0.9 % (FLUSH) 0.9 %
10 SYRINGE (ML) INJECTION
Status: CANCELLED | OUTPATIENT
Start: 2020-09-14

## 2020-08-28 RX ORDER — HEPARIN 100 UNIT/ML
500 SYRINGE INTRAVENOUS
Status: CANCELLED | OUTPATIENT
Start: 2020-10-26

## 2020-08-28 NOTE — PROGRESS NOTES
Subjective:       Patient ID: Brant Lees is a 89 y.o. male.    Chief Complaint: Results, Chemotherapy, and Lung Cancer    HPI 89-year-old male history of metastatic non-small cell lung carcinoma patient is here for administration of keytruda immunotherapy.  ECOG status 2    Past Medical History:   Diagnosis Date    Anticoagulant long-term use     plavix    Cardiomyopathy 9/07    Ischemic    HBP (high blood pressure) 1997    History of bladder cancer 2017    history of BCG tx    Hyperlipidemia     LBP (low back pain)     Metastatic non-small cell lung cancer     Metastatic non-small cell lung cancer    MI (myocardial infarction) 12/06    Status post primary angioplasty with coronary stent 12/06    Thyroid disease      Family History   Problem Relation Age of Onset    Heart disease Mother     Cancer Mother      Social History     Socioeconomic History    Marital status:      Spouse name: Not on file    Number of children: 1    Years of education: Not on file    Highest education level: Not on file   Occupational History    Not on file   Social Needs    Financial resource strain: Not on file    Food insecurity     Worry: Not on file     Inability: Not on file    Transportation needs     Medical: Not on file     Non-medical: Not on file   Tobacco Use    Smoking status: Never Smoker    Smokeless tobacco: Never Used   Substance and Sexual Activity    Alcohol use: No    Drug use: No    Sexual activity: Not Currently     Partners: Female   Lifestyle    Physical activity     Days per week: Not on file     Minutes per session: Not on file    Stress: Not on file   Relationships    Social connections     Talks on phone: Not on file     Gets together: Not on file     Attends Congregation service: Not on file     Active member of club or organization: Not on file     Attends meetings of clubs or organizations: Not on file     Relationship status: Not on file   Other Topics Concern    Not on file    Social History Narrative    Not on file     Past Surgical History:   Procedure Laterality Date    amputation toe      distal phalanx right great toe    CATARACT EXTRACTION      CORONARY STENT PLACEMENT      HERNIA REPAIR      INSERTION OF VENOUS ACCESS PORT Left 5/17/2019    Procedure: INSERTION, VENOUS ACCESS PORT;  Surgeon: Lester Trejo MD;  Location: [facility];  Service: General;  Laterality: Left;    VITRECTOMY Left 01/27/2019    Dr Tanisha Lewis       Labs:  Lab Results   Component Value Date    WBC 15.58 (H) 08/28/2020    HGB 11.9 (L) 08/28/2020    HCT 39.3 (L) 08/28/2020    MCV 95 08/28/2020     08/28/2020     BMP  Lab Results   Component Value Date     (L) 08/28/2020    K 4.6 08/28/2020     08/28/2020    CO2 24 08/28/2020    BUN 24 (H) 08/28/2020    CREATININE 1.2 08/28/2020    CALCIUM 9.1 08/28/2020    ANIONGAP 8 08/28/2020    ESTGFRAFRICA >60 08/28/2020    EGFRNONAA 53 (A) 08/28/2020     Lab Results   Component Value Date    ALT 33 08/28/2020    AST 14 08/28/2020    ALKPHOS 89 08/28/2020    BILITOT 0.7 08/28/2020       No results found for: IRON, TIBC, FERRITIN, SATURATEDIRO  No results found for: [id number]  No results found for: FOLATE  Lab Results   Component Value Date    TSH 0.669 08/28/2020         Review of Systems   Constitutional: Positive for activity change and fatigue. Negative for appetite change, chills, diaphoresis, fever and unexpected weight change.   HENT: Negative for congestion, dental problem, drooling, ear discharge, ear pain, facial swelling, hearing loss, mouth sores, nosebleeds, postnasal drip, rhinorrhea, sinus pressure, sneezing, sore throat, tinnitus, trouble swallowing and voice change.    Eyes: Negative for photophobia, pain, discharge, redness, itching and visual disturbance.   Respiratory: Negative for apnea, cough, choking, chest tightness, shortness of breath, wheezing and stridor.    Cardiovascular: Negative for chest pain, palpitations  and leg swelling.   Gastrointestinal: Negative for abdominal distention, abdominal pain, anal bleeding, blood in stool, constipation, diarrhea, nausea, rectal pain and vomiting.   Endocrine: Negative for cold intolerance, heat intolerance, polydipsia, polyphagia and polyuria.   Genitourinary: Negative for decreased urine volume, difficulty urinating, discharge, dysuria, enuresis, flank pain, frequency, genital sores, hematuria, penile pain, penile swelling, scrotal swelling, testicular pain and urgency.   Musculoskeletal: Positive for gait problem. Negative for arthralgias, back pain, joint swelling, myalgias, neck pain and neck stiffness.   Skin: Negative for color change, pallor, rash and wound.   Allergic/Immunologic: Negative for environmental allergies, food allergies and immunocompromised state.   Neurological: Positive for weakness. Negative for dizziness, tremors, seizures, syncope, facial asymmetry, speech difficulty, light-headedness, numbness and headaches.   Hematological: Negative for adenopathy. Does not bruise/bleed easily.   Psychiatric/Behavioral: Positive for dysphoric mood. Negative for agitation, behavioral problems, confusion, decreased concentration, hallucinations, self-injury, sleep disturbance and suicidal ideas. The patient is nervous/anxious. The patient is not hyperactive.        Objective:      Physical Exam  Vitals signs reviewed.   Constitutional:       General: He is not in acute distress.     Appearance: He is well-developed. He is cachectic. He is ill-appearing. He is not diaphoretic.   HENT:      Head: Normocephalic.      Right Ear: External ear normal.      Left Ear: External ear normal.      Nose: Nose normal.      Right Sinus: No maxillary sinus tenderness or frontal sinus tenderness.      Left Sinus: No maxillary sinus tenderness or frontal sinus tenderness.      Mouth/Throat:      Pharynx: No oropharyngeal exudate.   Eyes:      General: Lids are normal. No scleral icterus.         Right eye: No discharge.         Left eye: No discharge.      Extraocular Movements:      Right eye: Normal extraocular motion.      Left eye: Normal extraocular motion.      Conjunctiva/sclera:      Right eye: Right conjunctiva is not injected. No hemorrhage.     Left eye: Left conjunctiva is not injected. No hemorrhage.     Pupils: Pupils are equal, round, and reactive to light.   Neck:      Musculoskeletal: Normal range of motion and neck supple.      Thyroid: No thyromegaly.      Vascular: No JVD.      Trachea: No tracheal deviation.   Cardiovascular:      Rate and Rhythm: Normal rate.   Pulmonary:      Effort: Pulmonary effort is normal. No respiratory distress.      Breath sounds: No stridor.   Abdominal:      General: Bowel sounds are normal.      Palpations: Abdomen is soft. There is no hepatomegaly, splenomegaly or mass.      Tenderness: There is no abdominal tenderness.   Musculoskeletal: Normal range of motion.         General: No tenderness.   Lymphadenopathy:      Head:      Right side of head: No posterior auricular or occipital adenopathy.      Left side of head: No posterior auricular or occipital adenopathy.      Cervical: No cervical adenopathy.      Right cervical: No superficial, deep or posterior cervical adenopathy.     Left cervical: No superficial, deep or posterior cervical adenopathy.      Upper Body:      Right upper body: No supraclavicular adenopathy.      Left upper body: No supraclavicular adenopathy.   Skin:     General: Skin is dry.      Findings: No erythema or rash.      Nails: There is no clubbing.     Neurological:      Mental Status: He is alert and oriented to person, place, and time.      Cranial Nerves: No cranial nerve deficit.      Coordination: Coordination normal.   Psychiatric:         Behavior: Behavior normal.         Thought Content: Thought content normal.         Judgment: Judgment normal.             Assessment:      1. Metastatic non-small cell lung cancer     2. Malignant neoplasm of unspecified part of unspecified bronchus or lung    3. Malignant neoplasm of overlapping sites of left bronchus and lung            Plan:     Reviewed last 3 PET scans.  Concerning June of 2020 of progressive disease in continuation of immunotherapy.  At this point will proceed with repeat PET scan and return to be seen either myself or primary oncologist prior to determine whether not immunotherapy should be continued or whether discontinuation and other treatment options should be discussed.        Ez Horowitz Jr, MD FACP

## 2020-08-29 LAB
FOLATE SERPL-MCNC: 17 NG/ML (ref 4–24)
VIT B12 SERPL-MCNC: 322 PG/ML (ref 210–950)

## 2020-09-08 ENCOUNTER — TELEPHONE (OUTPATIENT)
Dept: RADIOLOGY | Facility: HOSPITAL | Age: 85
End: 2020-09-08

## 2020-09-08 ENCOUNTER — LAB VISIT (OUTPATIENT)
Dept: LAB | Facility: HOSPITAL | Age: 85
End: 2020-09-08
Attending: DERMATOLOGY
Payer: MEDICARE

## 2020-09-08 ENCOUNTER — OFFICE VISIT (OUTPATIENT)
Dept: DERMATOLOGY | Facility: CLINIC | Age: 85
End: 2020-09-08
Payer: MEDICARE

## 2020-09-08 DIAGNOSIS — L13.8 LINEAR IGA BULLOUS DERMATOSIS: ICD-10-CM

## 2020-09-08 DIAGNOSIS — Z79.899 ENCOUNTER FOR LONG-TERM (CURRENT) USE OF MEDICATIONS: ICD-10-CM

## 2020-09-08 DIAGNOSIS — L13.8 LINEAR IGA BULLOUS DERMATOSIS: Primary | ICD-10-CM

## 2020-09-08 LAB
ALBUMIN SERPL BCP-MCNC: 3.7 G/DL (ref 3.5–5.2)
ALP SERPL-CCNC: 94 U/L (ref 55–135)
ALT SERPL W/O P-5'-P-CCNC: 28 U/L (ref 10–44)
ANION GAP SERPL CALC-SCNC: 8 MMOL/L (ref 8–16)
AST SERPL-CCNC: 16 U/L (ref 10–40)
BASOPHILS # BLD AUTO: 0.03 K/UL (ref 0–0.2)
BASOPHILS NFR BLD: 0.2 % (ref 0–1.9)
BILIRUB SERPL-MCNC: 1.1 MG/DL (ref 0.1–1)
BUN SERPL-MCNC: 22 MG/DL (ref 8–23)
CALCIUM SERPL-MCNC: 9.4 MG/DL (ref 8.7–10.5)
CHLORIDE SERPL-SCNC: 100 MMOL/L (ref 95–110)
CO2 SERPL-SCNC: 28 MMOL/L (ref 23–29)
CREAT SERPL-MCNC: 1.2 MG/DL (ref 0.5–1.4)
DIFFERENTIAL METHOD: ABNORMAL
EOSINOPHIL # BLD AUTO: 0.2 K/UL (ref 0–0.5)
EOSINOPHIL NFR BLD: 1.8 % (ref 0–8)
ERYTHROCYTE [DISTWIDTH] IN BLOOD BY AUTOMATED COUNT: 18 % (ref 11.5–14.5)
EST. GFR  (AFRICAN AMERICAN): >60 ML/MIN/1.73 M^2
EST. GFR  (NON AFRICAN AMERICAN): 53.3 ML/MIN/1.73 M^2
GLUCOSE SERPL-MCNC: 139 MG/DL (ref 70–110)
HCT VFR BLD AUTO: 42.5 % (ref 40–54)
HGB BLD-MCNC: 12.3 G/DL (ref 14–18)
IMM GRANULOCYTES # BLD AUTO: 0.07 K/UL (ref 0–0.04)
IMM GRANULOCYTES NFR BLD AUTO: 0.5 % (ref 0–0.5)
LYMPHOCYTES # BLD AUTO: 1.6 K/UL (ref 1–4.8)
LYMPHOCYTES NFR BLD: 11.6 % (ref 18–48)
MCH RBC QN AUTO: 29.5 PG (ref 27–31)
MCHC RBC AUTO-ENTMCNC: 28.9 G/DL (ref 32–36)
MCV RBC AUTO: 102 FL (ref 82–98)
MONOCYTES # BLD AUTO: 0.9 K/UL (ref 0.3–1)
MONOCYTES NFR BLD: 6.2 % (ref 4–15)
NEUTROPHILS # BLD AUTO: 10.9 K/UL (ref 1.8–7.7)
NEUTROPHILS NFR BLD: 79.7 % (ref 38–73)
NRBC BLD-RTO: 0 /100 WBC
PLATELET # BLD AUTO: 218 K/UL (ref 150–350)
PMV BLD AUTO: 11.2 FL (ref 9.2–12.9)
POTASSIUM SERPL-SCNC: 4.6 MMOL/L (ref 3.5–5.1)
PROT SERPL-MCNC: 7.3 G/DL (ref 6–8.4)
RBC # BLD AUTO: 4.17 M/UL (ref 4.6–6.2)
SODIUM SERPL-SCNC: 136 MMOL/L (ref 136–145)
WBC # BLD AUTO: 13.63 K/UL (ref 3.9–12.7)

## 2020-09-08 PROCEDURE — 1101F PT FALLS ASSESS-DOCD LE1/YR: CPT | Mod: CPTII,S$GLB,, | Performed by: DERMATOLOGY

## 2020-09-08 PROCEDURE — 99999 PR PBB SHADOW E&M-EST. PATIENT-LVL III: CPT | Mod: PBBFAC,,, | Performed by: DERMATOLOGY

## 2020-09-08 PROCEDURE — 36415 COLL VENOUS BLD VENIPUNCTURE: CPT

## 2020-09-08 PROCEDURE — 85025 COMPLETE CBC W/AUTO DIFF WBC: CPT

## 2020-09-08 PROCEDURE — 1159F PR MEDICATION LIST DOCUMENTED IN MEDICAL RECORD: ICD-10-PCS | Mod: S$GLB,,, | Performed by: DERMATOLOGY

## 2020-09-08 PROCEDURE — 99214 PR OFFICE/OUTPT VISIT, EST, LEVL IV, 30-39 MIN: ICD-10-PCS | Mod: S$GLB,,, | Performed by: DERMATOLOGY

## 2020-09-08 PROCEDURE — 1159F MED LIST DOCD IN RCRD: CPT | Mod: S$GLB,,, | Performed by: DERMATOLOGY

## 2020-09-08 PROCEDURE — 99214 OFFICE O/P EST MOD 30 MIN: CPT | Mod: S$GLB,,, | Performed by: DERMATOLOGY

## 2020-09-08 PROCEDURE — 80053 COMPREHEN METABOLIC PANEL: CPT

## 2020-09-08 PROCEDURE — 1101F PR PT FALLS ASSESS DOC 0-1 FALLS W/OUT INJ PAST YR: ICD-10-PCS | Mod: CPTII,S$GLB,, | Performed by: DERMATOLOGY

## 2020-09-08 PROCEDURE — 99999 PR PBB SHADOW E&M-EST. PATIENT-LVL III: ICD-10-PCS | Mod: PBBFAC,,, | Performed by: DERMATOLOGY

## 2020-09-08 RX ORDER — DAPSONE 100 MG/1
TABLET ORAL
Qty: 90 TABLET | Refills: 0 | Status: SHIPPED | OUTPATIENT
Start: 2020-09-08 | End: 2020-12-14

## 2020-09-08 RX ORDER — DAPSONE 25 MG/1
25 TABLET ORAL DAILY
Qty: 90 TABLET | Refills: 0 | Status: SHIPPED | OUTPATIENT
Start: 2020-09-08 | End: 2020-12-14 | Stop reason: SDUPTHER

## 2020-09-08 NOTE — Clinical Note
Angeles Sher.    I just wanted him to touch base with someone while I'm out in about 6 weeks.  You can send him back thereafter, unless he wants to go with you to your [facility].  He has linear IgA bullous dermatosis and lung CA.  We can't use immunosuppressants because would negate effect of Keytruda.  I was thinking maybe plaquenil next if returns on dapsone or low dose prednisone if okay by hem-onc. He is currently clear but s/p prednisone taper.     Thanks,    Gillian

## 2020-09-08 NOTE — PROGRESS NOTES
Subjective:       Patient ID:  Brant Lees is a 89 y.o. male who presents for   Chief Complaint   Patient presents with    Follow-up     rash on hand      Hx of lung cancer (on Keytruda followed by Dr. Goins), linear IgA bullous dermatosis since 5/2020, last seen on 8/17/20. He is s/p prednisone x 3 weeks (dc'd 2 days ago), and followed BG levels, currently untx and being monitored by Dr. Gibson.  Other meds include: dapsone 125 mg qD (100 mg qAM and 25 mg qHS), niacinamide, xyzal, hydroxyzine 10 mg qHS.  + improvement in hands, decreased pruritus. He dc'd betamethasone.     Elevation in WBC at 15.58 on 8/28/20.  BG elevated at 327 on 8/28/20.    Prior tx: s/p IM steroid, dapsone 100 mg qD, niacinamide OTC, xyzal, hydroxyzine 10 mg qHS, and betamethasone.          Review of Systems   Constitutional: Negative for fever and chills.   Gastrointestinal: Negative for nausea and vomiting.   Skin: Positive for itching and rash. Negative for daily sunscreen use, activity-related sunscreen use and recent sunburn.   Hematologic/Lymphatic: Does not bruise/bleed easily.        Objective:    Physical Exam   Constitutional: He appears well-developed and well-nourished. No distress.   Neurological: He is alert and oriented to person, place, and time. He is not disoriented.   Psychiatric: He has a normal mood and affect.   Skin:   Areas Examined (abnormalities noted in diagram):   Head / Face Inspection Performed  Neck Inspection Performed  RUE Inspected  LUE Inspection Performed  RLE Inspected  LLE Inspection Performed  Nails and Digits Inspection Performed                 Assessment / Plan:        Linear IgA bullous dermatosis  Encounter for long-term (current) use of medications  -     CBC auto differential; Future; Expected date: 09/08/2020  -     Comprehensive metabolic panel; Future; Expected date: 09/08/2020  -     dapsone 100 MG Tab; Take one tablet daily. Combine with 25 mg to take total of 125 mg daily  Dispense: 90  tablet; Refill: 0  -     dapsone 25 MG Tab; Take 1 tablet (25 mg total) by mouth once daily. Combine with 100 mg to take total of 125 mg daily  Dispense: 90 tablet; Refill: 0  -     Currently improved s/p prednisone taper. Given condition previously unstable, will have f/u in 6 weeks with Dr. Madden and can resume f/u with myself in 3 months.  Discussed pt may flare once prednisone has tapered.  Will continue dapsone 125 mg qD, xyzal, hydroxyzine and niacinamide.  Pt contraindicated to immunosuppresents (MTX, cellcept) given lung CA on Keytruda.  Will ensure changes in medications approved by Dr. Goins in Hem-Onc. May need to start low dose prednisone if recurs and okay with hem-onc.              Follow up in about 6 weeks (around 10/20/2020) for with Dr. Madden.

## 2020-09-09 ENCOUNTER — OFFICE VISIT (OUTPATIENT)
Dept: UROLOGY | Facility: CLINIC | Age: 85
End: 2020-09-09
Payer: MEDICARE

## 2020-09-09 VITALS
BODY MASS INDEX: 23.4 KG/M2 | SYSTOLIC BLOOD PRESSURE: 112 MMHG | DIASTOLIC BLOOD PRESSURE: 62 MMHG | WEIGHT: 167.75 LBS | TEMPERATURE: 99 F

## 2020-09-09 DIAGNOSIS — C67.9 MALIGNANT NEOPLASM OF URINARY BLADDER, UNSPECIFIED SITE: Primary | ICD-10-CM

## 2020-09-09 LAB
BILIRUB SERPL-MCNC: NORMAL MG/DL
BLOOD URINE, POC: NORMAL
CLARITY, POC UA: CLEAR
COLOR, POC UA: NORMAL
GLUCOSE UR QL STRIP: NORMAL
KETONES UR QL STRIP: NORMAL
LEUKOCYTE ESTERASE URINE, POC: NORMAL
NITRITE, POC UA: NORMAL
PH, POC UA: 5
PROTEIN, POC: NORMAL
SPECIFIC GRAVITY, POC UA: 1.02
UROBILINOGEN, POC UA: NORMAL

## 2020-09-09 PROCEDURE — 99999 PR PBB SHADOW E&M-EST. PATIENT-LVL III: CPT | Mod: PBBFAC,,, | Performed by: UROLOGY

## 2020-09-09 PROCEDURE — 99499 NO LOS: ICD-10-PCS | Mod: S$GLB,,, | Performed by: UROLOGY

## 2020-09-09 PROCEDURE — 52000 CYSTOURETHROSCOPY: CPT | Mod: S$GLB,,, | Performed by: UROLOGY

## 2020-09-09 PROCEDURE — 99499 UNLISTED E&M SERVICE: CPT | Mod: S$GLB,,, | Performed by: UROLOGY

## 2020-09-09 PROCEDURE — 52000 PR CYSTOURETHROSCOPY: ICD-10-PCS | Mod: S$GLB,,, | Performed by: UROLOGY

## 2020-09-09 PROCEDURE — 81002 URINALYSIS NONAUTO W/O SCOPE: CPT | Mod: S$GLB,,, | Performed by: UROLOGY

## 2020-09-09 PROCEDURE — 99999 PR PBB SHADOW E&M-EST. PATIENT-LVL III: ICD-10-PCS | Mod: PBBFAC,,, | Performed by: UROLOGY

## 2020-09-09 PROCEDURE — 81002 POCT URINE DIPSTICK WITHOUT MICROSCOPE: ICD-10-PCS | Mod: S$GLB,,, | Performed by: UROLOGY

## 2020-09-09 RX ORDER — GLUCOSAMINE/CHONDR SU A SOD 167-133 MG
500 CAPSULE ORAL EVERY MORNING
COMMUNITY

## 2020-09-09 NOTE — PROGRESS NOTES
Chief Complaint: High Grade Ta Bladder Cancer    HPI:   9/9/20: 3 year cysto normal; doing well no complaints.   5/27/20: 30 mo cysto normal  9/25/19: 24 mo cysto normal  3/25/19: 18 mo cysto normal.  12/19/18: 15 mo cysto normal  10/17/18: 12 mo cysto normal.  6/14/18: 9 mo cysto normal.  3/14/18: 6 mo cysto normal.  No BCG from now on.  1/10/18: Had a lot of problems with BCG re-try and we agreed today to not do it again.  Voiding fine otherwise, no other complaints.   1/3/18: No problems in interim will try BCG again.  12/20/17: Had flu-like symptoms very severe after last dose.  Got better in a couple days.  12/13/17: BCG 1/3 1/2 strength today.  12/7/17: 3 mo cysto normal.  11/17/17: Pt would like to not do the BCG today.  We reviewed the indications.  We will respect his wishes.  Agreed to cysto soon, then do 3 wks maint BCG; just not do it today.  11/9/17: LUTS for a half-day last time but not bad enough to skip this week. BCG again today.  11/3/17: Had sig LUTS after last BCG and we skipped last week.  BCG 1/2 strength 4/6 today.  10/19/17: BCG 3/6 today.  9/22/17: Came for BCG today, but still too much microhematuria and leuk.  9/5/17: High grade Ta on path report.  OAB now settling down.  8/10/17: Cleared for surgery, reviewed findings, arranging TURBT.  7/10/17: Hematuria confirmed, CT Urogram reassuring.  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  6/16/17: 87 yo man referred by Dr. Gibson for evaluation of nocturia and LUTS.  Has incontinence at night, sudden urgency has trouble making it to the toilet.  Nocturia like this 2-3/night.  No daytime problems.  Six months so far.   No abd/pelvic pain and no exac/rel factors.  No gross hematuria.  No urolithiasis.  No other urinary bother.  No  history.  PSA was always normal.  2 cups coffee in AM, tea at  lunch and dinner (green tea).  Good stream.     Allergies:  Patient has no known allergies.    Medications:  has a current medication list which includes the following prescription(s): acetaminophen, amlodipine, aspirin, blood sugar diagnostic, blood-glucose meter, dapsone, dapsone, doxycycline, lancets, levocetirizine, levothyroxine, lisinopril, niacin, nitroglycerin, omeprazole, simvastatin, triamcinolone acetonide 0.5%, betamethasone dipropionate, clobetasol 0.05%, doxepin, and mupirocin.    Review of Systems:  General: No fever, chills, fatigability, or weight loss.  Skin: No rashes, itching, or changes in color or texture of skin.  Chest: Denies DELUCA, cyanosis, wheezing, cough, and sputum production.  Abdomen: Appetite fine. No weight loss. Denies diarrhea, abdominal pain, hematemesis, or blood in stool.  Musculoskeletal: No joint stiffness or swelling. Denies back pain.  : As above.  All other review of systems negative.    PMH:   has a past medical history of Anticoagulant long-term use, Cardiomyopathy (9/07), HBP (high blood pressure) (1997), History of bladder cancer (2017), Hyperlipidemia, LBP (low back pain), Metastatic non-small cell lung cancer, MI (myocardial infarction) (12/06), Status post primary angioplasty with coronary stent (12/06), and Thyroid disease.    PSH:   has a past surgical history that includes Hernia repair; Coronary stent placement; Insertion of venous access port (Left, 5/17/2019); Cataract extraction; amputation toe; and Vitrectomy (Left, 01/27/2019).    FamHx: family history includes Cancer in his mother; Heart disease in his mother.    SocHx:  reports that he has never smoked. He has never used smokeless tobacco. He reports that he does not drink alcohol or use drugs.      Physical Exam:  Vitals:    09/09/20 0857   BP: 112/62   Temp: 98.6 °F (37 °C)     General: A&Ox3, no apparent distress, no deformities  Neck: No masses, normal thyroid  Lungs: normal inspiration, no use of  accessory muscles  Heart: normal pulse, no arrhythmias  Abdomen: Soft, NT, ND  Skin: The skin is warm and dry. No jaundice.  Ext: No c/c/e.  :   6/17: Test desc remington, no abnormalities of epididymus. Penis normal, with normal penile and scrotal skin. Meatus normal. Normal rectal tone, no hemorrhoids. Prost 40 gm no nodules or masses appreciated. SV not palpable. Perineum and anus normal.    Labs/Studies:   Urinalysis performed in clinic, summary:    10/6/17: UA normal exc tr prot and 50 blood, less on micro exam  Bladder Scan performed in office:     6/17: PVR 4 ml.    Procedure: Diagnostic Cystoscopy    Procedure in Detail: After proper consents were obtained, the patient was prepped and draped in normal sterile fashion for diagnostic cystoscopy. 5 ml of lidocaine jelly was instilled in the urethra. The flexible cystoscope was then introduced into the urethra, and advanced into the bladder under direct vision. The urethral mucosa appeared normal, and no strictures were noted. The sphincter appeared to be normal, and the veru montanum was unremarkable. The prostatic mucosa and the lateral lobes of the prostate were normal. The bladder neck was normal. Inspection of the interior of the bladder was then carried out. The trigone was unremarkable, with no mucosal lesions. The ureteral orifices were normal in position and configuration. Systematic inspection of the mucosa of the bladder it was then carried out, rotating the cystoscope so that all areas of the left and right lateral walls, the dome of the bladder, and the posterior wall were all visualized. The cystoscope was then advanced further into the bladder, and maximum deflection of the scope was performed so that the bladder neck could be inspected. No mucosal lesions were noted there. The cystoscope was then removed, and the procedure terminated.     Findings: normal cysto    Impression/Plan:   1. 4 yr mo cysto 9/7/21. No more BCG  2. US next visit

## 2020-09-11 ENCOUNTER — HOSPITAL ENCOUNTER (OUTPATIENT)
Dept: RADIOLOGY | Facility: HOSPITAL | Age: 85
Discharge: HOME | End: 2020-09-11
Attending: INTERNAL MEDICINE
Payer: MEDICARE

## 2020-09-11 DIAGNOSIS — C34.82 MALIGNANT NEOPLASM OF OVERLAPPING SITES OF LEFT BRONCHUS AND LUNG: ICD-10-CM

## 2020-09-11 DIAGNOSIS — C34.90 METASTATIC NON-SMALL CELL LUNG CANCER: ICD-10-CM

## 2020-09-11 PROCEDURE — 78815 PET IMAGE W/CT SKULL-THIGH: CPT | Mod: 26,PS,, | Performed by: RADIOLOGY

## 2020-09-11 PROCEDURE — 78815 NM PET CT ROUTINE: ICD-10-PCS | Mod: 26,PS,, | Performed by: RADIOLOGY

## 2020-09-11 PROCEDURE — 78815 PET IMAGE W/CT SKULL-THIGH: CPT | Mod: TC

## 2020-09-14 ENCOUNTER — OFFICE VISIT (OUTPATIENT)
Dept: HEMATOLOGY/ONCOLOGY | Facility: CLINIC | Age: 85
End: 2020-09-14
Payer: MEDICARE

## 2020-09-14 ENCOUNTER — INFUSION (OUTPATIENT)
Dept: INFUSION THERAPY | Facility: HOSPITAL | Age: 85
End: 2020-09-14
Attending: INTERNAL MEDICINE
Payer: MEDICARE

## 2020-09-14 VITALS
HEIGHT: 71 IN | BODY MASS INDEX: 23.52 KG/M2 | TEMPERATURE: 99 F | WEIGHT: 168 LBS | SYSTOLIC BLOOD PRESSURE: 121 MMHG | OXYGEN SATURATION: 95 % | DIASTOLIC BLOOD PRESSURE: 62 MMHG | HEART RATE: 87 BPM

## 2020-09-14 VITALS
OXYGEN SATURATION: 93 % | SYSTOLIC BLOOD PRESSURE: 106 MMHG | HEART RATE: 68 BPM | DIASTOLIC BLOOD PRESSURE: 58 MMHG | RESPIRATION RATE: 18 BRPM | BODY MASS INDEX: 23.52 KG/M2 | WEIGHT: 168 LBS | HEIGHT: 71 IN | TEMPERATURE: 97 F

## 2020-09-14 DIAGNOSIS — C34.90 METASTATIC NON-SMALL CELL LUNG CANCER: Primary | ICD-10-CM

## 2020-09-14 DIAGNOSIS — C80.1 MALIGNANT NEOPLASM METASTATIC TO CHOROID WITH UNKNOWN PRIMARY SITE: ICD-10-CM

## 2020-09-14 DIAGNOSIS — R64 CACHEXIA: ICD-10-CM

## 2020-09-14 DIAGNOSIS — Z51.11 CHEMOTHERAPY MANAGEMENT, ENCOUNTER FOR: ICD-10-CM

## 2020-09-14 DIAGNOSIS — R94.6 ABNORMAL RESULTS OF THYROID FUNCTION STUDIES: Primary | ICD-10-CM

## 2020-09-14 DIAGNOSIS — C78.01 MALIGNANT NEOPLASM METASTATIC TO RIGHT LUNG: ICD-10-CM

## 2020-09-14 DIAGNOSIS — R94.6 ABNORMAL RESULTS OF THYROID FUNCTION STUDIES: ICD-10-CM

## 2020-09-14 DIAGNOSIS — C34.81 MALIGNANT NEOPLASM OF OVERLAPPING SITES OF RIGHT LUNG: ICD-10-CM

## 2020-09-14 DIAGNOSIS — C79.49 MALIGNANT NEOPLASM METASTATIC TO CHOROID WITH UNKNOWN PRIMARY SITE: ICD-10-CM

## 2020-09-14 DIAGNOSIS — C34.90 METASTATIC NON-SMALL CELL LUNG CANCER: ICD-10-CM

## 2020-09-14 DIAGNOSIS — Z71.89 ACP (ADVANCE CARE PLANNING): ICD-10-CM

## 2020-09-14 PROCEDURE — 1101F PT FALLS ASSESS-DOCD LE1/YR: CPT | Mod: CPTII,S$GLB,, | Performed by: INTERNAL MEDICINE

## 2020-09-14 PROCEDURE — 25000003 PHARM REV CODE 250: Performed by: INTERNAL MEDICINE

## 2020-09-14 PROCEDURE — 1126F PR PAIN SEVERITY QUANTIFIED, NO PAIN PRESENT: ICD-10-PCS | Mod: S$GLB,,, | Performed by: INTERNAL MEDICINE

## 2020-09-14 PROCEDURE — 1101F PR PT FALLS ASSESS DOC 0-1 FALLS W/OUT INJ PAST YR: ICD-10-PCS | Mod: CPTII,S$GLB,, | Performed by: INTERNAL MEDICINE

## 2020-09-14 PROCEDURE — 99999 PR PBB SHADOW E&M-EST. PATIENT-LVL IV: CPT | Mod: PBBFAC,,, | Performed by: INTERNAL MEDICINE

## 2020-09-14 PROCEDURE — 1159F PR MEDICATION LIST DOCUMENTED IN MEDICAL RECORD: ICD-10-PCS | Mod: S$GLB,,, | Performed by: INTERNAL MEDICINE

## 2020-09-14 PROCEDURE — 1159F MED LIST DOCD IN RCRD: CPT | Mod: S$GLB,,, | Performed by: INTERNAL MEDICINE

## 2020-09-14 PROCEDURE — 63600175 PHARM REV CODE 636 W HCPCS: Performed by: INTERNAL MEDICINE

## 2020-09-14 PROCEDURE — 99999 PR PBB SHADOW E&M-EST. PATIENT-LVL IV: ICD-10-PCS | Mod: PBBFAC,,, | Performed by: INTERNAL MEDICINE

## 2020-09-14 PROCEDURE — 99215 PR OFFICE/OUTPT VISIT, EST, LEVL V, 40-54 MIN: ICD-10-PCS | Mod: 25,S$GLB,, | Performed by: INTERNAL MEDICINE

## 2020-09-14 PROCEDURE — 96413 CHEMO IV INFUSION 1 HR: CPT

## 2020-09-14 PROCEDURE — 1126F AMNT PAIN NOTED NONE PRSNT: CPT | Mod: S$GLB,,, | Performed by: INTERNAL MEDICINE

## 2020-09-14 PROCEDURE — 99215 OFFICE O/P EST HI 40 MIN: CPT | Mod: 25,S$GLB,, | Performed by: INTERNAL MEDICINE

## 2020-09-14 RX ORDER — SODIUM CHLORIDE 0.9 % (FLUSH) 0.9 %
10 SYRINGE (ML) INJECTION
Status: DISCONTINUED | OUTPATIENT
Start: 2020-09-14 | End: 2020-09-14 | Stop reason: HOSPADM

## 2020-09-14 RX ORDER — HEPARIN 100 UNIT/ML
500 SYRINGE INTRAVENOUS
Status: DISCONTINUED | OUTPATIENT
Start: 2020-09-14 | End: 2020-09-14 | Stop reason: HOSPADM

## 2020-09-14 RX ADMIN — HEPARIN 500 UNITS: 100 SYRINGE at 12:09

## 2020-09-14 RX ADMIN — SODIUM CHLORIDE 400 MG: 9 INJECTION, SOLUTION INTRAVENOUS at 12:09

## 2020-09-14 NOTE — PROGRESS NOTES
Subjective:       Patient ID: Brant Lees is a 89 y.o. male.    Chief Complaint: Results, Chemotherapy, and Lung Cancer    HPI 89-year-old male returns for review of PET scan patient had a long interval where patient did not receive immunotherapy during corona virus event in early 2020.  Patient presents with repeat PET scan today for review ECOG status 2        Past Medical History:   Diagnosis Date    Anticoagulant long-term use     plavix    Cardiomyopathy 9/07    Ischemic    HBP (high blood pressure) 1997    History of bladder cancer 2017    history of BCG tx    Hyperlipidemia     LBP (low back pain)     Metastatic non-small cell lung cancer     Metastatic non-small cell lung cancer    MI (myocardial infarction) 12/06    Status post primary angioplasty with coronary stent 12/06    Thyroid disease      Family History   Problem Relation Age of Onset    Heart disease Mother     Cancer Mother      Social History     Socioeconomic History    Marital status:      Spouse name: Not on file    Number of children: 1    Years of education: Not on file    Highest education level: Not on file   Occupational History    Not on file   Social Needs    Financial resource strain: Not on file    Food insecurity     Worry: Not on file     Inability: Not on file    Transportation needs     Medical: Not on file     Non-medical: Not on file   Tobacco Use    Smoking status: Never Smoker    Smokeless tobacco: Never Used   Substance and Sexual Activity    Alcohol use: No    Drug use: No    Sexual activity: Not Currently     Partners: Female   Lifestyle    Physical activity     Days per week: Not on file     Minutes per session: Not on file    Stress: Not on file   Relationships    Social connections     Talks on phone: Not on file     Gets together: Not on file     Attends Mandaeism service: Not on file     Active member of club or organization: Not on file     Attends meetings of clubs or organizations:  Not on file     Relationship status: Not on file   Other Topics Concern    Not on file   Social History Narrative    Not on file     Past Surgical History:   Procedure Laterality Date    amputation toe      distal phalanx right great toe    CATARACT EXTRACTION      CORONARY STENT PLACEMENT      HERNIA REPAIR      INSERTION OF VENOUS ACCESS PORT Left 5/17/2019    Procedure: INSERTION, VENOUS ACCESS PORT;  Surgeon: Lester Trejo MD;  Location: [facility];  Service: General;  Laterality: Left;    VITRECTOMY Left 01/27/2019    Dr Tanisha Lewis       Labs:  Lab Results   Component Value Date    WBC 13.63 (H) 09/08/2020    HGB 12.3 (L) 09/08/2020    HCT 42.5 09/08/2020     (H) 09/08/2020     09/08/2020     BMP  Lab Results   Component Value Date     09/08/2020    K 4.6 09/08/2020     09/08/2020    CO2 28 09/08/2020    BUN 22 09/08/2020    CREATININE 1.2 09/08/2020    CALCIUM 9.4 09/08/2020    ANIONGAP 8 09/08/2020    ESTGFRAFRICA >60.0 09/08/2020    EGFRNONAA 53.3 (A) 09/08/2020     Lab Results   Component Value Date    ALT 28 09/08/2020    AST 16 09/08/2020    ALKPHOS 94 09/08/2020    BILITOT 1.1 (H) 09/08/2020       Lab Results   Component Value Date    IRON 53 08/28/2020    TIBC 340 08/28/2020    FERRITIN 371 (H) 08/28/2020     Lab Results   Component Value Date    NFXXOZXQ16 322 08/28/2020     Lab Results   Component Value Date    FOLATE 17.0 08/28/2020     Lab Results   Component Value Date    TSH 0.669 08/28/2020         Review of Systems   Constitutional: Positive for activity change, appetite change and fatigue. Negative for chills, diaphoresis, fever and unexpected weight change.   HENT: Negative for congestion, dental problem, drooling, ear discharge, ear pain, facial swelling, hearing loss, mouth sores, nosebleeds, postnasal drip, rhinorrhea, sinus pressure, sneezing, sore throat, tinnitus, trouble swallowing and voice change.    Eyes: Negative for photophobia, pain,  discharge, redness, itching and visual disturbance.   Respiratory: Negative for apnea, cough, choking, chest tightness, shortness of breath, wheezing and stridor.    Cardiovascular: Negative for chest pain, palpitations and leg swelling.   Gastrointestinal: Negative for abdominal distention, abdominal pain, anal bleeding, blood in stool, constipation, diarrhea, nausea, rectal pain and vomiting.   Endocrine: Negative for cold intolerance, heat intolerance, polydipsia, polyphagia and polyuria.   Genitourinary: Negative for decreased urine volume, difficulty urinating, discharge, dysuria, enuresis, flank pain, frequency, genital sores, hematuria, penile pain, penile swelling, scrotal swelling, testicular pain and urgency.   Musculoskeletal: Negative for arthralgias, back pain, gait problem, joint swelling, myalgias, neck pain and neck stiffness.   Skin: Negative for color change, pallor, rash and wound.   Allergic/Immunologic: Negative for environmental allergies, food allergies and immunocompromised state.   Neurological: Positive for weakness. Negative for dizziness, tremors, seizures, syncope, facial asymmetry, speech difficulty, light-headedness, numbness and headaches.   Hematological: Negative for adenopathy. Does not bruise/bleed easily.   Psychiatric/Behavioral: Positive for dysphoric mood. Negative for agitation, behavioral problems, confusion, decreased concentration, hallucinations, self-injury, sleep disturbance and suicidal ideas. The patient is nervous/anxious. The patient is not hyperactive.        Objective:      Physical Exam  Vitals signs reviewed.   Constitutional:       General: He is not in acute distress.     Appearance: He is well-developed. He is cachectic. He is ill-appearing. He is not diaphoretic.   HENT:      Head: Normocephalic.      Right Ear: External ear normal.      Left Ear: External ear normal.      Nose: Nose normal.      Right Sinus: No maxillary sinus tenderness or frontal sinus  tenderness.      Left Sinus: No maxillary sinus tenderness or frontal sinus tenderness.      Mouth/Throat:      Pharynx: No oropharyngeal exudate.   Eyes:      General: Lids are normal. No scleral icterus.        Right eye: No discharge.         Left eye: No discharge.      Extraocular Movements:      Right eye: Normal extraocular motion.      Left eye: Normal extraocular motion.      Conjunctiva/sclera:      Right eye: Right conjunctiva is not injected. No hemorrhage.     Left eye: Left conjunctiva is not injected. No hemorrhage.     Pupils: Pupils are equal, round, and reactive to light.   Neck:      Musculoskeletal: Normal range of motion and neck supple.      Thyroid: No thyromegaly.      Vascular: No JVD.      Trachea: No tracheal deviation.   Cardiovascular:      Rate and Rhythm: Normal rate.      Heart sounds: Normal heart sounds.   Pulmonary:      Effort: Pulmonary effort is normal. No respiratory distress.      Breath sounds: Normal breath sounds. No stridor.   Abdominal:      General: Bowel sounds are normal.      Palpations: Abdomen is soft. There is no hepatomegaly, splenomegaly or mass.      Tenderness: There is no abdominal tenderness.   Musculoskeletal: Normal range of motion.         General: No tenderness.   Lymphadenopathy:      Head:      Right side of head: No posterior auricular or occipital adenopathy.      Left side of head: No posterior auricular or occipital adenopathy.      Cervical: No cervical adenopathy.      Right cervical: No superficial, deep or posterior cervical adenopathy.     Left cervical: No superficial, deep or posterior cervical adenopathy.      Upper Body:      Right upper body: No supraclavicular adenopathy.      Left upper body: No supraclavicular adenopathy.   Skin:     General: Skin is dry.      Findings: No erythema or rash.      Nails: There is no clubbing.     Neurological:      Mental Status: He is alert and oriented to person, place, and time.      Cranial Nerves: No  cranial nerve deficit.      Coordination: Coordination normal.   Psychiatric:         Behavior: Behavior normal.         Thought Content: Thought content normal.         Judgment: Judgment normal.             Assessment:      1. Metastatic non-small cell lung cancer    2. Abnormal results of thyroid function studies     3. Malignant neoplasm of overlapping sites of right lung    4. Malignant neoplasm metastatic to right lung    5. 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 Cachexia    7. Chemotherapy management, encounter for    8. ACP (advance care planning)           Plan:     Extensive conversation with patient reviewed last 3 PET scans demonstrating mixed response.  At this point will air on the side of continuation of giving immunotherapy little options available.  Since patient had a long disease-free interval between initial diagnosis and most recent PET scan prior to the 1 on 09/11/2020 at this time will reimage in approximately 3-4 months talked about systemic chemotherapy response rates noncurative 20-30% reviewed advanced care planning with patient discussed implications with family present 40 min face-to-face time coordination of care greater 50% time face-to-face with patient and family        Ez Hroowitz Jr, MD FACP

## 2020-09-14 NOTE — DISCHARGE INSTRUCTIONS
[facility]  [address] [address]  [address]  [phone] phone     [phone] fax  Hours of Operation: Monday- Friday 8:00am- 5:00pm  After hours phone  [phone]  Hematology / Oncology Physicians on call      Dr. Carlos Manuel Noyola, LINDA Galeana, LINDA Rivero NP    Please call with any concerns regarding your appointment today.  WAYS TO HELP PREVENT INFECTION         WASH YOUR HANDS OFTEN DURING THE DAY, ESPECIALLY BEFORE YOU EAT, AFTER USING THE BATHROOM, AND AFTER TOUCHING ANIMALS     STAY AWAY FROM PEOPLE WHO HAVE ILLNESSES YOU CAN CATCH; SUCH AS COLDS, FLU, CHICKEN POX     TRY TO AVOID CROWDS     STAY AWAY FROM CHILDREN WHO RECENTLY HAVE RECEIVED LIVE VIRUS VACCINES     MAINTAIN GOOD MOUTH CARE     DO NOT SQUEEZE OR SCRATCH PIMPLES     CLEAN CUTS & SCRAPES RIGHT AWAY AND DAILY UNTIL HEALED WITH WARM WATER, SOAP & AN ANTISEPTIC     AVOID CONTACT WITH LITTER BOXES, BIRD CAGES, & FISH TANKS     AVOID STANDING WATER, IE., BIRD BATHS, FLOWER POTS/VASES, OR HUMIDIFIERS     WEAR GLOVES WHEN GARDENING OR CLEANING UP AFTER OTHERS, ESPECIALLY BABIES & SMALL CHILDREN     DO NOT EAT RAW FISH, SEAFOOD, MEAT, OR EGGS  FALL PREVENTION   Falls often occur due to slipping, tripping or losing your balance. Here are ways to reduce your risk of falling again.   Was there anything that caused your fall that can be fixed, removed or replaced?   Make your home safe by keeping walkways clear of objects you may trip over.   Use non-slip pads under rugs.   Do not walk in poorly lit areas.   Do not stand on chairs or wobbly ladders.   Use caution when reaching overhead or looking upward. This position can cause a loss of balance.   Be sure your shoes fit properly, have non-slip bottoms and are in good condition.   Be cautious when going up and down stairs, curbs, and when walking on  uneven sidewalks.   If your balance is poor, consider using a cane or walker.   If your fall was related to alcohol use, stop or limit alcohol intake.   If your fall was related to use of sleeping medicines, talk to your doctor about this. You may need to reduce your dosage at bedtime if you awaken during the night to go to the bathroom.   To reduce the need for nighttime bathroom trips:   Avoid drinking fluids for several hours before going to bed   Empty your bladder before going to bed   Men can keep a urinal at the bedside   © 6811-0265 [facility], [address] [place] [address], [place] [address]. All rights reserved. This information is not intended as a substitute for professional medical care. Always follow your healthcare professional's instructions.

## 2020-09-17 ENCOUNTER — PATIENT MESSAGE (OUTPATIENT)
Dept: DERMATOLOGY | Facility: CLINIC | Age: 85
End: 2020-09-17

## 2020-09-17 ENCOUNTER — PATIENT MESSAGE (OUTPATIENT)
Dept: HEMATOLOGY/ONCOLOGY | Facility: CLINIC | Age: 85
End: 2020-09-17

## 2020-09-28 ENCOUNTER — PATIENT OUTREACH (OUTPATIENT)
Dept: ADMINISTRATIVE | Facility: OTHER | Age: 85
End: 2020-09-28

## 2020-09-30 ENCOUNTER — OFFICE VISIT (OUTPATIENT)
Dept: ENDOCRINOLOGY | Facility: CLINIC | Age: 85
End: 2020-09-30
Payer: MEDICARE

## 2020-09-30 ENCOUNTER — TELEPHONE (OUTPATIENT)
Dept: ENDOCRINOLOGY | Facility: CLINIC | Age: 85
End: 2020-09-30

## 2020-09-30 ENCOUNTER — IMMUNIZATION (OUTPATIENT)
Dept: PHARMACY | Facility: CLINIC | Age: 85
End: 2020-09-30
Payer: MEDICARE

## 2020-09-30 VITALS
WEIGHT: 170 LBS | DIASTOLIC BLOOD PRESSURE: 59 MMHG | HEIGHT: 71 IN | SYSTOLIC BLOOD PRESSURE: 125 MMHG | BODY MASS INDEX: 23.8 KG/M2 | RESPIRATION RATE: 16 BRPM | HEART RATE: 90 BPM

## 2020-09-30 DIAGNOSIS — R94.6 ABNORMAL THYROID FUNCTION TEST: Primary | ICD-10-CM

## 2020-09-30 DIAGNOSIS — E03.9 ACQUIRED HYPOTHYROIDISM: ICD-10-CM

## 2020-09-30 PROCEDURE — 1126F PR PAIN SEVERITY QUANTIFIED, NO PAIN PRESENT: ICD-10-PCS | Mod: S$GLB,,, | Performed by: INTERNAL MEDICINE

## 2020-09-30 PROCEDURE — 1159F MED LIST DOCD IN RCRD: CPT | Mod: S$GLB,,, | Performed by: INTERNAL MEDICINE

## 2020-09-30 PROCEDURE — 99999 PR PBB SHADOW E&M-EST. PATIENT-LVL IV: ICD-10-PCS | Mod: PBBFAC,,, | Performed by: INTERNAL MEDICINE

## 2020-09-30 PROCEDURE — 99999 PR PBB SHADOW E&M-EST. PATIENT-LVL IV: CPT | Mod: PBBFAC,,, | Performed by: INTERNAL MEDICINE

## 2020-09-30 PROCEDURE — 1159F PR MEDICATION LIST DOCUMENTED IN MEDICAL RECORD: ICD-10-PCS | Mod: S$GLB,,, | Performed by: INTERNAL MEDICINE

## 2020-09-30 PROCEDURE — 1101F PT FALLS ASSESS-DOCD LE1/YR: CPT | Mod: CPTII,S$GLB,, | Performed by: INTERNAL MEDICINE

## 2020-09-30 PROCEDURE — 99203 PR OFFICE/OUTPT VISIT, NEW, LEVL III, 30-44 MIN: ICD-10-PCS | Mod: S$GLB,,, | Performed by: INTERNAL MEDICINE

## 2020-09-30 PROCEDURE — 99203 OFFICE O/P NEW LOW 30 MIN: CPT | Mod: S$GLB,,, | Performed by: INTERNAL MEDICINE

## 2020-09-30 PROCEDURE — 1126F AMNT PAIN NOTED NONE PRSNT: CPT | Mod: S$GLB,,, | Performed by: INTERNAL MEDICINE

## 2020-09-30 PROCEDURE — 1101F PR PT FALLS ASSESS DOC 0-1 FALLS W/OUT INJ PAST YR: ICD-10-PCS | Mod: CPTII,S$GLB,, | Performed by: INTERNAL MEDICINE

## 2020-09-30 NOTE — PROGRESS NOTES
Referring Provider:  Daysi Noyola NP    PCP:  Karlee Gibson MD    Reason for referral:   Hypothyroidism    CC:  Patient is not sure why he was referred.  He has no complaints.    HPI:  Brant Lees 89 y.o. male    Patient is accompanied by his granddaughter.  He has been taking levothyroxine.  He is not sure why he was referred here.  Have explained to the patient  Regarding the abnormal thyroid function and the changes in the levothyroxine dose.  From review of record  Thyroid peroxidase antibodies were positive in 2017  Levothyroxine dose was 25 mcg in 04/2017  Levothyroxine dose was 100 mcg in 09/2019  Levothyroxine dose was changed to 112 mcg on 08/05/2020  TSH was 13 in 08/05/2020  Last TSH was 0.6 in last week of August 2020    Patient has no complaints of chest pain, shortness breath, palpitations, heat intolerance, or edema.  History of lung cancer and history of bladder cancer.    Past Medical History:   Diagnosis Date    Anticoagulant long-term use     plavix    Cardiomyopathy 9/07    Ischemic    HBP (high blood pressure) 1997    History of bladder cancer 2017    history of BCG tx    Hyperlipidemia     LBP (low back pain)     Metastatic non-small cell lung cancer     Metastatic non-small cell lung cancer    MI (myocardial infarction) 12/06    Status post primary angioplasty with coronary stent 12/06    Thyroid disease        Past Surgical History:   Procedure Laterality Date    amputation toe      distal phalanx right great toe    CATARACT EXTRACTION      CORONARY STENT PLACEMENT      HERNIA REPAIR      INSERTION OF VENOUS ACCESS PORT Left 5/17/2019    Procedure: INSERTION, VENOUS ACCESS PORT;  Surgeon: Lseter Trejo MD;  Location: [facility];  Service: General;  Laterality: Left;    VITRECTOMY Left 01/27/2019    Dr Tanisha Lewis       Social History     Socioeconomic History    Marital status:      Spouse name: Not on file    Number of children: 1    Years of  education: Not on file    Highest education level: Not on file   Occupational History    Not on file   Social Needs    Financial resource strain: Not on file    Food insecurity     Worry: Not on file     Inability: Not on file    Transportation needs     Medical: Not on file     Non-medical: Not on file   Tobacco Use    Smoking status: Never Smoker    Smokeless tobacco: Never Used   Substance and Sexual Activity    Alcohol use: No    Drug use: No    Sexual activity: Not Currently     Partners: Female   Lifestyle    Physical activity     Days per week: Not on file     Minutes per session: Not on file    Stress: Not on file   Relationships    Social connections     Talks on phone: Not on file     Gets together: Not on file     Attends Restoration service: Not on file     Active member of club or organization: Not on file     Attends meetings of clubs or organizations: Not on file     Relationship status: Not on file   Other Topics Concern    Not on file   Social History Narrative    Not on file         ROS:   Thyroid problem  Lung cancer  ROS otherwise neg except for what is mentioned in the PMH, PSH and HPI    PE:  Vitals:    09/30/20 0913   BP: (!) 125/59   Pulse: 90   Resp: 16     Alert and oriented  No acute distress  Hearing loss  No acne  No Proptosis or conjunctivitis  No rash on tongue, dentures  No goitre by inspection  Thyroid gland is not palpable  No cervical lymphadenopathy  Heart reg, no gallop  Lungs cta, no wheezing  Abd soft, no tnd  No edema in lower legs  No rash  No bruises  Speech normal  Behavior normal  No tremor  No obesity  Body mass index is 23.71 kg/m².      Lab:    Lab Results   Component Value Date    TSH 0.669 08/28/2020    FREET4 1.02 08/07/2020      Ref. Range 6/4/2020 09:16 6/26/2020 09:58 7/16/2020 10:09 8/7/2020 14:12 8/28/2020 13:36   TSH Latest Ref Range: 0.400 - 4.000 uIU/mL 4.994 (H) 11.122 (H) 15.716 (H) 13.656 (H) 0.669         Lab Results   Component Value Date     CHOL 143 08/07/2020    TRIG 128 08/07/2020    HDL 42 08/07/2020    CHOLHDL 29.4 08/07/2020    TOTALCHOLEST 3.4 08/07/2020    NONHDLCHOL 101 08/07/2020     BMP  Lab Results   Component Value Date     09/08/2020    K 4.6 09/08/2020     09/08/2020    CO2 28 09/08/2020    BUN 22 09/08/2020    CREATININE 1.2 09/08/2020    CALCIUM 9.4 09/08/2020    ANIONGAP 8 09/08/2020    ESTGFRAFRICA >60.0 09/08/2020    EGFRNONAA 53.3 (A) 09/08/2020     Lab Results   Component Value Date    CREATRANDUR 110.0 08/07/2020    MICALBCREAT 30.9 (H) 08/07/2020       A/P:  Abnormal thyroid function test  Acquired hypothyroidism  History of positive thyroperoxidase antibodies  Clinically euthyroid  TSH was down 2.6 after increasing levothyroxine dose last month  To continue taking levothyroxine 112 mcg daily  I recommend that thyroid function be rechecked by blood test in 3-6 months.  No follow-up appointment will be needed with me for now.  Patient to follow-up with his primary care physician Dr. Gibson    History of lung cancer and history of bladder cancer      Pt understands the plan and instructions.

## 2020-09-30 NOTE — LETTER
September 30, 2020      [place]  [address] [place] [address]           [facility]  [address]  [place] [address]  Phone: [phone]  Fax: [phone]          Patient: Brant Lees   MR Number: [id number]   YOB: 1931   Date of Visit: 9/30/2020       Dear Daysi Noyola:    Thank you for referring Brant Lees to me for evaluation. Attached you will find relevant portions of my assessment and plan of care.    If you have questions, please do not hesitate to call me. I look forward to following Brant Lees along with you.    Sincerely,    Will Koch MD    Enclosure  CC:  No Recipients    If you would like to receive this communication electronically, please contact [email or web address] or [phone] to request more information on [employer] Link access.    For providers and/or their staff who would like to refer a patient to [facility], please contact us through our one-stop-shop provider referral line, Mayra Gallardo, at [phone].    If you feel you have received this communication in error or would no longer like to receive these types of communications, please e-mail [email or web address]

## 2020-10-19 DIAGNOSIS — E03.9 HYPOTHYROIDISM (ACQUIRED): ICD-10-CM

## 2020-10-19 RX ORDER — LEVOTHYROXINE SODIUM 112 UG/1
112 TABLET ORAL DAILY
Qty: 90 TABLET | Refills: 2 | Status: SHIPPED | OUTPATIENT
Start: 2020-10-19 | End: 2021-10-19

## 2020-10-20 ENCOUNTER — OFFICE VISIT (OUTPATIENT)
Dept: DERMATOLOGY | Facility: CLINIC | Age: 85
End: 2020-10-20
Payer: MEDICARE

## 2020-10-20 DIAGNOSIS — L13.8 LINEAR IGA BULLOUS DERMATOSIS: Primary | ICD-10-CM

## 2020-10-20 PROCEDURE — 99214 OFFICE O/P EST MOD 30 MIN: CPT | Mod: S$GLB,,, | Performed by: DERMATOLOGY

## 2020-10-20 PROCEDURE — 1159F PR MEDICATION LIST DOCUMENTED IN MEDICAL RECORD: ICD-10-PCS | Mod: S$GLB,,, | Performed by: DERMATOLOGY

## 2020-10-20 PROCEDURE — 99214 PR OFFICE/OUTPT VISIT, EST, LEVL IV, 30-39 MIN: ICD-10-PCS | Mod: S$GLB,,, | Performed by: DERMATOLOGY

## 2020-10-20 PROCEDURE — 99999 PR PBB SHADOW E&M-EST. PATIENT-LVL III: CPT | Mod: PBBFAC,,, | Performed by: DERMATOLOGY

## 2020-10-20 PROCEDURE — 1101F PR PT FALLS ASSESS DOC 0-1 FALLS W/OUT INJ PAST YR: ICD-10-PCS | Mod: CPTII,S$GLB,, | Performed by: DERMATOLOGY

## 2020-10-20 PROCEDURE — 99999 PR PBB SHADOW E&M-EST. PATIENT-LVL III: ICD-10-PCS | Mod: PBBFAC,,, | Performed by: DERMATOLOGY

## 2020-10-20 PROCEDURE — 1159F MED LIST DOCD IN RCRD: CPT | Mod: S$GLB,,, | Performed by: DERMATOLOGY

## 2020-10-20 PROCEDURE — 1101F PT FALLS ASSESS-DOCD LE1/YR: CPT | Mod: CPTII,S$GLB,, | Performed by: DERMATOLOGY

## 2020-10-20 NOTE — PROGRESS NOTES
Subjective:       Patient ID:  Brant Lees is a 89 y.o. male who presents for   Chief Complaint   Patient presents with    Itching     hands and feet      Patient of Dr. Calero's with hx of lung cancer (on Keytruda followed by Dr. Goins), linear IgA bullous dermatosis since 5/2020, last seen on 9/8/2020 by Dr. Calero and continued on dapsone 125 mg daily, xyzal, hydroxyzine, and niacinamide. Reports he was clear and itch-free for about 2 weeks after his last visit (had just completed 3 week pred taper) but then the itch returned and he has started getting blisters again, mostly on his hands and feet.  His granddaughter notes that they increased his dose of Keytruda.    Current treatment: dapsone 125 mg qD (100 mg qAM and 25 mg qHS), niacinamide, xyzal, hydroxyzine 10 mg qHS.      Prior tx: s/p prednisone x 3 weeks (Aug/Sept 2020) IM steroid, dapsone 100 mg qD, doxycycline, niacinamide OTC, xyzal, hydroxyzine 10 mg qHS, and betamethasone.        Review of Systems   Constitutional: Negative for fever, chills and fatigue.   Respiratory: Positive for shortness of breath (chronic, stable).    Gastrointestinal: Negative for nausea and vomiting.   Musculoskeletal: Negative for muscle weakness.   Skin: Positive for itching and rash. Negative for daily sunscreen use, activity-related sunscreen use and recent sunburn.   Neurological: Negative for focal weakness and numbness.   Hematologic/Lymphatic: Does not bruise/bleed easily.        Objective:    Physical Exam   Constitutional: He appears well-developed and well-nourished. No distress.   Neurological: He is alert and oriented to person, place, and time. He is not disoriented.   Psychiatric: He has a normal mood and affect.   Skin:   Areas Examined (abnormalities noted in diagram):   Scalp / Hair Palpated and Inspected  Head / Face Inspection Performed  Neck Inspection Performed  Chest / Axilla Inspection Performed  Abdomen Inspection Performed  Back Inspection  Performed  RUE Inspected  LUE Inspection Performed  RLE Inspected  LLE Inspection Performed  Nails and Digits Inspection Performed                      Diagram Legend     Erythematous scaling macule/papule c/w actinic keratosis       Vascular papule c/w angioma      Pigmented verrucoid papule/plaque c/w seborrheic keratosis      Yellow umbilicated papule c/w sebaceous hyperplasia      Irregularly shaped tan macule c/w lentigo     1-2 mm smooth white papules consistent with Milia      Movable subcutaneous cyst with punctum c/w epidermal inclusion cyst      Subcutaneous movable cyst c/w pilar cyst      Firm pink to brown papule c/w dermatofibroma      Pedunculated fleshy papule(s) c/w skin tag(s)      Evenly pigmented macule c/w junctional nevus     Mildly variegated pigmented, slightly irregular-bordered macule c/w mildly atypical nevus      Flesh colored to evenly pigmented papule c/w intradermal nevus       Pink pearly papule/plaque c/w basal cell carcinoma      Erythematous hyperkeratotic cursted plaque c/w SCC      Surgical scar with no sign of skin cancer recurrence      Open and closed comedones      Inflammatory papules and pustules      Verrucoid papule consistent consistent with wart     Erythematous eczematous patches and plaques     Dystrophic onycholytic nail with subungual debris c/w onychomycosis     Umbilicated papule    Erythematous-base heme-crusted tan verrucoid plaque consistent with inflamed seborrheic keratosis     Erythematous Silvery Scaling Plaque c/w Psoriasis     See annotation      Assessment / Plan:        Linear IgA bullous dermatosis    - suspect 2/2 Keytruda; flaring s/p pred taper (completed ~6 weeks ago), on dapsone; patient unsure if taking niacinamide (granddaughter is going to clarify and will resume taking)   - refractory to doxycycline  - patient does not think he is taking hydroxyzine. Discussed that he can restart this for severe itching and discussed risk of sedation and  falling with him and his granddaughter.  - therapy is limited as immunosuppressants will negate the effect of Keytruda.   - would recommend considering discontinuing the Keytruda, or refer him for a second opinion to an academic [facility] center (likely Dr. [facility] at [facility]) for another opinion on controlling LABD if Keytruda must be continued  - continue dapsone 125 mg for now, will message Dr. Goins to see if another steroid taper followed by chronic low dose prednisone would be ok from an onc standpoint.      Discussed risks, benefits, and adverse effects of dapsone including but not limited to hemolytic anemia, agranulocytosis, methemoglobinemia, DRESS, motor neuropathy.  Counseled on concerning symptoms to watch for.  Avoid bactrim and methotrexate while on dapsone. Check CBC, renal function and LFTs every 3-4 months.      Side effects of prednisone including osteoporosis/osteopenia, hyperglycemia, weight gain, bloating and hypertension reviewed with the patient. The patient acknowledged understanding.        F/u in 6 weeks with Dr. Calero

## 2020-10-23 ENCOUNTER — PATIENT MESSAGE (OUTPATIENT)
Dept: DERMATOLOGY | Facility: CLINIC | Age: 85
End: 2020-10-23

## 2020-10-23 DIAGNOSIS — L13.8 LINEAR IGA BULLOUS DERMATOSIS: Primary | ICD-10-CM

## 2020-10-23 RX ORDER — PREDNISONE 20 MG/1
TABLET ORAL
Qty: 42 TABLET | Refills: 0 | Status: SHIPPED | OUTPATIENT
Start: 2020-10-23 | End: 2020-11-17

## 2020-10-23 NOTE — PROGRESS NOTES
Start 3 week prednisone taper and plan to see back in clinic in 3 weeks.  Will plan to continue chronic prednisone at the lowest dose that controls his symptoms.    Side effects of prednisone including osteoporosis/osteopenia, hyperglycemia, weight gain, bloating and hypertension reviewed with the patient. Patient's granddaughter acknowledged understanding. They will monitor his blood glucose as instructed previously by his PCP and will f/u with PCP if elevations occur.

## 2020-10-23 NOTE — TELEPHONE ENCOUNTER
Called and LVM for patient's granddaughter and sent Traffio message regarding flare of LABD and Keytruda. Our treatment options for this rash are limited due to his cancer and other medical conditions.  I would recommend possible discontinuation of Keytruda if flare gets severe.  If he has to stay on the Keytruda then we can try another prednisone taper and then keep him on chronic, low-dose prednisone, which has been approved by oncology.  Beyond that, I would recommend considering a referral to an academic center for medical dermatology (Dr. Alexander). Will also try to contact granddaughter again next week.

## 2020-10-24 NOTE — PROGRESS NOTES
Subjective:       Patient ID: Brant Lees is a 89 y.o. male.    Chief Complaint: Malignant neoplasm of overlapping sites of right lung [C34.81]  HPI: We have an opportunity to see Mr. Brant Lees in Hematology Oncology clinic at [facility] on 10/24/2020.  Mr. Brant Lees is a 89 y.o. gentleman who had screening Ct lung, was found to have multiple lung lesions. PET CT showed involvement of both right and left lungs as well as hilar nodes.  Biopsy showed NSCLC squamous type.  Reported no cancer symptoms, denies pain.  Molecular analysis showed high PDL1 expression 50%.  Currently on keytruda.       Developed pruritic rash in both lower legs, used OTC benadryl cream and cortisone cream with some relief    Oncology History   Malignant neoplasm of overlapping sites of lung   4/15/2019 Initial Diagnosis    Malignant neoplasm of overlapping sites of lung     4/15/2019 Cancer Staged    Staging form: Lung, AJCC 8th Edition  - Clinical stage from 4/15/2019: Stage IV (cT3, cN3, cM1a) - Signed by iSmone Goins MD on 4/15/2019     Metastatic non-small cell lung cancer   4/15/2019 Initial Diagnosis    Metastatic non-small cell lung cancer     5/24/2019 -  Chemotherapy    Treatment Summary   Plan Name: OP PEMBROLIZUMAB 200MG Q3W  Treatment Goal: Curative  Status: Active  Start Date: 5/24/2019  End Date: 11/16/2020 (Planned)  Provider: Simone Goins MD  Chemotherapy: pembrolizumab (KEYTRUDA) 200 mg in sodium chloride 0.9% 108 mL chemo infusion, 200 mg, Intravenous, Clinic/HOD 1 time, 17 of 20 cycles  Dose modification: 400 mg (original dose 200 mg, Cycle 17), 400 mg (original dose 200 mg, Cycle 18)  Administration: 200 mg (5/24/2019), 200 mg (6/14/2019), 200 mg (7/5/2019), 200 mg (7/26/2019), 200 mg (8/16/2019), 200 mg (9/6/2019), 200 mg (9/27/2019), 200 mg (10/18/2019), 200 mg (11/8/2019), 200 mg (11/29/2019), 200 mg (12/20/2019), 200 mg (1/10/2020), 200 mg (1/31/2020), 200 mg (2/21/2020), 200 mg (6/4/2020), 200 mg  (6/26/2020), 200 mg (8/7/2020), 400 mg (9/14/2020)       Past Medical History:   Diagnosis Date    Anticoagulant long-term use     plavix    Cardiomyopathy 9/07    Ischemic    HBP (high blood pressure) 1997    History of bladder cancer 2017    history of BCG tx    Hyperlipidemia     LBP (low back pain)     Metastatic non-small cell lung cancer     Metastatic non-small cell lung cancer    MI (myocardial infarction) 12/06    Status post primary angioplasty with coronary stent 12/06    Thyroid disease      Family History   Problem Relation Age of Onset    Heart disease Mother     Cancer Mother      Social History     Socioeconomic History    Marital status:      Spouse name: Not on file    Number of children: 1    Years of education: Not on file    Highest education level: Not on file   Occupational History    Not on file   Social Needs    Financial resource strain: Not on file    Food insecurity     Worry: Not on file     Inability: Not on file    Transportation needs     Medical: Not on file     Non-medical: Not on file   Tobacco Use    Smoking status: Never Smoker    Smokeless tobacco: Never Used   Substance and Sexual Activity    Alcohol use: No    Drug use: No    Sexual activity: Not Currently     Partners: Female   Lifestyle    Physical activity     Days per week: Not on file     Minutes per session: Not on file    Stress: Not on file   Relationships    Social connections     Talks on phone: Not on file     Gets together: Not on file     Attends Mandaeism service: Not on file     Active member of club or organization: Not on file     Attends meetings of clubs or organizations: Not on file     Relationship status: Not on file   Other Topics Concern    Not on file   Social History Narrative    Not on file     Past Surgical History:   Procedure Laterality Date    amputation toe      distal phalanx right great toe    CATARACT EXTRACTION      CORONARY STENT PLACEMENT       HERNIA REPAIR      INSERTION OF VENOUS ACCESS PORT Left 5/17/2019    Procedure: INSERTION, VENOUS ACCESS PORT;  Surgeon: Lester Trejo MD;  Location: [facility];  Service: General;  Laterality: Left;    VITRECTOMY Left 01/27/2019    Dr Tanisha Lewis     Current Outpatient Medications   Medication Sig Dispense Refill    acetaminophen (TYLENOL) 325 MG tablet Take 325 mg by mouth every 6 (six) hours as needed for Pain.      amLODIPine (NORVASC) 5 MG tablet TAKE 1 TABLET TWICE DAILY 180 tablet 1    ASPIRIN (ASPIR-81 ORAL) Take 1 tablet by mouth once daily.       betamethasone dipropionate (DIPROLENE) 0.05 % ointment Apply topically 2 (two) times daily. For hands 90 g 3    blood sugar diagnostic Strp To check BG 1 times daily, to use with insurance preferred meter 100 each 4    blood-glucose meter kit To check BG 1 times daily, to use with insurance preferred meter 1 each 0    clobetasol 0.05% (TEMOVATE) 0.05 % Oint Apply topically 2 (two) times daily. 45 g 2    dapsone 100 MG Tab Take one tablet daily. Combine with 25 mg to take total of 125 mg daily 90 tablet 0    dapsone 25 MG Tab Take 1 tablet (25 mg total) by mouth once daily. Combine with 100 mg to take total of 125 mg daily 90 tablet 0    doxepin (ZONALON) 5 % cream Apply topically 3 (three) times daily. For rash on hands 45 g 3    doxycycline (MONODOX) 100 MG capsule Take twice a day with food. May cause upset stomach 20 capsule 0    lancets Misc To check BG 1 times daily, to use with insurance preferred meter 100 each 4    levocetirizine (XYZAL) 5 MG tablet Take 1 tablet each evening. 30 tablet 11    levothyroxine (SYNTHROID) 112 MCG tablet Take 1 tablet (112 mcg total) by mouth once daily. 90 tablet 2    lisinopriL (PRINIVIL,ZESTRIL) 20 MG tablet TAKE 1 TABLET EVERY DAY 90 tablet 1    mupirocin (BACTROBAN) 2 % ointment AAA bid with Q-tip. Antibiotic ointment. For sores on left thigh 60 g 1    niacin 500 MG Tab Take 500 mg by mouth every  morning.      nitroGLYCERIN (NITROSTAT) 0.4 MG SL tablet Place 0.4 mg under the tongue every 5 (five) minutes as needed for Chest pain.      omeprazole (PRILOSEC) 20 MG capsule Take 20 mg by mouth once daily.      predniSONE (DELTASONE) 20 MG tablet Take 3 pills po qam with breakfast x 1 wk then take 2 po qam with breakfast x 1 wk then take 1 po qam with breakfast x 1 wk 42 tablet 0    simvastatin (ZOCOR) 20 MG tablet Take 1 tablet (20 mg total) by mouth every evening. 90 tablet 4    triamcinolone acetonide 0.5% (KENALOG) 0.5 % Crea APPLY TOPICALLY ONCE DAILY. 30 g 1     No current facility-administered medications for this visit.        Labs:  Lab Results   Component Value Date    WBC 13.63 (H) 09/08/2020    HGB 12.3 (L) 09/08/2020    HCT 42.5 09/08/2020     (H) 09/08/2020     09/08/2020     BMP  Lab Results   Component Value Date     09/08/2020    K 4.6 09/08/2020     09/08/2020    CO2 28 09/08/2020    BUN 22 09/08/2020    CREATININE 1.2 09/08/2020    CALCIUM 9.4 09/08/2020    ANIONGAP 8 09/08/2020    ESTGFRAFRICA >60.0 09/08/2020    EGFRNONAA 53.3 (A) 09/08/2020     Lab Results   Component Value Date    ALT 28 09/08/2020    AST 16 09/08/2020    ALKPHOS 94 09/08/2020    BILITOT 1.1 (H) 09/08/2020       Lab Results   Component Value Date    IRON 53 08/28/2020    TIBC 340 08/28/2020    FERRITIN 371 (H) 08/28/2020     Lab Results   Component Value Date    MOQMUNVW98 322 08/28/2020     Lab Results   Component Value Date    FOLATE 17.0 08/28/2020     Lab Results   Component Value Date    TSH 0.669 08/28/2020       I have reviewed the radiology reports and examined the scan/xray images.    Review of Systems   Constitutional: Negative.    HENT: Negative.    Eyes: Negative.    Respiratory: Negative.    Cardiovascular: Negative.    Gastrointestinal: Negative.    Endocrine: Negative.    Genitourinary: Negative.    Musculoskeletal: Negative.    Skin: Negative.    Allergic/Immunologic: Negative.     Neurological: Negative.    Hematological: Negative.    Psychiatric/Behavioral: Negative.      ECOG SCORE    0 - Fully active-able to carry on all pre-disease performance without restriction            Objective:     Vitals:    10/26/20 1238   BP: (!) 127/59   Pulse: 73   Temp: 98.2 °F (36.8 °C)   Body mass index is 23.46 kg/m².  Physical Exam  Vitals signs and nursing note reviewed.   Constitutional:       Appearance: He is well-developed.   HENT:      Head: Normocephalic and atraumatic.   Eyes:      Conjunctiva/sclera: Conjunctivae normal.   Neck:      Musculoskeletal: Normal range of motion and neck supple.   Cardiovascular:      Rate and Rhythm: Normal rate and regular rhythm.   Pulmonary:      Effort: Pulmonary effort is normal.      Breath sounds: Normal breath sounds.   Abdominal:      General: Bowel sounds are normal.      Palpations: Abdomen is soft.   Musculoskeletal: Normal range of motion.   Skin:     General: Skin is warm and dry.   Neurological:      Mental Status: He is alert and oriented to person, place, and time.   Psychiatric:         Behavior: Behavior normal.         Thought Content: Thought content normal.         Judgment: Judgment normal.           Assessment:      1. Malignant neoplasm of overlapping sites of right lung    2. Nutritional anemia, unspecified     3. Bullous dermatosis           Plan:     Malignant neoplasm of overlapping sites of right lung  Continue on keytruda, woiuld use 200 mg every 3 weeks dose, as 400 mg every 6 weeks dose gave skin rash and bulla.  -     ipratropium-albuteroL (COMBIVENT)  mcg/actuation inhaler; Inhale 2 puffs into the lungs 3 (three) times daily. Rescue  Dispense: 4 g; Refill: 1  -     Ambulatory referral/consult to Pulmonology; Future; Expected date: 10/26/2020  -     CBC auto differential; Future; Expected date: 11/16/2020  -     Comprehensive Metabolic Panel; Future; Expected date: 11/16/2020  -     TSH; Future; Expected date: 11/16/2020  -      Ambulatory referral/consult to Home Health; Future; Expected date: 11/02/2020    Nutritional anemia, unspecified   -     TSH; Future; Expected date: 11/16/2020    Bullous dermatosis  Continue prednisone pulse per dermatology    COPD flare  Start combinvent inhaler  Pulmonology referral  [facility] for home oxygen

## 2020-10-26 ENCOUNTER — TELEPHONE (OUTPATIENT)
Dept: DERMATOLOGY | Facility: CLINIC | Age: 85
End: 2020-10-26

## 2020-10-26 ENCOUNTER — INFUSION (OUTPATIENT)
Dept: INFUSION THERAPY | Facility: HOSPITAL | Age: 85
End: 2020-10-26
Attending: INTERNAL MEDICINE
Payer: MEDICARE

## 2020-10-26 ENCOUNTER — OFFICE VISIT (OUTPATIENT)
Dept: PULMONOLOGY | Facility: CLINIC | Age: 85
End: 2020-10-26
Payer: MEDICARE

## 2020-10-26 ENCOUNTER — CLINICAL SUPPORT (OUTPATIENT)
Dept: PULMONOLOGY | Facility: CLINIC | Age: 85
End: 2020-10-26
Payer: MEDICARE

## 2020-10-26 ENCOUNTER — OFFICE VISIT (OUTPATIENT)
Dept: HEMATOLOGY/ONCOLOGY | Facility: CLINIC | Age: 85
End: 2020-10-26
Payer: MEDICARE

## 2020-10-26 VITALS
HEIGHT: 71 IN | OXYGEN SATURATION: 91 % | DIASTOLIC BLOOD PRESSURE: 64 MMHG | RESPIRATION RATE: 16 BRPM | BODY MASS INDEX: 23.55 KG/M2 | TEMPERATURE: 97 F | SYSTOLIC BLOOD PRESSURE: 124 MMHG | WEIGHT: 168.19 LBS | HEART RATE: 80 BPM

## 2020-10-26 VITALS
TEMPERATURE: 98 F | SYSTOLIC BLOOD PRESSURE: 127 MMHG | OXYGEN SATURATION: 92 % | BODY MASS INDEX: 23.55 KG/M2 | DIASTOLIC BLOOD PRESSURE: 59 MMHG | HEART RATE: 73 BPM | WEIGHT: 168.19 LBS | HEIGHT: 71 IN

## 2020-10-26 VITALS
DIASTOLIC BLOOD PRESSURE: 56 MMHG | WEIGHT: 168 LBS | HEIGHT: 69 IN | BODY MASS INDEX: 24.88 KG/M2 | WEIGHT: 168.44 LBS | HEIGHT: 69 IN | HEART RATE: 91 BPM | RESPIRATION RATE: 16 BRPM | BODY MASS INDEX: 24.95 KG/M2 | OXYGEN SATURATION: 96 % | SYSTOLIC BLOOD PRESSURE: 132 MMHG

## 2020-10-26 DIAGNOSIS — J43.1 PANLOBULAR EMPHYSEMA: Primary | Chronic | ICD-10-CM

## 2020-10-26 DIAGNOSIS — D53.9 NUTRITIONAL ANEMIA, UNSPECIFIED: ICD-10-CM

## 2020-10-26 DIAGNOSIS — C34.81 MALIGNANT NEOPLASM OF OVERLAPPING SITES OF RIGHT LUNG: Chronic | ICD-10-CM

## 2020-10-26 DIAGNOSIS — R79.81 LOW OXYGEN SATURATION: Primary | ICD-10-CM

## 2020-10-26 DIAGNOSIS — C34.81 MALIGNANT NEOPLASM OF OVERLAPPING SITES OF RIGHT LUNG: Primary | ICD-10-CM

## 2020-10-26 DIAGNOSIS — L13.9 BULLOUS DERMATOSIS: ICD-10-CM

## 2020-10-26 DIAGNOSIS — J96.11 CHRONIC RESPIRATORY FAILURE WITH HYPOXIA: Chronic | ICD-10-CM

## 2020-10-26 DIAGNOSIS — R79.81 LOW OXYGEN SATURATION: ICD-10-CM

## 2020-10-26 DIAGNOSIS — C34.90 METASTATIC NON-SMALL CELL LUNG CANCER: Primary | ICD-10-CM

## 2020-10-26 PROCEDURE — 96413 CHEMO IV INFUSION 1 HR: CPT

## 2020-10-26 PROCEDURE — 1159F PR MEDICATION LIST DOCUMENTED IN MEDICAL RECORD: ICD-10-PCS | Mod: S$GLB,,, | Performed by: INTERNAL MEDICINE

## 2020-10-26 PROCEDURE — 99215 PR OFFICE/OUTPT VISIT, EST, LEVL V, 40-54 MIN: ICD-10-PCS | Mod: 25,S$GLB,, | Performed by: INTERNAL MEDICINE

## 2020-10-26 PROCEDURE — 94618 PULMONARY STRESS TESTING: CPT | Mod: S$GLB,,, | Performed by: INTERNAL MEDICINE

## 2020-10-26 PROCEDURE — 1101F PR PT FALLS ASSESS DOC 0-1 FALLS W/OUT INJ PAST YR: ICD-10-PCS | Mod: CPTII,S$GLB,, | Performed by: INTERNAL MEDICINE

## 2020-10-26 PROCEDURE — 99999 PR PBB SHADOW E&M-EST. PATIENT-LVL I: ICD-10-PCS | Mod: PBBFAC,,,

## 2020-10-26 PROCEDURE — 99999 PR PBB SHADOW E&M-EST. PATIENT-LVL V: ICD-10-PCS | Mod: PBBFAC,,, | Performed by: INTERNAL MEDICINE

## 2020-10-26 PROCEDURE — 63600175 PHARM REV CODE 636 W HCPCS: Performed by: INTERNAL MEDICINE

## 2020-10-26 PROCEDURE — 99999 PR PBB SHADOW E&M-EST. PATIENT-LVL I: CPT | Mod: PBBFAC,,,

## 2020-10-26 PROCEDURE — 63600175 PHARM REV CODE 636 W HCPCS: Mod: JG | Performed by: INTERNAL MEDICINE

## 2020-10-26 PROCEDURE — 1126F AMNT PAIN NOTED NONE PRSNT: CPT | Mod: S$GLB,,, | Performed by: INTERNAL MEDICINE

## 2020-10-26 PROCEDURE — 1126F PR PAIN SEVERITY QUANTIFIED, NO PAIN PRESENT: ICD-10-PCS | Mod: S$GLB,,, | Performed by: INTERNAL MEDICINE

## 2020-10-26 PROCEDURE — 99999 PR PBB SHADOW E&M-EST. PATIENT-LVL V: CPT | Mod: PBBFAC,,, | Performed by: INTERNAL MEDICINE

## 2020-10-26 PROCEDURE — 99999 PR PBB SHADOW E&M-EST. PATIENT-LVL IV: CPT | Mod: PBBFAC,,, | Performed by: INTERNAL MEDICINE

## 2020-10-26 PROCEDURE — 25000003 PHARM REV CODE 250: Performed by: INTERNAL MEDICINE

## 2020-10-26 PROCEDURE — 1159F MED LIST DOCD IN RCRD: CPT | Mod: S$GLB,,, | Performed by: INTERNAL MEDICINE

## 2020-10-26 PROCEDURE — 99999 PR PBB SHADOW E&M-EST. PATIENT-LVL IV: ICD-10-PCS | Mod: PBBFAC,,, | Performed by: INTERNAL MEDICINE

## 2020-10-26 PROCEDURE — 94618 PULMONARY STRESS TESTING: ICD-10-PCS | Mod: S$GLB,,, | Performed by: INTERNAL MEDICINE

## 2020-10-26 PROCEDURE — 1101F PT FALLS ASSESS-DOCD LE1/YR: CPT | Mod: CPTII,S$GLB,, | Performed by: INTERNAL MEDICINE

## 2020-10-26 PROCEDURE — 99214 OFFICE O/P EST MOD 30 MIN: CPT | Mod: 25,S$GLB,, | Performed by: INTERNAL MEDICINE

## 2020-10-26 PROCEDURE — 99215 OFFICE O/P EST HI 40 MIN: CPT | Mod: 25,S$GLB,, | Performed by: INTERNAL MEDICINE

## 2020-10-26 PROCEDURE — 99214 PR OFFICE/OUTPT VISIT, EST, LEVL IV, 30-39 MIN: ICD-10-PCS | Mod: 25,S$GLB,, | Performed by: INTERNAL MEDICINE

## 2020-10-26 RX ORDER — HEPARIN 100 UNIT/ML
500 SYRINGE INTRAVENOUS
Status: DISCONTINUED | OUTPATIENT
Start: 2020-10-26 | End: 2020-10-26 | Stop reason: HOSPADM

## 2020-10-26 RX ORDER — SODIUM CHLORIDE 0.9 % (FLUSH) 0.9 %
10 SYRINGE (ML) INJECTION
Status: DISCONTINUED | OUTPATIENT
Start: 2020-10-26 | End: 2020-10-26 | Stop reason: HOSPADM

## 2020-10-26 RX ADMIN — SODIUM CHLORIDE 200 MG: 9 INJECTION, SOLUTION INTRAVENOUS at 01:10

## 2020-10-26 RX ADMIN — SODIUM CHLORIDE: 9 INJECTION, SOLUTION INTRAVENOUS at 01:10

## 2020-10-26 RX ADMIN — HEPARIN SODIUM (PORCINE) LOCK FLUSH IV SOLN 100 UNIT/ML 500 UNITS: 100 SOLUTION at 02:10

## 2020-10-26 NOTE — DISCHARGE INSTRUCTIONS
[facility]  [id number] [facility]  [address]  [phone] phone     [phone] fax  Hours of Operation: Monday- Friday 8:00am- 5:00pm  After hours phone  [phone]  Hematology / Oncology Physicians on call      Dr. Carlos Manuel Noyola, LINDA Galeana, LINDA Rivero NP    Please call with any concerns regarding your appointment today.  WAYS TO HELP PREVENT INFECTION         WASH YOUR HANDS OFTEN DURING THE DAY, ESPECIALLY BEFORE YOU EAT, AFTER USING THE BATHROOM, AND AFTER TOUCHING ANIMALS     STAY AWAY FROM PEOPLE WHO HAVE ILLNESSES YOU CAN CATCH; SUCH AS COLDS, FLU, CHICKEN POX     TRY TO AVOID CROWDS     STAY AWAY FROM CHILDREN WHO RECENTLY HAVE RECEIVED LIVE VIRUS VACCINES     MAINTAIN GOOD MOUTH CARE     DO NOT SQUEEZE OR SCRATCH PIMPLES     CLEAN CUTS & SCRAPES RIGHT AWAY AND DAILY UNTIL HEALED WITH WARM WATER, SOAP & AN ANTISEPTIC     AVOID CONTACT WITH LITTER BOXES, BIRD CAGES, & FISH TANKS     AVOID STANDING WATER, IE., BIRD BATHS, FLOWER POTS/VASES, OR HUMIDIFIERS     WEAR GLOVES WHEN GARDENING OR CLEANING UP AFTER OTHERS, ESPECIALLY BABIES & SMALL CHILDREN     DO NOT EAT RAW FISH, SEAFOOD, MEAT, OR EGGS  FALL PREVENTION   Falls often occur due to slipping, tripping or losing your balance. Here are ways to reduce your risk of falling again.   Was there anything that caused your fall that can be fixed, removed or replaced?   Make your home safe by keeping walkways clear of objects you may trip over.   Use non-slip pads under rugs.   Do not walk in poorly lit areas.   Do not stand on chairs or wobbly ladders.   Use caution when reaching overhead or looking upward. This position can cause a loss of balance.   Be sure your shoes fit properly, have non-slip bottoms and are in good condition.   Be cautious when going up and down stairs, curbs, and when walking on  "uneven sidewalks.   If your balance is poor, consider using a cane or walker.   If your fall was related to alcohol use, stop or limit alcohol intake.   If your fall was related to use of sleeping medicines, talk to your doctor about this. You may need to reduce your dosage at bedtime if you awaken during the night to go to the bathroom.   To reduce the need for nighttime bathroom trips:   Avoid drinking fluids for several hours before going to bed   Empty your bladder before going to bed   Men can keep a urinal at the bedside   © 4782-7549 [employer] [facility], [address] [place] [address]. All rights reserved. This information is not intended as a substitute for professional medical care. Always follow your healthcare professional's instructions.    Support Groups/Classes    Support groups and classes are being offered at the   [facility] and [facility]    "Cooking with Cancer" (Nutrition Class):  Second Wednesday of each month   at noon at the [facility].  Metastatic Support Group:  Third Tuesday of each month   at noon at the [facility].  Next Steps Class/Group: Second and fourth Thursday of each month at noon at the [facility].  [facility] (Breast Cancer Support Group): First Tuesday of each month   at 5:30pm at the [facility] location.  [facility]: [facility]: Second and third Tuesday of each month from 7:30am - 2pm.  [facility]: First and fourth Tuesday of each month from 7:30am - 2pm    If you are interested in attending or would like more information please ask our social workers or your nurse!    "

## 2020-10-26 NOTE — ASSESSMENT & PLAN NOTE
EMPHYSEMA ROS: taking medications as instructed, no medication side effects noted.   New concerns: None.   Exam: appears well, vitals normal, no respiratory distress, acyanotic, normal RR.   Assessment:  EMPHYSEMA  stable.   Plan: ALBUTEROL. Current treatment plan is effective, no change in therapy. PFT in 1 months.

## 2020-10-26 NOTE — PATIENT INSTRUCTIONS
Chronic Lung Disease: If Oxygen Is Prescribed   Supplemental oxygen is prescribed if tests show that the level of oxygen in your blood is too low. If the level stays too low for too long, serious problems can develop in many parts of the body. Supplemental oxygen helps to relieve your symptoms and prevent future problems by getting more oxygen to the blood. Depending on your test results, you may need oxygen all the time. Or it may only be needed during certain activities, such as exercise or sleep. When oxygen is prescribed, youll be referred to a medical equipment company. They will set up the oxygen unit and teach you how to use it.  Nasal cannula     A nasal cannula should be placed in the nose with the prongs arching toward you.     Oxygen is most often inhaled through a nasal cannula (lightweight tube with two hollow prongs that fit just inside the nose).  Types of supplemental oxygen    Compressed oxygen   Oxygen concentrator   Liquid oxygen   Prescribed oxygen comes in several forms. You may use more than one type, depending on when you need oxygen:  · Compressed oxygen is oxygen gas stored in a tank. Because the oxygen is stored under pressure, these tanks must be handled carefully. Gauges on the tank can be used to adjust the oxygen flow rate. Your healthcare provider will determine what this should be. Small tanks can be carried. Larger tanks are on wheels and can be pulled around the house.  · An oxygen concentrator is a machine about the size of a large suitcase. It plugs into an electrical outlet. (A back-up oxygen supply is recommended in case of a power outage.) The machine takes oxygen from the air and concentrates it. Its then delivered to you through plastic tubing. The tubing is long enough so that you can move around the house. When youre using the concentrator, it must be kept somewhere that has a good supply of fresh air. (Dont keep it in a confined space, like a closet.) You may be set  up on a concentrator if you need oxygen all the time or while youre sleeping.  · Liquid oxygen results when oxygen gas is cooled to a very low temperature. Its kept in special containers that maintain this low temperature. When you use liquid oxygen, its warmed and becomes gas before reaching the cannula. Most tanks come with a portable unit that you can carry or pull on a cart. Some of these weigh only a few pounds. Liquid oxygen units are easy to carry around. If you need oxygen all the time or during activity, this kind of unit can help you stay active.  Oxygen is prescribed just for you  Your healthcare provider will prescribe oxygen based on your needs. Here are a few things you should know:  · A therapist from the medical equipment company will explain when to use oxygen and what type to use. Youll be taught how to use and maintain your oxygen equipment.  · You must use the exact rate of oxygen prescribed for each activity. Dont increase or decrease the amount without asking your healthcare provider first.  · Supplemental oxygen is a medicine. Its not addictive and causes no side effects when used as directed.   Date Last Reviewed: 5/1/2016  © 2953-9642 [employer] [place]. [address] [place] [address], [place] [address]. All rights reserved. This information is not intended as a substitute for professional medical care. Always follow your healthcare professional's instructions.

## 2020-10-26 NOTE — PROCEDURES
"[facility] - [facility]  Six Minute Walk     SUMMARY     Ordering Provider: Dr Blunt   Interpreting Provider: Dr Blunt  Performing nurse/tech/RT: DIPAK Rodriguez RRT  Diagnosis: (low oxygen saturation)  Height: 5' 9" (175.3 cm)  Weight: 76.4 kg (168 lb 6.9 oz)  BMI (Calculated): 24.9   Patient Race:             Phase Oxygen Assessment Supplemental O2 Heart   Rate Blood Pressure Nathanael Dyspnea Scale Rating   Resting 92 % Room Air 84 bpm 133/66 0   Exercise        Minute        1 86 %(placed on NC 2lpm) Room Air 104 bpm     2 85 %(increased to 3lpm) 2 L/M 110 bpm     3 87 %(increased to 4lpm) 3 L/M 114 bpm     4 87 %(inceased to 6lpm) 4 L/M 112 bpm     5 89 % 6 L/M 117 bpm     6  90 % 6 L/M 118 bpm (!) 154/121 2   Recovery        Minute        1 93 % 6 L/M 98 bpm     2 96 % 6 L/M 88 bpm     3 96 % 6 L/M 87 bpm     4 96 % 6 L/M 91 bpm 132/56 1     Six Minute Walk Summary  6MWT Status: completed without stopping  Patient Reported: (light dyspnea)     Interpretation:  Did the patient stop or pause?: No                                         Total Time Walked (Calculated): 360 seconds  Final Partial Lap Distance (feet): 100 feet  Total Distance Meters (Calculated): 274.32 meters  Predicted Distance Meters (Calculated): 436.78 meters  Percentage of Predicted (Calculated): 62.81  Peak VO2 (Calculated): 12.21  Mets: 3.49  Has The Patient Had a Previous Six Minute Walk Test?: No       Previous 6MWT Results  Has The Patient Had a Previous Six Minute Walk Test?: No       PHYSICIAN INTERPRETATION AND COMMENTS:    Six minute walk distance is 274.32 / 436.78 meters  (62.81 % predicted) with very light dyspnea.      Peak VO2 during walking was 12.21  Ml/min/kg [ 3.49 METS].      Baseline oxygen saturation (at rest) was 92 %.  Lowest oxygen saturation during walking was 85 %.      During exercise, there was significant desaturation while breathing room air.     A desaturation event was defined as a decrease of saturation by 4 or " more.  Patient was placed on supplemental oxygen by nasal canula during walking.      Oxygen saturation improved to 90% with oxygen supplementation at 6  L /min.        Both blood pressure and heart rate increased significantly with walking.  Normotension was present prior to exercise.  This may represent  a hypertensive response to exercise  The patient did not report non-pulmonary symptoms during exercise    There was Significant exercise impairment during walking.  Significant exercise impairment is likely due to desaturation.  The patient did complete the study, walking 360 seconds of the 360 second test.  The patient  required oxygen supplementation during walking.  The patient may benefit from using supplemental oxygen during exertion.  No previous study performed.  Based upon age and body mass index, exercise capacity is less than predicted.

## 2020-10-26 NOTE — ASSESSMENT & PLAN NOTE
Metastatic non-small cell lung cancer with PD L1 expression more than 50%  Stable disease on PET SCAN    Plan per oncology.

## 2020-10-26 NOTE — PROGRESS NOTES
Subjective:      Patient ID: Brant Lees is a 89 y.o. male.    Patient Active Problem List   Diagnosis    Hyperlipidemia    LBP (low back pain)    Cardiomyopathy    HBP (high blood pressure)    Status post primary angioplasty with coronary stent    Multiple lung nodules    Lesion of bladder    Hypothyroidism due to Hashimoto's thyroiditis    Malignant neoplasm of overlapping sites of lung    Metastatic non-small cell lung cancer    History of bladder cancer    CT compatible an MRI safe power Port-A-Cath in place    Panlobular emphysema    Chronic renal failure, stage 3 (moderate)    Cachexia    Chemotherapy management, encounter for    Malignant neoplasm metastatic to right lung    Exudative age-related macular degeneration of both eyes with inactive scar    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    Chief Complaint: Emphysema and Lung Cancer    HPI    Follow up for Emphyesema, Lung cancer    6MWT reviewed with patient who voiced understanding    Patients reports NYHA II dyspnea      He reports excessive fatigue.   He denies  hemoptysis,  pain with breathing, wheezing, asthma.      Immunization status reviewed and up to date.        Oncology History   Malignant neoplasm of overlapping sites of lung   4/15/2019 Initial Diagnosis    Malignant neoplasm of overlapping sites of lung     4/15/2019 Cancer Staged    Staging form: Lung, AJCC 8th Edition  - Clinical stage from 4/15/2019: Stage IV (cT3, cN3, cM1a) - Signed by Simone Goins MD on 4/15/2019     Metastatic non-small cell lung cancer   4/15/2019 Initial Diagnosis    Metastatic non-small cell lung cancer     5/24/2019 -  Chemotherapy    Treatment Summary   Plan Name: OP PEMBROLIZUMAB 200MG Q3W  Treatment Goal: Curative  Status: Active  Start Date: 5/24/2019  End Date: 1/18/2021  (Planned)  Provider: Simone Goins MD  Chemotherapy: pembrolizumab (KEYTRUDA) 200 mg in sodium chloride 0.9% 108 mL chemo infusion, 200 mg, Intravenous, Clinic/HOD 1 time, 18 of 22 cycles  Dose modification: 400 mg (original dose 200 mg, Cycle 17), 400 mg (original dose 200 mg, Cycle 18), 200 mg (original dose 200 mg, Cycle 18)  Administration: 200 mg (5/24/2019), 200 mg (6/14/2019), 200 mg (7/5/2019), 200 mg (7/26/2019), 200 mg (8/16/2019), 200 mg (9/6/2019), 200 mg (9/27/2019), 200 mg (10/18/2019), 200 mg (11/8/2019), 200 mg (11/29/2019), 200 mg (12/20/2019), 200 mg (1/10/2020), 200 mg (1/31/2020), 200 mg (2/21/2020), 200 mg (6/4/2020), 200 mg (6/26/2020), 200 mg (8/7/2020), 400 mg (9/14/2020), 200 mg (10/26/2020)            A full  review of systems, past , family  and social histories was performed except as mentioned in the note above, these are non contributory to the main issues discussed today.       Previous Report Reviewed: office notes     The following portions of the patient's history were reviewed and updated as appropriate: He  has a past medical history of Anticoagulant long-term use, Cardiomyopathy (9/07), HBP (high blood pressure) (1997), History of bladder cancer (2017), Hyperlipidemia, LBP (low back pain), Metastatic non-small cell lung cancer, MI (myocardial infarction) (12/06), Status post primary angioplasty with coronary stent (12/06), and Thyroid disease.  He  has a past surgical history that includes Hernia repair; Coronary stent placement; Insertion of venous access port (Left, 5/17/2019); Cataract extraction; amputation toe; and Vitrectomy (Left, 01/27/2019).  His family history includes Cancer in his mother; Heart disease in his mother.  He  reports that he quit smoking about 40 years ago. His smoking use included cigarettes and pipe. He smoked 2.00 packs per day. He has never used smokeless tobacco. He reports that he does not drink alcohol or use drugs.  He has a current medication  "list which includes the following prescription(s): acetaminophen, amlodipine, aspirin, betamethasone dipropionate, blood sugar diagnostic, blood-glucose meter, clobetasol 0.05%, dapsone, dapsone, doxepin, doxycycline, ipratropium-albuterol, lancets, levocetirizine, levothyroxine, lisinopril, mupirocin, niacin, nitroglycerin, omeprazole, prednisone, simvastatin, and triamcinolone acetonide 0.5%.  He has No Known Allergies..    Review of Systems   Constitutional: Negative for fever, chills, fatigue and night sweats.   HENT: Negative for nosebleeds, rhinorrhea, sore throat, trouble swallowing, congestion and hearing loss.    Eyes: Negative for itching.   Respiratory: Positive for cough. Negative for snoring, sputum production, chest tightness, wheezing and dyspnea on extertion.    Cardiovascular: Negative for chest pain and leg swelling.   Genitourinary: Negative for difficulty urinating.   Endocrine: Negative for cold intolerance and heat intolerance.    Musculoskeletal: Positive for arthralgias.   Skin: Negative for rash.   Gastrointestinal: Negative for nausea, vomiting and acid reflux.   Neurological: Negative for dizziness, syncope, light-headedness and headaches.   Hematological: Does not bruise/bleed easily.   Psychiatric/Behavioral: The patient is not nervous/anxious.    All other systems reviewed and are negative.     Objective:     BP (!) 132/56   Pulse 91   Resp 16   Ht 5' 9" (1.753 m)   Wt 76.2 kg (168 lb)   SpO2 96%   BMI 24.81 kg/m²   Body mass index is 24.81 kg/m².    Physical Exam  Constitutional:       Appearance: He is well-developed.   HENT:      Head: Normocephalic and atraumatic.      Right Ear: Tympanic membrane, ear canal and external ear normal.      Left Ear: Tympanic membrane, ear canal and external ear normal.      Nose: Nose normal.      Mouth/Throat:      Pharynx: No oropharyngeal exudate.   Eyes:      Conjunctiva/sclera: Conjunctivae normal.   Neck:      Musculoskeletal: Neck supple. "      Thyroid: No thyromegaly.   Cardiovascular:      Rate and Rhythm: Normal rate and regular rhythm.      Heart sounds: Normal heart sounds.   Pulmonary:      Effort: Pulmonary effort is normal.      Breath sounds: No wheezing.   Lymphadenopathy:      Cervical: No cervical adenopathy.   Skin:     General: Skin is warm and dry.   Neurological:      Mental Status: He is alert and oriented to person, place, and time.   Psychiatric:         Behavior: Behavior normal.         Personal Diagnostic Review    NM PET CT ROUTINE: 09/11/20:   1. Overall some interval worsening with slight interval increase in size and FDG avidity of subpleural right lower lobe nodule.  2. Some interval worsening mediastinal and left hilar adenopathy as above.  3. Large mass in the left lower lobe otherwise does not appear significantly changed.    Six Minute Walk Test: 10/26/20:       PHYSICIAN INTERPRETATION AND COMMENTS:  Six minute walk distance is 274.32 / 436.78 meters  (62.81 % predicted) with very light dyspnea.     Peak VO2 during walking was 12.21  Ml/min/kg [ 3.49 METS].     Baseline oxygen saturation (at rest) was 92 %.  Lowest oxygen saturation during walking was 85 %.     During exercise, there was significant desaturation while breathing room air.    A desaturation event was defined as a decrease of saturation by 4 or more.  Patient was placed on supplemental oxygen by nasal canula during walking.     Oxygen saturation improved to 90% with oxygen supplementation at 6  L /min.      Both blood pressure and heart rate increased significantly with walking.  Normotension was present prior to exercise.  This may represent  a hypertensive response to exercise  The patient did not report non-pulmonary symptoms during exercise    There was Significant exercise impairment during walking.  Significant exercise impairment is likely due to desaturation.  The patient did complete the study, walking 360 seconds of the 360 second test.  The  patient  required oxygen supplementation during walking.  The patient may benefit from using supplemental oxygen during exertion.  No previous study performed.  Based upon age and body mass index, exercise capacity is less than predicted.          Assessment / plan :       Discussed diagnosis, its evaluation, treatment and usual course. All questions answered.    Problem List Items Addressed This Visit        Pulmonary    Chronic respiratory failure with hypoxia (Chronic)    Current Assessment & Plan     Start supplemental OXYGEN @ 6 L /MIN   ONSAT on room air.     Repat 6MWT in 1 monrh         Relevant Orders    OXYGEN FOR HOME USE    Pulse oximetry overnight    Pulmonary stress test    Panlobular emphysema - Primary    Current Assessment & Plan     EMPHYSEMA ROS: taking medications as instructed, no medication side effects noted.   New concerns: None.   Exam: appears well, vitals normal, no respiratory distress, acyanotic, normal RR.   Assessment:  EMPHYSEMA  stable.   Plan: ALBUTEROL. Current treatment plan is effective, no change in therapy. PFT in 1 months.          Relevant Orders    Pulse oximetry overnight    Complete PFT without bronchodilator       Oncology    Malignant neoplasm of overlapping sites of lung    Current Assessment & Plan     Metastatic non-small cell lung cancer with PD L1 expression more than 50%  Stable disease on PET SCAN    Plan per oncology.              TIME SPENT WITH PATIENT: Time spent: 40 minutes in face to face  discussion concerning diagnosis, prognosis, review of lab and test results, benefits of treatment as well as management of disease, counseling of patient and coordination of care between various health  care providers . Greater than half the time spent was used for coordination of care and counseling of patient.       Follow up in about 1 month (around 11/26/2020) for Emphysema, Lung Cancer, Chronic Respiratory Failure.

## 2020-10-26 NOTE — PLAN OF CARE
"  Problem: Neutropenia (Chemotherapy Effects)  Goal: Absence of Infection  Outcome: Ongoing, Progressing  Intervention: Prevent Infection and Maximize Resistance  Flowsheets (Taken 10/26/2020 1324)  Infection Prevention:   environmental surveillance performed   equipment surfaces disinfected     Problem: Fall Injury Risk  Goal: Absence of Fall and Fall-Related Injury  Outcome: Ongoing, Progressing     Problem: Adult Inpatient Plan of Care  Goal: Plan of Care Review  Outcome: Ongoing, Progressing  Flowsheets (Taken 10/26/2020 1324)  Plan of Care Reviewed With: patient  Goal: Patient-Specific Goal (Individualization)  Outcome: Ongoing, Progressing  Flowsheets (Taken 10/26/2020 1324)  Individualized Care Needs: pt likes pillow/blanket, and ice for mediport access  Anxieties, Fears or Concerns: none voiced  Patient-Specific Goals (Include Timeframe): to tolerate treatment today    Pt has no complaints today, rash on bilateral hands are healing with no redness noted. Pt reports " I am taking steroids for my rash".      "

## 2020-10-26 NOTE — TELEPHONE ENCOUNTER
----- Message from Angeles Madden MD sent at 10/23/2020  5:54 PM CDT -----  Regarding: Appointment  Please schedule appt in 3 weeks with me if patient is OK with [facility] location, or with another derm MD if he wants to keep coming to this location.

## 2020-10-27 ENCOUNTER — TELEPHONE (OUTPATIENT)
Dept: PULMONOLOGY | Facility: CLINIC | Age: 85
End: 2020-10-27

## 2020-10-27 PROCEDURE — G0180 PR HOME HEALTH MD CERTIFICATION: ICD-10-PCS | Mod: ,,, | Performed by: INTERNAL MEDICINE

## 2020-10-27 PROCEDURE — G0180 MD CERTIFICATION HHA PATIENT: HCPCS | Mod: ,,, | Performed by: INTERNAL MEDICINE

## 2020-10-27 NOTE — TELEPHONE ENCOUNTER
----- Message from Selma Avendaño sent at 10/27/2020 12:06 PM CDT -----  Regarding: [facility]  Would like to consult with nurse to have orders for Portable oxygen concentrator fax over [phone], if any questions give a call back at [phone].

## 2020-10-27 NOTE — TELEPHONE ENCOUNTER
Spoke with [facility] explained that patient's oxygen requirements was 6L continuous and a battery operated concentrator would not be sufficient

## 2020-10-28 ENCOUNTER — CLINICAL SUPPORT (OUTPATIENT)
Dept: PULMONOLOGY | Facility: CLINIC | Age: 85
End: 2020-10-28
Payer: MEDICARE

## 2020-10-28 DIAGNOSIS — J43.1 PANLOBULAR EMPHYSEMA: Chronic | ICD-10-CM

## 2020-10-28 DIAGNOSIS — J96.11 CHRONIC RESPIRATORY FAILURE WITH HYPOXIA: Chronic | ICD-10-CM

## 2020-10-30 DIAGNOSIS — J43.1 PANLOBULAR EMPHYSEMA: Primary | ICD-10-CM

## 2020-10-30 RX ORDER — ALBUTEROL SULFATE 1.25 MG/3ML
1.25 SOLUTION RESPIRATORY (INHALATION) EVERY 6 HOURS PRN
Qty: 1 BOX | Refills: 0 | Status: SHIPPED | OUTPATIENT
Start: 2020-10-30 | End: 2021-10-30

## 2020-11-04 ENCOUNTER — TELEPHONE (OUTPATIENT)
Dept: HEMATOLOGY/ONCOLOGY | Facility: CLINIC | Age: 85
End: 2020-11-04

## 2020-11-04 DIAGNOSIS — J96.11 CHRONIC RESPIRATORY FAILURE WITH HYPOXIA: Primary | Chronic | ICD-10-CM

## 2020-11-04 RX ORDER — ALBUTEROL SULFATE 90 UG/1
2 AEROSOL, METERED RESPIRATORY (INHALATION) EVERY 8 HOURS
Status: ACTIVE | OUTPATIENT
Start: 2020-11-05

## 2020-11-04 NOTE — TELEPHONE ENCOUNTER
----- Message from Simone Goins MD sent at 11/4/2020  4:14 PM CST -----  Contact: Samy Anderson, may we refer to see Pulmonology pleae  ----- Message -----  From: Rona Herrera LPN  Sent: 11/4/2020   4:12 PM CST  To: Simone Goins MD      ----- Message -----  From: Hailey Shirley  Sent: 11/4/2020  12:00 PM CST  To: [facility] Staff    Home health called in regards to medication. States pt cannot afford Duneb & wants to know if generic albuterol inhaler or some alternative could be called in instead. Please call back at [phone] or [phone]. Thanks jh

## 2020-11-04 NOTE — TELEPHONE ENCOUNTER
Attempted to contact [facility] to inform them that Dr. Goins would like pt to be referred to Pulmonary.

## 2020-11-04 NOTE — PROGRESS NOTES
Patient returned overnight pulse ox monitor. No data was recorded. Message sent to Dr. Blunt's nurse.

## 2020-11-05 ENCOUNTER — EXTERNAL HOME HEALTH (OUTPATIENT)
Dept: HOME HEALTH SERVICES | Facility: HOSPITAL | Age: 85
End: 2020-11-05
Payer: MEDICARE

## 2020-11-09 ENCOUNTER — TELEPHONE (OUTPATIENT)
Dept: PULMONOLOGY | Facility: CLINIC | Age: 85
End: 2020-11-09

## 2020-11-09 ENCOUNTER — PATIENT OUTREACH (OUTPATIENT)
Dept: ADMINISTRATIVE | Facility: OTHER | Age: 85
End: 2020-11-09

## 2020-11-09 DIAGNOSIS — J43.1 PANLOBULAR EMPHYSEMA: ICD-10-CM

## 2020-11-09 DIAGNOSIS — J96.11 CHRONIC RESPIRATORY FAILURE WITH HYPOXIA: Primary | ICD-10-CM

## 2020-11-09 DIAGNOSIS — C34.90 METASTATIC NON-SMALL CELL LUNG CANCER: ICD-10-CM

## 2020-11-09 NOTE — TELEPHONE ENCOUNTER
----- Message from Cassandra Lund sent at 11/9/2020  2:14 PM CST -----  Type:  Needs Medical Advice    Who Called:  Pt granddaughter (Tamara)  Symptoms (please be specific):  Pt did a test overnight regarding if he is needing oxygen//they have not gotten any results yet   How long has patient had these symptoms:     Pharmacy name and phone #:     Would the patient rather a call back or a response via [facility]?   Call back   Best Call Back Number:    [phone]  Additional Information:   please call to discuss///shelly/jourdan

## 2020-11-10 ENCOUNTER — CLINICAL SUPPORT (OUTPATIENT)
Dept: PULMONOLOGY | Facility: CLINIC | Age: 85
End: 2020-11-10
Payer: MEDICARE

## 2020-11-10 ENCOUNTER — OFFICE VISIT (OUTPATIENT)
Dept: PODIATRY | Facility: CLINIC | Age: 85
End: 2020-11-10
Payer: MEDICARE

## 2020-11-10 VITALS
WEIGHT: 168 LBS | SYSTOLIC BLOOD PRESSURE: 138 MMHG | DIASTOLIC BLOOD PRESSURE: 62 MMHG | BODY MASS INDEX: 24.88 KG/M2 | HEIGHT: 69 IN | HEART RATE: 61 BPM

## 2020-11-10 DIAGNOSIS — B35.1 ONYCHOMYCOSIS: ICD-10-CM

## 2020-11-10 DIAGNOSIS — Z89.421 HISTORY OF PARTIAL AMPUTATION OF TOE OF RIGHT FOOT: ICD-10-CM

## 2020-11-10 DIAGNOSIS — G60.9 IDIOPATHIC PERIPHERAL NEUROPATHY: Primary | ICD-10-CM

## 2020-11-10 DIAGNOSIS — C34.90 METASTATIC NON-SMALL CELL LUNG CANCER: ICD-10-CM

## 2020-11-10 DIAGNOSIS — L84 CALLUS: ICD-10-CM

## 2020-11-10 DIAGNOSIS — J43.1 PANLOBULAR EMPHYSEMA: ICD-10-CM

## 2020-11-10 DIAGNOSIS — C34.81 MALIGNANT NEOPLASM OF OVERLAPPING SITES OF RIGHT LUNG: ICD-10-CM

## 2020-11-10 PROCEDURE — 94762 PR NONINVASV OXYGEN SATUR, CONT: ICD-10-PCS | Mod: S$GLB,,, | Performed by: INTERNAL MEDICINE

## 2020-11-10 PROCEDURE — 94762 N-INVAS EAR/PLS OXIMTRY CONT: CPT | Mod: S$GLB,,, | Performed by: INTERNAL MEDICINE

## 2020-11-10 PROCEDURE — 11055 PARING/CUTG B9 HYPRKER LES 1: CPT | Mod: Q7,S$GLB,, | Performed by: PODIATRIST

## 2020-11-10 PROCEDURE — 11721 DEBRIDE NAIL 6 OR MORE: CPT | Mod: 59,Q7,S$GLB, | Performed by: PODIATRIST

## 2020-11-10 PROCEDURE — 99999 PR PBB SHADOW E&M-EST. PATIENT-LVL IV: CPT | Mod: PBBFAC,,, | Performed by: PODIATRIST

## 2020-11-10 PROCEDURE — 11721 PR DEBRIDEMENT OF NAILS, 6 OR MORE: ICD-10-PCS | Mod: 59,Q7,S$GLB, | Performed by: PODIATRIST

## 2020-11-10 PROCEDURE — 99499 UNLISTED E&M SERVICE: CPT | Mod: S$GLB,,, | Performed by: PODIATRIST

## 2020-11-10 PROCEDURE — 99999 PR PBB SHADOW E&M-EST. PATIENT-LVL IV: ICD-10-PCS | Mod: PBBFAC,,, | Performed by: PODIATRIST

## 2020-11-10 PROCEDURE — 99499 NO LOS: ICD-10-PCS | Mod: S$GLB,,, | Performed by: PODIATRIST

## 2020-11-10 PROCEDURE — 11055 PR TRIM HYPERKERATOTIC SKIN LESION, ONE: ICD-10-PCS | Mod: Q7,S$GLB,, | Performed by: PODIATRIST

## 2020-11-10 NOTE — PROGRESS NOTES
Health Maintenance Due   Topic Date Due    TETANUS VACCINE  01/29/1949    Shingles Vaccine (1 of 2) 01/29/1981     Updates were requested from care everywhere.  Chart was reviewed for overdue Proactive [facility] Encounters (JESIKA) topics (CRS, Breast Cancer Screening, Eye exam)  Health Maintenance has been updated.  LINKS immunization registry triggered.  Immunizations were reconciled.

## 2020-11-10 NOTE — PROGRESS NOTES
Subjective:       Patient ID: Brant Lees is a 89 y.o. male.    Chief Complaint: Routine Foot Care (0/10 casual shoe w/socks non diabetic pt pcp Dr. Gibson.)      HPI: Patient presents to the office with the chief complaint of elongated, thickened and dystrophic nail plates to the B/L foot. This patient does have Peripheral Neuropathy. Patient does follow with Primary Care for management of comorbid states. This patient's PMD is Karlee Gibson MD. Patient Endocrinologist is Will Koch MD. Patient last saw this provider on 20.0    Review of patient's allergies indicates:  No Known Allergies    Past Medical History:   Diagnosis Date    Anticoagulant long-term use     plavix    Cardiomyopathy     Ischemic    HBP (high blood pressure)     History of bladder cancer     history of BCG tx    Hyperlipidemia     LBP (low back pain)     Metastatic non-small cell lung cancer     Metastatic non-small cell lung cancer    MI (myocardial infarction)     Status post primary angioplasty with coronary stent     Thyroid disease        Family History   Problem Relation Age of Onset    Heart disease Mother     Cancer Mother        Social History     Socioeconomic History    Marital status:      Spouse name: Not on file    Number of children: 1    Years of education: Not on file    Highest education level: Not on file   Occupational History    Not on file   Social Needs    Financial resource strain: Not on file    Food insecurity     Worry: Not on file     Inability: Not on file    Transportation needs     Medical: Not on file     Non-medical: Not on file   Tobacco Use    Smoking status: Former Smoker     Packs/day: 2.00     Types: Cigarettes, Pipe     Quit date: 10/26/1980     Years since quittin.0    Smokeless tobacco: Never Used   Substance and Sexual Activity    Alcohol use: No    Drug use: No    Sexual activity: Not Currently     Partners: Female   Lifestyle     "Physical activity     Days per week: Not on file     Minutes per session: Not on file    Stress: Not on file   Relationships    Social connections     Talks on phone: Not on file     Gets together: Not on file     Attends Anabaptism service: Not on file     Active member of club or organization: Not on file     Attends meetings of clubs or organizations: Not on file     Relationship status: Not on file   Other Topics Concern    Not on file   Social History Narrative    Not on file       Past Surgical History:   Procedure Laterality Date    amputation toe      distal phalanx right great toe    CATARACT EXTRACTION      CORONARY STENT PLACEMENT      HERNIA REPAIR      INSERTION OF VENOUS ACCESS PORT Left 5/17/2019    Procedure: INSERTION, VENOUS ACCESS PORT;  Surgeon: Lester Trejo MD;  Location: [facility];  Service: General;  Laterality: Left;    VITRECTOMY Left 01/27/2019    Dr Tanisha Lewis       Review of Systems   Constitutional: Negative for chills, fatigue and fever.   HENT: Negative for hearing loss.    Eyes: Negative for photophobia and visual disturbance.        Legal blindness   Respiratory: Negative for cough, chest tightness, shortness of breath and wheezing.    Cardiovascular: Positive for leg swelling. Negative for chest pain and palpitations.   Gastrointestinal: Negative for constipation, diarrhea, nausea and vomiting.   Endocrine: Negative for cold intolerance and heat intolerance.   Genitourinary: Negative for flank pain.   Musculoskeletal: Positive for arthralgias, back pain and gait problem. Negative for neck pain and neck stiffness.   Skin: Positive for rash and wound.   Neurological: Positive for numbness. Negative for light-headedness and headaches.   Psychiatric/Behavioral: Negative for sleep disturbance.          Objective:   /62   Pulse 61   Ht 5' 9" (1.753 m)   Wt 76.2 kg (167 lb 15.9 oz)   BMI 24.81 kg/m²     Physical Exam  LOWER EXTREMITY PHYSICAL " EXAMINATION  DERMATOLOGY: On the left foot, nails 1, 2, 3, 4 and 5 are suggestive of onychomycotic changes. On the right foot, nails 2, 3, 4 and 5 are suggestive of onychomycotic changes. These nail plates are thickened, are dystrophic, chaulky in appearance and malodorous with substantial subungual debris. These nail plates are yellow to brown in appearance. The remaining nail plates are elongated and do not have suggestive clinical features of onychomycosis. Callus, distal right 3rd toe. Mild xerosis is noted.     NEUROLOGY: Protective sensation is not intact to the left and right plantar surfaces of the foot and digits, as the patient has no sensation/detection at greater than 4 distinct points of contact with 5.07 [place] monofilament. Sensation to light touch is intact on the left and right foot. Proprioception is intact, bilateral. Sensation to pin prick is reduced to absent. Vibratory sensation is diminished.    VASCULAR: Warm to warm, proximal to distal.   Mild edema, bilateral lower extremity. Capillary refill time is within normal limits and less  than 3 seconds. Hair growth is sparse on the left and right dorsal foot and at the digits. The left dorsalis pedis pulse is 1/4 and on the right is 1/4, and the left posterior tibial pulse is 1/4 and is 1/4 on the right.      ORTHOPEDIC: Partial amputation, right great toe.    Assessment:     1. Idiopathic peripheral neuropathy    2. Malignant neoplasm of overlapping sites of right lung    3. Metastatic non-small cell lung cancer    4. Onychomycosis    5. Callus    6. History of partial amputation of toe of right foot        Plan:     Idiopathic peripheral neuropathy    Malignant neoplasm of overlapping sites of right lung  Metastatic non-small cell lung cancer  Patient advised to follow up with HEME/ONC for management of comorbid states.    Onychomycosis  Onychomycotic nail plates, as outlined above, are sharply debrided with double action nail nipper,  and/or with the assistance of a mechanical rotary cary for reduction of pains. Nails are reduced in terms of length, width and girth with removal of subungual debris to facilitate pain free weight bearing and ambulation. Elongated nails as outlined in the objective portion of this note, were trimmed to appropriate length for alleviation/reduction of pains as well. Follow up in approx. 9-12 weeks.    Callus  Hypertrophic skin formation, as outlined within the examination portion of this note, is/are trimmed/pared surgically debrided with sharp #10/#15 blade, to alleviate discomfort with weight bearing and ambulation, and to lessen the possibility of skin complications, e.g., ulceration due to pressure. No ulceration(s) is are noted with/post debridement.     History of partial amputation of toe of right foot            Future Appointments   Date Time Provider Department Center   11/10/2020 10:45 AM Justus Nazario DPM ONLC POD [facility]   11/10/2020 12:30 PM PULMONARY LAB 2, ONLC ONLC PULMFS [facility]   11/20/2020  1:10 PM MIKEL PARR CC LAB BRCH LAB DS [facility]   11/20/2020  1:30 PM Tracy Rivero NP [facility] HEM ONC [facility]   11/20/2020  2:00 PM CHAIR 02 BRCH BRCH INFSN [facility]   11/30/2020 10:20 AM COVID TESTING, ONLC ENT ONLC ENT [facility]   12/3/2020 10:45 AM PULMONARY LAB 2, ONLC ONLC PULMFS [facility]   12/3/2020 11:30 AM PULMONARY LAB 2, ONLC ONLC PULMFS [facility]   12/3/2020  1:20 PM Lai Blunt MD ONLC PULMSVC [facility]   2/8/2021  9:15 AM Justus Nazario DPM ONLC POD [facility]   9/7/2021  9:30 AM ONLH US2 ONLH ULSOUND [facility]   9/9/2021  9:20 AM Jorge Alcocer IV, MD ON UROLOGY [facility]

## 2020-11-11 NOTE — PROCEDURES
[facility] [address] * [place] [address]  Telephone: [phone]  Test date: 11/10/20 Start: 11/10/20 19:31:18 Brant Lees  Doctor: Chet End: 20 05:13:50 [id number]  Oximetry: Summary Report  Comments: joon  Recording time: 09:42:32 Highest pulse: 180 Highest SpO2: 90%  Excluded samplin:08:20 Lowest pulse: 51 Lowest SpO2: 70%  Total valid samplin:34:12 Mean pulse: 59 Mean SpO2: 81.7%  1 S.D.: 6.5 1 S.D.: 2.3  Time with SpO2<90: 9:33:36, 99.9%  Time with SpO2<80: 1:24:24, 14.7%  Time with SpO2<70: 0:00:00, 0.0%  Time with SpO2<60: 0:00:00, 0.0%  Time with SpO2<89: 9:33:04, 99.8%  Time with SpO2 =>90: 0:00:36, 0.1%  Time with SpO2=>80 & <90: 8:09:12, 85.2%  Time with SpO2=>70 & <80: 1:24:24, 14.7%  Time with SpO2=>60 & <70: 0:00:00, 0.0%  The longest continuous time with saturation <=88 was 02:36:48, which started at  20 01:18:46.  A desaturation event was defined as a decrease of saturation by 4 or more.  2 events were excluded due to artifact.  There were 24 desaturation events over 3 minutes duration.  There were 174 desaturation events of less than 3 minutes duration during which:  The mean high was 84.8%. The mean low was 79.0%.  The number of these events that were:  > 0 & <10 seconds: 7 > 0 seconds: 174  =>10 & <20 seconds: 74 =>10 seconds: 167  =>20 & <30 seconds: 41 =>20 seconds: 93  =>30 & <40 seconds: 5 =>30 seconds: 52  =>40 & <50 seconds: 7 =>40 seconds: 47  =>50 & <60 seconds: 5 =>50 seconds: 40  =>60 seconds: 35 =>60 seconds: 35  The mean length of desaturation events that were >=10 sec & <=3 mins was: 39.3 sec.  Desaturation event index (events >=10 sec per sampled hour): 17.5  Desaturation event index (events >= 0 sec per sampled hour): 18.2  © 1499-8949 [employer] Oximetry version Standard TA1334.  Oximeter: [employer]pir[employer]s 920M Plus memory, 4 second resolution.      PHYSICIAN INTERPRETATION AND COMMENTS:    OVERNIGHT OXIMETRY  REPORT:    Dictated by: Lai Mays M.D.  Test date: 11/10/2020  Dictated on: 2020      Comment: This test was performed on Room Air     A desaturation event was defined as a decrease of saturation by 4 or more.    REPORT SUMMARY  Total recording time: 09:42:32  Excluded samplin:08:20  Total valid samplin:34:12  High pulse: 180 /min  Low pulse: 51 /min    Mean pulse: 59/min    High SpO2: 90%    Low SpO2: 70%    Mean SpO2  81.7 %  Cumulative time with oxygen saturation less than 89% (TC89): 09:33:04      CLINICAL INTERPRETATION   There is significant nocturnal oxygen desaturation and  Recommend overnight polysomnography if clinically indicated    Medicare Criteria Comments:   Oximetry test results suggest the patient falls under Medicare Group 1 Criteria. ( Arterial oxygen saturation at or below 88% for at least 5 minutes taken during sleep)    Details about Medicare Group Criteria coverage can be found at http://www.cms.hhs.gov/manuals/downloads/

## 2020-11-16 DIAGNOSIS — J43.1 PANLOBULAR EMPHYSEMA: Primary | ICD-10-CM

## 2020-11-17 DIAGNOSIS — L13.8 LINEAR IGA BULLOUS DERMATOSIS: Primary | ICD-10-CM

## 2020-11-17 RX ORDER — PREDNISONE 20 MG/1
20 TABLET ORAL DAILY
Qty: 14 TABLET | Refills: 0 | Status: SHIPPED | OUTPATIENT
Start: 2020-11-17 | End: 2020-12-01

## 2020-11-17 NOTE — PROGRESS NOTES
Spoke with patient's granddaughter Tiara. Patient finished 3 week pred taper on 11/12, blisters and itching had resolved. Itching recurred yesterday.  They have not heard from Dr. Alexander's office regarding referral to [facility].  Will re-fax referral and send rx for prednisone 20 mg x 2 weeks, with plans to taper to lowest effective dose until he is able to see Dr. Alexander to discuss alternative options for controlling LABD while on Keytruda.

## 2020-11-20 ENCOUNTER — INFUSION (OUTPATIENT)
Dept: INFUSION THERAPY | Facility: HOSPITAL | Age: 85
End: 2020-11-20
Attending: INTERNAL MEDICINE
Payer: MEDICARE

## 2020-11-20 ENCOUNTER — OFFICE VISIT (OUTPATIENT)
Dept: HEMATOLOGY/ONCOLOGY | Facility: CLINIC | Age: 85
End: 2020-11-20
Payer: MEDICARE

## 2020-11-20 VITALS
RESPIRATION RATE: 18 BRPM | TEMPERATURE: 97 F | SYSTOLIC BLOOD PRESSURE: 119 MMHG | DIASTOLIC BLOOD PRESSURE: 61 MMHG | OXYGEN SATURATION: 96 % | HEART RATE: 63 BPM

## 2020-11-20 VITALS — HEIGHT: 71 IN | BODY MASS INDEX: 23.3 KG/M2 | WEIGHT: 166.44 LBS

## 2020-11-20 DIAGNOSIS — C34.90 METASTATIC NON-SMALL CELL LUNG CANCER: ICD-10-CM

## 2020-11-20 DIAGNOSIS — C34.81 MALIGNANT NEOPLASM OF OVERLAPPING SITES OF RIGHT LUNG: ICD-10-CM

## 2020-11-20 DIAGNOSIS — C34.90 METASTATIC NON-SMALL CELL LUNG CANCER: Primary | ICD-10-CM

## 2020-11-20 PROCEDURE — 1157F PR ADVANCE CARE PLAN OR EQUIV PRESENT IN MEDICAL RECORD: ICD-10-PCS | Mod: S$GLB,,, | Performed by: NURSE PRACTITIONER

## 2020-11-20 PROCEDURE — 99999 PR PBB SHADOW E&M-EST. PATIENT-LVL III: ICD-10-PCS | Mod: PBBFAC,,, | Performed by: NURSE PRACTITIONER

## 2020-11-20 PROCEDURE — 3288F FALL RISK ASSESSMENT DOCD: CPT | Mod: CPTII,S$GLB,, | Performed by: NURSE PRACTITIONER

## 2020-11-20 PROCEDURE — 99999 PR PBB SHADOW E&M-EST. PATIENT-LVL III: CPT | Mod: PBBFAC,,, | Performed by: NURSE PRACTITIONER

## 2020-11-20 PROCEDURE — 96413 CHEMO IV INFUSION 1 HR: CPT

## 2020-11-20 PROCEDURE — 1101F PR PT FALLS ASSESS DOC 0-1 FALLS W/OUT INJ PAST YR: ICD-10-PCS | Mod: CPTII,S$GLB,, | Performed by: NURSE PRACTITIONER

## 2020-11-20 PROCEDURE — 63600175 PHARM REV CODE 636 W HCPCS: Performed by: INTERNAL MEDICINE

## 2020-11-20 PROCEDURE — 99214 OFFICE O/P EST MOD 30 MIN: CPT | Mod: 25,S$GLB,, | Performed by: NURSE PRACTITIONER

## 2020-11-20 PROCEDURE — 1126F PR PAIN SEVERITY QUANTIFIED, NO PAIN PRESENT: ICD-10-PCS | Mod: S$GLB,,, | Performed by: NURSE PRACTITIONER

## 2020-11-20 PROCEDURE — 1159F PR MEDICATION LIST DOCUMENTED IN MEDICAL RECORD: ICD-10-PCS | Mod: S$GLB,,, | Performed by: NURSE PRACTITIONER

## 2020-11-20 PROCEDURE — 1101F PT FALLS ASSESS-DOCD LE1/YR: CPT | Mod: CPTII,S$GLB,, | Performed by: NURSE PRACTITIONER

## 2020-11-20 PROCEDURE — 99214 PR OFFICE/OUTPT VISIT, EST, LEVL IV, 30-39 MIN: ICD-10-PCS | Mod: 25,S$GLB,, | Performed by: NURSE PRACTITIONER

## 2020-11-20 PROCEDURE — 3288F PR FALLS RISK ASSESSMENT DOCUMENTED: ICD-10-PCS | Mod: CPTII,S$GLB,, | Performed by: NURSE PRACTITIONER

## 2020-11-20 PROCEDURE — 1126F AMNT PAIN NOTED NONE PRSNT: CPT | Mod: S$GLB,,, | Performed by: NURSE PRACTITIONER

## 2020-11-20 PROCEDURE — 1157F ADVNC CARE PLAN IN RCRD: CPT | Mod: S$GLB,,, | Performed by: NURSE PRACTITIONER

## 2020-11-20 PROCEDURE — 1159F MED LIST DOCD IN RCRD: CPT | Mod: S$GLB,,, | Performed by: NURSE PRACTITIONER

## 2020-11-20 PROCEDURE — 25000003 PHARM REV CODE 250: Performed by: INTERNAL MEDICINE

## 2020-11-20 RX ORDER — SODIUM CHLORIDE 0.9 % (FLUSH) 0.9 %
10 SYRINGE (ML) INJECTION
Status: CANCELLED | OUTPATIENT
Start: 2020-11-20

## 2020-11-20 RX ORDER — SODIUM CHLORIDE 0.9 % (FLUSH) 0.9 %
10 SYRINGE (ML) INJECTION
Status: DISCONTINUED | OUTPATIENT
Start: 2020-11-20 | End: 2020-11-20 | Stop reason: HOSPADM

## 2020-11-20 RX ORDER — HEPARIN 100 UNIT/ML
500 SYRINGE INTRAVENOUS
Status: DISCONTINUED | OUTPATIENT
Start: 2020-11-20 | End: 2020-11-20 | Stop reason: HOSPADM

## 2020-11-20 RX ORDER — HEPARIN 100 UNIT/ML
500 SYRINGE INTRAVENOUS
Status: CANCELLED | OUTPATIENT
Start: 2020-11-20

## 2020-11-20 RX ADMIN — SODIUM CHLORIDE 200 MG: 9 INJECTION, SOLUTION INTRAVENOUS at 02:11

## 2020-11-20 RX ADMIN — HEPARIN 500 UNITS: 100 SYRINGE at 03:11

## 2020-11-20 NOTE — PLAN OF CARE
Patient arrived in good spirits and voices no concerns at this time. Feet elevated in recliner and warm blanket provided for comfort measures. Infection precautions reviewed.  Pt states full understanding to call with any sign of infection, including temp >100.4.

## 2020-11-20 NOTE — ASSESSMENT & PLAN NOTE
Laboratory review stable to proceed with Q 3 weekly Keytruda. Patient currently on Prednisone for pruritic skin reaction following Keytruda. Has been previously referred to Dermatology    F/u 3 weeks with MD to establish ongoing care with cbc, cmp, TSH for next planned Keytruda

## 2020-11-20 NOTE — PROGRESS NOTES
Subjective:       Patient ID: Brant Lees is a 89 y.o. male.    Chief Complaint: lung cancer    HPI: 89 y.o male with h/o Emphysema on 6L home O2 via nasal cannula, lung cancer on maintenance Keytruda Q 3 weeks. Patient could not tolerate Q 6 weekly Keytruda due to skin rash. Presenting today for follow up and continued Keytruda dosing      Social History     Socioeconomic History    Marital status:      Spouse name: Not on file    Number of children: 1    Years of education: Not on file    Highest education level: Not on file   Occupational History    Not on file   Social Needs    Financial resource strain: Not on file    Food insecurity     Worry: Not on file     Inability: Not on file    Transportation needs     Medical: Not on file     Non-medical: Not on file   Tobacco Use    Smoking status: Former Smoker     Packs/day: 2.00     Types: Cigarettes, Pipe     Quit date: 10/26/1980     Years since quittin.0    Smokeless tobacco: Never Used   Substance and Sexual Activity    Alcohol use: No    Drug use: No    Sexual activity: Not Currently     Partners: Female   Lifestyle    Physical activity     Days per week: Not on file     Minutes per session: Not on file    Stress: Not on file   Relationships    Social connections     Talks on phone: Not on file     Gets together: Not on file     Attends Cheondoism service: Not on file     Active member of club or organization: Not on file     Attends meetings of clubs or organizations: Not on file     Relationship status: Not on file   Other Topics Concern    Not on file   Social History Narrative    Not on file       Past Medical History:   Diagnosis Date    Anticoagulant long-term use     plavix    Cardiomyopathy     Ischemic    HBP (high blood pressure)     History of bladder cancer 2017    history of BCG tx    Hyperlipidemia     LBP (low back pain)     Metastatic non-small cell lung cancer     Metastatic non-small cell lung cancer     MI (myocardial infarction) 12/06    Status post primary angioplasty with coronary stent 12/06    Thyroid disease        Family History   Problem Relation Age of Onset    Heart disease Mother     Cancer Mother        Past Surgical History:   Procedure Laterality Date    amputation toe      distal phalanx right great toe    CATARACT EXTRACTION      CORONARY STENT PLACEMENT      HERNIA REPAIR      INSERTION OF VENOUS ACCESS PORT Left 5/17/2019    Procedure: INSERTION, VENOUS ACCESS PORT;  Surgeon: Lester Trejo MD;  Location: [facility];  Service: General;  Laterality: Left;    VITRECTOMY Left 01/27/2019    Dr Tanisha Lewis       Review of Systems   Constitutional: Negative for activity change, appetite change, chills, fatigue, fever and unexpected weight change.   HENT: Negative for congestion, mouth sores, nosebleeds, sore throat, trouble swallowing and voice change.    Eyes: Negative for photophobia and visual disturbance.   Respiratory: Positive for shortness of breath (with exertion. on home O2). Negative for cough, chest tightness and wheezing.    Cardiovascular: Negative for chest pain and leg swelling.   Gastrointestinal: Negative for abdominal distention, abdominal pain, anal bleeding, blood in stool, constipation, diarrhea, nausea and vomiting.   Genitourinary: Negative for difficulty urinating, dysuria and hematuria.   Musculoskeletal: Negative for arthralgias, back pain and myalgias.   Skin: Negative for pallor, rash and wound.   Neurological: Negative for dizziness, syncope, weakness and headaches.   Hematological: Negative for adenopathy. Does not bruise/bleed easily.   Psychiatric/Behavioral: The patient is not nervous/anxious.          Medication List with Changes/Refills   Current Medications    ACETAMINOPHEN (TYLENOL) 325 MG TABLET    Take 325 mg by mouth every 6 (six) hours as needed for Pain.    ALBUTEROL (ACCUNEB) 1.25 MG/3 ML NEBU    Take 3 mLs (1.25 mg total) by nebulization  every 6 (six) hours as needed. Rescue    AMLODIPINE (NORVASC) 5 MG TABLET    TAKE 1 TABLET TWICE DAILY    ASPIRIN (ASPIR-81 ORAL)    Take 1 tablet by mouth once daily.     BETAMETHASONE DIPROPIONATE (DIPROLENE) 0.05 % OINTMENT    Apply topically 2 (two) times daily. For hands    BLOOD SUGAR DIAGNOSTIC STRP    To check BG 1 times daily, to use with insurance preferred meter    BLOOD-GLUCOSE METER KIT    To check BG 1 times daily, to use with insurance preferred meter    CLOBETASOL 0.05% (TEMOVATE) 0.05 % OINT    Apply topically 2 (two) times daily.    DAPSONE 100 MG TAB    Take one tablet daily. Combine with 25 mg to take total of 125 mg daily    DAPSONE 25 MG TAB    Take 1 tablet (25 mg total) by mouth once daily. Combine with 100 mg to take total of 125 mg daily    DOXEPIN (ZONALON) 5 % CREAM    Apply topically 3 (three) times daily. For rash on hands    DOXYCYCLINE (MONODOX) 100 MG CAPSULE    Take twice a day with food. May cause upset stomach    IPRATROPIUM-ALBUTEROL (COMBIVENT)  MCG/ACTUATION INHALER    Inhale 2 puffs into the lungs 3 (three) times daily. Rescue    LANCETS MISC    To check BG 1 times daily, to use with insurance preferred meter    LEVOCETIRIZINE (XYZAL) 5 MG TABLET    Take 1 tablet each evening.    LEVOTHYROXINE (SYNTHROID) 112 MCG TABLET    Take 1 tablet (112 mcg total) by mouth once daily.    LISINOPRIL (PRINIVIL,ZESTRIL) 20 MG TABLET    TAKE 1 TABLET EVERY DAY    MUPIROCIN (BACTROBAN) 2 % OINTMENT    AAA bid with Q-tip. Antibiotic ointment. For sores on left thigh    NIACIN 500 MG TAB    Take 500 mg by mouth every morning.    NITROGLYCERIN (NITROSTAT) 0.4 MG SL TABLET    Place 0.4 mg under the tongue every 5 (five) minutes as needed for Chest pain.    OMEPRAZOLE (PRILOSEC) 20 MG CAPSULE    Take 20 mg by mouth once daily.    PREDNISONE (DELTASONE) 20 MG TABLET    Take 1 tablet (20 mg total) by mouth once daily. for 14 days    SIMVASTATIN (ZOCOR) 20 MG TABLET    Take 1 tablet (20 mg  total) by mouth every evening.    TRIAMCINOLONE ACETONIDE 0.5% (KENALOG) 0.5 % CREA    APPLY TOPICALLY ONCE DAILY.     Objective:   There were no vitals filed for this visit.  Lab Results   Component Value Date    WBC 11.22 11/20/2020    HGB 10.1 (L) 11/20/2020    HCT 33.5 (L) 11/20/2020    MCV 95 11/20/2020     11/20/2020       BMP  Lab Results   Component Value Date     (L) 11/20/2020    K 5.3 (H) 11/20/2020     11/20/2020    CO2 28 11/20/2020    BUN 16 11/20/2020    CREATININE 1.1 11/20/2020    CALCIUM 9.4 11/20/2020    ANIONGAP 6 (L) 11/20/2020    ESTGFRAFRICA >60 11/20/2020    EGFRNONAA 59 (A) 11/20/2020     Lab Results   Component Value Date    ALT 29 11/20/2020    AST 16 11/20/2020    ALKPHOS 96 11/20/2020    BILITOT 0.7 11/20/2020       Physical Exam  Vitals signs reviewed.   Constitutional:       Appearance: He is well-developed.      Interventions: Nasal cannula in place.   HENT:      Head: Normocephalic.      Right Ear: External ear normal.      Left Ear: External ear normal.   Eyes:      General: Lids are normal. No scleral icterus.        Right eye: No discharge.         Left eye: No discharge.      Conjunctiva/sclera: Conjunctivae normal.   Neck:      Musculoskeletal: Normal range of motion.      Thyroid: No thyroid mass.   Cardiovascular:      Rate and Rhythm: Normal rate and regular rhythm.   Pulmonary:      Effort: Pulmonary effort is normal. No respiratory distress.      Breath sounds: Normal breath sounds. No wheezing, rhonchi or rales.   Abdominal:      General: Bowel sounds are normal. There is no distension.      Palpations: Abdomen is soft.      Tenderness: There is no abdominal tenderness.   Genitourinary:     Comments: deferred  Musculoskeletal: Normal range of motion.   Lymphadenopathy:      Head:      Right side of head: No submandibular, preauricular or posterior auricular adenopathy.      Left side of head: No submandibular, preauricular or posterior auricular  adenopathy.   Skin:     General: Skin is warm and dry.   Neurological:      Mental Status: He is alert and oriented to person, place, and time.   Psychiatric:         Speech: Speech normal.         Behavior: Behavior normal. Behavior is cooperative.         Thought Content: Thought content normal.          Assessment:     Problem List Items Addressed This Visit        Oncology    Malignant neoplasm of overlapping sites of lung     Laboratory review stable to proceed with Q 3 weekly Keytruda. Patient currently on Prednisone for pruritic skin reaction following Keytruda. Has been previously referred to Dermatology    F/u 3 weeks with MD to establish ongoing care with cbc, cmp, TSH for next planned Keytruda            Other    Metastatic non-small cell lung cancer     Continue Keytruda Q 3 weekly.  F/u 3 weeks with labs for next Keytruda                 Plan:     Malignant neoplasm of overlapping sites of right lung    Metastatic non-small cell lung cancer              JAYLENE Longo

## 2020-11-20 NOTE — DISCHARGE INSTRUCTIONS
[facility]  [address] [facility]  [address] [address]  [phone] phone     [phone] fax  Hours of Operation: Monday- Friday 8:00am- 5:00pm  After hours phone  [phone]  Hematology / Oncology Physicians on call      Dr. Carlos Manuel Noyola, LINDA Galeana NP Tyesha Taylor, NP    Please call with any concerns regarding your appointment today.        FALL PREVENTION   Falls often occur due to slipping, tripping or losing your balance. Here are ways to reduce your risk of falling again.    Was there anything that caused your fall that can be fixed, removed or replaced?    Make your home safe by keeping walkways clear of objects you may trip over.    Use non-slip pads under rugs.    Do not walk in poorly lit areas.    Do not stand on chairs or wobbly ladders.    Use caution when reaching overhead or looking upward. This position can cause a loss of balance.    Be sure your shoes fit properly, have non-slip bottoms and are in good condition.    Be cautious when going up and down stairs, curbs, and when walking on uneven sidewalks.    If your balance is poor, consider using a cane or walker.    If your fall was related to alcohol use, stop or limit alcohol intake.    If your fall was related to use of sleeping medicines, talk to your doctor about this. You may need to reduce your dosage at bedtime if you awaken during the night to go to the bathroom.    To reduce the need for nighttime bathroom trips:    Avoid drinking fluids for several hours before going to bed    Empty your bladder before going to bed    Men can keep a urinal at the bedside   © 8926-6981 [employer], [address] [place] [address], [place] [address]. All rights reserved. This information is not intended as a substitute for professional medical care. Always follow your healthcare professional's  instructions.      Discharge Instructions for Chemotherapy   Your doctor prescribed a type of medication therapy for you called chemotherapy. Doctors prescribe chemotherapy for many different types of illnesses, including cancer. There are many types of chemotherapy. This sheet provides general guidelines on how you can take care of yourself after your chemotherapy.   Mouth Care   Dont be discouraged if you get mouth sores, even if you are following all your doctors instructions. Many people get mouth sores as a side effect of chemotherapy. Heres what you can do to prevent mouth sores:    Keep your mouth clean. Brush your teeth with a soft-bristle toothbrush after every meal.    Use an oral swab or special soft toothbrush if your gums bleed during regular brushing.    Dont use dental floss if your platelet count is below 50,000. Your doctor or nurse will tell you if this is the case.    Use any mouthwashes given to you as directed.    If you cant tolerate regular methods, use salt and baking soda to clean your mouth. Mix 1 teaspoon(s) of salt and 1 teaspoon(s) of baking soda into an 8-ounce glass of warm water. Swish and spit.    Watch your mouth and tongue for white patches. This is a sign of fungal infection, a common side effect of chemotherapy. Be sure to tell your doctor about these patches. Medication can be prescribed to help you fight the fungal infection.  Other Home Care    Try to exercise. Exercise keeps you strong and keeps your heart and lungs active. Walk as much as you can without becoming dizzy or weak.    Keep clean. During chemotherapy, your body cant fight infection very well. Take short baths or showers.    Use moisturizing soap. Chemotherapy can make your skin dry.    Apply moisturizing lotion several times a day to help relieve dry skin.    Dont take very hot or very cold showers or baths.    Dont be surprised if your chemotherapy causes slight burns to your skin--usually on  the hands and feet. Some drugs used in high doses cause this to happen. Ask for a special cream to help relieve the burn and protect your skin.    Let your doctor know if your throat is sore. You may have an infection that needs treatment.    Remember, many patients feel sick and lose their appetites during treatment. Eat small meals several times a day to keep your strength up.    Choose bland foods with little taste or smell if you are reacting strongly to food.    Be sure to cook all food thoroughly. This kills bacteria and helps you avoid infection.    Eat foods that are soft. Soft foods are less likely to cause stomach irritation.   Follow-Up   Make a follow-up appointment as directed by our staff.   When to Call Your Doctor   Call your doctor right away if you have any of the following:    Unexplained bleeding    Trouble concentrating    Ongoing fatigue    Shortness of breath, wheezing, or trouble breathing    Rapid, irregular heartbeat; chest pain    Dizziness, lightheadedness    Constant feeling of being cold    Hives or a cut or rash that swells, turns red, feels hot or painful, or begins to ooze    Fever of 100.4°F or higher, or chills    © 8443-7758 [facility], [address] [place] [address], [place] [address]. All rights reserved. This information is not intended as a substitute for professional medical care.

## 2020-11-21 ENCOUNTER — PATIENT MESSAGE (OUTPATIENT)
Dept: HEMATOLOGY/ONCOLOGY | Facility: CLINIC | Age: 85
End: 2020-11-21

## 2020-11-25 ENCOUNTER — TELEPHONE (OUTPATIENT)
Dept: PULMONOLOGY | Facility: CLINIC | Age: 85
End: 2020-11-25

## 2020-11-30 ENCOUNTER — LAB VISIT (OUTPATIENT)
Dept: OTOLARYNGOLOGY | Facility: CLINIC | Age: 85
End: 2020-11-30
Payer: MEDICARE

## 2020-11-30 ENCOUNTER — TELEPHONE (OUTPATIENT)
Dept: PULMONOLOGY | Facility: CLINIC | Age: 85
End: 2020-11-30

## 2020-11-30 DIAGNOSIS — J43.1 PANLOBULAR EMPHYSEMA: ICD-10-CM

## 2020-11-30 PROCEDURE — U0003 INFECTIOUS AGENT DETECTION BY NUCLEIC ACID (DNA OR RNA); SEVERE ACUTE RESPIRATORY SYNDROME CORONAVIRUS 2 (SARS-COV-2) (CORONAVIRUS DISEASE [COVID-19]), AMPLIFIED PROBE TECHNIQUE, MAKING USE OF HIGH THROUGHPUT TECHNOLOGIES AS DESCRIBED BY CMS-2020-01-R: HCPCS

## 2020-12-01 DIAGNOSIS — E78.2 MIXED HYPERLIPIDEMIA: Primary | ICD-10-CM

## 2020-12-02 ENCOUNTER — PATIENT MESSAGE (OUTPATIENT)
Dept: PULMONOLOGY | Facility: CLINIC | Age: 85
End: 2020-12-02

## 2020-12-02 LAB — SARS-COV-2 RNA RESP QL NAA+PROBE: NOT DETECTED

## 2020-12-03 ENCOUNTER — CLINICAL SUPPORT (OUTPATIENT)
Dept: PULMONOLOGY | Facility: CLINIC | Age: 85
End: 2020-12-03
Payer: MEDICARE

## 2020-12-03 VITALS — WEIGHT: 163.69 LBS | BODY MASS INDEX: 22.92 KG/M2 | HEIGHT: 71 IN

## 2020-12-03 DIAGNOSIS — J96.11 CHRONIC RESPIRATORY FAILURE WITH HYPOXIA: Chronic | ICD-10-CM

## 2020-12-03 DIAGNOSIS — J43.1 PANLOBULAR EMPHYSEMA: Chronic | ICD-10-CM

## 2020-12-03 LAB
BRPFT: NORMAL
ERV LLN: -16449.16
ERV PRE REF: 98.8 %
ERV REF: 0.84
FEF 25 75 LLN: 0.58
FEF 25 75 PRE REF: 84.6 %
FEF 25 75 REF: 1.76
FEV1 FVC LLN: 57
FEV1 FVC PRE REF: 99.6 %
FEV1 FVC REF: 73
FEV1 LLN: 1.78
FEV1 PRE REF: 80.7 %
FEV1 REF: 2.68
FRCPLETH LLN: 2.94
FRCPLETH PREREF: 92.1 %
FRCPLETH REF: 3.92
FVC LLN: 2.6
FVC PRE REF: 79.9 %
FVC REF: 3.7
MVV LLN: 91
MVV PRE REF: 64.6 %
MVV REF: 107
PEF LLN: 3.82
PEF PRE REF: 127.6 %
PEF REF: 6.2
PRE ERV: 0.83 L
PRE FEF 25 75: 1.49 L/S
PRE FET 100: 6.66 SEC
PRE FEV1 FVC: 73.08 %
PRE FEV1: 2.16 L
PRE FRC PL: 3.62 L
PRE FVC: 2.96 L
PRE MVV: 69 L/MIN
PRE PEF: 7.91 L/S
PRE RV: 2.57 L
PRE TLC: 5.62 L
RAW LLN: 3.06
RAW PRE REF: 57.2 %
RAW PRE: 1.75 CMH2O*S/L
RAW REF: 3.06
RV LLN: 2.41
RV PRE REF: 83.2 %
RV REF: 3.09
RVTLC LLN: 40
RVTLC PRE REF: 93.9 %
RVTLC PRE: 45.72 %
RVTLC REF: 49
TLC LLN: 6.15
TLC PRE REF: 76.9 %
TLC REF: 7.3
VC LLN: 2.6
VC PRE REF: 82.3 %
VC PRE: 3.05 L
VC REF: 3.7
VTGRAWPRE: 4.08 L

## 2020-12-03 PROCEDURE — 94618 PULMONARY STRESS TESTING: ICD-10-PCS | Mod: S$GLB,,, | Performed by: INTERNAL MEDICINE

## 2020-12-03 PROCEDURE — 94618 PULMONARY STRESS TESTING: CPT | Mod: S$GLB,,, | Performed by: INTERNAL MEDICINE

## 2020-12-03 PROCEDURE — 94010 BREATHING CAPACITY TEST: CPT | Mod: S$GLB,,, | Performed by: INTERNAL MEDICINE

## 2020-12-03 PROCEDURE — 94726 PULM FUNCT TST PLETHYSMOGRAP: ICD-10-PCS | Mod: S$GLB,,, | Performed by: INTERNAL MEDICINE

## 2020-12-03 PROCEDURE — 94726 PLETHYSMOGRAPHY LUNG VOLUMES: CPT | Mod: S$GLB,,, | Performed by: INTERNAL MEDICINE

## 2020-12-03 PROCEDURE — 94010 BREATHING CAPACITY TEST: ICD-10-PCS | Mod: S$GLB,,, | Performed by: INTERNAL MEDICINE

## 2020-12-03 RX ORDER — SIMVASTATIN 20 MG/1
20 TABLET, FILM COATED ORAL NIGHTLY
Qty: 90 TABLET | Refills: 4 | Status: SHIPPED | OUTPATIENT
Start: 2020-12-03

## 2020-12-03 NOTE — PROCEDURES
"[facility]  Six Minute Walk     SUMMARY     Ordering Provider: Dr. Blunt   Interpreting Provider: Dr. Blunt  Performing nurse/tech/RT: BRODERICK Almanza CRT  Diagnosis: (Chronic Respiratory Failure with Hypoxia)  Height: 5' 11" (180.3 cm)  Weight: 74.2 kg (163 lb 11.1 oz)  BMI (Calculated): 22.8   Patient Race:             Phase Oxygen Assessment Supplemental O2 Heart   Rate Blood Pressure Nathanael Dyspnea Scale Rating   Resting 90 % Room Air 96 bpm 103/61 0   Exercise        Minute        1 88 % Room Air 128 bpm     2 90 % 2 L/M 127 bpm     3 91 % 2 L/M 133 bpm     4 91 % 2 L/M 126 bpm     5 90 % 2 L/M 136 bpm     6  89 % 2 L/M 130 bpm 105/51 3   Recovery        Minute        1 91 % 2 L/M 114 bpm     2 93 % 2 L/M 98 bpm     3 94 % 2 L/M 95 bpm     4 94 % 2 L/M 87 bpm 111/57 0     Six Minute Walk Summary  6MWT Status: completed without stopping  Patient Reported: Dyspnea(Knee Pain)     Interpretation:  Did the patient stop or pause?: No                                         Total Time Walked (Calculated): 360 seconds  Final Partial Lap Distance (feet): 25 feet  Total Distance Meters (Calculated): 251.46 meters  Predicted Distance Meters (Calculated): 478.41 meters  Percentage of Predicted (Calculated): 52.56  Peak VO2 (Calculated): 11.52  Mets: 3.29  Has The Patient Had a Previous Six Minute Walk Test?: Yes       Previous 6MWT Results  Has The Patient Had a Previous Six Minute Walk Test?: Yes  Date of Previous Test: 10/26/20  Total Time Walked: 360 seconds  Total Distance (meters): 274.32  Predicted Distance (meters): 436.78 meters  Percentage of Predicted: 62.81  Percent Change (Calculated): 0.08          PHYSICIAN INTERPRETATION AND COMMENTS:    Six minute walk distance is 251.46 / 478.41 meters (52.56 % predicted) with light dyspnea.     Peak VO2 during walking was 11.52  Ml/min/kg [ 3.29 METS].     Baseline oxygen saturation (at rest) was 90 %.  Lowest oxygen saturation during walking was 88 %. "     During exercise, there was significant desaturation while breathing room air.    A desaturation event was defined as a decrease of saturation by 4 or more.    Oxygen saturation improved to 89 - 91% with oxygen supplementation at 2  L /min.    Blood pressure remained stable and Heart rate increased significantly with walking.  Normotension was present prior to exercise.  This may represent  an abnormal cardiovascular response to exercise  The patient reported non-pulmonary symptoms during exercise  - knee pain   There was Significant exercise impairment during walking.  Significant exercise impairment is likely due to desaturation, cardiovascular causes and subjective symptoms.  The patient did complete the study, walking 360 seconds of the 360 second test.  The patient  required oxygen supplementation during walking.  The patient may benefit from using supplemental oxygen.  Since the previous study in 10/26/20, exercise capacity is unchanged.  Based upon age and body mass index, exercise capacity is less than predicted.

## 2020-12-04 ENCOUNTER — OFFICE VISIT (OUTPATIENT)
Dept: DERMATOLOGY | Facility: CLINIC | Age: 85
End: 2020-12-04
Payer: MEDICARE

## 2020-12-04 DIAGNOSIS — L13.8 LINEAR IGA BULLOUS DERMATOSIS: Primary | ICD-10-CM

## 2020-12-04 PROCEDURE — 99214 OFFICE O/P EST MOD 30 MIN: CPT | Mod: S$GLB,,, | Performed by: DERMATOLOGY

## 2020-12-04 PROCEDURE — 1157F ADVNC CARE PLAN IN RCRD: CPT | Mod: S$GLB,,, | Performed by: DERMATOLOGY

## 2020-12-04 PROCEDURE — 99214 PR OFFICE/OUTPT VISIT, EST, LEVL IV, 30-39 MIN: ICD-10-PCS | Mod: S$GLB,,, | Performed by: DERMATOLOGY

## 2020-12-04 PROCEDURE — 99999 PR PBB SHADOW E&M-EST. PATIENT-LVL IV: ICD-10-PCS | Mod: PBBFAC,,, | Performed by: DERMATOLOGY

## 2020-12-04 PROCEDURE — 1157F PR ADVANCE CARE PLAN OR EQUIV PRESENT IN MEDICAL RECORD: ICD-10-PCS | Mod: S$GLB,,, | Performed by: DERMATOLOGY

## 2020-12-04 PROCEDURE — 1126F AMNT PAIN NOTED NONE PRSNT: CPT | Mod: S$GLB,,, | Performed by: DERMATOLOGY

## 2020-12-04 PROCEDURE — 99999 PR PBB SHADOW E&M-EST. PATIENT-LVL IV: CPT | Mod: PBBFAC,,, | Performed by: DERMATOLOGY

## 2020-12-04 PROCEDURE — 1126F PR PAIN SEVERITY QUANTIFIED, NO PAIN PRESENT: ICD-10-PCS | Mod: S$GLB,,, | Performed by: DERMATOLOGY

## 2020-12-04 PROCEDURE — 1159F PR MEDICATION LIST DOCUMENTED IN MEDICAL RECORD: ICD-10-PCS | Mod: S$GLB,,, | Performed by: DERMATOLOGY

## 2020-12-04 PROCEDURE — 1159F MED LIST DOCD IN RCRD: CPT | Mod: S$GLB,,, | Performed by: DERMATOLOGY

## 2020-12-04 RX ORDER — PREDNISONE 5 MG/1
20 TABLET ORAL DAILY
Qty: 120 TABLET | Refills: 0 | Status: SHIPPED | OUTPATIENT
Start: 2020-12-04 | End: 2021-01-03

## 2020-12-04 NOTE — PATIENT INSTRUCTIONS
Start 1200 mg calcium and 800 IU vit D3 daily    Take 20 mg of prednisone daily for 2 weeks (so this week and the week after his Keytruda), then please message me and let me know how he is doing.  We may try to decrease the dose at that point.  Do not stop taking prednisone abruptly, always tell us before you run out.    Set up appointment Dr. Gerri Alexander with [facility] for second opinion.

## 2020-12-04 NOTE — PROGRESS NOTES
Subjective:       Patient ID:  Brant Lees is a 89 y.o. male who presents for   Chief Complaint   Patient presents with    Rash     bilateral hands      Patient of Dr. Calero's with hx of lung cancer (on Keytruda followed by Dr. 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 Last seen on 10/20/2020 for flare, and continued on dapsone 125 mg daily, xyzal, hydroxyzine, and niacinamide, and after discussion with his oncologist, started 3 week pred taper on 10/23.  Also referred to Dr. Alexander with [facility] for second opinion on other potential medical therapy after discussion with Dr. Calero.  He has had a delay in getting appointment and had to restart prednisone 20 mg daily on 11/17 for symptom control, reports he ran out Wednesday, and can already feel itching on his hands returning and sees a few areas where blisters are about to appear (denies itching/rash anywhere else). He has his next Keytruda infusion a week from today.  He is very concerned about having a flare because it severely affects the quality of his life, but Keytruda is his only option for his lung cancer.  He reports that being on prednisone gives him enough symptomatic relief that he can tolerate the Keytruda.      Current treatment:   dapsone 125 mg qD (100 mg qAM and 25 mg qHS)  Niacinamide  xyzal  hydroxyzine 10 mg qHS.      Prior tx:   prednisone taper x 3 weeks (Aug/Sept 2020)   IM steroid  dapsone 100 mg qD  Doxycycline  Betamethasone (reports topicals don't work and he doesn't want to apply them anymore)        Review of Systems   Constitutional: Negative for fever, chills, fatigue and malaise.   Eyes: Negative for visual change (reports stable chronic decreased vision 2/2 macular degeneration, followed with optho).   Respiratory: Positive for shortness of breath (chronic, worsened a few weeks ago and improved with supplemental O2 (reports he was just decreased from 6L to 2L)).    Gastrointestinal: Negative for nausea and  vomiting.   Musculoskeletal: Negative for muscle weakness.   Skin: Positive for itching and rash.   Neurological: Negative for focal weakness and numbness.        Denies HA   Hematologic/Lymphatic: Does not bruise/bleed easily.        Objective:    Physical Exam   Constitutional: He appears well-developed and well-nourished. No distress.   Musculoskeletal:      Comments: Normal strength BUE and BLE on manual muscle testing   Neurological: He is alert and oriented to person, place, and time. He is not disoriented.   Psychiatric: He has a normal mood and affect.   Skin:   Areas Examined (abnormalities noted in diagram):   Nails and Digits Inspection Performed                  Diagram Legend     Erythematous scaling macule/papule c/w actinic keratosis       Vascular papule c/w angioma      Pigmented verrucoid papule/plaque c/w seborrheic keratosis      Yellow umbilicated papule c/w sebaceous hyperplasia      Irregularly shaped tan macule c/w lentigo     1-2 mm smooth white papules consistent with Milia      Movable subcutaneous cyst with punctum c/w epidermal inclusion cyst      Subcutaneous movable cyst c/w pilar cyst      Firm pink to brown papule c/w dermatofibroma      Pedunculated fleshy papule(s) c/w skin tag(s)      Evenly pigmented macule c/w junctional nevus     Mildly variegated pigmented, slightly irregular-bordered macule c/w mildly atypical nevus      Flesh colored to evenly pigmented papule c/w intradermal nevus       Pink pearly papule/plaque c/w basal cell carcinoma      Erythematous hyperkeratotic cursted plaque c/w SCC      Surgical scar with no sign of skin cancer recurrence      Open and closed comedones      Inflammatory papules and pustules      Verrucoid papule consistent consistent with wart     Erythematous eczematous patches and plaques     Dystrophic onycholytic nail with subungual debris c/w onychomycosis     Umbilicated papule    Erythematous-base heme-crusted tan verrucoid plaque  consistent with inflamed seborrheic keratosis     Erythematous Silvery Scaling Plaque c/w Psoriasis     See annotation      Assessment / Plan:        Linear IgA bullous dermatosis  - suspect 2/2 Keytruda.   - refractory to doxycycline, niacinamide, and dapsone alone (without prednisone)  - therapy is limited as immunosuppressants will negate the effect of Keytruda  - severely affecting his quality of life. Discussed that this is likely to continue occurring with Keytruda treatment. Recommended considering d/c of Keytruda, but have discussed with patient, his granddaughters, and his oncologist, and all are in agreement that the risks associated with chronic steroid use are worth the benefits of continuing the Keytruda as this seems to be his only option for lung cancer treatment.    - have referred to Dr. Alexander for another opinion on controlling LABD if Keytruda must be continued, other than chronic steroids.  Until then, will continue prednisone and try to find the lowest dose that controls his symptoms.  - continue prednisone 20 mg daily for the next 2 weeks (next Keytruda is in 1 week), then they will message me and we will discuss trying to taper dose.    - patient and granddaughter are aware that he is not to abruptly discontinue the prednisone (they will always contact us before he runs out), and I recommended that he wear a medical alert bracelet indicating his chronic steroid use status  - continue dapsone 125 mg daily, niacinamide; hydroxyzine prn  Discussed risks, benefits, and adverse effects of dapsone including but not limited to hemolytic anemia, agranulocytosis, methemoglobinemia, DRESS, motor neuropathy.  Counseled on concerning symptoms to watch for.  Avoid bactrim and methotrexate while on dapsone. Check CBC, renal function and LFTs every 3-4 months (due in 4 weeks- will check at f/u).       Side effects of prednisone including osteoporosis/osteopenia, hyperglycemia, weight gain, bloating and  hypertension reviewed with the patient. The patient acknowledged understanding.    -     predniSONE (DELTASONE) 5 MG tablet; Take 4 tablets (20 mg total) by mouth once daily. Do not stop taking abruptly.  Dispense: 120 tablet; Refill: 0     Long term systemic glucocorticoid monitoring:  Bone:  - Take 1200 mg calcium and 800 IU vit D daily  - recommended annual DEXA    GI:  - PUD risk factors: denies NSAIDS, smoking, hx of H. Pylori, etoh use, current or previous PUD, bisphosphonates; + age >65 yrs  - discussed starting PPI, his only risk factor currently is age >65 yrs, will consider    Endocrine:  - following with his PCP, monitoring glucose and has plan for insulin    Ocular:  - hx of cataracts; no history of glaucoma  - has seen optho, already has baseline decreased visual acuity    CV:  - BP: granddaughter reports BP is being monitored, has been having low BP and meds are being adjusted accordingly  - plan to add fasting lipid panel to next lab draw    Vaccinations:  - discussed avoiding live vaccines    Infectious:  - HBV, HCV screening: add to next lab draw  - HIV screening: add to next lab draw  - quant gold: add to next lab draw      Mood and cognitivie:  - past/current neuropsychiatric d/o: denies      Follow up in about 4 weeks (around 1/1/2021).

## 2020-12-10 ENCOUNTER — DOCUMENTATION ONLY (OUTPATIENT)
Dept: DERMATOLOGY | Facility: CLINIC | Age: 85
End: 2020-12-10

## 2020-12-10 ENCOUNTER — TELEPHONE (OUTPATIENT)
Dept: DERMATOLOGY | Facility: CLINIC | Age: 85
End: 2020-12-10

## 2020-12-10 ENCOUNTER — LAB VISIT (OUTPATIENT)
Dept: LAB | Facility: HOSPITAL | Age: 85
End: 2020-12-10
Attending: INTERNAL MEDICINE
Payer: MEDICARE

## 2020-12-10 DIAGNOSIS — C34.90 METASTATIC NON-SMALL CELL LUNG CANCER: ICD-10-CM

## 2020-12-10 DIAGNOSIS — R94.6 ABNORMAL RESULTS OF THYROID FUNCTION STUDIES: ICD-10-CM

## 2020-12-10 LAB
ALBUMIN SERPL BCP-MCNC: 3.4 G/DL (ref 3.5–5.2)
ALP SERPL-CCNC: 103 U/L (ref 55–135)
ALT SERPL W/O P-5'-P-CCNC: 19 U/L (ref 10–44)
ANION GAP SERPL CALC-SCNC: 12 MMOL/L (ref 8–16)
AST SERPL-CCNC: 18 U/L (ref 10–40)
BASOPHILS # BLD AUTO: 0.07 K/UL (ref 0–0.2)
BASOPHILS NFR BLD: 0.3 % (ref 0–1.9)
BILIRUB SERPL-MCNC: 0.9 MG/DL (ref 0.1–1)
BUN SERPL-MCNC: 18 MG/DL (ref 8–23)
CALCIUM SERPL-MCNC: 9 MG/DL (ref 8.7–10.5)
CHLORIDE SERPL-SCNC: 96 MMOL/L (ref 95–110)
CO2 SERPL-SCNC: 25 MMOL/L (ref 23–29)
CREAT SERPL-MCNC: 1.3 MG/DL (ref 0.5–1.4)
DIFFERENTIAL METHOD: ABNORMAL
EOSINOPHIL # BLD AUTO: 0.1 K/UL (ref 0–0.5)
EOSINOPHIL NFR BLD: 0.3 % (ref 0–8)
ERYTHROCYTE [DISTWIDTH] IN BLOOD BY AUTOMATED COUNT: 20.1 % (ref 11.5–14.5)
EST. GFR  (AFRICAN AMERICAN): 56 ML/MIN/1.73 M^2
EST. GFR  (NON AFRICAN AMERICAN): 48 ML/MIN/1.73 M^2
GLUCOSE SERPL-MCNC: 415 MG/DL (ref 70–110)
HCT VFR BLD AUTO: 31.9 % (ref 40–54)
HGB BLD-MCNC: 9.7 G/DL (ref 14–18)
IMM GRANULOCYTES # BLD AUTO: 0.24 K/UL (ref 0–0.04)
IMM GRANULOCYTES NFR BLD AUTO: 1.2 % (ref 0–0.5)
LYMPHOCYTES # BLD AUTO: 0.6 K/UL (ref 1–4.8)
LYMPHOCYTES NFR BLD: 2.8 % (ref 18–48)
MCH RBC QN AUTO: 31.3 PG (ref 27–31)
MCHC RBC AUTO-ENTMCNC: 30.4 G/DL (ref 32–36)
MCV RBC AUTO: 103 FL (ref 82–98)
MONOCYTES # BLD AUTO: 0.8 K/UL (ref 0.3–1)
MONOCYTES NFR BLD: 3.7 % (ref 4–15)
NEUTROPHILS # BLD AUTO: 18.9 K/UL (ref 1.8–7.7)
NEUTROPHILS NFR BLD: 91.7 % (ref 38–73)
NRBC BLD-RTO: 0 /100 WBC
PLATELET # BLD AUTO: 318 K/UL (ref 150–350)
PMV BLD AUTO: 10.3 FL (ref 9.2–12.9)
POTASSIUM SERPL-SCNC: 4.6 MMOL/L (ref 3.5–5.1)
PROT SERPL-MCNC: 6.9 G/DL (ref 6–8.4)
RBC # BLD AUTO: 3.1 M/UL (ref 4.6–6.2)
SODIUM SERPL-SCNC: 133 MMOL/L (ref 136–145)
WBC # BLD AUTO: 20.62 K/UL (ref 3.9–12.7)

## 2020-12-10 PROCEDURE — 85025 COMPLETE CBC W/AUTO DIFF WBC: CPT

## 2020-12-10 PROCEDURE — 36415 COLL VENOUS BLD VENIPUNCTURE: CPT | Mod: PO

## 2020-12-10 PROCEDURE — 80053 COMPREHEN METABOLIC PANEL: CPT

## 2020-12-10 PROCEDURE — 84443 ASSAY THYROID STIM HORMONE: CPT

## 2020-12-10 NOTE — PROGRESS NOTES
Spoke with Dr. Alexander who saw patient today. Will fax her his most recent labs. Goal is to get to prednisone 10mg daily or lower to not interfere with the effect of the Keytruda. Start plaquenil (200 mg daily x 2-3 days, then BID) and decrease dapsone to 50 mg daily (plan to taper off if he remains controlled). Stop niacinamide.    Start pred taper 12/12 (the day after Keytruda). Plan to alternate 20 mg / 15 mg for 5 days, then take 15 mg daily x 5 days, then alternate 15 mg/ 10 mg x 5 days, then get to 10 mg daily.    Called patient's granddaughter and confirmed updated plan.

## 2020-12-10 NOTE — TELEPHONE ENCOUNTER
Called Dr. Alexander and sent phone call to Dr.Watson Melissa MA     ----- Message from Breanna Costello sent at 12/10/2020  3:04 PM CST -----  .Type:  Needs Medical Advice    Who Called: Brenda from Dr Alexander Office @ [phone]  Symptoms (please be specific):    How long has patient had these symptoms:    Pharmacy name and phone #:    Would the patient rather a call back or a response via [facility]?   Best Call Back Number:   Additional Information: Dr Alexander would like to speak to Dr Madden about patient

## 2020-12-11 ENCOUNTER — OFFICE VISIT (OUTPATIENT)
Dept: HEMATOLOGY/ONCOLOGY | Facility: CLINIC | Age: 85
End: 2020-12-11
Payer: MEDICARE

## 2020-12-11 ENCOUNTER — PATIENT MESSAGE (OUTPATIENT)
Dept: DERMATOLOGY | Facility: CLINIC | Age: 85
End: 2020-12-11

## 2020-12-11 VITALS
OXYGEN SATURATION: 94 % | DIASTOLIC BLOOD PRESSURE: 61 MMHG | HEIGHT: 71 IN | SYSTOLIC BLOOD PRESSURE: 110 MMHG | TEMPERATURE: 97 F | BODY MASS INDEX: 22.96 KG/M2 | WEIGHT: 164 LBS | HEART RATE: 76 BPM

## 2020-12-11 DIAGNOSIS — R73.9 HYPERGLYCEMIA: ICD-10-CM

## 2020-12-11 DIAGNOSIS — D63.0 ANEMIA IN NEOPLASTIC DISEASE: ICD-10-CM

## 2020-12-11 DIAGNOSIS — C34.90 METASTATIC NON-SMALL CELL LUNG CANCER: Primary | ICD-10-CM

## 2020-12-11 DIAGNOSIS — D72.829 LEUKOCYTOSIS, UNSPECIFIED TYPE: ICD-10-CM

## 2020-12-11 DIAGNOSIS — C34.12 MALIGNANT NEOPLASM OF UPPER LOBE, LEFT BRONCHUS OR LUNG: ICD-10-CM

## 2020-12-11 LAB — TSH SERPL DL<=0.005 MIU/L-ACNC: 1.79 UIU/ML (ref 0.4–4)

## 2020-12-11 PROCEDURE — 99999 PR PBB SHADOW E&M-EST. PATIENT-LVL V: CPT | Mod: PBBFAC,,, | Performed by: INTERNAL MEDICINE

## 2020-12-11 PROCEDURE — 1157F PR ADVANCE CARE PLAN OR EQUIV PRESENT IN MEDICAL RECORD: ICD-10-PCS | Mod: S$GLB,,, | Performed by: INTERNAL MEDICINE

## 2020-12-11 PROCEDURE — 1126F AMNT PAIN NOTED NONE PRSNT: CPT | Mod: S$GLB,,, | Performed by: INTERNAL MEDICINE

## 2020-12-11 PROCEDURE — 99999 PR PBB SHADOW E&M-EST. PATIENT-LVL V: ICD-10-PCS | Mod: PBBFAC,,, | Performed by: INTERNAL MEDICINE

## 2020-12-11 PROCEDURE — 1126F PR PAIN SEVERITY QUANTIFIED, NO PAIN PRESENT: ICD-10-PCS | Mod: S$GLB,,, | Performed by: INTERNAL MEDICINE

## 2020-12-11 PROCEDURE — 3288F PR FALLS RISK ASSESSMENT DOCUMENTED: ICD-10-PCS | Mod: CPTII,S$GLB,, | Performed by: INTERNAL MEDICINE

## 2020-12-11 PROCEDURE — 1157F ADVNC CARE PLAN IN RCRD: CPT | Mod: S$GLB,,, | Performed by: INTERNAL MEDICINE

## 2020-12-11 PROCEDURE — 3288F FALL RISK ASSESSMENT DOCD: CPT | Mod: CPTII,S$GLB,, | Performed by: INTERNAL MEDICINE

## 2020-12-11 PROCEDURE — 99215 OFFICE O/P EST HI 40 MIN: CPT | Mod: S$GLB,,, | Performed by: INTERNAL MEDICINE

## 2020-12-11 PROCEDURE — 99215 PR OFFICE/OUTPT VISIT, EST, LEVL V, 40-54 MIN: ICD-10-PCS | Mod: S$GLB,,, | Performed by: INTERNAL MEDICINE

## 2020-12-11 PROCEDURE — 1159F PR MEDICATION LIST DOCUMENTED IN MEDICAL RECORD: ICD-10-PCS | Mod: S$GLB,,, | Performed by: INTERNAL MEDICINE

## 2020-12-11 PROCEDURE — 1159F MED LIST DOCD IN RCRD: CPT | Mod: S$GLB,,, | Performed by: INTERNAL MEDICINE

## 2020-12-11 PROCEDURE — 1101F PR PT FALLS ASSESS DOC 0-1 FALLS W/OUT INJ PAST YR: ICD-10-PCS | Mod: CPTII,S$GLB,, | Performed by: INTERNAL MEDICINE

## 2020-12-11 PROCEDURE — 1101F PT FALLS ASSESS-DOCD LE1/YR: CPT | Mod: CPTII,S$GLB,, | Performed by: INTERNAL MEDICINE

## 2020-12-11 NOTE — PROGRESS NOTES
Subjective:      DATE OF VISIT: 12/11/2020   ?   ?   Patient ID:?Brant Lees is a 89 y.o. male.?? MR#: [id number]   ?   PRIMARY PROVIDER:   Dr. Goins -> Dr. Franks  ?   CHIEF COMPLAINT:  Follow-up?????   ?   ONCOLOGIC DIAGNOSIS:  Metastatic non-small cell lung cancer  ?   CURRENT TREATMENT:  Pembolzumab Q 3 weeks      HPI  Since last visit he had follow-up with Dermatology and recommended treatment for pruritic rash.  He is on steroids prednisone 20 mg plan for wean.  Over a month ago he was initiated on supplemental oxygen 6 L weaning down to 2 L currently.  He feels in stable condition at this time.    Review of Systems    ?   A comprehensive 14-point review of systems was reviewed with patient and was negative other than as specified above.   ?     Objective:      Physical Exam      ?   Vitals:    12/11/20 1109   BP: 110/61   Pulse: 76   Temp: 97.4 °F (36.3 °C)      ?   ECOG:?2  General appearance: Generally well appearing, in no acute distress, on 2 L supplemental oxygen.   Head, eyes, ears, nose, and throat: Oropharynx clear with moist mucous membranes.   Cardiovascular: Regular rate and rhythm, S1, S2, no audible murmurs.   Respiratory: Lungs clear to auscultation bilaterally.   Abdomen: nontender, nondistended.   Extremities: Warm, without edema.   Neurologic: Alert and oriented. Grossly normal strength, coordination, and gait.   Skin:  Left lateral shin with superficial abrasion.No ecchymoses or petechial lesion.   Psychiatric: normal mood and affect, conversant and appropriate    ?   Laboratory:  ?   No visits with results within 1 Day(s) from this visit.   Latest known visit with results is:   Lab Visit on 12/10/2020   Component Date Value Ref Range Status    Sodium 12/10/2020 133* 136 - 145 mmol/L Final    Potassium 12/10/2020 4.6  3.5 - 5.1 mmol/L Final    Chloride 12/10/2020 96  95 - 110 mmol/L Final    CO2 12/10/2020 25  23 - 29 mmol/L Final    Glucose 12/10/2020 415* 70 - 110 mg/dL Final    BUN  12/10/2020 18  8 - 23 mg/dL Final    Creatinine 12/10/2020 1.3  0.5 - 1.4 mg/dL Final    Calcium 12/10/2020 9.0  8.7 - 10.5 mg/dL Final    Total Protein 12/10/2020 6.9  6.0 - 8.4 g/dL Final    Albumin 12/10/2020 3.4* 3.5 - 5.2 g/dL Final    Total Bilirubin 12/10/2020 0.9  0.1 - 1.0 mg/dL Final    Alkaline Phosphatase 12/10/2020 103  55 - 135 U/L Final    AST 12/10/2020 18  10 - 40 U/L Final    ALT 12/10/2020 19  10 - 44 U/L Final    Anion Gap 12/10/2020 12  8 - 16 mmol/L Final    eGFR if  12/10/2020 56* >60 mL/min/1.73 m^2 Final    eGFR if non African American 12/10/2020 48* >60 mL/min/1.73 m^2 Final    WBC 12/10/2020 20.62* 3.90 - 12.70 K/uL Final    RBC 12/10/2020 3.10* 4.60 - 6.20 M/uL Final    Hemoglobin 12/10/2020 9.7* 14.0 - 18.0 g/dL Final    Hematocrit 12/10/2020 31.9* 40.0 - 54.0 % Final    MCV 12/10/2020 103* 82 - 98 fL Final    MCH 12/10/2020 31.3* 27.0 - 31.0 pg Final    MCHC 12/10/2020 30.4* 32.0 - 36.0 g/dL Final    RDW 12/10/2020 20.1* 11.5 - 14.5 % Final    Platelets 12/10/2020 318  150 - 350 K/uL Final    MPV 12/10/2020 10.3  9.2 - 12.9 fL Final    Immature Granulocytes 12/10/2020 1.2* 0.0 - 0.5 % Final    Gran # (ANC) 12/10/2020 18.9* 1.8 - 7.7 K/uL Final    Immature Grans (Abs) 12/10/2020 0.24* 0.00 - 0.04 K/uL Final    Lymph # 12/10/2020 0.6* 1.0 - 4.8 K/uL Final    Mono # 12/10/2020 0.8  0.3 - 1.0 K/uL Final    Eos # 12/10/2020 0.1  0.0 - 0.5 K/uL Final    Baso # 12/10/2020 0.07  0.00 - 0.20 K/uL Final    nRBC 12/10/2020 0  0 /100 WBC Final    Gran % 12/10/2020 91.7* 38.0 - 73.0 % Final    Lymph % 12/10/2020 2.8* 18.0 - 48.0 % Final    Mono % 12/10/2020 3.7* 4.0 - 15.0 % Final    Eosinophil % 12/10/2020 0.3  0.0 - 8.0 % Final    Basophil % 12/10/2020 0.3  0.0 - 1.9 % Final    Differential Method 12/10/2020 Automated   Final    TSH 12/10/2020 1.792  0.400 - 4.000 uIU/mL Final      ?   ?   Assessment/Plan:       1. Metastatic non-small cell lung  cancer    2. Malignant neoplasm of upper lobe, left bronchus or lung     3. Leukocytosis, unspecified type    4. 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 3 weeks.  It has been 3 months since his last imaging and recommended repeat PET-CT to reassess status of disease.  His worsened respiratory status over the last month this concerning with new supplemental oxygen requirement.  Labs from yesterday with leukocytosis although this may be related to steroid p.o. for rash treatment.  Typically recommend avoiding systemic steroids as possible with immunotherapy as this may blunt immune response with treatment; plan to wean.  Hyperglycemia he notes improved on home checked today 120 use.  Will hold treatment today obtain repeat PET-CT in reconvene to discuss continuation of most appropriate treatment accordingly.    Follow-Up:   - pembrolizumab today and in 3 weeks with RV and labs

## 2020-12-14 DIAGNOSIS — L13.8 LINEAR IGA BULLOUS DERMATOSIS: ICD-10-CM

## 2020-12-14 DIAGNOSIS — Z79.899 ENCOUNTER FOR LONG-TERM (CURRENT) USE OF MEDICATIONS: ICD-10-CM

## 2020-12-14 RX ORDER — DAPSONE 25 MG/1
50 TABLET ORAL DAILY
Qty: 60 TABLET | Refills: 0 | Status: ON HOLD | OUTPATIENT
Start: 2020-12-14 | End: 2020-12-27 | Stop reason: HOSPADM

## 2020-12-22 ENCOUNTER — PATIENT MESSAGE (OUTPATIENT)
Dept: INTERNAL MEDICINE | Facility: CLINIC | Age: 85
End: 2020-12-22

## 2020-12-22 ENCOUNTER — TELEPHONE (OUTPATIENT)
Dept: RADIOLOGY | Facility: HOSPITAL | Age: 85
End: 2020-12-22

## 2020-12-23 ENCOUNTER — PATIENT MESSAGE (OUTPATIENT)
Dept: HEMATOLOGY/ONCOLOGY | Facility: CLINIC | Age: 85
End: 2020-12-23

## 2020-12-23 DIAGNOSIS — E03.9 HYPOTHYROIDISM (ACQUIRED): ICD-10-CM

## 2020-12-24 ENCOUNTER — TELEPHONE (OUTPATIENT)
Dept: INTERNAL MEDICINE | Facility: CLINIC | Age: 85
End: 2020-12-24

## 2020-12-24 DIAGNOSIS — I10 HYPERTENSION, ESSENTIAL: Primary | ICD-10-CM

## 2020-12-24 DIAGNOSIS — E03.9 HYPOTHYROIDISM (ACQUIRED): ICD-10-CM

## 2020-12-24 RX ORDER — LISINOPRIL 20 MG/1
20 TABLET ORAL DAILY
Qty: 90 TABLET | Refills: 1 | OUTPATIENT
Start: 2020-12-24

## 2020-12-24 RX ORDER — LISINOPRIL 20 MG/1
20 TABLET ORAL DAILY
Qty: 90 TABLET | Refills: 1 | Status: CANCELLED | OUTPATIENT
Start: 2020-12-24

## 2020-12-24 RX ORDER — LISINOPRIL 20 MG/1
20 TABLET ORAL DAILY
Qty: 90 TABLET | Refills: 1 | Status: SHIPPED | OUTPATIENT
Start: 2020-12-24

## 2020-12-24 RX ORDER — LISINOPRIL 20 MG/1
20 TABLET ORAL DAILY
Qty: 30 TABLET | Refills: 0 | Status: SHIPPED | OUTPATIENT
Start: 2020-12-24

## 2020-12-24 NOTE — TELEPHONE ENCOUNTER
----- Message from Eunice Franks MD sent at 12/24/2020 11:17 AM CST -----  Hello,     I received a request to refill this patient's blood pressure medication.  I saw that this was requested to primary care and saw the interaction below.  It is very important that cancer patients continue to follow with primary care to manage chronic medical issues like hypertension. His blood pressures have been low at times and do not know if appropriate for him to continue on same dosing.  I would appreciate your follow-up with him in primary care and prescribed at your discretion  Thank you,    Eunice Franks  ---------------------------------    Naina Hamilton MA  to Brant Lees          7:08 AM  Kindly send your refill request to Dr. Goins.    Last read by Brant Lees at 7:11 AM on 12/23/2020.  December 22, 2020  Brant Lees  to Karlee Gibson MD          6:01 PM  Need a refill on Linsinopril.

## 2020-12-24 NOTE — TELEPHONE ENCOUNTER
Pls contact pt and schedule him to be seen - he was due for A1C. He has appt with Dr Yoo 12/29 - can we schedule him to get the A1C that day? And then a visit the next week?    (informed [facility]jivar, that lisinopril will be RFd - they will need local supply until [employer] refill gets to him. rxs sent#30 local, 6 months mail order. She is aware someone will be calling next week to schedule lab/visit. States BS runs 120 us in AM. Note 400+ on last lab - he is on prednisone now. Asked that they bring in readings to visit.)

## 2020-12-26 ENCOUNTER — HOSPITAL ENCOUNTER (OUTPATIENT)
Facility: HOSPITAL | Age: 85
Discharge: HOSPICE | End: 2020-12-28
Attending: EMERGENCY MEDICINE | Admitting: INTERNAL MEDICINE
Payer: MEDICARE

## 2020-12-26 DIAGNOSIS — J96.21 ACUTE ON CHRONIC RESPIRATORY FAILURE WITH HYPOXIA: Primary | ICD-10-CM

## 2020-12-26 DIAGNOSIS — R06.02 SOB (SHORTNESS OF BREATH): ICD-10-CM

## 2020-12-26 DIAGNOSIS — U07.1 COVID-19 VIRUS INFECTION: ICD-10-CM

## 2020-12-26 DIAGNOSIS — J96.01 ACUTE RESPIRATORY FAILURE WITH HYPOXIA: ICD-10-CM

## 2020-12-26 PROBLEM — Z86.79 HISTORY OF ATRIAL FIBRILLATION: Status: ACTIVE | Noted: 2020-12-26

## 2020-12-26 PROBLEM — R79.89 ELEVATED TROPONIN: Status: ACTIVE | Noted: 2020-12-26

## 2020-12-26 LAB
ALBUMIN SERPL BCP-MCNC: 2.8 G/DL (ref 3.5–5.2)
ALP SERPL-CCNC: 80 U/L (ref 55–135)
ALT SERPL W/O P-5'-P-CCNC: 17 U/L (ref 10–44)
ANION GAP SERPL CALC-SCNC: 14 MMOL/L (ref 8–16)
AST SERPL-CCNC: 25 U/L (ref 10–40)
BACTERIA #/AREA URNS HPF: ABNORMAL /HPF
BASOPHILS # BLD AUTO: 0.02 K/UL (ref 0–0.2)
BASOPHILS NFR BLD: 0.2 % (ref 0–1.9)
BILIRUB SERPL-MCNC: 0.4 MG/DL (ref 0.1–1)
BILIRUB UR QL STRIP: NEGATIVE
BNP SERPL-MCNC: 151 PG/ML (ref 0–99)
BUN SERPL-MCNC: 19 MG/DL (ref 8–23)
CALCIUM SERPL-MCNC: 9 MG/DL (ref 8.7–10.5)
CHLORIDE SERPL-SCNC: 97 MMOL/L (ref 95–110)
CLARITY UR: CLEAR
CO2 SERPL-SCNC: 23 MMOL/L (ref 23–29)
COLOR UR: YELLOW
CREAT SERPL-MCNC: 1.1 MG/DL (ref 0.5–1.4)
DIFFERENTIAL METHOD: ABNORMAL
EOSINOPHIL # BLD AUTO: 0 K/UL (ref 0–0.5)
EOSINOPHIL NFR BLD: 0 % (ref 0–8)
ERYTHROCYTE [DISTWIDTH] IN BLOOD BY AUTOMATED COUNT: 16.5 % (ref 11.5–14.5)
EST. GFR  (AFRICAN AMERICAN): >60 ML/MIN/1.73 M^2
EST. GFR  (NON AFRICAN AMERICAN): 59 ML/MIN/1.73 M^2
GLUCOSE SERPL-MCNC: 141 MG/DL (ref 70–110)
GLUCOSE UR QL STRIP: ABNORMAL
HCT VFR BLD AUTO: 33 % (ref 40–54)
HGB BLD-MCNC: 10.1 G/DL (ref 14–18)
HGB UR QL STRIP: NEGATIVE
HYALINE CASTS #/AREA URNS LPF: 2 /LPF
IMM GRANULOCYTES # BLD AUTO: 0.06 K/UL (ref 0–0.04)
IMM GRANULOCYTES NFR BLD AUTO: 0.6 % (ref 0–0.5)
INFLUENZA A, MOLECULAR: NEGATIVE
INFLUENZA B, MOLECULAR: NEGATIVE
KETONES UR QL STRIP: NEGATIVE
LACTATE SERPL-SCNC: 1.5 MMOL/L (ref 0.5–2.2)
LEUKOCYTE ESTERASE UR QL STRIP: NEGATIVE
LYMPHOCYTES # BLD AUTO: 0.4 K/UL (ref 1–4.8)
LYMPHOCYTES NFR BLD: 4 % (ref 18–48)
MCH RBC QN AUTO: 28.9 PG (ref 27–31)
MCHC RBC AUTO-ENTMCNC: 30.6 G/DL (ref 32–36)
MCV RBC AUTO: 95 FL (ref 82–98)
MICROSCOPIC COMMENT: ABNORMAL
MONOCYTES # BLD AUTO: 0.5 K/UL (ref 0.3–1)
MONOCYTES NFR BLD: 4.3 % (ref 4–15)
NEUTROPHILS # BLD AUTO: 9.9 K/UL (ref 1.8–7.7)
NEUTROPHILS NFR BLD: 90.9 % (ref 38–73)
NITRITE UR QL STRIP: NEGATIVE
NRBC BLD-RTO: 0 /100 WBC
PH UR STRIP: 6 [PH] (ref 5–8)
PLATELET # BLD AUTO: 348 K/UL (ref 150–350)
PMV BLD AUTO: 9.4 FL (ref 9.2–12.9)
POTASSIUM SERPL-SCNC: 3.7 MMOL/L (ref 3.5–5.1)
PROT SERPL-MCNC: 7.1 G/DL (ref 6–8.4)
PROT UR QL STRIP: ABNORMAL
RBC # BLD AUTO: 3.49 M/UL (ref 4.6–6.2)
RBC #/AREA URNS HPF: 1 /HPF (ref 0–4)
SARS-COV-2 RDRP RESP QL NAA+PROBE: POSITIVE
SODIUM SERPL-SCNC: 134 MMOL/L (ref 136–145)
SP GR UR STRIP: 1.02 (ref 1–1.03)
SPECIMEN SOURCE: NORMAL
TROPONIN I SERPL DL<=0.01 NG/ML-MCNC: 0.03 NG/ML (ref 0–0.03)
TROPONIN I SERPL DL<=0.01 NG/ML-MCNC: 0.03 NG/ML (ref 0–0.03)
URN SPEC COLLECT METH UR: ABNORMAL
UROBILINOGEN UR STRIP-ACNC: NEGATIVE EU/DL
WBC # BLD AUTO: 10.87 K/UL (ref 3.9–12.7)
WBC #/AREA URNS HPF: 0 /HPF (ref 0–5)

## 2020-12-26 PROCEDURE — 27100171 HC OXYGEN HIGH FLOW UP TO 24 HOURS

## 2020-12-26 PROCEDURE — 84484 ASSAY OF TROPONIN QUANT: CPT

## 2020-12-26 PROCEDURE — 83880 ASSAY OF NATRIURETIC PEPTIDE: CPT

## 2020-12-26 PROCEDURE — 93010 ELECTROCARDIOGRAM REPORT: CPT | Mod: ,,, | Performed by: INTERNAL MEDICINE

## 2020-12-26 PROCEDURE — 96374 THER/PROPH/DIAG INJ IV PUSH: CPT

## 2020-12-26 PROCEDURE — 87502 INFLUENZA DNA AMP PROBE: CPT

## 2020-12-26 PROCEDURE — U0002 COVID-19 LAB TEST NON-CDC: HCPCS

## 2020-12-26 PROCEDURE — 99291 CRITICAL CARE FIRST HOUR: CPT | Mod: 25

## 2020-12-26 PROCEDURE — G0179 PR HOME HEALTH MD RECERTIFICATION: ICD-10-PCS | Mod: ,,, | Performed by: FAMILY MEDICINE

## 2020-12-26 PROCEDURE — 84484 ASSAY OF TROPONIN QUANT: CPT | Mod: 91

## 2020-12-26 PROCEDURE — G0179 MD RECERTIFICATION HHA PT: HCPCS | Mod: ,,, | Performed by: FAMILY MEDICINE

## 2020-12-26 PROCEDURE — 80053 COMPREHEN METABOLIC PANEL: CPT

## 2020-12-26 PROCEDURE — 63600175 PHARM REV CODE 636 W HCPCS: Performed by: NURSE PRACTITIONER

## 2020-12-26 PROCEDURE — 87040 BLOOD CULTURE FOR BACTERIA: CPT | Mod: 59

## 2020-12-26 PROCEDURE — 81000 URINALYSIS NONAUTO W/SCOPE: CPT

## 2020-12-26 PROCEDURE — 36415 COLL VENOUS BLD VENIPUNCTURE: CPT

## 2020-12-26 PROCEDURE — 25000003 PHARM REV CODE 250: Performed by: NURSE PRACTITIONER

## 2020-12-26 PROCEDURE — G0378 HOSPITAL OBSERVATION PER HR: HCPCS

## 2020-12-26 PROCEDURE — 83605 ASSAY OF LACTIC ACID: CPT

## 2020-12-26 PROCEDURE — 85025 COMPLETE CBC W/AUTO DIFF WBC: CPT

## 2020-12-26 PROCEDURE — 99900035 HC TECH TIME PER 15 MIN (STAT)

## 2020-12-26 PROCEDURE — 93010 EKG 12-LEAD: ICD-10-PCS | Mod: ,,, | Performed by: INTERNAL MEDICINE

## 2020-12-26 PROCEDURE — 96372 THER/PROPH/DIAG INJ SC/IM: CPT | Mod: 59

## 2020-12-26 PROCEDURE — 93005 ELECTROCARDIOGRAM TRACING: CPT

## 2020-12-26 RX ORDER — CHOLECALCIFEROL (VITAMIN D3) 25 MCG
1000 TABLET ORAL DAILY
Status: DISCONTINUED | OUTPATIENT
Start: 2020-12-27 | End: 2020-12-28

## 2020-12-26 RX ORDER — ASPIRIN 81 MG/1
81 TABLET ORAL DAILY
Status: DISCONTINUED | OUTPATIENT
Start: 2020-12-27 | End: 2020-12-28

## 2020-12-26 RX ORDER — ONDANSETRON 2 MG/ML
4 INJECTION INTRAMUSCULAR; INTRAVENOUS EVERY 6 HOURS PRN
Status: DISCONTINUED | OUTPATIENT
Start: 2020-12-26 | End: 2020-12-28 | Stop reason: HOSPADM

## 2020-12-26 RX ORDER — ACETAMINOPHEN 325 MG/1
650 TABLET ORAL EVERY 6 HOURS PRN
Status: DISCONTINUED | OUTPATIENT
Start: 2020-12-26 | End: 2020-12-28 | Stop reason: HOSPADM

## 2020-12-26 RX ORDER — GLUCOSAMINE/CHONDR SU A SOD 167-133 MG
500 CAPSULE ORAL EVERY MORNING
Status: DISCONTINUED | OUTPATIENT
Start: 2020-12-27 | End: 2020-12-28

## 2020-12-26 RX ORDER — FLUTICASONE FUROATE AND VILANTEROL 100; 25 UG/1; UG/1
1 POWDER RESPIRATORY (INHALATION) DAILY
Status: DISCONTINUED | OUTPATIENT
Start: 2020-12-27 | End: 2020-12-28

## 2020-12-26 RX ORDER — CETIRIZINE HYDROCHLORIDE 5 MG/1
5 TABLET ORAL DAILY
Status: DISCONTINUED | OUTPATIENT
Start: 2020-12-27 | End: 2020-12-28

## 2020-12-26 RX ORDER — LEVOTHYROXINE SODIUM 112 UG/1
112 TABLET ORAL DAILY
Status: DISCONTINUED | OUTPATIENT
Start: 2020-12-27 | End: 2020-12-28

## 2020-12-26 RX ORDER — BUDESONIDE 0.5 MG/2ML
0.5 INHALANT ORAL EVERY 12 HOURS
Status: DISCONTINUED | OUTPATIENT
Start: 2020-12-26 | End: 2020-12-26

## 2020-12-26 RX ORDER — PANTOPRAZOLE SODIUM 40 MG/1
40 TABLET, DELAYED RELEASE ORAL DAILY
Status: DISCONTINUED | OUTPATIENT
Start: 2020-12-27 | End: 2020-12-28

## 2020-12-26 RX ORDER — ENOXAPARIN SODIUM 100 MG/ML
40 INJECTION SUBCUTANEOUS EVERY 24 HOURS
Status: DISCONTINUED | OUTPATIENT
Start: 2020-12-26 | End: 2020-12-28

## 2020-12-26 RX ORDER — ALBUTEROL SULFATE 90 UG/1
2 AEROSOL, METERED RESPIRATORY (INHALATION) EVERY 6 HOURS
Status: DISCONTINUED | OUTPATIENT
Start: 2020-12-27 | End: 2020-12-28 | Stop reason: HOSPADM

## 2020-12-26 RX ORDER — HYDRALAZINE HYDROCHLORIDE 20 MG/ML
10 INJECTION INTRAMUSCULAR; INTRAVENOUS EVERY 6 HOURS PRN
Status: DISCONTINUED | OUTPATIENT
Start: 2020-12-26 | End: 2020-12-28 | Stop reason: HOSPADM

## 2020-12-26 RX ORDER — ONDANSETRON 2 MG/ML
4 INJECTION INTRAMUSCULAR; INTRAVENOUS EVERY 6 HOURS PRN
Status: DISCONTINUED | OUTPATIENT
Start: 2020-12-26 | End: 2020-12-26

## 2020-12-26 RX ORDER — DEXAMETHASONE SODIUM PHOSPHATE 4 MG/ML
6 INJECTION, SOLUTION INTRA-ARTICULAR; INTRALESIONAL; INTRAMUSCULAR; INTRAVENOUS; SOFT TISSUE EVERY 24 HOURS
Status: DISCONTINUED | OUTPATIENT
Start: 2020-12-26 | End: 2020-12-28

## 2020-12-26 RX ORDER — GUAIFENESIN 100 MG/5ML
200 SOLUTION ORAL EVERY 4 HOURS PRN
Status: DISCONTINUED | OUTPATIENT
Start: 2020-12-26 | End: 2020-12-28 | Stop reason: HOSPADM

## 2020-12-26 RX ORDER — SIMVASTATIN 20 MG/1
20 TABLET, FILM COATED ORAL NIGHTLY
Status: DISCONTINUED | OUTPATIENT
Start: 2020-12-26 | End: 2020-12-28

## 2020-12-26 RX ORDER — ZINC SULFATE 50(220)MG
220 CAPSULE ORAL DAILY
Status: DISCONTINUED | OUTPATIENT
Start: 2020-12-27 | End: 2020-12-28

## 2020-12-26 RX ORDER — LISINOPRIL 20 MG/1
20 TABLET ORAL DAILY
Status: DISCONTINUED | OUTPATIENT
Start: 2020-12-27 | End: 2020-12-28

## 2020-12-26 RX ORDER — ALBUTEROL SULFATE 90 UG/1
2 AEROSOL, METERED RESPIRATORY (INHALATION)
Status: DISCONTINUED | OUTPATIENT
Start: 2020-12-26 | End: 2020-12-28 | Stop reason: HOSPADM

## 2020-12-26 RX ORDER — ASCORBIC ACID 500 MG
500 TABLET ORAL DAILY
Status: DISCONTINUED | OUTPATIENT
Start: 2020-12-27 | End: 2020-12-28

## 2020-12-26 RX ORDER — AMLODIPINE BESYLATE 5 MG/1
5 TABLET ORAL 2 TIMES DAILY
Status: DISCONTINUED | OUTPATIENT
Start: 2020-12-26 | End: 2020-12-28

## 2020-12-26 RX ADMIN — AMLODIPINE BESYLATE 5 MG: 5 TABLET ORAL at 09:12

## 2020-12-26 RX ADMIN — DEXAMETHASONE SODIUM PHOSPHATE 6 MG: 4 INJECTION INTRA-ARTICULAR; INTRALESIONAL; INTRAMUSCULAR; INTRAVENOUS; SOFT TISSUE at 09:12

## 2020-12-26 RX ADMIN — SIMVASTATIN 20 MG: 20 TABLET, FILM COATED ORAL at 09:12

## 2020-12-26 RX ADMIN — ENOXAPARIN SODIUM 40 MG: 40 INJECTION SUBCUTANEOUS at 09:12

## 2020-12-27 LAB
ANION GAP SERPL CALC-SCNC: 13 MMOL/L (ref 8–16)
BASOPHILS # BLD AUTO: 0.02 K/UL (ref 0–0.2)
BASOPHILS NFR BLD: 0.2 % (ref 0–1.9)
BUN SERPL-MCNC: 17 MG/DL (ref 8–23)
CALCIUM SERPL-MCNC: 8.7 MG/DL (ref 8.7–10.5)
CHLORIDE SERPL-SCNC: 97 MMOL/L (ref 95–110)
CO2 SERPL-SCNC: 23 MMOL/L (ref 23–29)
CREAT SERPL-MCNC: 1 MG/DL (ref 0.5–1.4)
CRP SERPL-MCNC: 275.4 MG/L (ref 0–8.2)
D DIMER PPP IA.FEU-MCNC: 1.09 MG/L FEU
DIFFERENTIAL METHOD: ABNORMAL
EOSINOPHIL # BLD AUTO: 0 K/UL (ref 0–0.5)
EOSINOPHIL NFR BLD: 0 % (ref 0–8)
ERYTHROCYTE [DISTWIDTH] IN BLOOD BY AUTOMATED COUNT: 16.5 % (ref 11.5–14.5)
EST. GFR  (AFRICAN AMERICAN): >60 ML/MIN/1.73 M^2
EST. GFR  (NON AFRICAN AMERICAN): >60 ML/MIN/1.73 M^2
FERRITIN SERPL-MCNC: 1307 NG/ML (ref 20–300)
GLUCOSE SERPL-MCNC: 180 MG/DL (ref 70–110)
HCT VFR BLD AUTO: 30.7 % (ref 40–54)
HGB BLD-MCNC: 9.4 G/DL (ref 14–18)
IMM GRANULOCYTES # BLD AUTO: 0.09 K/UL (ref 0–0.04)
IMM GRANULOCYTES NFR BLD AUTO: 0.8 % (ref 0–0.5)
LDH SERPL L TO P-CCNC: 378 U/L (ref 110–260)
LYMPHOCYTES # BLD AUTO: 0.3 K/UL (ref 1–4.8)
LYMPHOCYTES NFR BLD: 2.6 % (ref 18–48)
MAGNESIUM SERPL-MCNC: 2 MG/DL (ref 1.6–2.6)
MCH RBC QN AUTO: 29.4 PG (ref 27–31)
MCHC RBC AUTO-ENTMCNC: 30.6 G/DL (ref 32–36)
MCV RBC AUTO: 96 FL (ref 82–98)
MONOCYTES # BLD AUTO: 0.3 K/UL (ref 0.3–1)
MONOCYTES NFR BLD: 2.5 % (ref 4–15)
NEUTROPHILS # BLD AUTO: 10.7 K/UL (ref 1.8–7.7)
NEUTROPHILS NFR BLD: 93.9 % (ref 38–73)
NRBC BLD-RTO: 0 /100 WBC
PLATELET # BLD AUTO: 313 K/UL (ref 150–350)
PMV BLD AUTO: 9.2 FL (ref 9.2–12.9)
POTASSIUM SERPL-SCNC: 3.8 MMOL/L (ref 3.5–5.1)
PROCALCITONIN SERPL IA-MCNC: 0.18 NG/ML
RBC # BLD AUTO: 3.2 M/UL (ref 4.6–6.2)
SODIUM SERPL-SCNC: 133 MMOL/L (ref 136–145)
TROPONIN I SERPL DL<=0.01 NG/ML-MCNC: 0.03 NG/ML (ref 0–0.03)
WBC # BLD AUTO: 11.34 K/UL (ref 3.9–12.7)

## 2020-12-27 PROCEDURE — 83735 ASSAY OF MAGNESIUM: CPT

## 2020-12-27 PROCEDURE — 82728 ASSAY OF FERRITIN: CPT

## 2020-12-27 PROCEDURE — 63600175 PHARM REV CODE 636 W HCPCS: Performed by: NURSE PRACTITIONER

## 2020-12-27 PROCEDURE — 99900035 HC TECH TIME PER 15 MIN (STAT)

## 2020-12-27 PROCEDURE — 27100171 HC OXYGEN HIGH FLOW UP TO 24 HOURS

## 2020-12-27 PROCEDURE — 36415 COLL VENOUS BLD VENIPUNCTURE: CPT

## 2020-12-27 PROCEDURE — 85379 FIBRIN DEGRADATION QUANT: CPT

## 2020-12-27 PROCEDURE — 94640 AIRWAY INHALATION TREATMENT: CPT

## 2020-12-27 PROCEDURE — 25000242 PHARM REV CODE 250 ALT 637 W/ HCPCS: Performed by: NURSE PRACTITIONER

## 2020-12-27 PROCEDURE — 97110 THERAPEUTIC EXERCISES: CPT

## 2020-12-27 PROCEDURE — 25000242 PHARM REV CODE 250 ALT 637 W/ HCPCS: Performed by: INTERNAL MEDICINE

## 2020-12-27 PROCEDURE — 97161 PT EVAL LOW COMPLEX 20 MIN: CPT

## 2020-12-27 PROCEDURE — 83615 LACTATE (LD) (LDH) ENZYME: CPT

## 2020-12-27 PROCEDURE — 86140 C-REACTIVE PROTEIN: CPT

## 2020-12-27 PROCEDURE — 25000003 PHARM REV CODE 250: Performed by: NURSE PRACTITIONER

## 2020-12-27 PROCEDURE — 80048 BASIC METABOLIC PNL TOTAL CA: CPT

## 2020-12-27 PROCEDURE — 84145 PROCALCITONIN (PCT): CPT

## 2020-12-27 PROCEDURE — 97530 THERAPEUTIC ACTIVITIES: CPT

## 2020-12-27 PROCEDURE — 99214 OFFICE O/P EST MOD 30 MIN: CPT | Mod: ,,, | Performed by: INTERNAL MEDICINE

## 2020-12-27 PROCEDURE — 96376 TX/PRO/DX INJ SAME DRUG ADON: CPT

## 2020-12-27 PROCEDURE — 99214 PR OFFICE/OUTPT VISIT, EST, LEVL IV, 30-39 MIN: ICD-10-PCS | Mod: ,,, | Performed by: INTERNAL MEDICINE

## 2020-12-27 PROCEDURE — 84484 ASSAY OF TROPONIN QUANT: CPT

## 2020-12-27 PROCEDURE — 85025 COMPLETE CBC W/AUTO DIFF WBC: CPT

## 2020-12-27 PROCEDURE — G0378 HOSPITAL OBSERVATION PER HR: HCPCS

## 2020-12-27 PROCEDURE — 96372 THER/PROPH/DIAG INJ SC/IM: CPT | Mod: 59

## 2020-12-27 RX ADMIN — DEXAMETHASONE SODIUM PHOSPHATE 6 MG: 4 INJECTION INTRA-ARTICULAR; INTRALESIONAL; INTRAMUSCULAR; INTRAVENOUS; SOFT TISSUE at 08:12

## 2020-12-27 RX ADMIN — CETIRIZINE HYDROCHLORIDE 5 MG: 5 TABLET ORAL at 08:12

## 2020-12-27 RX ADMIN — THERA TABS 1 TABLET: TAB at 08:12

## 2020-12-27 RX ADMIN — ASPIRIN 81 MG: 81 TABLET, COATED ORAL at 08:12

## 2020-12-27 RX ADMIN — PANTOPRAZOLE SODIUM 40 MG: 40 TABLET, DELAYED RELEASE ORAL at 08:12

## 2020-12-27 RX ADMIN — ALBUTEROL SULFATE 2 PUFF: 90 AEROSOL, METERED RESPIRATORY (INHALATION) at 12:12

## 2020-12-27 RX ADMIN — Medication 1000 UNITS: at 08:12

## 2020-12-27 RX ADMIN — FLUTICASONE FUROATE AND VILANTEROL TRIFENATATE 1 PUFF: 100; 25 POWDER RESPIRATORY (INHALATION) at 08:12

## 2020-12-27 RX ADMIN — SIMVASTATIN 20 MG: 20 TABLET, FILM COATED ORAL at 08:12

## 2020-12-27 RX ADMIN — ALBUTEROL SULFATE 2 PUFF: 90 AEROSOL, METERED RESPIRATORY (INHALATION) at 08:12

## 2020-12-27 RX ADMIN — LEVOTHYROXINE SODIUM 112 MCG: 0.11 TABLET ORAL at 06:12

## 2020-12-27 RX ADMIN — LISINOPRIL 20 MG: 20 TABLET ORAL at 08:12

## 2020-12-27 RX ADMIN — ALBUTEROL SULFATE 2 PUFF: 90 AEROSOL, METERED RESPIRATORY (INHALATION) at 07:12

## 2020-12-27 RX ADMIN — ENOXAPARIN SODIUM 40 MG: 40 INJECTION SUBCUTANEOUS at 08:12

## 2020-12-27 RX ADMIN — ALBUTEROL SULFATE 2 PUFF: 90 AEROSOL, METERED RESPIRATORY (INHALATION) at 01:12

## 2020-12-27 RX ADMIN — AMLODIPINE BESYLATE 5 MG: 5 TABLET ORAL at 08:12

## 2020-12-27 RX ADMIN — Medication 500 MG: at 08:12

## 2020-12-27 RX ADMIN — ZINC SULFATE 220 MG (50 MG) CAPSULE 220 MG: CAPSULE at 08:12

## 2020-12-27 RX ADMIN — OXYCODONE HYDROCHLORIDE AND ACETAMINOPHEN 500 MG: 500 TABLET ORAL at 08:12

## 2020-12-28 ENCOUNTER — PATIENT MESSAGE (OUTPATIENT)
Dept: ADMINISTRATIVE | Facility: OTHER | Age: 85
End: 2020-12-28

## 2020-12-28 ENCOUNTER — TELEPHONE (OUTPATIENT)
Dept: RADIOLOGY | Facility: HOSPITAL | Age: 85
End: 2020-12-28

## 2020-12-28 VITALS
BODY MASS INDEX: 22.96 KG/M2 | RESPIRATION RATE: 26 BRPM | HEART RATE: 99 BPM | OXYGEN SATURATION: 74 % | DIASTOLIC BLOOD PRESSURE: 62 MMHG | WEIGHT: 164 LBS | TEMPERATURE: 101 F | HEIGHT: 71 IN | SYSTOLIC BLOOD PRESSURE: 131 MMHG

## 2020-12-28 LAB
ANION GAP SERPL CALC-SCNC: 10 MMOL/L (ref 8–16)
BASOPHILS # BLD AUTO: 0.03 K/UL (ref 0–0.2)
BASOPHILS NFR BLD: 0.2 % (ref 0–1.9)
BUN SERPL-MCNC: 26 MG/DL (ref 8–23)
CALCIUM SERPL-MCNC: 9.2 MG/DL (ref 8.7–10.5)
CHLORIDE SERPL-SCNC: 99 MMOL/L (ref 95–110)
CO2 SERPL-SCNC: 24 MMOL/L (ref 23–29)
CREAT SERPL-MCNC: 1.1 MG/DL (ref 0.5–1.4)
DIFFERENTIAL METHOD: ABNORMAL
EOSINOPHIL # BLD AUTO: 0 K/UL (ref 0–0.5)
EOSINOPHIL NFR BLD: 0 % (ref 0–8)
ERYTHROCYTE [DISTWIDTH] IN BLOOD BY AUTOMATED COUNT: 16.7 % (ref 11.5–14.5)
EST. GFR  (AFRICAN AMERICAN): >60 ML/MIN/1.73 M^2
EST. GFR  (NON AFRICAN AMERICAN): 59 ML/MIN/1.73 M^2
GLUCOSE SERPL-MCNC: 131 MG/DL (ref 70–110)
HCT VFR BLD AUTO: 34.6 % (ref 40–54)
HGB BLD-MCNC: 10.2 G/DL (ref 14–18)
IMM GRANULOCYTES # BLD AUTO: 0.13 K/UL (ref 0–0.04)
IMM GRANULOCYTES NFR BLD AUTO: 0.9 % (ref 0–0.5)
LYMPHOCYTES # BLD AUTO: 0.8 K/UL (ref 1–4.8)
LYMPHOCYTES NFR BLD: 5.5 % (ref 18–48)
MAGNESIUM SERPL-MCNC: 2 MG/DL (ref 1.6–2.6)
MCH RBC QN AUTO: 28.9 PG (ref 27–31)
MCHC RBC AUTO-ENTMCNC: 29.5 G/DL (ref 32–36)
MCV RBC AUTO: 98 FL (ref 82–98)
MONOCYTES # BLD AUTO: 0.4 K/UL (ref 0.3–1)
MONOCYTES NFR BLD: 2.6 % (ref 4–15)
NEUTROPHILS # BLD AUTO: 13.5 K/UL (ref 1.8–7.7)
NEUTROPHILS NFR BLD: 90.8 % (ref 38–73)
NRBC BLD-RTO: 0 /100 WBC
PLATELET # BLD AUTO: 317 K/UL (ref 150–350)
PMV BLD AUTO: 10.3 FL (ref 9.2–12.9)
POTASSIUM SERPL-SCNC: 4.2 MMOL/L (ref 3.5–5.1)
RBC # BLD AUTO: 3.53 M/UL (ref 4.6–6.2)
SODIUM SERPL-SCNC: 133 MMOL/L (ref 136–145)
WBC # BLD AUTO: 14.84 K/UL (ref 3.9–12.7)

## 2020-12-28 PROCEDURE — 36415 COLL VENOUS BLD VENIPUNCTURE: CPT

## 2020-12-28 PROCEDURE — 63600175 PHARM REV CODE 636 W HCPCS: Performed by: NURSE PRACTITIONER

## 2020-12-28 PROCEDURE — G0378 HOSPITAL OBSERVATION PER HR: HCPCS

## 2020-12-28 PROCEDURE — 63600175 PHARM REV CODE 636 W HCPCS: Performed by: EMERGENCY MEDICINE

## 2020-12-28 PROCEDURE — 94761 N-INVAS EAR/PLS OXIMETRY MLT: CPT

## 2020-12-28 PROCEDURE — 99900035 HC TECH TIME PER 15 MIN (STAT)

## 2020-12-28 PROCEDURE — 94640 AIRWAY INHALATION TREATMENT: CPT

## 2020-12-28 PROCEDURE — 85025 COMPLETE CBC W/AUTO DIFF WBC: CPT

## 2020-12-28 PROCEDURE — 96375 TX/PRO/DX INJ NEW DRUG ADDON: CPT

## 2020-12-28 PROCEDURE — 83735 ASSAY OF MAGNESIUM: CPT

## 2020-12-28 PROCEDURE — 80048 BASIC METABOLIC PNL TOTAL CA: CPT

## 2020-12-28 PROCEDURE — 27100171 HC OXYGEN HIGH FLOW UP TO 24 HOURS

## 2020-12-28 PROCEDURE — 99214 PR OFFICE/OUTPT VISIT, EST, LEVL IV, 30-39 MIN: ICD-10-PCS | Mod: ,,, | Performed by: INTERNAL MEDICINE

## 2020-12-28 PROCEDURE — 99214 OFFICE O/P EST MOD 30 MIN: CPT | Mod: ,,, | Performed by: INTERNAL MEDICINE

## 2020-12-28 PROCEDURE — 25000003 PHARM REV CODE 250: Performed by: NURSE PRACTITIONER

## 2020-12-28 PROCEDURE — 96376 TX/PRO/DX INJ SAME DRUG ADON: CPT

## 2020-12-28 RX ORDER — MORPHINE SULFATE 2 MG/ML
2 INJECTION, SOLUTION INTRAMUSCULAR; INTRAVENOUS EVERY 4 HOURS PRN
Status: DISCONTINUED | OUTPATIENT
Start: 2020-12-28 | End: 2020-12-28 | Stop reason: HOSPADM

## 2020-12-28 RX ORDER — LORAZEPAM 2 MG/ML
1 INJECTION INTRAMUSCULAR EVERY 4 HOURS PRN
Status: DISCONTINUED | OUTPATIENT
Start: 2020-12-28 | End: 2020-12-28 | Stop reason: HOSPADM

## 2020-12-28 RX ADMIN — LORAZEPAM 1 MG: 2 INJECTION INTRAMUSCULAR; INTRAVENOUS at 11:12

## 2020-12-28 RX ADMIN — ALBUTEROL SULFATE 2 PUFF: 90 AEROSOL, METERED RESPIRATORY (INHALATION) at 08:12

## 2020-12-28 RX ADMIN — FLUTICASONE FUROATE AND VILANTEROL TRIFENATATE 1 PUFF: 100; 25 POWDER RESPIRATORY (INHALATION) at 07:12

## 2020-12-28 RX ADMIN — ALBUTEROL SULFATE 2 PUFF: 90 AEROSOL, METERED RESPIRATORY (INHALATION) at 12:12

## 2020-12-28 RX ADMIN — DEXAMETHASONE SODIUM PHOSPHATE 6 MG: 4 INJECTION INTRA-ARTICULAR; INTRALESIONAL; INTRAMUSCULAR; INTRAVENOUS; SOFT TISSUE at 08:12

## 2020-12-28 RX ADMIN — MORPHINE SULFATE 2 MG: 2 INJECTION, SOLUTION INTRAMUSCULAR; INTRAVENOUS at 08:12

## 2020-12-28 RX ADMIN — LEVOTHYROXINE SODIUM 112 MCG: 0.11 TABLET ORAL at 05:12

## 2020-12-28 NOTE — TELEPHONE ENCOUNTER
Pt was hospitalized 12/26 - 27 with hypoxia/positive for COVID 19. He was admitted to hospice on D/C. No need to pursue BP and A1C measures at this time.

## 2020-12-29 ENCOUNTER — NURSE TRIAGE (OUTPATIENT)
Dept: ADMINISTRATIVE | Facility: CLINIC | Age: 85
End: 2020-12-29

## 2020-12-29 ENCOUNTER — EXTERNAL HOME HEALTH (OUTPATIENT)
Dept: HOME HEALTH SERVICES | Facility: HOSPITAL | Age: 85
End: 2020-12-29
Payer: MEDICARE

## 2020-12-29 NOTE — TELEPHONE ENCOUNTER
Patient submitted consent in portal and is active in CC program. No outreach necessary per protocol.

## 2021-01-01 LAB
BACTERIA BLD CULT: NORMAL
BACTERIA BLD CULT: NORMAL

## 2021-12-30 NOTE — TELEPHONE ENCOUNTER
Personalized Preventive Plan for Antonia Husain - 12/30/2021  Medicare offers a range of preventive health benefits. Some of the tests and screenings are paid in full while other may be subject to a deductible, co-insurance, and/or copay. Some of these benefits include a comprehensive review of your medical history including lifestyle, illnesses that may run in your family, and various assessments and screenings as appropriate. After reviewing your medical record and screening and assessments performed today your provider may have ordered immunizations, labs, imaging, and/or referrals for you. A list of these orders (if applicable) as well as your Preventive Care list are included within your After Visit Summary for your review. Other Preventive Recommendations:    · A preventive eye exam performed by an eye specialist is recommended every 1-2 years to screen for glaucoma; cataracts, macular degeneration, and other eye disorders. · A preventive dental visit is recommended every 6 months. · Try to get at least 150 minutes of exercise per week or 10,000 steps per day on a pedometer . · Order or download the FREE \"Exercise & Physical Activity: Your Everyday Guide\" from [employer]. Call 8-333.871.5698 or search [employer] online. · You need 4216-4656 mg of calcium and 6693-7910 IU of vitamin D per day. It is possible to meet your calcium requirement with diet alone, but a vitamin D supplement is usually necessary to meet this goal.  · When exposed to the sun, use a sunscreen that protects against both UVA and UVB radiation with an SPF of 30 or greater. Reapply every 2 to 3 hours or after sweating, drying off with a towel, or swimming. · Always wear a seat belt when traveling in a car. Always wear a helmet when riding a bicycle or motorcycle. Placing lab for A1C - will review with pt at his 8/21/2020 visit.

## 2025-05-19 NOTE — PLAN OF CARE
Pt stated feeling great and no complaints .   Routing to PCP      Pt not seeing much change in blood sugars or weight loss with MOUNJARO    Pended 7.5mg dose -     Pt request to  5/22 due to leaving out of town on Friday for the holiday     Pt would like a [employer] message when sent.        Maria C, RN    Triage Nurse  [facility]

## (undated) DEVICE — SEE MEDLINE ITEM 152487

## (undated) DEVICE — SEE MEDLINE ITEM 157185

## (undated) DEVICE — MANIFOLD 4 PORT

## (undated) DEVICE — SOL WATER STRL IRR 1000ML

## (undated) DEVICE — GAUZE SPONGE 4X4 12PLY

## (undated) DEVICE — GLOVE SURGICAL LATEX SZ 8

## (undated) DEVICE — SUT MONOCRYL 4.0 PS2 CP496G

## (undated) DEVICE — ELECTRODE LOOP CUTTING BIPOLAR

## (undated) DEVICE — SEE MEDLINE ITEM 157027

## (undated) DEVICE — SEE MEDLINE ITEM 152739

## (undated) DEVICE — DRESSING TRANS 4X4 TEGADERM

## (undated) DEVICE — SKIN MARKER DEVON 160

## (undated) DEVICE — Device

## (undated) DEVICE — APPLICATOR CHLORAPREP ORN 26ML

## (undated) DEVICE — DECANTER VIAL ASEPTIC TRANSFER

## (undated) DEVICE — DRAPE MOBILE C-ARM

## (undated) DEVICE — CONTAINER SPECIMEN STRL 4OZ

## (undated) DEVICE — SEE MEDLINE ITEM 157117

## (undated) DEVICE — SUPPORT ULNA NERVE PROTECTOR

## (undated) DEVICE — SHEET THYROID W/ISO-BAC

## (undated) DEVICE — NDL SAFETY 25G X 1.5 ECLIPSE

## (undated) DEVICE — SEE MEDLINE ITEM 152622

## (undated) DEVICE — SUT VICRYL 4-0 ANTIBACT

## (undated) DEVICE — SYR 10CC LUER LOCK

## (undated) DEVICE — BAG DRAIN URINARY CONT IRRG

## (undated) DEVICE — SYR 50ML CATH TIP

## (undated) DEVICE — SUT VICRYL 0 SH

## (undated) DEVICE — UROVIEW 2600/2800

## (undated) DEVICE — CLOSURE SKIN STERI STRIP 1/2X4

## (undated) DEVICE — SEE MEDLINE ITEM 154981

## (undated) DEVICE — ELECTRODE REM PLYHSV RETURN 9

## (undated) DEVICE — CANISTER SUCTION MEDI-VAC 12L

## (undated) DEVICE — GLOVE PROTEXIS HYDROGEL SZ7.5

## (undated) DEVICE — ADHESIVE MASTISOL VIAL 48/BX

## (undated) DEVICE — SOL IRR NACL .9% 3000ML

## (undated) DEVICE — GOWN SMARTGOWN LVL4 X-LONG XL

## (undated) DEVICE — COVER OVERHEAD SURG LT BLUE

## (undated) DEVICE — SUT VICRYL 3-0 27 SH